# Patient Record
Sex: MALE | Race: WHITE | Employment: OTHER | URBAN - METROPOLITAN AREA
[De-identification: names, ages, dates, MRNs, and addresses within clinical notes are randomized per-mention and may not be internally consistent; named-entity substitution may affect disease eponyms.]

---

## 2017-05-23 ENCOUNTER — OFFICE VISIT (OUTPATIENT)
Dept: CARDIOLOGY | Age: 72
End: 2017-05-23

## 2017-05-23 VITALS
SYSTOLIC BLOOD PRESSURE: 144 MMHG | HEIGHT: 70 IN | OXYGEN SATURATION: 98 % | TEMPERATURE: 97.7 F | DIASTOLIC BLOOD PRESSURE: 85 MMHG | RESPIRATION RATE: 18 BRPM | HEART RATE: 62 BPM | BODY MASS INDEX: 26.34 KG/M2 | WEIGHT: 184 LBS

## 2017-05-23 DIAGNOSIS — Z95.5 HISTORY OF CORONARY ARTERY STENT PLACEMENT: ICD-10-CM

## 2017-05-23 DIAGNOSIS — I73.9 PAD (PERIPHERAL ARTERY DISEASE) (HCC): ICD-10-CM

## 2017-05-23 DIAGNOSIS — Z78.9 STATIN INTOLERANCE: ICD-10-CM

## 2017-05-23 DIAGNOSIS — I25.119 CORONARY ARTERY DISEASE INVOLVING NATIVE CORONARY ARTERY OF NATIVE HEART WITH ANGINA PECTORIS (HCC): Primary | ICD-10-CM

## 2017-05-23 DIAGNOSIS — Z98.62 HISTORY OF ANGIOPLASTY OF PERIPHERAL VESSEL: ICD-10-CM

## 2017-05-23 DIAGNOSIS — Z87.891 SMOKING HISTORY: ICD-10-CM

## 2017-05-23 PROCEDURE — 3017F COLORECTAL CA SCREEN DOC REV: CPT | Performed by: INTERNAL MEDICINE

## 2017-05-23 PROCEDURE — G8420 CALC BMI NORM PARAMETERS: HCPCS | Performed by: INTERNAL MEDICINE

## 2017-05-23 PROCEDURE — 1123F ACP DISCUSS/DSCN MKR DOCD: CPT | Performed by: INTERNAL MEDICINE

## 2017-05-23 PROCEDURE — G8599 NO ASA/ANTIPLAT THER USE RNG: HCPCS | Performed by: INTERNAL MEDICINE

## 2017-05-23 PROCEDURE — 4040F PNEUMOC VAC/ADMIN/RCVD: CPT | Performed by: INTERNAL MEDICINE

## 2017-05-23 PROCEDURE — G8427 DOCREV CUR MEDS BY ELIG CLIN: HCPCS | Performed by: INTERNAL MEDICINE

## 2017-05-23 PROCEDURE — 99214 OFFICE O/P EST MOD 30 MIN: CPT | Performed by: INTERNAL MEDICINE

## 2017-05-23 PROCEDURE — 1036F TOBACCO NON-USER: CPT | Performed by: INTERNAL MEDICINE

## 2017-05-23 PROCEDURE — 93000 ELECTROCARDIOGRAM COMPLETE: CPT | Performed by: INTERNAL MEDICINE

## 2017-05-25 ENCOUNTER — HOSPITAL ENCOUNTER (OUTPATIENT)
Dept: GENERAL RADIOLOGY | Age: 72
Discharge: HOME | End: 2017-05-25
Payer: MEDICARE

## 2017-05-25 DIAGNOSIS — Z87.891 SMOKING HISTORY: ICD-10-CM

## 2017-05-25 PROCEDURE — 71020 XR CHEST STANDARD TWO VW: CPT

## 2017-05-30 DIAGNOSIS — R97.20 ELEVATED PSA: ICD-10-CM

## 2017-05-30 DIAGNOSIS — R97.20 ELEVATED PSA: Primary | ICD-10-CM

## 2017-05-30 LAB — PROSTATE SPECIFIC ANTIGEN: 4.07 NG/ML (ref 0–6.22)

## 2017-06-09 ASSESSMENT — ENCOUNTER SYMPTOMS
SHORTNESS OF BREATH: 0
EYES NEGATIVE: 1
CHEST TIGHTNESS: 0
ALLERGIC/IMMUNOLOGIC NEGATIVE: 1
GASTROINTESTINAL NEGATIVE: 1

## 2017-06-12 RX ORDER — ATENOLOL 50 MG/1
TABLET ORAL
Qty: 90 TABLET | Refills: 3 | Status: SHIPPED | OUTPATIENT
Start: 2017-06-12 | End: 2017-07-21 | Stop reason: SDUPTHER

## 2017-06-15 ENCOUNTER — OFFICE VISIT (OUTPATIENT)
Dept: FAMILY MEDICINE CLINIC | Age: 72
End: 2017-06-15

## 2017-06-15 VITALS
TEMPERATURE: 97.8 F | HEIGHT: 71 IN | BODY MASS INDEX: 25.62 KG/M2 | RESPIRATION RATE: 16 BRPM | DIASTOLIC BLOOD PRESSURE: 82 MMHG | SYSTOLIC BLOOD PRESSURE: 130 MMHG | OXYGEN SATURATION: 97 % | WEIGHT: 183 LBS | HEART RATE: 71 BPM

## 2017-06-15 DIAGNOSIS — Z00.00 MEDICARE ANNUAL WELLNESS VISIT, SUBSEQUENT: Primary | ICD-10-CM

## 2017-06-15 DIAGNOSIS — E78.5 HYPERLIPIDEMIA, UNSPECIFIED HYPERLIPIDEMIA TYPE: Chronic | ICD-10-CM

## 2017-06-15 DIAGNOSIS — I10 ESSENTIAL HYPERTENSION: Chronic | ICD-10-CM

## 2017-06-15 DIAGNOSIS — I73.9 PVD (PERIPHERAL VASCULAR DISEASE) (HCC): ICD-10-CM

## 2017-06-15 DIAGNOSIS — Z95.5 HISTORY OF PLACEMENT OF STENT IN LAD CORONARY ARTERY: ICD-10-CM

## 2017-06-15 DIAGNOSIS — I73.9 PERIPHERAL ARTERIAL DISEASE (HCC): ICD-10-CM

## 2017-06-15 PROCEDURE — G8598 ASA/ANTIPLAT THER USED: HCPCS | Performed by: FAMILY MEDICINE

## 2017-06-15 PROCEDURE — G0439 PPPS, SUBSEQ VISIT: HCPCS | Performed by: FAMILY MEDICINE

## 2017-06-15 ASSESSMENT — LIFESTYLE VARIABLES
AUDIT TOTAL SCORE: 3
HAS A RELATIVE, FRIEND, DOCTOR, OR ANOTHER HEALTH PROFESSIONAL EXPRESSED CONCERN ABOUT YOUR DRINKING OR SUGGESTED YOU CUT DOWN: 0
HOW OFTEN DURING THE LAST YEAR HAVE YOU HAD A FEELING OF GUILT OR REMORSE AFTER DRINKING: 0
HOW OFTEN DURING THE LAST YEAR HAVE YOU NEEDED AN ALCOHOLIC DRINK FIRST THING IN THE MORNING TO GET YOURSELF GOING AFTER A NIGHT OF HEAVY DRINKING: 0
HOW MANY STANDARD DRINKS CONTAINING ALCOHOL DO YOU HAVE ON A TYPICAL DAY: 0
HOW OFTEN DO YOU HAVE SIX OR MORE DRINKS ON ONE OCCASION: 0
HOW OFTEN DURING THE LAST YEAR HAVE YOU FAILED TO DO WHAT WAS NORMALLY EXPECTED FROM YOU BECAUSE OF DRINKING: 0
HAVE YOU OR SOMEONE ELSE BEEN INJURED AS A RESULT OF YOUR DRINKING: 0
HOW OFTEN DO YOU HAVE A DRINK CONTAINING ALCOHOL: 3
HOW OFTEN DURING THE LAST YEAR HAVE YOU BEEN UNABLE TO REMEMBER WHAT HAPPENED THE NIGHT BEFORE BECAUSE YOU HAD BEEN DRINKING: 0
AUDIT-C TOTAL SCORE: 3
HOW OFTEN DURING THE LAST YEAR HAVE YOU FOUND THAT YOU WERE NOT ABLE TO STOP DRINKING ONCE YOU HAD STARTED: 0

## 2017-06-15 ASSESSMENT — ENCOUNTER SYMPTOMS
RESPIRATORY NEGATIVE: 1
SHORTNESS OF BREATH: 0
GASTROINTESTINAL NEGATIVE: 1
ALLERGIC/IMMUNOLOGIC NEGATIVE: 1
EYES NEGATIVE: 1

## 2017-06-15 ASSESSMENT — PATIENT HEALTH QUESTIONNAIRE - PHQ9: SUM OF ALL RESPONSES TO PHQ QUESTIONS 1-9: 0

## 2017-06-15 ASSESSMENT — ANXIETY QUESTIONNAIRES: GAD7 TOTAL SCORE: 0

## 2017-07-21 DIAGNOSIS — I10 ESSENTIAL HYPERTENSION: Primary | Chronic | ICD-10-CM

## 2017-07-21 DIAGNOSIS — E78.5 HYPERLIPIDEMIA, UNSPECIFIED HYPERLIPIDEMIA TYPE: Chronic | ICD-10-CM

## 2017-07-21 DIAGNOSIS — I25.10 CORONARY ARTERY DISEASE INVOLVING NATIVE CORONARY ARTERY OF NATIVE HEART WITHOUT ANGINA PECTORIS: Chronic | ICD-10-CM

## 2017-07-21 RX ORDER — LOVASTATIN 10 MG/1
TABLET ORAL
Qty: 90 TABLET | Refills: 3 | Status: SHIPPED | OUTPATIENT
Start: 2017-07-21 | End: 2018-08-21 | Stop reason: SDUPTHER

## 2017-07-21 RX ORDER — ATENOLOL 50 MG/1
50 TABLET ORAL DAILY
Qty: 90 TABLET | Refills: 3 | Status: SHIPPED | OUTPATIENT
Start: 2017-07-21 | End: 2018-05-31 | Stop reason: ALTCHOICE

## 2017-09-20 ENCOUNTER — OFFICE VISIT (OUTPATIENT)
Dept: FAMILY MEDICINE CLINIC | Age: 72
End: 2017-09-20

## 2017-09-20 VITALS
WEIGHT: 179 LBS | OXYGEN SATURATION: 97 % | TEMPERATURE: 98.1 F | RESPIRATION RATE: 18 BRPM | HEIGHT: 71 IN | DIASTOLIC BLOOD PRESSURE: 86 MMHG | BODY MASS INDEX: 25.06 KG/M2 | HEART RATE: 74 BPM | SYSTOLIC BLOOD PRESSURE: 138 MMHG

## 2017-09-20 DIAGNOSIS — M25.561 ARTHRALGIA OF KNEE, RIGHT: Primary | ICD-10-CM

## 2017-09-20 PROCEDURE — 3017F COLORECTAL CA SCREEN DOC REV: CPT | Performed by: FAMILY MEDICINE

## 2017-09-20 PROCEDURE — G8427 DOCREV CUR MEDS BY ELIG CLIN: HCPCS | Performed by: FAMILY MEDICINE

## 2017-09-20 PROCEDURE — 1123F ACP DISCUSS/DSCN MKR DOCD: CPT | Performed by: FAMILY MEDICINE

## 2017-09-20 PROCEDURE — G8598 ASA/ANTIPLAT THER USED: HCPCS | Performed by: FAMILY MEDICINE

## 2017-09-20 PROCEDURE — 1036F TOBACCO NON-USER: CPT | Performed by: FAMILY MEDICINE

## 2017-09-20 PROCEDURE — G8420 CALC BMI NORM PARAMETERS: HCPCS | Performed by: FAMILY MEDICINE

## 2017-09-20 PROCEDURE — 99213 OFFICE O/P EST LOW 20 MIN: CPT | Performed by: FAMILY MEDICINE

## 2017-09-20 PROCEDURE — 4040F PNEUMOC VAC/ADMIN/RCVD: CPT | Performed by: FAMILY MEDICINE

## 2017-09-20 ASSESSMENT — ENCOUNTER SYMPTOMS
RESPIRATORY NEGATIVE: 1
ALLERGIC/IMMUNOLOGIC NEGATIVE: 1
EYES NEGATIVE: 1
GASTROINTESTINAL NEGATIVE: 1
SHORTNESS OF BREATH: 0

## 2017-10-05 RX ORDER — METOPROLOL SUCCINATE 50 MG/1
50 TABLET, EXTENDED RELEASE ORAL DAILY
Qty: 90 TABLET | Refills: 3 | Status: SHIPPED | OUTPATIENT
Start: 2017-10-05 | End: 2018-06-07 | Stop reason: SDUPTHER

## 2017-11-16 ENCOUNTER — OFFICE VISIT (OUTPATIENT)
Dept: FAMILY MEDICINE CLINIC | Age: 72
End: 2017-11-16

## 2017-11-16 VITALS
SYSTOLIC BLOOD PRESSURE: 138 MMHG | DIASTOLIC BLOOD PRESSURE: 88 MMHG | HEIGHT: 71 IN | RESPIRATION RATE: 18 BRPM | TEMPERATURE: 98.1 F | HEART RATE: 72 BPM | BODY MASS INDEX: 24.08 KG/M2 | OXYGEN SATURATION: 97 % | WEIGHT: 172 LBS

## 2017-11-16 DIAGNOSIS — E78.5 HYPERLIPIDEMIA, UNSPECIFIED HYPERLIPIDEMIA TYPE: Chronic | ICD-10-CM

## 2017-11-16 DIAGNOSIS — Z12.5 PROSTATE CANCER SCREENING: ICD-10-CM

## 2017-11-16 DIAGNOSIS — I10 ESSENTIAL HYPERTENSION: Primary | Chronic | ICD-10-CM

## 2017-11-16 DIAGNOSIS — I10 ESSENTIAL HYPERTENSION: Chronic | ICD-10-CM

## 2017-11-16 DIAGNOSIS — I25.10 CORONARY ARTERY DISEASE INVOLVING NATIVE CORONARY ARTERY OF NATIVE HEART WITHOUT ANGINA PECTORIS: Chronic | ICD-10-CM

## 2017-11-16 LAB
ACANTHOCYTES: 0
ALBUMIN SERPL-MCNC: 4.6 G/DL (ref 3.9–4.9)
ALP BLD-CCNC: 63 U/L (ref 35–104)
ALT SERPL-CCNC: 19 U/L (ref 0–41)
ANION GAP SERPL CALCULATED.3IONS-SCNC: 14 MEQ/L (ref 7–13)
ANISOCYTOSIS: 0
AST SERPL-CCNC: 23 U/L (ref 0–40)
AUER RODS: 0
BANDED NEUTROPHILS RELATIVE PERCENT: 1 %
BASOPHILIC STIPPLING: 0
BASOPHILS ABSOLUTE: 0 K/UL (ref 0–0.2)
BASOPHILS RELATIVE PERCENT: 0.9 %
BILIRUB SERPL-MCNC: 0.4 MG/DL (ref 0–1.2)
BUN BLDV-MCNC: 15 MG/DL (ref 8–23)
BURR CELLS: 0
CABOT RINGS: 0
CALCIUM SERPL-MCNC: 9.1 MG/DL (ref 8.6–10.2)
CHLORIDE BLD-SCNC: 103 MEQ/L (ref 98–107)
CHOLESTEROL, TOTAL: 183 MG/DL (ref 0–199)
CO2: 28 MEQ/L (ref 22–29)
CREAT SERPL-MCNC: 0.97 MG/DL (ref 0.7–1.2)
DOHLE BODIES: 0
EOSINOPHILS ABSOLUTE: 0.2 K/UL (ref 0–0.7)
EOSINOPHILS RELATIVE PERCENT: 3 %
GFR AFRICAN AMERICAN: >60
GFR NON-AFRICAN AMERICAN: >60
GLOBULIN: 2.4 G/DL (ref 2.3–3.5)
GLUCOSE BLD-MCNC: 81 MG/DL (ref 74–109)
HAIRY CELLS: 0
HCT VFR BLD CALC: 43.6 % (ref 42–52)
HDLC SERPL-MCNC: 57 MG/DL (ref 40–59)
HEMOGLOBIN: 14.7 G/DL (ref 14–18)
HOWELL-JOLLY BODIES: 0
HYPERSEGMENTED NEUTROPHILS: 0
HYPOCHROMIA: 0
LDL CHOLESTEROL CALCULATED: 87 MG/DL (ref 0–129)
LYMPHOCYTES ABSOLUTE: 0.7 K/UL (ref 1–4.8)
LYMPHOCYTES RELATIVE PERCENT: 11 %
MACROCYTES: 0
MCH RBC QN AUTO: 29.7 PG (ref 27–31.3)
MCHC RBC AUTO-ENTMCNC: 33.7 % (ref 33–37)
MCV RBC AUTO: 88.2 FL (ref 80–100)
METAMYELOCYTES RELATIVE PERCENT: 1 %
MICROCYTES: 0
MONOCYTES ABSOLUTE: 0.2 K/UL (ref 0.2–0.8)
MONOCYTES RELATIVE PERCENT: 3.9 %
NEUTROPHILS ABSOLUTE: 5.1 K/UL (ref 1.4–6.5)
NEUTROPHILS RELATIVE PERCENT: 81 %
OVALOCYTES: 0
PAPPENHEIMER BODIES: 0
PDW BLD-RTO: 13 % (ref 11.5–14.5)
PELGER HUET CELLS: 0 %
PLATELET # BLD: 202 K/UL (ref 130–400)
PLATELET SLIDE REVIEW: ADEQUATE
POIKILOCYTES: 0
POLYCHROMASIA: 0
POTASSIUM SERPL-SCNC: 4.6 MEQ/L (ref 3.5–5.1)
PROSTATE SPECIFIC ANTIGEN: 4.25 NG/ML (ref 0–6.22)
RBC # BLD: 4.95 M/UL (ref 4.7–6.1)
RBC # BLD: NORMAL 10*6/UL
SCHISTOCYTES: 0
SICKLE CELLS: 0
SMUDGE CELLS: 3.9
SODIUM BLD-SCNC: 145 MEQ/L (ref 132–144)
SPHEROCYTES: 0
STOMATOCYTES: 0
TARGET CELLS: 0
TEAR DROP CELLS: 0
TOTAL PROTEIN: 7 G/DL (ref 6.4–8.1)
TOXIC GRANULATION: 0
TRIGL SERPL-MCNC: 197 MG/DL (ref 0–200)
VACUOLATED NEUTROPHILS: 0
WBC # BLD: 6.2 K/UL (ref 4.8–10.8)

## 2017-11-16 PROCEDURE — 1123F ACP DISCUSS/DSCN MKR DOCD: CPT | Performed by: FAMILY MEDICINE

## 2017-11-16 PROCEDURE — G8598 ASA/ANTIPLAT THER USED: HCPCS | Performed by: FAMILY MEDICINE

## 2017-11-16 PROCEDURE — G8427 DOCREV CUR MEDS BY ELIG CLIN: HCPCS | Performed by: FAMILY MEDICINE

## 2017-11-16 PROCEDURE — G8420 CALC BMI NORM PARAMETERS: HCPCS | Performed by: FAMILY MEDICINE

## 2017-11-16 PROCEDURE — 3017F COLORECTAL CA SCREEN DOC REV: CPT | Performed by: FAMILY MEDICINE

## 2017-11-16 PROCEDURE — 99214 OFFICE O/P EST MOD 30 MIN: CPT | Performed by: FAMILY MEDICINE

## 2017-11-16 PROCEDURE — 4040F PNEUMOC VAC/ADMIN/RCVD: CPT | Performed by: FAMILY MEDICINE

## 2017-11-16 PROCEDURE — G8484 FLU IMMUNIZE NO ADMIN: HCPCS | Performed by: FAMILY MEDICINE

## 2017-11-16 PROCEDURE — 1036F TOBACCO NON-USER: CPT | Performed by: FAMILY MEDICINE

## 2017-11-16 ASSESSMENT — ENCOUNTER SYMPTOMS
ALLERGIC/IMMUNOLOGIC NEGATIVE: 1
SHORTNESS OF BREATH: 0
RESPIRATORY NEGATIVE: 1
EYES NEGATIVE: 1
GASTROINTESTINAL NEGATIVE: 1

## 2017-11-16 NOTE — PROGRESS NOTES
Number of children: N/A    Years of education: N/A     Social History Main Topics    Smoking status: Former Smoker     Packs/day: 1.00     Years: 36.00     Start date: 7/1/1967     Quit date: 7/1/2003    Smokeless tobacco: Never Used    Alcohol use Yes     5 Glasses of wine, 2 Cans of beer per week      Comment: 3- 4 TIMES A WEEK  WINE ONLY     Drug use: No    Sexual activity: Not Asked     Other Topics Concern    None     Social History Narrative    None     Current Outpatient Prescriptions   Medication Sig Dispense Refill    metoprolol succinate (TOPROL XL) 50 MG extended release tablet Take 1 tablet by mouth daily 90 tablet 3    lovastatin (MEVACOR) 10 MG tablet Take 1 tablet by mouth  nightly 90 tablet 3    atenolol (TENORMIN) 50 MG tablet Take 1 tablet by mouth daily 90 tablet 3    aspirin 81 MG tablet Take 81 mg by mouth daily      tadalafil (CIALIS) 20 MG tablet Take 1 tablet by mouth as needed for Erectile Dysfunction. 10 tablet 3     No current facility-administered medications for this visit. Current Outpatient Prescriptions on File Prior to Visit   Medication Sig Dispense Refill    metoprolol succinate (TOPROL XL) 50 MG extended release tablet Take 1 tablet by mouth daily 90 tablet 3    lovastatin (MEVACOR) 10 MG tablet Take 1 tablet by mouth  nightly 90 tablet 3    atenolol (TENORMIN) 50 MG tablet Take 1 tablet by mouth daily 90 tablet 3    aspirin 81 MG tablet Take 81 mg by mouth daily      tadalafil (CIALIS) 20 MG tablet Take 1 tablet by mouth as needed for Erectile Dysfunction. 10 tablet 3     No current facility-administered medications on file prior to visit.       Allergies   Allergen Reactions    Lisinopril Anaphylaxis and Swelling    Crestor [Rosuvastatin]     Pravachol [Pravastatin] Other (See Comments)     Joint / Muscle aches     Health Maintenance   Topic Date Due    AAA screen  1945    DTaP/Tdap/Td vaccine (1 - Tdap) 04/14/1964    Smoker: low dose lung CT screening  04/14/2000    Lipid screen  11/11/2021    Colon cancer screen colonoscopy  10/19/2022    Zostavax vaccine  Completed    Flu vaccine  Completed    Pneumococcal low/med risk  Completed    Hepatitis C screen  Completed       Review of Systems    Review of Systems   Constitutional: Negative for activity change, appetite change, chills, fever and unexpected weight change. HENT: Negative. Eyes: Negative. Respiratory: Negative. Negative for shortness of breath. Cardiovascular: Negative. Negative for chest pain and palpitations. Gastrointestinal: Negative. Endocrine: Negative. Genitourinary: Negative. Musculoskeletal: Negative. Skin: Negative. Allergic/Immunologic: Negative. Neurological: Negative. Hematological: Negative. Psychiatric/Behavioral: Negative. Physical Exam  Vitals:    11/16/17 1302   BP: 138/88   Pulse: 72   Resp: 18   Temp: 98.1 °F (36.7 °C)   TempSrc: Tympanic   SpO2: 97%   Weight: 172 lb (78 kg)   Height: 5' 11\" (1.803 m)       Physical Exam   Constitutional: He appears well-developed and well-nourished. HENT:   Right Ear: External ear normal.   Left Ear: External ear normal.   Eyes: Conjunctivae are normal. Pupils are equal, round, and reactive to light. Neck: Normal range of motion. Neck supple. No thyromegaly present. Cardiovascular: Normal rate, regular rhythm and normal heart sounds. No murmur heard. Pulmonary/Chest: Effort normal and breath sounds normal.   Abdominal: Soft. Bowel sounds are normal.   Musculoskeletal: Normal range of motion. He exhibits no edema or tenderness. Lymphadenopathy:     He has no cervical adenopathy. Assessment  1. Essential hypertension  CBC Auto Differential    Comprehensive Metabolic Panel    Lipid Panel   2. Coronary artery disease involving native coronary artery of native heart without angina pectoris     3. Hyperlipidemia, unspecified hyperlipidemia type     4.  Prostate cancer screening Psa screening     Problem List     Hypertension - Primary (Chronic)    Relevant Medications    tadalafil (CIALIS) 20 MG tablet    aspirin 81 MG tablet    lovastatin (MEVACOR) 10 MG tablet    atenolol (TENORMIN) 50 MG tablet    metoprolol succinate (TOPROL XL) 50 MG extended release tablet    Other Relevant Orders    CBC Auto Differential    Comprehensive Metabolic Panel    Lipid Panel    CAD (coronary artery disease) (Chronic)    Relevant Medications    tadalafil (CIALIS) 20 MG tablet    aspirin 81 MG tablet    lovastatin (MEVACOR) 10 MG tablet    atenolol (TENORMIN) 50 MG tablet    metoprolol succinate (TOPROL XL) 50 MG extended release tablet    Hyperlipidemia (Chronic)    Relevant Medications    tadalafil (CIALIS) 20 MG tablet    aspirin 81 MG tablet    lovastatin (MEVACOR) 10 MG tablet    atenolol (TENORMIN) 50 MG tablet    metoprolol succinate (TOPROL XL) 50 MG extended release tablet          Plan  Orders Placed This Encounter   Procedures    CBC Auto Differential     Standing Status:   Future     Standing Expiration Date:   11/16/2018    Comprehensive Metabolic Panel     Standing Status:   Future     Standing Expiration Date:   11/16/2018    Lipid Panel     Standing Status:   Future     Standing Expiration Date:   11/16/2018     Order Specific Question:   Is Patient Fasting?/# of Hours     Answer:   8    Psa screening     Standing Status:   Future     Standing Expiration Date:   11/16/2018     No orders of the defined types were placed in this encounter. Follow up in 6 months.   Eileen Payton MD

## 2018-05-31 ENCOUNTER — OFFICE VISIT (OUTPATIENT)
Dept: INTERNAL MEDICINE CLINIC | Age: 73
End: 2018-05-31
Payer: MEDICARE

## 2018-05-31 VITALS
HEIGHT: 71 IN | DIASTOLIC BLOOD PRESSURE: 82 MMHG | RESPIRATION RATE: 12 BRPM | OXYGEN SATURATION: 95 % | SYSTOLIC BLOOD PRESSURE: 136 MMHG | TEMPERATURE: 98.1 F | HEART RATE: 72 BPM | BODY MASS INDEX: 24.64 KG/M2 | WEIGHT: 176 LBS

## 2018-05-31 DIAGNOSIS — Z13.6 SCREENING FOR AAA (ABDOMINAL AORTIC ANEURYSM): ICD-10-CM

## 2018-05-31 DIAGNOSIS — D22.9 ATYPICAL MOLE: Primary | ICD-10-CM

## 2018-05-31 DIAGNOSIS — Z23 NEED FOR SHINGLES VACCINE: ICD-10-CM

## 2018-05-31 DIAGNOSIS — I73.9 PERIPHERAL ARTERIAL DISEASE (HCC): ICD-10-CM

## 2018-05-31 DIAGNOSIS — Z87.891 PERSONAL HISTORY OF TOBACCO USE: ICD-10-CM

## 2018-05-31 DIAGNOSIS — I73.9 PVD (PERIPHERAL VASCULAR DISEASE) (HCC): ICD-10-CM

## 2018-05-31 PROCEDURE — 1036F TOBACCO NON-USER: CPT | Performed by: FAMILY MEDICINE

## 2018-05-31 PROCEDURE — 99213 OFFICE O/P EST LOW 20 MIN: CPT | Performed by: FAMILY MEDICINE

## 2018-05-31 PROCEDURE — 3017F COLORECTAL CA SCREEN DOC REV: CPT | Performed by: FAMILY MEDICINE

## 2018-05-31 PROCEDURE — G8598 ASA/ANTIPLAT THER USED: HCPCS | Performed by: FAMILY MEDICINE

## 2018-05-31 PROCEDURE — G0296 VISIT TO DETERM LDCT ELIG: HCPCS | Performed by: FAMILY MEDICINE

## 2018-05-31 PROCEDURE — G8427 DOCREV CUR MEDS BY ELIG CLIN: HCPCS | Performed by: FAMILY MEDICINE

## 2018-05-31 PROCEDURE — G8420 CALC BMI NORM PARAMETERS: HCPCS | Performed by: FAMILY MEDICINE

## 2018-05-31 PROCEDURE — 4040F PNEUMOC VAC/ADMIN/RCVD: CPT | Performed by: FAMILY MEDICINE

## 2018-05-31 PROCEDURE — 1123F ACP DISCUSS/DSCN MKR DOCD: CPT | Performed by: FAMILY MEDICINE

## 2018-05-31 ASSESSMENT — ENCOUNTER SYMPTOMS
RESPIRATORY NEGATIVE: 1
GASTROINTESTINAL NEGATIVE: 1
EYES NEGATIVE: 1
ALLERGIC/IMMUNOLOGIC NEGATIVE: 1
SHORTNESS OF BREATH: 0

## 2018-06-07 RX ORDER — METOPROLOL SUCCINATE 50 MG/1
50 TABLET, EXTENDED RELEASE ORAL DAILY
Qty: 90 TABLET | Refills: 1 | Status: SHIPPED | OUTPATIENT
Start: 2018-06-07 | End: 2018-10-08 | Stop reason: SDUPTHER

## 2018-06-08 ENCOUNTER — TELEPHONE (OUTPATIENT)
Dept: CASE MANAGEMENT | Age: 73
End: 2018-06-08

## 2018-06-15 ENCOUNTER — OFFICE VISIT (OUTPATIENT)
Dept: CARDIOLOGY CLINIC | Age: 73
End: 2018-06-15
Payer: MEDICARE

## 2018-06-15 VITALS
WEIGHT: 175 LBS | HEIGHT: 71 IN | RESPIRATION RATE: 16 BRPM | BODY MASS INDEX: 24.5 KG/M2 | DIASTOLIC BLOOD PRESSURE: 70 MMHG | SYSTOLIC BLOOD PRESSURE: 122 MMHG | OXYGEN SATURATION: 99 % | HEART RATE: 75 BPM

## 2018-06-15 DIAGNOSIS — I73.9 PAD (PERIPHERAL ARTERY DISEASE) (HCC): Primary | ICD-10-CM

## 2018-06-15 DIAGNOSIS — R09.89 BRUIT OF RIGHT CAROTID ARTERY: ICD-10-CM

## 2018-06-15 DIAGNOSIS — Z95.5 HISTORY OF PLACEMENT OF STENT IN LAD CORONARY ARTERY: ICD-10-CM

## 2018-06-15 DIAGNOSIS — I25.10 CORONARY ARTERY DISEASE INVOLVING NATIVE CORONARY ARTERY OF NATIVE HEART WITHOUT ANGINA PECTORIS: Chronic | ICD-10-CM

## 2018-06-15 DIAGNOSIS — I10 ESSENTIAL HYPERTENSION: Chronic | ICD-10-CM

## 2018-06-15 DIAGNOSIS — E78.5 HYPERLIPIDEMIA, UNSPECIFIED HYPERLIPIDEMIA TYPE: Chronic | ICD-10-CM

## 2018-06-15 PROCEDURE — 3017F COLORECTAL CA SCREEN DOC REV: CPT | Performed by: INTERNAL MEDICINE

## 2018-06-15 PROCEDURE — 93000 ELECTROCARDIOGRAM COMPLETE: CPT | Performed by: INTERNAL MEDICINE

## 2018-06-15 PROCEDURE — G8427 DOCREV CUR MEDS BY ELIG CLIN: HCPCS | Performed by: INTERNAL MEDICINE

## 2018-06-15 PROCEDURE — 4040F PNEUMOC VAC/ADMIN/RCVD: CPT | Performed by: INTERNAL MEDICINE

## 2018-06-15 PROCEDURE — 99214 OFFICE O/P EST MOD 30 MIN: CPT | Performed by: INTERNAL MEDICINE

## 2018-06-15 PROCEDURE — G8598 ASA/ANTIPLAT THER USED: HCPCS | Performed by: INTERNAL MEDICINE

## 2018-06-15 PROCEDURE — 1123F ACP DISCUSS/DSCN MKR DOCD: CPT | Performed by: INTERNAL MEDICINE

## 2018-06-15 PROCEDURE — G8420 CALC BMI NORM PARAMETERS: HCPCS | Performed by: INTERNAL MEDICINE

## 2018-06-15 PROCEDURE — 1036F TOBACCO NON-USER: CPT | Performed by: INTERNAL MEDICINE

## 2018-06-15 ASSESSMENT — ENCOUNTER SYMPTOMS
STRIDOR: 0
WHEEZING: 0
SHORTNESS OF BREATH: 0
NAUSEA: 0
GASTROINTESTINAL NEGATIVE: 1
EYES NEGATIVE: 1
RESPIRATORY NEGATIVE: 1
BLOOD IN STOOL: 0
CHEST TIGHTNESS: 0
COUGH: 0

## 2018-06-19 ENCOUNTER — HOSPITAL ENCOUNTER (OUTPATIENT)
Dept: CT IMAGING | Age: 73
Discharge: HOME | End: 2018-06-21
Payer: MEDICARE

## 2018-06-19 ENCOUNTER — HOSPITAL ENCOUNTER (OUTPATIENT)
Dept: ULTRASOUND IMAGING | Age: 73
Discharge: HOME | End: 2018-06-21
Payer: MEDICARE

## 2018-06-19 DIAGNOSIS — Z13.6 SCREENING FOR AAA (ABDOMINAL AORTIC ANEURYSM): ICD-10-CM

## 2018-06-19 DIAGNOSIS — R09.89 BRUIT: ICD-10-CM

## 2018-06-19 DIAGNOSIS — Z87.891 PERSONAL HISTORY OF TOBACCO USE: ICD-10-CM

## 2018-06-19 DIAGNOSIS — R09.89 BRUIT OF RIGHT CAROTID ARTERY: ICD-10-CM

## 2018-06-19 PROCEDURE — 93880 EXTRACRANIAL BILAT STUDY: CPT

## 2018-06-19 PROCEDURE — G0297 LDCT FOR LUNG CA SCREEN: HCPCS

## 2018-06-19 PROCEDURE — 76706 US ABDL AORTA SCREEN AAA: CPT

## 2018-06-21 ENCOUNTER — TELEPHONE (OUTPATIENT)
Dept: CARDIOLOGY CLINIC | Age: 73
End: 2018-06-21

## 2018-06-28 ENCOUNTER — OFFICE VISIT (OUTPATIENT)
Dept: INTERNAL MEDICINE CLINIC | Age: 73
End: 2018-06-28
Payer: MEDICARE

## 2018-06-28 VITALS
WEIGHT: 177 LBS | OXYGEN SATURATION: 98 % | DIASTOLIC BLOOD PRESSURE: 80 MMHG | RESPIRATION RATE: 16 BRPM | SYSTOLIC BLOOD PRESSURE: 130 MMHG | TEMPERATURE: 97.7 F | HEIGHT: 71 IN | BODY MASS INDEX: 24.78 KG/M2 | HEART RATE: 68 BPM

## 2018-06-28 DIAGNOSIS — I25.10 CORONARY ARTERY DISEASE INVOLVING NATIVE CORONARY ARTERY OF NATIVE HEART WITHOUT ANGINA PECTORIS: Chronic | ICD-10-CM

## 2018-06-28 DIAGNOSIS — I73.9 PVD (PERIPHERAL VASCULAR DISEASE) (HCC): Primary | ICD-10-CM

## 2018-06-28 DIAGNOSIS — I10 ESSENTIAL HYPERTENSION: Chronic | ICD-10-CM

## 2018-06-28 PROCEDURE — 4040F PNEUMOC VAC/ADMIN/RCVD: CPT | Performed by: FAMILY MEDICINE

## 2018-06-28 PROCEDURE — G8598 ASA/ANTIPLAT THER USED: HCPCS | Performed by: FAMILY MEDICINE

## 2018-06-28 PROCEDURE — 1123F ACP DISCUSS/DSCN MKR DOCD: CPT | Performed by: FAMILY MEDICINE

## 2018-06-28 PROCEDURE — G8427 DOCREV CUR MEDS BY ELIG CLIN: HCPCS | Performed by: FAMILY MEDICINE

## 2018-06-28 PROCEDURE — G8420 CALC BMI NORM PARAMETERS: HCPCS | Performed by: FAMILY MEDICINE

## 2018-06-28 PROCEDURE — 99214 OFFICE O/P EST MOD 30 MIN: CPT | Performed by: FAMILY MEDICINE

## 2018-06-28 PROCEDURE — 3288F FALL RISK ASSESSMENT DOCD: CPT | Performed by: FAMILY MEDICINE

## 2018-06-28 PROCEDURE — 1036F TOBACCO NON-USER: CPT | Performed by: FAMILY MEDICINE

## 2018-06-28 PROCEDURE — G8510 SCR DEP NEG, NO PLAN REQD: HCPCS | Performed by: FAMILY MEDICINE

## 2018-06-28 PROCEDURE — 3017F COLORECTAL CA SCREEN DOC REV: CPT | Performed by: FAMILY MEDICINE

## 2018-06-28 ASSESSMENT — ENCOUNTER SYMPTOMS
ALLERGIC/IMMUNOLOGIC NEGATIVE: 1
RESPIRATORY NEGATIVE: 1
SHORTNESS OF BREATH: 0
GASTROINTESTINAL NEGATIVE: 1
EYES NEGATIVE: 1

## 2018-06-28 ASSESSMENT — PATIENT HEALTH QUESTIONNAIRE - PHQ9
SUM OF ALL RESPONSES TO PHQ QUESTIONS 1-9: 0
2. FEELING DOWN, DEPRESSED OR HOPELESS: 0
1. LITTLE INTEREST OR PLEASURE IN DOING THINGS: 0
SUM OF ALL RESPONSES TO PHQ9 QUESTIONS 1 & 2: 0

## 2018-07-13 ENCOUNTER — PROCEDURE VISIT (OUTPATIENT)
Dept: INTERNAL MEDICINE CLINIC | Age: 73
End: 2018-07-13
Payer: MEDICARE

## 2018-07-13 VITALS
RESPIRATION RATE: 16 BRPM | DIASTOLIC BLOOD PRESSURE: 82 MMHG | SYSTOLIC BLOOD PRESSURE: 124 MMHG | HEART RATE: 70 BPM | WEIGHT: 177.2 LBS | OXYGEN SATURATION: 97 % | BODY MASS INDEX: 24.81 KG/M2 | TEMPERATURE: 98.4 F | HEIGHT: 71 IN

## 2018-07-13 DIAGNOSIS — R91.1 LUNG NODULE: Primary | ICD-10-CM

## 2018-07-13 DIAGNOSIS — D22.9 ATYPICAL MOLE: ICD-10-CM

## 2018-07-13 PROCEDURE — 11310 SHAVE SKIN LESION 0.5 CM/<: CPT | Performed by: FAMILY MEDICINE

## 2018-07-24 NOTE — PROGRESS NOTES
SUBJECTIVE:   Sree Leon is a 68 y.o. male who presents for lesion removal. We have already discussed this procedure, including option of not performing surgery, technique of surgery  risk benefit and possible complications including but not limited to potential for excessive scarring, recurrence, excessive bleeding and residual lesion at a recent visit       OBJECTIVE:   Patient appears well. /82 (Site: Left Arm, Position: Sitting, Cuff Size: Small Adult)   Pulse 70   Temp 98.4 °F (36.9 °C) (Oral)   Resp 16   Ht 5' 11\" (1.803 m)   Wt 177 lb 3.2 oz (80.4 kg)   SpO2 97%   BMI 24.71 kg/m²     Skin: 5 mm atypical mole     ASSESSMENT:   suspicious lesion pigment even, pigmented uneven, pearly edges, non ulcerated and hyperkeratotic size 5 mm noted on the right cheek    PLAN:   After informed consent was obtained, using Betadine and Alcohol for cleansing and 2% Lidocaine with epinephrine for anesthetic, with sterile technique, shave excision was performed. Antibiotic dressing is applied, and wound care instructions provided. Be alert for any signs of cutaneous infection. The procedure was well tolerated without complications. Follow up: the specimen is labeled and sent to pathology for evaluation.      Ct showed possible lung nodule referred to Dr. Nathan Reeves

## 2018-08-01 ENCOUNTER — OFFICE VISIT (OUTPATIENT)
Dept: PULMONOLOGY | Age: 73
End: 2018-08-01
Payer: MEDICARE

## 2018-08-01 VITALS
DIASTOLIC BLOOD PRESSURE: 80 MMHG | OXYGEN SATURATION: 98 % | SYSTOLIC BLOOD PRESSURE: 138 MMHG | WEIGHT: 175.6 LBS | HEART RATE: 67 BPM | TEMPERATURE: 98 F | HEIGHT: 71 IN | BODY MASS INDEX: 24.58 KG/M2

## 2018-08-01 DIAGNOSIS — Z87.891 HISTORY OF SMOKING: ICD-10-CM

## 2018-08-01 DIAGNOSIS — R91.1 LUNG NODULE: Primary | ICD-10-CM

## 2018-08-01 PROCEDURE — G8420 CALC BMI NORM PARAMETERS: HCPCS | Performed by: INTERNAL MEDICINE

## 2018-08-01 PROCEDURE — 99204 OFFICE O/P NEW MOD 45 MIN: CPT | Performed by: INTERNAL MEDICINE

## 2018-08-01 PROCEDURE — 1101F PT FALLS ASSESS-DOCD LE1/YR: CPT | Performed by: INTERNAL MEDICINE

## 2018-08-01 PROCEDURE — G8427 DOCREV CUR MEDS BY ELIG CLIN: HCPCS | Performed by: INTERNAL MEDICINE

## 2018-08-01 PROCEDURE — G8598 ASA/ANTIPLAT THER USED: HCPCS | Performed by: INTERNAL MEDICINE

## 2018-08-01 PROCEDURE — 4040F PNEUMOC VAC/ADMIN/RCVD: CPT | Performed by: INTERNAL MEDICINE

## 2018-08-01 PROCEDURE — 1036F TOBACCO NON-USER: CPT | Performed by: INTERNAL MEDICINE

## 2018-08-01 PROCEDURE — 1123F ACP DISCUSS/DSCN MKR DOCD: CPT | Performed by: INTERNAL MEDICINE

## 2018-08-01 PROCEDURE — 3017F COLORECTAL CA SCREEN DOC REV: CPT | Performed by: INTERNAL MEDICINE

## 2018-08-01 NOTE — PROGRESS NOTES
well-being.     Reviewed with the patient: current clinical status, medications, activities and diet.       treatment plan and result expectations have been discussed with the patient who expresses understanding and desires to proceed.         Return in about 6 weeks (around 9/12/2018).       Starr Sprague MD

## 2018-08-02 DIAGNOSIS — R91.1 LUNG NODULE: ICD-10-CM

## 2018-08-05 LAB
ASPERGILLUS ANTIBODY ID: ABNORMAL
BLASTOMYCES ANTIBODY ID: ABNORMAL
COCCIDIOIDES ANTIBODY ID: DETECTED
HISTOPLASMA ABS, ID: ABNORMAL

## 2018-08-06 ENCOUNTER — TELEPHONE (OUTPATIENT)
Dept: PULMONOLOGY | Age: 73
End: 2018-08-06

## 2018-08-06 NOTE — TELEPHONE ENCOUNTER
Adriano Dillard,  , called wanting to know the results of his blood work. His phone number is [phone].

## 2018-08-09 ENCOUNTER — HOSPITAL ENCOUNTER (OUTPATIENT)
Dept: PULMONOLOGY | Age: 73
Discharge: HOME | End: 2018-08-09
Payer: MEDICARE

## 2018-08-09 DIAGNOSIS — R91.1 LUNG NODULE: ICD-10-CM

## 2018-08-09 PROCEDURE — 94729 DIFFUSING CAPACITY: CPT | Performed by: INTERNAL MEDICINE

## 2018-08-09 PROCEDURE — 94726 PLETHYSMOGRAPHY LUNG VOLUMES: CPT

## 2018-08-09 PROCEDURE — 94010 BREATHING CAPACITY TEST: CPT

## 2018-08-09 PROCEDURE — 94060 EVALUATION OF WHEEZING: CPT | Performed by: INTERNAL MEDICINE

## 2018-08-09 PROCEDURE — 94729 DIFFUSING CAPACITY: CPT

## 2018-08-09 PROCEDURE — 94726 PLETHYSMOGRAPHY LUNG VOLUMES: CPT | Performed by: INTERNAL MEDICINE

## 2018-08-10 ENCOUNTER — HOSPITAL ENCOUNTER (OUTPATIENT)
Dept: CT IMAGING | Age: 73
Discharge: HOME | End: 2018-08-12
Payer: MEDICARE

## 2018-08-10 DIAGNOSIS — R91.1 LUNG NODULE: ICD-10-CM

## 2018-08-10 PROCEDURE — 78815 PET IMAGE W/CT SKULL-THIGH: CPT

## 2018-08-10 PROCEDURE — 3430000000 HC RX DIAGNOSTIC RADIOPHARMACEUTICAL: Performed by: INTERNAL MEDICINE

## 2018-08-10 PROCEDURE — A9552 F18 FDG: HCPCS | Performed by: INTERNAL MEDICINE

## 2018-08-10 RX ORDER — FLUDEOXYGLUCOSE F 18 200 MCI/ML
15.3 INJECTION, SOLUTION INTRAVENOUS
Status: COMPLETED | OUTPATIENT
Start: 2018-08-10 | End: 2018-08-10

## 2018-08-10 RX ADMIN — FLUDEOXYGLUCOSE F 18 15.3 MILLICURIE: 200 INJECTION, SOLUTION INTRAVENOUS at 06:45

## 2018-08-13 ENCOUNTER — TELEPHONE (OUTPATIENT)
Dept: PULMONOLOGY | Age: 73
End: 2018-08-13

## 2018-08-13 DIAGNOSIS — R79.1 ABNORMAL COAGULATION PROFILE: ICD-10-CM

## 2018-08-13 DIAGNOSIS — Z01.812 PRE-PROCEDURE LAB EXAM: Primary | ICD-10-CM

## 2018-08-13 DIAGNOSIS — Z01.812 PRE-PROCEDURE LAB EXAM: ICD-10-CM

## 2018-08-13 LAB
BASOPHILS ABSOLUTE: 0.1 K/UL (ref 0–0.2)
BASOPHILS RELATIVE PERCENT: 0.9 %
EOSINOPHILS ABSOLUTE: 0.1 K/UL (ref 0–0.7)
EOSINOPHILS RELATIVE PERCENT: 1.2 %
HCT VFR BLD CALC: 45.6 % (ref 42–52)
HEMOGLOBIN: 15.6 G/DL (ref 14–18)
INR BLD: 1
LYMPHOCYTES ABSOLUTE: 1 K/UL (ref 1–4.8)
LYMPHOCYTES RELATIVE PERCENT: 12.9 %
MCH RBC QN AUTO: 29.7 PG (ref 27–31.3)
MCHC RBC AUTO-ENTMCNC: 34.2 % (ref 33–37)
MCV RBC AUTO: 86.9 FL (ref 80–100)
MONOCYTES ABSOLUTE: 0.3 K/UL (ref 0.2–0.8)
MONOCYTES RELATIVE PERCENT: 3.4 %
NEUTROPHILS ABSOLUTE: 6.2 K/UL (ref 1.4–6.5)
NEUTROPHILS RELATIVE PERCENT: 81.6 %
PDW BLD-RTO: 13.5 % (ref 11.5–14.5)
PLATELET # BLD: 196 K/UL (ref 130–400)
PROTHROMBIN TIME: 10.8 SEC (ref 9.6–12.3)
RBC # BLD: 5.25 M/UL (ref 4.7–6.1)
WBC # BLD: 7.6 K/UL (ref 4.8–10.8)

## 2018-08-13 NOTE — TELEPHONE ENCOUNTER
PET/CT scan discussed with the patient recommend he abuse for  mediastinal lymph node surveillance and biopsy if needed, and bronchoscopy with transbronchial biopsies. We will schedule  patient for next Friday, patient agrees to proceed, all questions answered. Will obtain INR and PTT    Susan please schedule patient for endobronchial ultrasound with transbronchial needle aspiration, bronchoscopy with transbronchial biopsy, general anesthesia, with fluoroscopy.

## 2018-08-17 ENCOUNTER — ANESTHESIA (OUTPATIENT)
Dept: OPERATING ROOM | Age: 73
End: 2018-08-17
Payer: MEDICARE

## 2018-08-17 ENCOUNTER — ANESTHESIA EVENT (OUTPATIENT)
Dept: OPERATING ROOM | Age: 73
End: 2018-08-17
Payer: MEDICARE

## 2018-08-17 ENCOUNTER — HOSPITAL ENCOUNTER (OUTPATIENT)
Age: 73
Setting detail: OUTPATIENT SURGERY
Discharge: HOME | End: 2018-08-17
Attending: INTERNAL MEDICINE | Admitting: INTERNAL MEDICINE
Payer: MEDICARE

## 2018-08-17 ENCOUNTER — APPOINTMENT (OUTPATIENT)
Dept: GENERAL RADIOLOGY | Age: 73
End: 2018-08-17
Attending: INTERNAL MEDICINE
Payer: MEDICARE

## 2018-08-17 VITALS
OXYGEN SATURATION: 98 % | HEIGHT: 71 IN | TEMPERATURE: 97.3 F | HEART RATE: 62 BPM | BODY MASS INDEX: 24.22 KG/M2 | DIASTOLIC BLOOD PRESSURE: 86 MMHG | WEIGHT: 173 LBS | RESPIRATION RATE: 18 BRPM | SYSTOLIC BLOOD PRESSURE: 171 MMHG

## 2018-08-17 VITALS — OXYGEN SATURATION: 100 % | DIASTOLIC BLOOD PRESSURE: 68 MMHG | SYSTOLIC BLOOD PRESSURE: 112 MMHG | TEMPERATURE: 59.9 F

## 2018-08-17 PROCEDURE — 88173 CYTOPATH EVAL FNA REPORT: CPT

## 2018-08-17 PROCEDURE — 7100000011 HC PHASE II RECOVERY - ADDTL 15 MIN: Performed by: INTERNAL MEDICINE

## 2018-08-17 PROCEDURE — 3600000013 HC SURGERY LEVEL 3 ADDTL 15MIN: Performed by: INTERNAL MEDICINE

## 2018-08-17 PROCEDURE — 3700000000 HC ANESTHESIA ATTENDED CARE: Performed by: INTERNAL MEDICINE

## 2018-08-17 PROCEDURE — 2709999900 HC NON-CHARGEABLE SUPPLY: Performed by: INTERNAL MEDICINE

## 2018-08-17 PROCEDURE — 2500000003 HC RX 250 WO HCPCS: Performed by: NURSE ANESTHETIST, CERTIFIED REGISTERED

## 2018-08-17 PROCEDURE — 31623 DX BRONCHOSCOPE/BRUSH: CPT | Performed by: INTERNAL MEDICINE

## 2018-08-17 PROCEDURE — 3209999900 FLUORO FOR SURGICAL PROCEDURES

## 2018-08-17 PROCEDURE — 3700000001 HC ADD 15 MINUTES (ANESTHESIA): Performed by: INTERNAL MEDICINE

## 2018-08-17 PROCEDURE — 2500000003 HC RX 250 WO HCPCS: Performed by: INTERNAL MEDICINE

## 2018-08-17 PROCEDURE — 31624 DX BRONCHOSCOPE/LAVAGE: CPT | Performed by: INTERNAL MEDICINE

## 2018-08-17 PROCEDURE — 31652 BRONCH EBUS SAMPLNG 1/2 NODE: CPT | Performed by: INTERNAL MEDICINE

## 2018-08-17 PROCEDURE — 2580000003 HC RX 258

## 2018-08-17 PROCEDURE — 87102 FUNGUS ISOLATION CULTURE: CPT

## 2018-08-17 PROCEDURE — 7100000001 HC PACU RECOVERY - ADDTL 15 MIN: Performed by: INTERNAL MEDICINE

## 2018-08-17 PROCEDURE — 3600000003 HC SURGERY LEVEL 3 BASE: Performed by: INTERNAL MEDICINE

## 2018-08-17 PROCEDURE — 31628 BRONCHOSCOPY/LUNG BX EACH: CPT | Performed by: INTERNAL MEDICINE

## 2018-08-17 PROCEDURE — C1713 ANCHOR/SCREW BN/BN,TIS/BN: HCPCS | Performed by: INTERNAL MEDICINE

## 2018-08-17 PROCEDURE — 88112 CYTOPATH CELL ENHANCE TECH: CPT

## 2018-08-17 PROCEDURE — 7100000010 HC PHASE II RECOVERY - FIRST 15 MIN: Performed by: INTERNAL MEDICINE

## 2018-08-17 PROCEDURE — 7100000000 HC PACU RECOVERY - FIRST 15 MIN: Performed by: INTERNAL MEDICINE

## 2018-08-17 PROCEDURE — 2580000003 HC RX 258: Performed by: INTERNAL MEDICINE

## 2018-08-17 PROCEDURE — 6360000002 HC RX W HCPCS: Performed by: NURSE ANESTHETIST, CERTIFIED REGISTERED

## 2018-08-17 PROCEDURE — 88305 TISSUE EXAM BY PATHOLOGIST: CPT

## 2018-08-17 PROCEDURE — 2580000003 HC RX 258: Performed by: NURSE ANESTHETIST, CERTIFIED REGISTERED

## 2018-08-17 PROCEDURE — 87116 MYCOBACTERIA CULTURE: CPT

## 2018-08-17 RX ORDER — MIDAZOLAM HYDROCHLORIDE 1 MG/ML
INJECTION INTRAMUSCULAR; INTRAVENOUS PRN
Status: DISCONTINUED | OUTPATIENT
Start: 2018-08-17 | End: 2018-08-17 | Stop reason: SDUPTHER

## 2018-08-17 RX ORDER — HYDROCODONE BITARTRATE AND ACETAMINOPHEN 5; 325 MG/1; MG/1
2 TABLET ORAL PRN
Status: DISCONTINUED | OUTPATIENT
Start: 2018-08-17 | End: 2018-08-17 | Stop reason: HOSPADM

## 2018-08-17 RX ORDER — DIPHENHYDRAMINE HYDROCHLORIDE 50 MG/ML
12.5 INJECTION INTRAMUSCULAR; INTRAVENOUS
Status: DISCONTINUED | OUTPATIENT
Start: 2018-08-17 | End: 2018-08-17 | Stop reason: HOSPADM

## 2018-08-17 RX ORDER — ONDANSETRON 2 MG/ML
4 INJECTION INTRAMUSCULAR; INTRAVENOUS
Status: DISCONTINUED | OUTPATIENT
Start: 2018-08-17 | End: 2018-08-17 | Stop reason: HOSPADM

## 2018-08-17 RX ORDER — SODIUM CHLORIDE, SODIUM LACTATE, POTASSIUM CHLORIDE, CALCIUM CHLORIDE 600; 310; 30; 20 MG/100ML; MG/100ML; MG/100ML; MG/100ML
INJECTION, SOLUTION INTRAVENOUS CONTINUOUS PRN
Status: DISCONTINUED | OUTPATIENT
Start: 2018-08-17 | End: 2018-08-17 | Stop reason: SDUPTHER

## 2018-08-17 RX ORDER — SODIUM CHLORIDE, SODIUM LACTATE, POTASSIUM CHLORIDE, CALCIUM CHLORIDE 600; 310; 30; 20 MG/100ML; MG/100ML; MG/100ML; MG/100ML
INJECTION, SOLUTION INTRAVENOUS
Status: COMPLETED
Start: 2018-08-17 | End: 2018-08-17

## 2018-08-17 RX ORDER — MAGNESIUM HYDROXIDE 1200 MG/15ML
LIQUID ORAL CONTINUOUS PRN
Status: DISCONTINUED | OUTPATIENT
Start: 2018-08-17 | End: 2018-08-17 | Stop reason: HOSPADM

## 2018-08-17 RX ORDER — ROCURONIUM BROMIDE 10 MG/ML
INJECTION, SOLUTION INTRAVENOUS PRN
Status: DISCONTINUED | OUTPATIENT
Start: 2018-08-17 | End: 2018-08-17 | Stop reason: SDUPTHER

## 2018-08-17 RX ORDER — HYDROCODONE BITARTRATE AND ACETAMINOPHEN 5; 325 MG/1; MG/1
1 TABLET ORAL PRN
Status: DISCONTINUED | OUTPATIENT
Start: 2018-08-17 | End: 2018-08-17 | Stop reason: HOSPADM

## 2018-08-17 RX ORDER — ONDANSETRON 2 MG/ML
INJECTION INTRAMUSCULAR; INTRAVENOUS PRN
Status: DISCONTINUED | OUTPATIENT
Start: 2018-08-17 | End: 2018-08-17 | Stop reason: SDUPTHER

## 2018-08-17 RX ORDER — MEPERIDINE HYDROCHLORIDE 25 MG/ML
12.5 INJECTION INTRAMUSCULAR; INTRAVENOUS; SUBCUTANEOUS EVERY 5 MIN PRN
Status: DISCONTINUED | OUTPATIENT
Start: 2018-08-17 | End: 2018-08-17 | Stop reason: HOSPADM

## 2018-08-17 RX ORDER — FENTANYL CITRATE 50 UG/ML
INJECTION, SOLUTION INTRAMUSCULAR; INTRAVENOUS PRN
Status: DISCONTINUED | OUTPATIENT
Start: 2018-08-17 | End: 2018-08-17 | Stop reason: SDUPTHER

## 2018-08-17 RX ORDER — GLYCOPYRROLATE 1 MG/5 ML
SYRINGE (ML) INTRAVENOUS PRN
Status: DISCONTINUED | OUTPATIENT
Start: 2018-08-17 | End: 2018-08-17 | Stop reason: SDUPTHER

## 2018-08-17 RX ORDER — FENTANYL CITRATE 50 UG/ML
50 INJECTION, SOLUTION INTRAMUSCULAR; INTRAVENOUS EVERY 10 MIN PRN
Status: DISCONTINUED | OUTPATIENT
Start: 2018-08-17 | End: 2018-08-17 | Stop reason: HOSPADM

## 2018-08-17 RX ORDER — PROPOFOL 10 MG/ML
INJECTION, EMULSION INTRAVENOUS PRN
Status: DISCONTINUED | OUTPATIENT
Start: 2018-08-17 | End: 2018-08-17 | Stop reason: SDUPTHER

## 2018-08-17 RX ORDER — METOCLOPRAMIDE HYDROCHLORIDE 5 MG/ML
10 INJECTION INTRAMUSCULAR; INTRAVENOUS
Status: DISCONTINUED | OUTPATIENT
Start: 2018-08-17 | End: 2018-08-17 | Stop reason: HOSPADM

## 2018-08-17 RX ORDER — LIDOCAINE HYDROCHLORIDE 20 MG/ML
INJECTION, SOLUTION EPIDURAL; INFILTRATION; INTRACAUDAL; PERINEURAL PRN
Status: DISCONTINUED | OUTPATIENT
Start: 2018-08-17 | End: 2018-08-17 | Stop reason: HOSPADM

## 2018-08-17 RX ADMIN — SODIUM CHLORIDE, POTASSIUM CHLORIDE, SODIUM LACTATE AND CALCIUM CHLORIDE: 600; 310; 30; 20 INJECTION, SOLUTION INTRAVENOUS at 07:00

## 2018-08-17 RX ADMIN — PROPOFOL 200 MG: 10 INJECTION, EMULSION INTRAVENOUS at 07:39

## 2018-08-17 RX ADMIN — SODIUM CHLORIDE, POTASSIUM CHLORIDE, SODIUM LACTATE AND CALCIUM CHLORIDE 1000 ML: 600; 310; 30; 20 INJECTION, SOLUTION INTRAVENOUS at 06:37

## 2018-08-17 RX ADMIN — PHENYLEPHRINE HYDROCHLORIDE 100 MCG: 10 INJECTION INTRAVENOUS at 08:32

## 2018-08-17 RX ADMIN — MIDAZOLAM HYDROCHLORIDE 2 MG: 1 INJECTION, SOLUTION INTRAMUSCULAR; INTRAVENOUS at 07:33

## 2018-08-17 RX ADMIN — ONDANSETRON 4 MG: 2 INJECTION INTRAMUSCULAR; INTRAVENOUS at 08:39

## 2018-08-17 RX ADMIN — FENTANYL CITRATE 100 MCG: 50 INJECTION, SOLUTION INTRAMUSCULAR; INTRAVENOUS at 07:39

## 2018-08-17 RX ADMIN — SUGAMMADEX 150 MG: 100 INJECTION, SOLUTION INTRAVENOUS at 08:50

## 2018-08-17 RX ADMIN — Medication 0.2 MG: at 08:17

## 2018-08-17 RX ADMIN — PHENYLEPHRINE HYDROCHLORIDE 100 MCG: 10 INJECTION INTRAVENOUS at 08:12

## 2018-08-17 RX ADMIN — ROCURONIUM BROMIDE 10 MG: 10 INJECTION INTRAVENOUS at 08:21

## 2018-08-17 RX ADMIN — PHENYLEPHRINE HYDROCHLORIDE 100 MCG: 10 INJECTION INTRAVENOUS at 08:06

## 2018-08-17 RX ADMIN — ROCURONIUM BROMIDE 40 MG: 10 INJECTION INTRAVENOUS at 07:39

## 2018-08-17 RX ADMIN — PHENYLEPHRINE HYDROCHLORIDE 100 MCG: 10 INJECTION INTRAVENOUS at 08:17

## 2018-08-17 ASSESSMENT — PULMONARY FUNCTION TESTS
PIF_VALUE: 28
PIF_VALUE: 31
PIF_VALUE: 32
PIF_VALUE: 32
PIF_VALUE: 0
PIF_VALUE: 33
PIF_VALUE: 2
PIF_VALUE: 1
PIF_VALUE: 31
PIF_VALUE: 12
PIF_VALUE: 33
PIF_VALUE: 12
PIF_VALUE: 31
PIF_VALUE: 3
PIF_VALUE: 35
PIF_VALUE: 31
PIF_VALUE: 32
PIF_VALUE: 32
PIF_VALUE: 35
PIF_VALUE: 35
PIF_VALUE: 15
PIF_VALUE: 32
PIF_VALUE: 31
PIF_VALUE: 20
PIF_VALUE: 13
PIF_VALUE: 12
PIF_VALUE: 33
PIF_VALUE: 21
PIF_VALUE: 31
PIF_VALUE: 35
PIF_VALUE: 33
PIF_VALUE: 35
PIF_VALUE: 10
PIF_VALUE: 14
PIF_VALUE: 13
PIF_VALUE: 33
PIF_VALUE: 29
PIF_VALUE: 15
PIF_VALUE: 13
PIF_VALUE: 21
PIF_VALUE: 12
PIF_VALUE: 29
PIF_VALUE: 32
PIF_VALUE: 25
PIF_VALUE: 21
PIF_VALUE: 2
PIF_VALUE: 33
PIF_VALUE: 35
PIF_VALUE: 14
PIF_VALUE: 12
PIF_VALUE: 31
PIF_VALUE: 2
PIF_VALUE: 1
PIF_VALUE: 2
PIF_VALUE: 4
PIF_VALUE: 13
PIF_VALUE: 33
PIF_VALUE: 13
PIF_VALUE: 35
PIF_VALUE: 32
PIF_VALUE: 14
PIF_VALUE: 32
PIF_VALUE: 31
PIF_VALUE: 12
PIF_VALUE: 2
PIF_VALUE: 31
PIF_VALUE: 11
PIF_VALUE: 14
PIF_VALUE: 28
PIF_VALUE: 14
PIF_VALUE: 31
PIF_VALUE: 12
PIF_VALUE: 29
PIF_VALUE: 34
PIF_VALUE: 1
PIF_VALUE: 34
PIF_VALUE: 15
PIF_VALUE: 17
PIF_VALUE: 17
PIF_VALUE: 21
PIF_VALUE: 30
PIF_VALUE: 13

## 2018-08-17 ASSESSMENT — PAIN - FUNCTIONAL ASSESSMENT: PAIN_FUNCTIONAL_ASSESSMENT: 0-10

## 2018-08-17 NOTE — H&P
Pulmonary and Critical Care Medicine     Encounter Date: 2018 7:22 AM    Mr. Triston Dial is a 68 y.o. male  : 1945         HISTORY OF PRESENT ILLNESS:    Patient is 68 y.o. presents with RUL lung nodule and mediastinal lymphadenopathy, for EBUS TBNA and bronch with biopsy. Has no complaint, no CP, cough, SOB , abd pain , n or v.           Past Medical History:        Diagnosis Date    CAD (coronary artery disease)     Eczema     Granuloma faciale     History of extremity bypass graft 2015    Left leg 2003    History of tobacco abuse 2015    Hyperlipidemia     Hypertension     Lipoma     PVD (peripheral vascular disease) ([facility] [address]     Seborrheic keratosis        Past Surgical History:        Procedure Laterality Date    CARDIAC CATHETERIZATION      COLONOSCOPY  10-19-12    CORONARY ANGIOPLASTY WITH STENT PLACEMENT      DIAGNOSTIC CARDIAC CATH LAB PROCEDURE      HAND SURGERY      L    HIP SURGERY         Social History:     reports that he quit smoking about 13 years ago. His smoking use included Cigarettes. He started smoking about 53 years ago. He has a 40.00 pack-year smoking history. He has never used smokeless tobacco. He reports that he drinks alcohol. He reports that he does not use drugs.     Family History:   Family History   Problem Relation Age of Onset    High Blood Pressure Mother     Other Mother         BRAIN TUMOR    Cancer Father         LUNG       Allergies:  Lisinopril; Crestor [rosuvastatin]; and Pravachol [pravastatin]        MEDICATIONS during current hospitalization:    Continuous Infusions:    Scheduled Meds:    PRN Meds:fentanNYL, HYDROcodone 5 mg - acetaminophen **OR** HYDROcodone 5 mg - acetaminophen, diphenhydrAMINE, ondansetron, metoclopramide, meperidine        REVIEW OF SYSTEMS:  ROS: 10 organs review of system is done including general, psychological, ENT, hematological, endocrine, respiratory, cardiovascular, gastrointestinal, musculoskeletal, neurological,  allergy and Immunology is done and is otherwise negative. PHYSICAL EXAM:    Vitals:  BP (!) 175/95   Pulse 66   Temp 97 °F (36.1 °C) (Temporal)   Resp 12   Ht 5' 11\" (1.803 m)   Wt 173 lb (78.5 kg)   SpO2 97%   BMI 24.13 kg/m²     General: alert, cooperative, no distress  Head: normocephalic, atraumatic  Eyes:No gross abnormalities. ENT:  MMM no lesions  Neck:  supple and no masses  Chest : clear to auscultation bilaterally- no wheezes, rales or rhonchi, normal air movement, no respiratory distress  Heart[de-identified] Heart sounds are normal.  Regular rate and rhythm without murmur, gallop or rub. ABD:  symmetric, soft, non-tender  Musculoskeletal : no cyanosis, no clubbing and no edema  Neuro:  Grossly normal  Skin: No rashes or nodules noted. Lymph node:  no cervical nodes  Urology: No Jolly   Psychiatric: appropriate        Data Review  No results for input(s): WBC, HGB, HCT, PLT in the last 72 hours. No results for input(s): NA, K, CL, CO2, BUN, CREATININE, GLUCOSE in the last 72 hours. Invalid input(s): CA    MV Settings: ABGs: No results for input(s): PHART, EXQ7UDF, PO2ART, PYV6DNZ, BEART, Q0FNMQWM, NQK0EZH in the last 72 hours. O2 Device: None (Room air)  No results found for: LACTA         Assessment, plan:   Patient is at risk due to    · RUL nodule with mediastinal lymphadenopathy, EBUS-TBNA, bronch with TBB, brush and washing today   · Discussed with patient risks include but not limited to bleeding, infection, lung collapse , cardiac arrhythmia, need for other procedures or allergy to med, benefits and alternatives discussed with patient.  Patient  agrees to proceed with procedure      Electronically signed by Ochoa Kelly MD, [facility],  on 8/17/2018 at 7:22 AM

## 2018-08-17 NOTE — ANESTHESIA PRE PROCEDURE
Department of Anesthesiology  Preprocedure Note       Name:  Donal Clark   Age:  68 y.o.  :  1945                                          MRN:  [id number]         Date:  2018      Surgeon: Iam Bailey):  Marga Akhtar MD    Procedure: Procedure(s):  EBUS WITH TRANSBRONCHIAL NEEDLE ASPIRATION W/ FLUOROSCOPY    Medications prior to admission:   Prior to Admission medications    Medication Sig Start Date End Date Taking? Authorizing Provider   metoprolol succinate (TOPROL XL) 50 MG extended release tablet Take 1 tablet by mouth daily 18   Brandon Russell MD   lovastatin (MEVACOR) 10 MG tablet Take 1 tablet by mouth  nightly 17   Bob Lamas MD   aspirin 81 MG tablet Take 81 mg by mouth daily    Historical Provider, MD       Current medications:    Current Facility-Administered Medications   Medication Dose Route Frequency Provider Last Rate Last Dose    lactated ringers infusion                Allergies:     Allergies   Allergen Reactions    Lisinopril Anaphylaxis and Swelling    Crestor [Rosuvastatin]     Pravachol [Pravastatin] Other (See Comments)     Joint / Muscle aches       Problem List:    Patient Active Problem List   Diagnosis Code    Hypertension I10    CAD (coronary artery disease) I25.10    Lipoma D17.9    Eczema L30.9    Hyperlipidemia E78.5    Seborrheic keratosis L82.1    PVD (peripheral vascular disease) ([address] I73.9    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    ED (erectile dysfunction) N52.9    Peripheral arterial disease (HCC) I73.9    History of placement of stent in LAD coronary artery Z95.5    History of extremity bypass graft Z95.828    History of tobacco abuse Z87.891    PAD (peripheral artery disease) (HCC) I73.9    Lung nodule R91.1       Past Medical History:        Diagnosis Date    CAD (coronary artery disease)     Eczema     Granuloma faciale     History of extremity bypass graft 2015    Left leg 2003    History of tobacco abuse 2015 GLUCOSE 104 10/14/2011    PROT 7.0 11/16/2017    CALCIUM 9.1 11/16/2017    BILITOT 0.4 11/16/2017    ALKPHOS 63 11/16/2017    AST 23 11/16/2017    ALT 19 11/16/2017       POC Tests: No results for input(s): POCGLU, POCNA, POCK, POCCL, POCBUN, POCHEMO, POCHCT in the last 72 hours. Coags:   Lab Results   Component Value Date    PROTIME 10.8 08/13/2018    INR 1.0 08/13/2018       HCG (If Applicable): No results found for: PREGTESTUR, PREGSERUM, HCG, HCGQUANT     ABGs: No results found for: PHART, PO2ART, AEX7HHE, AAC6GRY, BEART, S5OSZJTA     Type & Screen (If Applicable):  No results found for: LABABO, [address]    Anesthesia Evaluation  Patient summary reviewed and Nursing notes reviewed no history of anesthetic complications:   Airway: Mallampati: I  TM distance: >3 FB   Neck ROM: full  Mouth opening: > = 3 FB Dental: normal exam         Pulmonary:Negative Pulmonary ROS                              Cardiovascular:    (+) hypertension:, CAD:, CABG/stent:, hyperlipidemia      ECG reviewed               Beta Blocker:  Dose within 24 Hrs         Neuro/Psych:   Negative Neuro/Psych ROS              GI/Hepatic/Renal: Neg GI/Hepatic/Renal ROS            Endo/Other: Negative Endo/Other ROS                    Abdominal:           Vascular:   + PVD, aortic or cerebral, . Anesthesia Plan      general     ASA 3       Induction: intravenous. MIPS: Postoperative opioids intended and Prophylactic antiemetics administered. Anesthetic plan and risks discussed with patient. Plan discussed with CRNA.     Attending anesthesiologist reviewed and agrees with Kyara Paulino MD   8/17/2018

## 2018-08-17 NOTE — OP NOTE
PROCEDURE:   Fiberoptic bronchoscopy with   1. Limited visual airway inspection. 2. EBUS-guided TBNA of stations R4 and R10   1. Bronchoalveolar lavage, BAL RUL   2. Brush biopsy RUL   3. Transbronchial biopsy RUL     CONSENT:   The risks and benefits of this procedure were explained to the the patient . All questions were answered and alternative options explained. The patient has been assessed and He is stable to undergo conscious sedation as well as the procedure itself. MEDICATIONS USED:  1.  1. general anesthesia  2. Topical lidocaine 1% without epinephrine, total quantity 6 mL. PREPROCEDURE DIAGNOSIS(ES):   1. Lung nodule with mediastinal lymphadenopathy , + PET    POSTPROCEDURE DIAGNOSIS(ES):   1. Same      DESCRIPTION OF PROCEDURE:   Consent was verified as signed and placed upon the chart. Patient was positively identified and procedure site was marked. Time-out was performed by operating team and patient. Vital sign monitoring was accomplished by noninvasive hemodynamic monitoring, pulse oximetry, and telemetry. Conscious sedation was applied in an incremental fashion as noted above. A curvilinear EBUS Olympus bronchoscope was inserted into ET tube . Topical lidocaine was applied to the   tracheobronchial tree. Limited visual airway inspection was then performed and no abnormality was noted in the trachea or mainstem bronchi. The bronchoscope was then withdrawn up into the trachea and a andrew survey was begun. EBUS-guided TBNA of stations  R4 and R 10   was then performed. There was no significant bleeding post-biopsy. 2- PROCEDURE:  Fiberoptic bronchoscopy       fiberoptic Olympus bronchoscope was inserted via ET tube. visual airway inspection of the right upper lobe, right middle lobe, right lower lobe, left upper lobe, lingula, and left lower lobe was performed. There were no endobronchial lesions, mucopurulent secretions or active bleeding. Anatomy was normal to the segmental level. The bronchoscope was placed into the right upper lobe segment where fluoroscopic guided cytologic brushings X  1  were obtained from the anterior segment. Bronchoalveolar lavage of the  right upper lobe segment was then obtained. Transbronchial biopsies X  6  were obtained from the  right upper lobe segment under fluoroscopic guidance. There was no significant bleeding post-biopsy. Post-procedure fluorosocopy revealed no evidence of pneumothorax.      ESTIMATED BLOOD LOSS:  less than 10 cc    COMPLICATIONS:  None    Electronically signed by Sulema Bowman MD [facility],  on 8/17/2018 at 10:56 AM

## 2018-08-17 NOTE — PROGRESS NOTES
Dr. Holden Hemp at bedside giving oral discharge instructions, along with procedure results. No questions at noted at this time from family or patient.  Electronically signed by Santa Esqueda RN on 8/17/2018 at 10:50 AM

## 2018-08-21 RX ORDER — LOVASTATIN 10 MG/1
TABLET ORAL
Qty: 90 TABLET | Refills: 3 | Status: SHIPPED | OUTPATIENT
Start: 2018-08-21 | End: 2019-05-13 | Stop reason: SDUPTHER

## 2018-08-22 ENCOUNTER — TELEPHONE (OUTPATIENT)
Dept: PULMONOLOGY | Age: 73
End: 2018-08-22

## 2018-08-22 DIAGNOSIS — R91.1 LUNG NODULE: Primary | ICD-10-CM

## 2018-08-22 NOTE — TELEPHONE ENCOUNTER
Contacted Patient with biopsy results, discussed negative lymph nodes biopsy, lung biopsy also negative however cannot rule out false negative result due to sampling error. We will proceed with CT-guided biopsy, patient is agreeable.

## 2018-08-27 ENCOUNTER — TELEPHONE (OUTPATIENT)
Dept: PULMONOLOGY | Age: 73
End: 2018-08-27

## 2018-08-30 ENCOUNTER — INITIAL CONSULT (OUTPATIENT)
Dept: INTERVENTIONAL RADIOLOGY/VASCULAR | Age: 73
End: 2018-08-30
Payer: MEDICARE

## 2018-08-30 VITALS
SYSTOLIC BLOOD PRESSURE: 128 MMHG | RESPIRATION RATE: 14 BRPM | DIASTOLIC BLOOD PRESSURE: 84 MMHG | HEART RATE: 70 BPM | WEIGHT: 176.2 LBS | OXYGEN SATURATION: 100 % | BODY MASS INDEX: 24.57 KG/M2

## 2018-08-30 DIAGNOSIS — R91.1 LUNG NODULE: Primary | ICD-10-CM

## 2018-08-30 PROCEDURE — 1101F PT FALLS ASSESS-DOCD LE1/YR: CPT | Performed by: NURSE PRACTITIONER

## 2018-08-30 PROCEDURE — 99204 OFFICE O/P NEW MOD 45 MIN: CPT | Performed by: NURSE PRACTITIONER

## 2018-08-30 PROCEDURE — 3017F COLORECTAL CA SCREEN DOC REV: CPT | Performed by: NURSE PRACTITIONER

## 2018-08-30 PROCEDURE — G8427 DOCREV CUR MEDS BY ELIG CLIN: HCPCS | Performed by: NURSE PRACTITIONER

## 2018-08-30 PROCEDURE — G8420 CALC BMI NORM PARAMETERS: HCPCS | Performed by: NURSE PRACTITIONER

## 2018-08-30 ASSESSMENT — ENCOUNTER SYMPTOMS
NAUSEA: 0
CONSTIPATION: 0
DIARRHEA: 0
EYE PAIN: 0
VOMITING: 0
COUGH: 0
WHEEZING: 0
SORE THROAT: 0
HEARTBURN: 0
DOUBLE VISION: 0
BACK PAIN: 0
BLURRED VISION: 0
ABDOMINAL PAIN: 0
SHORTNESS OF BREATH: 0
SINUS PAIN: 0

## 2018-08-30 NOTE — PROGRESS NOTES
 None     Social History Narrative    None       Allergies   Allergen Reactions    Lisinopril Anaphylaxis and Swelling    Crestor [Rosuvastatin]     Pravachol [Pravastatin] Other (See Comments)     Joint / Muscle aches       Current Outpatient Prescriptions on File Prior to Visit   Medication Sig Dispense Refill    lovastatin (MEVACOR) 10 MG tablet Take 1 tablet by mouth  nightly 90 tablet 3    metoprolol succinate (TOPROL XL) 50 MG extended release tablet Take 1 tablet by mouth daily 90 tablet 1    aspirin 81 MG tablet Take 81 mg by mouth daily       No current facility-administered medications on file prior to visit. Review of Systems   Constitutional: Negative for chills, fever, malaise/fatigue and weight loss. HENT: Negative for congestion, ear pain, nosebleeds, sinus pain and sore throat. Eyes: Negative for blurred vision, double vision and pain. Respiratory: Negative for cough, shortness of breath and wheezing. Cardiovascular: Negative for chest pain, palpitations, claudication and leg swelling. Gastrointestinal: Negative for abdominal pain, constipation, diarrhea, heartburn, nausea and vomiting. Genitourinary: Negative for dysuria, frequency and urgency. Musculoskeletal: Negative for back pain, joint pain and neck pain. Skin: Negative for itching and rash. Neurological: Negative for dizziness, tingling, focal weakness, seizures, weakness and headaches. Endo/Heme/Allergies: Does not bruise/bleed easily. Psychiatric/Behavioral: Negative for depression. The patient is not nervous/anxious. OBJECTIVE:  /84   Pulse 70   Resp 14   Wt 176 lb 3.2 oz (79.9 kg)   SpO2 100%   BMI 24.57 kg/m²     Physical Exam   Constitutional: He is oriented to person, place, and time. He appears well-developed and well-nourished. No distress. HENT:   Head: Normocephalic and atraumatic.    Right Ear: External ear normal.   Left Ear: External ear normal.   Mouth/Throat:

## 2018-09-05 ENCOUNTER — HOSPITAL ENCOUNTER (OUTPATIENT)
Dept: CT IMAGING | Age: 73
Discharge: HOME | End: 2018-09-07
Payer: MEDICARE

## 2018-09-05 ENCOUNTER — HOSPITAL ENCOUNTER (OUTPATIENT)
Dept: GENERAL RADIOLOGY | Age: 73
Discharge: HOME | End: 2018-09-07
Payer: MEDICARE

## 2018-09-05 VITALS
OXYGEN SATURATION: 96 % | HEART RATE: 72 BPM | SYSTOLIC BLOOD PRESSURE: 127 MMHG | RESPIRATION RATE: 12 BRPM | DIASTOLIC BLOOD PRESSURE: 77 MMHG

## 2018-09-05 DIAGNOSIS — R91.1 LUNG NODULE: ICD-10-CM

## 2018-09-05 PROCEDURE — 88312 SPECIAL STAINS GROUP 1: CPT

## 2018-09-05 PROCEDURE — 99214 OFFICE O/P EST MOD 30 MIN: CPT | Performed by: RADIOLOGY

## 2018-09-05 PROCEDURE — 77012 CT SCAN FOR NEEDLE BIOPSY: CPT | Performed by: RADIOLOGY

## 2018-09-05 PROCEDURE — 32405 CT NEEDLE BIOPSY LUNG PERCUTANEOUS: CPT | Performed by: RADIOLOGY

## 2018-09-05 PROCEDURE — 6360000002 HC RX W HCPCS: Performed by: RADIOLOGY

## 2018-09-05 PROCEDURE — 71046 X-RAY EXAM CHEST 2 VIEWS: CPT

## 2018-09-05 PROCEDURE — 2580000003 HC RX 258: Performed by: RADIOLOGY

## 2018-09-05 PROCEDURE — 77012 CT SCAN FOR NEEDLE BIOPSY: CPT

## 2018-09-05 PROCEDURE — 2500000003 HC RX 250 WO HCPCS: Performed by: RADIOLOGY

## 2018-09-05 PROCEDURE — 71046 X-RAY EXAM CHEST 2 VIEWS: CPT | Performed by: RADIOLOGY

## 2018-09-05 PROCEDURE — 6370000000 HC RX 637 (ALT 250 FOR IP): Performed by: RADIOLOGY

## 2018-09-05 PROCEDURE — 88305 TISSUE EXAM BY PATHOLOGIST: CPT

## 2018-09-05 PROCEDURE — 32405 CT NEEDLE BIOPSY LUNG PERCUTANEOUS: CPT

## 2018-09-05 RX ORDER — BACITRACIN, NEOMYCIN, POLYMYXIN B 400; 3.5; 5 [USP'U]/G; MG/G; [USP'U]/G
OINTMENT TOPICAL ONCE
Status: COMPLETED | OUTPATIENT
Start: 2018-09-05 | End: 2018-09-05

## 2018-09-05 RX ORDER — FENTANYL CITRATE 50 UG/ML
INJECTION, SOLUTION INTRAMUSCULAR; INTRAVENOUS
Status: COMPLETED | OUTPATIENT
Start: 2018-09-05 | End: 2018-09-05

## 2018-09-05 RX ORDER — LIDOCAINE HYDROCHLORIDE 10 MG/ML
INJECTION, SOLUTION INFILTRATION; PERINEURAL
Status: COMPLETED | OUTPATIENT
Start: 2018-09-05 | End: 2018-09-05

## 2018-09-05 RX ORDER — MIDAZOLAM HYDROCHLORIDE 1 MG/ML
INJECTION INTRAMUSCULAR; INTRAVENOUS
Status: COMPLETED | OUTPATIENT
Start: 2018-09-05 | End: 2018-09-05

## 2018-09-05 RX ORDER — 0.9 % SODIUM CHLORIDE 0.9 %
INTRAVENOUS SOLUTION INTRAVENOUS CONTINUOUS PRN
Status: COMPLETED | OUTPATIENT
Start: 2018-09-05 | End: 2018-09-05

## 2018-09-05 RX ADMIN — SODIUM CHLORIDE 250 ML: 9 INJECTION, SOLUTION INTRAVENOUS at 09:40

## 2018-09-05 RX ADMIN — LIDOCAINE HYDROCHLORIDE 5 ML: 10 INJECTION, SOLUTION INFILTRATION; PERINEURAL at 09:48

## 2018-09-05 RX ADMIN — FENTANYL CITRATE 50 MCG: 50 INJECTION, SOLUTION INTRAMUSCULAR; INTRAVENOUS at 09:45

## 2018-09-05 RX ADMIN — MIDAZOLAM HYDROCHLORIDE 0.5 MG: 1 INJECTION, SOLUTION INTRAMUSCULAR; INTRAVENOUS at 09:45

## 2018-09-05 RX ADMIN — BACITRACIN, NEOMYCIN, POLYMYXIN B 1 G: 400; 3.5; 5 OINTMENT TOPICAL at 10:02

## 2018-09-05 ASSESSMENT — ENCOUNTER SYMPTOMS
HEMOPTYSIS: 0
SPUTUM PRODUCTION: 0
BLURRED VISION: 0
DIARRHEA: 0
PHOTOPHOBIA: 0
BACK PAIN: 0
VOMITING: 0
DOUBLE VISION: 0
COUGH: 1
WHEEZING: 0
EYES NEGATIVE: 1
HEARTBURN: 0
NAUSEA: 0
CONSTIPATION: 0
GASTROINTESTINAL NEGATIVE: 1
ABDOMINAL PAIN: 0
SHORTNESS OF BREATH: 0

## 2018-09-05 NOTE — PROGRESS NOTES
Pt to CT HR, walked gait steady. Assisted onto cart and into gown. Fiancee at side. Placed onto monitor. Vitals stable. History, medications and allergies reviewed. IV inserted. Blood patch obtained. Procedure explained and consent signed. Support given to pt.     0277 Dr Natan Carpenter here speaking with pt. Pre sedation assessment complete. 0094 pt walked to BR, assisted to CT table in supine position for biopsy. Placed onto monitor.

## 2018-09-05 NOTE — PROGRESS NOTES
Pt back to CT holding via cart. Pt A&Ox4. Pt with no c/o pain or SOB. Pt Minh weinstein, at bedside. VSS. Right upper chest band aid clean, dry and intact. Surrounding area WNL. Pt tolerating PO fluids well. Awaiting to go to xray at 11am. Will continue to monitor. Electronically signed by Ceasar Nyhan, RN on 9/5/2018 at 10:36 AM  At 1050, pt to radiology via cart for post lung biopsy 2 view chest xray. O2 sats 96% on RA and respirations even and unlabored. Electronically signed by Ceasar Nyhan, RN on 9/5/2018 at 11:06 AM  At 1120, pt back to CT holding. Dr. Franklin Prim notified and read chest xray is negative for pneumothorax and pt ok to be discharged. Pt provided discharge instructions and pt verbalized understanding. Pt IV removed with IV intact and no complications, dressing applied. Pt changed into street clothes independently. Pt discharged via wheelchair with belongings, to js's vehicle. Electronically signed by Ceasar Nyhan, RN on 9/5/2018 at 11:41 AM

## 2018-09-05 NOTE — PRE SEDATION
(see notes).     Mallampati Airway Assessment:  Mallampati Class I - (soft palate, fauces, uvula & anterior/posterior tonsillar pillars are visible)    Prior History of Anesthesia Complications:   none    ASA Classification:  Class 2 - A normal healthy patient with mild systemic disease    Sedation/ Anesthesia Plan:   intravenous sedation    Medications Planned:   midazolam (Versed) intravenously and fentanyl intravenously    Patient is an appropriate candidate for plan of sedation: yes    Electronically signed by Sara Sharp MD on 9/5/2018 at 9:29 AM

## 2018-09-05 NOTE — SEDATION DOCUMENTATION
Patient supine on ct scanner #2. Monitored, vss. Pt scanned, Dr Vinay Smith marked site then prepped with chloraprep, draped with sterile towels and numbed with lidocaine. 5398 CT/fluoro guidance used to position Temno [id number] introducer needle. 0958 Core sample of left lung nodule taken with Temno [id number] biopsy needle. Sample placed into formalin. 3495 Second sample taken. ETCO2 monitor malfunctioning, will have biomed look at monitor after procedure. All VSS.  [id number] Third core sample taken. All placed into formalin. Blood patch administered as introducer needle withdrawn. 1000 Procedure complete. Bandaid applied. Pt tolerated well. Post CT negative for immediate complications.

## 2018-09-06 NOTE — PROGRESS NOTES
Spoke to the patient who had a right lung biopsy done yesterday. He states he has had no complications or problems overnight. He has no questions. He states that he has no oozing,bleeding,drainage from the site. He slept well.

## 2018-09-11 ASSESSMENT — ENCOUNTER SYMPTOMS
SHORTNESS OF BREATH: 0
EYES NEGATIVE: 1
HEARTBURN: 0
BLURRED VISION: 0
PHOTOPHOBIA: 0
COUGH: 1
BACK PAIN: 0
ABDOMINAL PAIN: 0
SPUTUM PRODUCTION: 0
CONSTIPATION: 0
NAUSEA: 0
HEMOPTYSIS: 0
VOMITING: 0
WHEEZING: 1
GASTROINTESTINAL NEGATIVE: 1
DIARRHEA: 0
DOUBLE VISION: 0

## 2018-09-11 NOTE — H&P
Santi Lee, a male of 68 y.o. came to the office 9/5/2018. No chief complaint on file. HPI: Patient is a 22-year-old pleasant gentleman with a recently discovered pulmonary nodule in the anterior right lung pleural-based. This was seen to be hypermetabolic on recent PET scan dating August 10, 2018. There is high suspicion for malignancy, therefore biopsy is needed for diagnosis and treatment planning. Patient understands the procedure, CT guided lung biopsy as well as the risks and benefits and alternatives. He agrees to proceed with the procedure. He has no complaints today denies any chest pain or shortness of breath fevers nor chills. Family History   Problem Relation Age of Onset    High Blood Pressure Mother     Other Mother         BRAIN TUMOR    Cancer Father         LUNG       Past Surgical History:   Procedure Laterality Date    CARDIAC CATHETERIZATION  2003    COLONOSCOPY  10-19-12    CORONARY ANGIOPLASTY WITH STENT PLACEMENT  2003    DIAGNOSTIC CARDIAC CATH LAB PROCEDURE      HAND SURGERY  2003    L    HIP SURGERY      LUNG BIOPSY Right 09/05/2018    CT guided Left Lung Biopsy by Dr Jolene Garcia Right     9/5/2018 per Dr Kenna Hanks 70. EBUS DX/TX INTERVENTION [address] LES N/A 8/17/2018    EBUS WITH TRANSBRONCHIAL NEEDLE ASPIRATION W/ FLUOROSCOPY performed by Christen Zavala MD at [facility]        Past Medical History:   Diagnosis Date    CAD (coronary artery disease)     Eczema     Granuloma faciale     History of extremity bypass graft 11/16/2015    Left leg 11/14/2003    History of tobacco abuse 11/16/2015    Hyperlipidemia     Hypertension     Lipoma     [facility]     Seborrheic keratosis        Social History     Social History    Marital status:       Spouse name: N/A    Number of children: N/A    Years of education: N/A     Occupational History      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
Anaphylaxis and Swelling    Crestor [Rosuvastatin]     Pravachol [Pravastatin] Other (See Comments)     Joint / Muscle aches       Current Outpatient Prescriptions on File Prior to Encounter   Medication Sig Dispense Refill    lovastatin (MEVACOR) 10 MG tablet Take 1 tablet by mouth  nightly 90 tablet 3    metoprolol succinate (TOPROL XL) 50 MG extended release tablet Take 1 tablet by mouth daily 90 tablet 1    aspirin 81 MG tablet Take 81 mg by mouth daily       No current facility-administered medications on file prior to encounter. Review of Systems   Constitutional: Negative. Negative for chills, diaphoresis, fever, malaise/fatigue and weight loss. HENT: Negative. Negative for congestion, ear pain, hearing loss and tinnitus. Eyes: Negative. Negative for blurred vision, double vision and photophobia. Respiratory: Positive for cough. Negative for hemoptysis, sputum production, shortness of breath and wheezing. Cardiovascular: Negative. Negative for chest pain, palpitations, claudication and leg swelling. Gastrointestinal: Negative. Negative for abdominal pain, constipation, diarrhea, heartburn, nausea and vomiting. Genitourinary: Negative. Negative for dysuria, flank pain, frequency, hematuria and urgency. Musculoskeletal: Negative. Negative for back pain, joint pain and neck pain. Skin: Negative. Negative for itching and rash. Neurological: Negative. Negative for dizziness, tingling, tremors, sensory change, weakness and headaches. Endo/Heme/Allergies: Negative. Negative for environmental allergies. Does not bruise/bleed easily. Psychiatric/Behavioral: Negative. Negative for depression, hallucinations, substance abuse and suicidal ideas. The patient is not nervous/anxious. OBJECTIVE:  There were no vitals taken for this visit. Physical Exam   Constitutional: He is oriented to person, place, and time. He appears well-developed and well-nourished.  No

## 2018-09-12 ENCOUNTER — OFFICE VISIT (OUTPATIENT)
Dept: PULMONOLOGY | Age: 73
End: 2018-09-12
Payer: MEDICARE

## 2018-09-12 VITALS
HEIGHT: 71 IN | WEIGHT: 177.8 LBS | OXYGEN SATURATION: 94 % | DIASTOLIC BLOOD PRESSURE: 80 MMHG | BODY MASS INDEX: 24.89 KG/M2 | SYSTOLIC BLOOD PRESSURE: 130 MMHG | TEMPERATURE: 98.7 F | HEART RATE: 74 BPM

## 2018-09-12 DIAGNOSIS — R91.1 LUNG NODULE: Primary | ICD-10-CM

## 2018-09-12 DIAGNOSIS — Z87.891 HISTORY OF SMOKING: ICD-10-CM

## 2018-09-12 PROCEDURE — 4040F PNEUMOC VAC/ADMIN/RCVD: CPT | Performed by: INTERNAL MEDICINE

## 2018-09-12 PROCEDURE — 1036F TOBACCO NON-USER: CPT | Performed by: INTERNAL MEDICINE

## 2018-09-12 PROCEDURE — 3017F COLORECTAL CA SCREEN DOC REV: CPT | Performed by: INTERNAL MEDICINE

## 2018-09-12 PROCEDURE — 1101F PT FALLS ASSESS-DOCD LE1/YR: CPT | Performed by: INTERNAL MEDICINE

## 2018-09-12 PROCEDURE — 1123F ACP DISCUSS/DSCN MKR DOCD: CPT | Performed by: INTERNAL MEDICINE

## 2018-09-12 PROCEDURE — G8420 CALC BMI NORM PARAMETERS: HCPCS | Performed by: INTERNAL MEDICINE

## 2018-09-12 PROCEDURE — 99213 OFFICE O/P EST LOW 20 MIN: CPT | Performed by: INTERNAL MEDICINE

## 2018-09-12 PROCEDURE — G8598 ASA/ANTIPLAT THER USED: HCPCS | Performed by: INTERNAL MEDICINE

## 2018-09-12 PROCEDURE — G8427 DOCREV CUR MEDS BY ELIG CLIN: HCPCS | Performed by: INTERNAL MEDICINE

## 2018-09-15 LAB
FINAL REPORT: NORMAL
PRELIMINARY: NORMAL

## 2018-10-09 RX ORDER — METOPROLOL SUCCINATE 50 MG/1
50 TABLET, EXTENDED RELEASE ORAL DAILY
Qty: 90 TABLET | Refills: 3 | Status: SHIPPED | OUTPATIENT
Start: 2018-10-09 | End: 2019-05-23 | Stop reason: SDUPTHER

## 2018-10-13 LAB
AFB STAIN: NORMAL
FINAL REPORT: NORMAL
PRELIMINARY: NORMAL

## 2018-11-05 ENCOUNTER — HOSPITAL ENCOUNTER (OUTPATIENT)
Dept: CT IMAGING | Age: 73
Discharge: HOME | End: 2018-11-07
Payer: MEDICARE

## 2018-11-05 ENCOUNTER — TELEPHONE (OUTPATIENT)
Dept: PULMONOLOGY | Age: 73
End: 2018-11-05

## 2018-11-05 VITALS — WEIGHT: 175 LBS | BODY MASS INDEX: 24.5 KG/M2 | HEIGHT: 71 IN

## 2018-11-05 DIAGNOSIS — R91.1 LUNG NODULE: ICD-10-CM

## 2018-11-05 PROCEDURE — 71250 CT THORAX DX C-: CPT

## 2018-11-14 ENCOUNTER — OFFICE VISIT (OUTPATIENT)
Dept: PULMONOLOGY | Age: 73
End: 2018-11-14
Payer: MEDICARE

## 2018-11-14 VITALS
TEMPERATURE: 98.2 F | HEART RATE: 75 BPM | SYSTOLIC BLOOD PRESSURE: 162 MMHG | HEIGHT: 71 IN | BODY MASS INDEX: 25.11 KG/M2 | DIASTOLIC BLOOD PRESSURE: 90 MMHG | WEIGHT: 179.4 LBS | OXYGEN SATURATION: 98 %

## 2018-11-14 DIAGNOSIS — R91.1 LUNG NODULE: Primary | ICD-10-CM

## 2018-11-14 DIAGNOSIS — Z87.891 HISTORY OF SMOKING: ICD-10-CM

## 2018-11-14 DIAGNOSIS — J44.9 CHRONIC OBSTRUCTIVE PULMONARY DISEASE, UNSPECIFIED COPD TYPE (HCC): ICD-10-CM

## 2018-11-14 PROCEDURE — 99213 OFFICE O/P EST LOW 20 MIN: CPT | Performed by: INTERNAL MEDICINE

## 2018-11-14 PROCEDURE — G8427 DOCREV CUR MEDS BY ELIG CLIN: HCPCS | Performed by: INTERNAL MEDICINE

## 2018-11-14 PROCEDURE — G8598 ASA/ANTIPLAT THER USED: HCPCS | Performed by: INTERNAL MEDICINE

## 2018-11-14 PROCEDURE — 1036F TOBACCO NON-USER: CPT | Performed by: INTERNAL MEDICINE

## 2018-11-14 PROCEDURE — 1101F PT FALLS ASSESS-DOCD LE1/YR: CPT | Performed by: INTERNAL MEDICINE

## 2018-11-14 PROCEDURE — G8484 FLU IMMUNIZE NO ADMIN: HCPCS | Performed by: INTERNAL MEDICINE

## 2018-11-14 PROCEDURE — 4040F PNEUMOC VAC/ADMIN/RCVD: CPT | Performed by: INTERNAL MEDICINE

## 2018-11-14 PROCEDURE — G8926 SPIRO NO PERF OR DOC: HCPCS | Performed by: INTERNAL MEDICINE

## 2018-11-14 PROCEDURE — 3023F SPIROM DOC REV: CPT | Performed by: INTERNAL MEDICINE

## 2018-11-14 PROCEDURE — G8419 CALC BMI OUT NRM PARAM NOF/U: HCPCS | Performed by: INTERNAL MEDICINE

## 2018-11-14 PROCEDURE — 1123F ACP DISCUSS/DSCN MKR DOCD: CPT | Performed by: INTERNAL MEDICINE

## 2018-11-14 PROCEDURE — 3017F COLORECTAL CA SCREEN DOC REV: CPT | Performed by: INTERNAL MEDICINE

## 2018-11-14 NOTE — PROGRESS NOTES
Subjective:     Zohra Chandler is a 68 y.o. male whocomplains today of:     Chief Complaint   Patient presents with    Follow-up     f/u for CXR results. HPI  Patient presents for  Lung nodule doing ok, no chest pain, breathing is good, no fever and no weight loss, in general doing well, no lower ext swelling     Allergies:  Lisinopril; Crestor [rosuvastatin]; and Pravachol [pravastatin]  Past Medical History:   Diagnosis Date    CAD (coronary artery disease)     Eczema     Granuloma faciale     History of extremity bypass graft 11/16/2015    Left leg 11/14/2003    History of tobacco abuse 11/16/2015    Hyperlipidemia     Hypertension     Lipoma     PVD [facility]peripheral [facility]) [facility] [facility]     Seborrheic keratosis      Past Surgical History:   Procedure Laterality Date    CARDIAC CATHETERIZATION  2003    COLONOSCOPY  10-19-12    CORONARY ANGIOPLASTY WITH STENT PLACEMENT  2003    DIAGNOSTIC CARDIAC CATH LAB PROCEDURE      HAND SURGERY  2003    L    HIP SURGERY      LUNG BIOPSY Right 09/05/2018    CT guided Left Lung Biopsy by Dr Monika Chavarria Right     9/5/2018 per Dr Olesya [address]. EBUS DX/TX INTERVENTION [address] LES N/A 8/17/2018    EBUS WITH TRANSBRONCHIAL NEEDLE ASPIRATION W/ FLUOROSCOPY performed by Yvette Persaud MD at 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

## 2019-05-14 RX ORDER — LOVASTATIN 10 MG/1
TABLET ORAL
Qty: 90 TABLET | Refills: 1 | Status: SHIPPED | OUTPATIENT
Start: 2019-05-14 | End: 2019-07-22 | Stop reason: SDUPTHER

## 2019-05-21 ENCOUNTER — HOSPITAL ENCOUNTER (OUTPATIENT)
Dept: CT IMAGING | Age: 74
Discharge: HOME | End: 2019-05-23
Payer: MEDICARE

## 2019-05-21 DIAGNOSIS — R91.1 LUNG NODULE: ICD-10-CM

## 2019-05-21 PROCEDURE — 71250 CT THORAX DX C-: CPT

## 2019-05-22 ENCOUNTER — OFFICE VISIT (OUTPATIENT)
Dept: FAMILY MEDICINE CLINIC | Age: 74
End: 2019-05-22
Payer: MEDICARE

## 2019-05-22 VITALS
SYSTOLIC BLOOD PRESSURE: 170 MMHG | DIASTOLIC BLOOD PRESSURE: 94 MMHG | TEMPERATURE: 98.2 F | HEIGHT: 71 IN | RESPIRATION RATE: 10 BRPM | WEIGHT: 181.4 LBS | BODY MASS INDEX: 25.4 KG/M2 | OXYGEN SATURATION: 98 % | HEART RATE: 80 BPM

## 2019-05-22 DIAGNOSIS — I73.9 PVD (PERIPHERAL VASCULAR DISEASE) (HCC): ICD-10-CM

## 2019-05-22 DIAGNOSIS — Z12.5 PROSTATE CANCER SCREENING: ICD-10-CM

## 2019-05-22 DIAGNOSIS — I73.9 PERIPHERAL ARTERIAL DISEASE (HCC): ICD-10-CM

## 2019-05-22 DIAGNOSIS — I10 ESSENTIAL HYPERTENSION: Primary | ICD-10-CM

## 2019-05-22 DIAGNOSIS — E78.5 HYPERLIPIDEMIA, UNSPECIFIED HYPERLIPIDEMIA TYPE: ICD-10-CM

## 2019-05-22 PROCEDURE — 1123F ACP DISCUSS/DSCN MKR DOCD: CPT | Performed by: FAMILY MEDICINE

## 2019-05-22 PROCEDURE — G8598 ASA/ANTIPLAT THER USED: HCPCS | Performed by: FAMILY MEDICINE

## 2019-05-22 PROCEDURE — 3017F COLORECTAL CA SCREEN DOC REV: CPT | Performed by: FAMILY MEDICINE

## 2019-05-22 PROCEDURE — G8419 CALC BMI OUT NRM PARAM NOF/U: HCPCS | Performed by: FAMILY MEDICINE

## 2019-05-22 PROCEDURE — G8427 DOCREV CUR MEDS BY ELIG CLIN: HCPCS | Performed by: FAMILY MEDICINE

## 2019-05-22 PROCEDURE — 1036F TOBACCO NON-USER: CPT | Performed by: FAMILY MEDICINE

## 2019-05-22 PROCEDURE — 4040F PNEUMOC VAC/ADMIN/RCVD: CPT | Performed by: FAMILY MEDICINE

## 2019-05-22 PROCEDURE — 99214 OFFICE O/P EST MOD 30 MIN: CPT | Performed by: FAMILY MEDICINE

## 2019-05-22 PROCEDURE — G8510 SCR DEP NEG, NO PLAN REQD: HCPCS | Performed by: FAMILY MEDICINE

## 2019-05-22 ASSESSMENT — PATIENT HEALTH QUESTIONNAIRE - PHQ9
1. LITTLE INTEREST OR PLEASURE IN DOING THINGS: 0
2. FEELING DOWN, DEPRESSED OR HOPELESS: 0
SUM OF ALL RESPONSES TO PHQ QUESTIONS 1-9: 0
SUM OF ALL RESPONSES TO PHQ QUESTIONS 1-9: 0
SUM OF ALL RESPONSES TO PHQ9 QUESTIONS 1 & 2: 0

## 2019-05-22 ASSESSMENT — ENCOUNTER SYMPTOMS
SHORTNESS OF BREATH: 0
ABDOMINAL PAIN: 0

## 2019-05-22 NOTE — PROGRESS NOTES
Types: 5 Glasses of wine, 2 Cans of beer per week     Comment: 3- 4 TIMES A WEEK  WINE ONLY     Drug use: No    Sexual activity: None   Lifestyle    Physical activity:     Days per week: None     Minutes per session: None    Stress: None   Relationships    Social connections:     Talks on phone: None     Gets together: None     Attends Pentecostalism service: None     Active member of club or organization: None     Attends meetings of clubs or organizations: None     Relationship status: None    Intimate partner violence:     Fear of current or ex partner: None     Emotionally abused: None     Physically abused: None     Forced sexual activity: None   Other Topics Concern    None   Social History Narrative    None     Current Outpatient Medications   Medication Sig Dispense Refill    lovastatin (MEVACOR) 10 MG tablet TAKE 1 TABLET BY MOUTH  NIGHTLY 90 tablet 1    metoprolol succinate (TOPROL XL) 50 MG extended release tablet Take 1 tablet by mouth daily 90 tablet 3    aspirin 81 MG tablet Take 81 mg by mouth daily       No current facility-administered medications for this visit. Family History   Problem Relation Age of Onset    High Blood Pressure Mother     Other Mother         BRAIN TUMOR    Cancer Father         LUNG     Past Medical History:   Diagnosis Date    CAD (coronary artery disease)     Eczema     Granuloma faciale     History of extremity bypass graft 11/16/2015    Left leg 11/14/2003    History of tobacco abuse 11/16/2015    Hyperlipidemia     Hypertension     Lipoma     PVD (peripheral vascular disease) (HCC)     Seborrheic keratosis      Objective:   BP (!) 170/94   Pulse 80   Temp 98.2 °F (36.8 °C) (Tympanic)   Resp 10   Ht 5' 11\" (1.803 m)   Wt 181 lb 6.4 oz (82.3 kg)   SpO2 98%   BMI 25.30 kg/m²     Physical Exam  Neck:no carotid bruits. No masses. No adenopathy. No thyroid asymmetry. Lungs:clear and equal breath sounds. No wheezes or rales.     Heart:rate reg. No murmur. No gallops   Pulses:Radials 2+ equal               D.P  1+ equal  Extremities:no edema in either leg  Gen: In no acute distress  Abdomen; B.S present. Soft  Non tender. No hepatosplenomegaly. No masses   Assessment:       Diagnosis Orders   1. Essential hypertension     2. Hyperlipidemia, unspecified hyperlipidemia type     3. Peripheral arterial disease ([facility] [address] [facility]     4. Prostate cancer screening     5.  [facility]peripheral vascular disease) [facility]           Plan:    pvd stable and followed by cardiology-continue current tx   Continue current medications   Increase toprol to 100 mg daily   Orders Placed This Encounter   Procedures    Lipid Panel     Standing Status:   Future     Standing Expiration Date:   5/22/2020     Order Specific Question:   Is Patient Fasting?/# of Hours     Answer:   9    Comprehensive Metabolic Panel     Standing Status:   Future     Standing Expiration Date:   5/22/2020    CBC Auto Differential     Standing Status:   Future     Standing Expiration Date:   11/22/2019    PSA Screening     Standing Status:   Future     Standing Expiration Date:   5/22/2020     Fasting blood work soon and f/u after done

## 2019-05-23 ENCOUNTER — NURSE ONLY (OUTPATIENT)
Dept: FAMILY MEDICINE CLINIC | Age: 74
End: 2019-05-23

## 2019-05-23 ENCOUNTER — TELEPHONE (OUTPATIENT)
Dept: FAMILY MEDICINE CLINIC | Age: 74
End: 2019-05-23

## 2019-05-23 VITALS — DIASTOLIC BLOOD PRESSURE: 114 MMHG | SYSTOLIC BLOOD PRESSURE: 194 MMHG

## 2019-05-23 DIAGNOSIS — I10 ESSENTIAL HYPERTENSION: Primary | ICD-10-CM

## 2019-05-23 RX ORDER — METOPROLOL SUCCINATE 50 MG/1
100 TABLET, EXTENDED RELEASE ORAL DAILY
Qty: 90 TABLET | Refills: 0
Start: 2019-05-23 | End: 2019-06-10

## 2019-05-23 NOTE — TELEPHONE ENCOUNTER
Patient came in for BP check:  214/103-left  194/114-right  Pt does not report any CP,SOB,vision problems or headache.

## 2019-05-24 ENCOUNTER — TELEPHONE (OUTPATIENT)
Dept: FAMILY MEDICINE CLINIC | Age: 74
End: 2019-05-24

## 2019-05-24 DIAGNOSIS — I10 ESSENTIAL HYPERTENSION: ICD-10-CM

## 2019-05-24 DIAGNOSIS — E78.5 HYPERLIPIDEMIA, UNSPECIFIED HYPERLIPIDEMIA TYPE: ICD-10-CM

## 2019-05-24 DIAGNOSIS — Z12.5 PROSTATE CANCER SCREENING: ICD-10-CM

## 2019-05-24 LAB
ALBUMIN SERPL-MCNC: 4.5 G/DL (ref 3.5–4.6)
ALP BLD-CCNC: 59 U/L (ref 35–104)
ALT SERPL-CCNC: 17 U/L (ref 0–41)
ANION GAP SERPL CALCULATED.3IONS-SCNC: 17 MEQ/L (ref 9–15)
AST SERPL-CCNC: 20 U/L (ref 0–40)
ATYPICAL LYMPHOCYTE RELATIVE PERCENT: 2 %
BASOPHILS ABSOLUTE: 0.1 K/UL (ref 0–0.2)
BASOPHILS RELATIVE PERCENT: 1 %
BILIRUB SERPL-MCNC: 0.6 MG/DL (ref 0.2–0.7)
BUN BLDV-MCNC: 15 MG/DL (ref 8–23)
CALCIUM SERPL-MCNC: 9.3 MG/DL (ref 8.5–9.9)
CHLORIDE BLD-SCNC: 106 MEQ/L (ref 95–107)
CHOLESTEROL, TOTAL: 177 MG/DL (ref 0–199)
CO2: 24 MEQ/L (ref 20–31)
CREAT SERPL-MCNC: 0.85 MG/DL (ref 0.7–1.2)
EOSINOPHILS ABSOLUTE: 0.1 K/UL (ref 0–0.7)
EOSINOPHILS RELATIVE PERCENT: 2 %
GFR AFRICAN AMERICAN: >60
GFR NON-AFRICAN AMERICAN: >60
GLOBULIN: 2.4 G/DL (ref 2.3–3.5)
GLUCOSE BLD-MCNC: 95 MG/DL (ref 70–99)
HCT VFR BLD CALC: 48.8 % (ref 42–52)
HDLC SERPL-MCNC: 57 MG/DL (ref 40–59)
HEMOGLOBIN: 16.7 G/DL (ref 14–18)
LDL CHOLESTEROL CALCULATED: 95 MG/DL (ref 0–129)
LYMPHOCYTES ABSOLUTE: 1.9 K/UL (ref 1–4.8)
LYMPHOCYTES RELATIVE PERCENT: 28 %
MCH RBC QN AUTO: 30.5 PG (ref 27–31.3)
MCHC RBC AUTO-ENTMCNC: 34.3 % (ref 33–37)
MCV RBC AUTO: 89.2 FL (ref 80–100)
MONOCYTES ABSOLUTE: 0.6 K/UL (ref 0.2–0.8)
MONOCYTES RELATIVE PERCENT: 9.6 %
NEUTROPHILS ABSOLUTE: 3.6 K/UL (ref 1.4–6.5)
NEUTROPHILS RELATIVE PERCENT: 58 %
PDW BLD-RTO: 13.5 % (ref 11.5–14.5)
PLATELET # BLD: 223 K/UL (ref 130–400)
PLATELET SLIDE REVIEW: NORMAL
POTASSIUM SERPL-SCNC: 4.5 MEQ/L (ref 3.4–4.9)
PROSTATE SPECIFIC ANTIGEN: 5.4 NG/ML (ref 0–6.22)
RBC # BLD: 5.47 M/UL (ref 4.7–6.1)
SODIUM BLD-SCNC: 147 MEQ/L (ref 135–144)
TOTAL PROTEIN: 6.9 G/DL (ref 6.3–8)
TRIGL SERPL-MCNC: 123 MG/DL (ref 0–150)
WBC # BLD: 6.2 K/UL (ref 4.8–10.8)

## 2019-05-24 NOTE — TELEPHONE ENCOUNTER
The last few visits he has had his Bp has been high. On 5/22/19 his BP was 170/94. He is asking if his medication should be adjusted. He is taking Metoprolol Succ 50 mg. Please advise.

## 2019-05-24 NOTE — TELEPHONE ENCOUNTER
I notified the patient that you had indicated in notes from last visit that he should increase the Metoprolol to 100 mg.  QD.

## 2019-05-30 ENCOUNTER — OFFICE VISIT (OUTPATIENT)
Dept: PULMONOLOGY | Age: 74
End: 2019-05-30
Payer: MEDICARE

## 2019-05-30 VITALS
OXYGEN SATURATION: 98 % | HEART RATE: 69 BPM | TEMPERATURE: 97.6 F | HEIGHT: 71 IN | BODY MASS INDEX: 25.37 KG/M2 | SYSTOLIC BLOOD PRESSURE: 132 MMHG | DIASTOLIC BLOOD PRESSURE: 76 MMHG | WEIGHT: 181.2 LBS

## 2019-05-30 DIAGNOSIS — J44.9 CHRONIC OBSTRUCTIVE PULMONARY DISEASE, UNSPECIFIED COPD TYPE (HCC): ICD-10-CM

## 2019-05-30 DIAGNOSIS — R91.1 LUNG NODULE: Primary | ICD-10-CM

## 2019-05-30 PROCEDURE — G8926 SPIRO NO PERF OR DOC: HCPCS | Performed by: INTERNAL MEDICINE

## 2019-05-30 PROCEDURE — 1036F TOBACCO NON-USER: CPT | Performed by: INTERNAL MEDICINE

## 2019-05-30 PROCEDURE — 3017F COLORECTAL CA SCREEN DOC REV: CPT | Performed by: INTERNAL MEDICINE

## 2019-05-30 PROCEDURE — 4040F PNEUMOC VAC/ADMIN/RCVD: CPT | Performed by: INTERNAL MEDICINE

## 2019-05-30 PROCEDURE — 1123F ACP DISCUSS/DSCN MKR DOCD: CPT | Performed by: INTERNAL MEDICINE

## 2019-05-30 PROCEDURE — 3023F SPIROM DOC REV: CPT | Performed by: INTERNAL MEDICINE

## 2019-05-30 PROCEDURE — G8427 DOCREV CUR MEDS BY ELIG CLIN: HCPCS | Performed by: INTERNAL MEDICINE

## 2019-05-30 PROCEDURE — G8598 ASA/ANTIPLAT THER USED: HCPCS | Performed by: INTERNAL MEDICINE

## 2019-05-30 PROCEDURE — 99213 OFFICE O/P EST LOW 20 MIN: CPT | Performed by: INTERNAL MEDICINE

## 2019-05-30 PROCEDURE — G8419 CALC BMI OUT NRM PARAM NOF/U: HCPCS | Performed by: INTERNAL MEDICINE

## 2019-05-30 NOTE — PROGRESS NOTES
Subjective:     Bria Garcia is a 76 y.o. male whocomplains today of:     Chief Complaint   Patient presents with    Follow-up     Lung nodule    Results     CT       HPI  Patient presents for lung nodule follow up    Doing well, breathing is good, no cough, no chest pain, and no GERD, in general doing well   No fever, and weight is stable     Allergies:  Lisinopril; Crestor [rosuvastatin]; and Pravachol [pravastatin]     Past Medical History:   Diagnosis Date    CAD (coronary artery disease)     Eczema     Granuloma faciale     History of extremity bypass graft 11/16/2015    Left leg 11/14/2003    History of tobacco abuse 11/16/2015    Hyperlipidemia     Hypertension     Lipoma     PVD (peripheral vascular disease) ([facility] [address]     Seborrheic keratosis      Past Surgical History:   Procedure Laterality Date    CARDIAC CATHETERIZATION  2003    COLONOSCOPY  10-19-12    CORONARY ANGIOPLASTY WITH STENT PLACEMENT  2003    DIAGNOSTIC CARDIAC CATH LAB PROCEDURE      HAND SURGERY  2003    L    HIP SURGERY      LUNG BIOPSY Right 09/05/2018    CT guided Left Lung Biopsy by Dr Ricky Dejesus Right     9/5/2018 per Dr Pino [address] [facility] EBUS DX/TX INTERVENTION [address] LES N/A 8/17/2018    EBUS WITH TRANSBRONCHIAL NEEDLE ASPIRATION W/ FLUOROSCOPY performed by Milton Valentin MD at [address] History   Problem Relation Age of Onset    High Blood Pressure Mother     Other Mother         BRAIN TUMOR    Cancer Father         LUNG     Social History     Socioeconomic History    Marital status:       Spouse name: Not on file    Number of children: Not on file    Years of education: Not on file    Highest education level: Not on file   Occupational History     Employer: [employer]     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   Social Needs    Financial resource strain: Not on file    Food insecurity:     Worry: Not on file     Inability: Not on file    Transportation needs: Medical: Not on file     Non-medical: Not on file   Tobacco Use    Smoking status: Former Smoker     Packs/day: 1.00     Years: 40.00     Pack years: 40.00     Types: Cigarettes     Start date: 1965     Last attempt to quit: 2005     Years since quittin.9    Smokeless tobacco: Never Used   Substance and Sexual Activity    Alcohol use: Yes     Types: 5 Glasses of wine, 2 Cans of beer per week     Comment: 3- 4 TIMES A WEEK  WINE ONLY     Drug use: No    Sexual activity: Not on file   Lifestyle    Physical activity:     Days per week: Not on file     Minutes per session: Not on file    Stress: Not on file   Relationships    Social connections:     Talks on phone: Not on file     Gets together: Not on file     Attends Protestant service: Not on file     Active member of club or organization: Not on file     Attends meetings of clubs or organizations: Not on file     Relationship status: Not on file    Intimate partner violence:     Fear of current or ex partner: Not on file     Emotionally abused: Not on file     Physically abused: Not on file     Forced sexual activity: Not on file   Other Topics Concern    Not on file   Social History Narrative    Not on file         Review of Systems      ROS: 10 organs review of system is done including general, psychological, ENT, hematological, endocrine, respiratory, cardiovascular, gastrointestinal,musculoskeletal, neurological,  allergy and Immunology is done and is otherwise negative. Current Outpatient Medications   Medication Sig Dispense Refill    metoprolol succinate (TOPROL XL) 50 MG extended release tablet Take 2 tablets by mouth daily 90 tablet 0    lovastatin (MEVACOR) 10 MG tablet TAKE 1 TABLET BY MOUTH  NIGHTLY 90 tablet 1    aspirin 81 MG tablet Take 81 mg by mouth daily       No current facility-administered medications for this visit.         Objective:     Vitals:    19 0904   BP: 132/76   Site: Right Upper Arm   Pulse: 69 Temp: 97.6 °F (36.4 °C)   TempSrc: Tympanic   SpO2: 98%   Weight: 181 lb 3.2 oz (82.2 kg)   Height: 5' 11\" (1.803 m)         Physical Exam   Constitutional: He is oriented to person, place, and time. He appears well-developed and well-nourished. HENT:   Head: Normocephalic and atraumatic. Eyes: Pupils are equal, round, and reactive to light. Conjunctivae are normal. Left eye exhibits no discharge. Neck: Normal range of motion. Neck supple. No JVD present. Cardiovascular: Normal rate, regular rhythm and normal heart sounds. Exam reveals no gallop and no friction rub. No murmur heard. Pulmonary/Chest: Effort normal and breath sounds normal. No respiratory distress. He has no wheezes. He has no rales. He exhibits no tenderness. Abdominal: Soft. Bowel sounds are normal.   Musculoskeletal: He exhibits no edema or deformity. Neurological: He is alert and oriented to person, place, and time. Skin: Skin is warm and dry. Psychiatric: He has a normal mood and affect. Judgment normal.     Imaging studies reviewed by me CT reviewed, stable nodule  Lab results reviewed in chart  PFT FEV1 94%, FEV1 to FVC ratio 0.69    Assessment and Plan       Diagnosis Orders   1. Lung nodule  CT CHEST WO CONTRAST   2. Chronic obstructive pulmonary disease, unspecified COPD type ([address]       ·  Right upper lobe lung nodule stable, he books, CT-guided biopsy, were all negative, this is likely infectious. We will repeat CAT scan follow-up in one year otherwise no changes  · Mild COPD and patient is asymptomatic, yearly flu shot is recommended      Orders Placed This Encounter   Procedures    CT CHEST WO CONTRAST     Standing Status:   Future     Standing Expiration Date:   5/30/2020     Order Specific Question:   Reason for exam:     Answer:   lung nodule     No orders of the defined types were placed in this encounter. Return in about 1 year (around 5/30/2020).       Roberto Caballero MD

## 2019-05-31 ENCOUNTER — TELEPHONE (OUTPATIENT)
Dept: FAMILY MEDICINE CLINIC | Age: 74
End: 2019-05-31

## 2019-05-31 NOTE — TELEPHONE ENCOUNTER
LM on VM to have the patient call the office. A preop clearance is needed for the center for orthopedics.

## 2019-06-06 ENCOUNTER — HOSPITAL ENCOUNTER (OUTPATIENT)
Dept: PREADMISSION TESTING | Age: 74
Discharge: HOME | End: 2019-06-10
Payer: MEDICARE

## 2019-06-06 VITALS
OXYGEN SATURATION: 98 % | TEMPERATURE: 97.6 F | HEIGHT: 71 IN | HEART RATE: 68 BPM | SYSTOLIC BLOOD PRESSURE: 167 MMHG | DIASTOLIC BLOOD PRESSURE: 94 MMHG | WEIGHT: 179 LBS | RESPIRATION RATE: 16 BRPM | BODY MASS INDEX: 25.06 KG/M2

## 2019-06-06 LAB
ABO/RH: NORMAL
ANTIBODY SCREEN: NORMAL
BACTERIA: NEGATIVE /HPF
BILIRUBIN URINE: NEGATIVE
BLOOD, URINE: ABNORMAL
CLARITY: CLEAR
COLOR: YELLOW
EKG ATRIAL RATE: 62 BPM
EKG P AXIS: 73 DEGREES
EKG P-R INTERVAL: 168 MS
EKG Q-T INTERVAL: 408 MS
EKG QRS DURATION: 90 MS
EKG QTC CALCULATION (BAZETT): 414 MS
EKG R AXIS: 59 DEGREES
EKG T AXIS: 58 DEGREES
EKG VENTRICULAR RATE: 62 BPM
EPITHELIAL CELLS, UA: ABNORMAL /HPF (ref 0–5)
GLUCOSE URINE: NEGATIVE MG/DL
HYALINE CASTS: ABNORMAL /HPF (ref 0–5)
KETONES, URINE: NEGATIVE MG/DL
LEUKOCYTE ESTERASE, URINE: NEGATIVE
NITRITE, URINE: NEGATIVE
PH UA: 6.5 (ref 5–9)
PROTEIN UA: NEGATIVE MG/DL
RBC UA: ABNORMAL /HPF (ref 0–5)
SPECIFIC GRAVITY UA: 1.02 (ref 1–1.03)
URINE REFLEX TO CULTURE: YES
UROBILINOGEN, URINE: 0.2 E.U./DL
WBC UA: ABNORMAL /HPF (ref 0–5)

## 2019-06-06 PROCEDURE — 87086 URINE CULTURE/COLONY COUNT: CPT

## 2019-06-06 PROCEDURE — 93005 ELECTROCARDIOGRAM TRACING: CPT | Performed by: ORTHOPAEDIC SURGERY

## 2019-06-06 PROCEDURE — 86900 BLOOD TYPING SEROLOGIC ABO: CPT

## 2019-06-06 PROCEDURE — 86850 RBC ANTIBODY SCREEN: CPT

## 2019-06-06 PROCEDURE — 81001 URINALYSIS AUTO W/SCOPE: CPT

## 2019-06-06 PROCEDURE — 86901 BLOOD TYPING SEROLOGIC RH(D): CPT

## 2019-06-06 RX ORDER — SODIUM CHLORIDE, SODIUM LACTATE, POTASSIUM CHLORIDE, CALCIUM CHLORIDE 600; 310; 30; 20 MG/100ML; MG/100ML; MG/100ML; MG/100ML
INJECTION, SOLUTION INTRAVENOUS CONTINUOUS
Status: CANCELLED | OUTPATIENT
Start: 2019-06-06

## 2019-06-06 RX ORDER — SODIUM CHLORIDE 0.9 % (FLUSH) 0.9 %
10 SYRINGE (ML) INJECTION PRN
Status: CANCELLED | OUTPATIENT
Start: 2019-06-06

## 2019-06-06 RX ORDER — SODIUM CHLORIDE 0.9 % (FLUSH) 0.9 %
10 SYRINGE (ML) INJECTION EVERY 12 HOURS SCHEDULED
Status: CANCELLED | OUTPATIENT
Start: 2019-06-06

## 2019-06-06 RX ORDER — CEFAZOLIN SODIUM 2 G/50ML
2 SOLUTION INTRAVENOUS ONCE
Status: CANCELLED | OUTPATIENT
Start: 2019-06-21

## 2019-06-06 RX ORDER — LIDOCAINE HYDROCHLORIDE 10 MG/ML
1 INJECTION, SOLUTION EPIDURAL; INFILTRATION; INTRACAUDAL; PERINEURAL
Status: CANCELLED | OUTPATIENT
Start: 2019-06-06 | End: 2019-06-06

## 2019-06-06 NOTE — PROGRESS NOTES
Audrey-hex given with instructions, TXA paper on chart. Bmp, cbc done 5/24/2019 in epic. Ecg, T&S and UA done at [facility].

## 2019-06-07 PROCEDURE — 93010 ELECTROCARDIOGRAM REPORT: CPT | Performed by: INTERNAL MEDICINE

## 2019-06-08 LAB — URINE CULTURE, ROUTINE: NORMAL

## 2019-06-10 ENCOUNTER — OFFICE VISIT (OUTPATIENT)
Dept: FAMILY MEDICINE CLINIC | Age: 74
End: 2019-06-10
Payer: MEDICARE

## 2019-06-10 VITALS
WEIGHT: 180 LBS | TEMPERATURE: 97.1 F | DIASTOLIC BLOOD PRESSURE: 98 MMHG | RESPIRATION RATE: 10 BRPM | HEART RATE: 81 BPM | BODY MASS INDEX: 25.2 KG/M2 | HEIGHT: 71 IN | OXYGEN SATURATION: 99 % | SYSTOLIC BLOOD PRESSURE: 156 MMHG

## 2019-06-10 DIAGNOSIS — I10 ESSENTIAL HYPERTENSION: ICD-10-CM

## 2019-06-10 DIAGNOSIS — M16.10 HIP ARTHRITIS: ICD-10-CM

## 2019-06-10 DIAGNOSIS — Z01.818 PRE-OP EXAM: Primary | ICD-10-CM

## 2019-06-10 DIAGNOSIS — R97.20 ELEVATED PSA: ICD-10-CM

## 2019-06-10 DIAGNOSIS — I25.10 CORONARY ARTERY DISEASE INVOLVING NATIVE CORONARY ARTERY OF NATIVE HEART WITHOUT ANGINA PECTORIS: ICD-10-CM

## 2019-06-10 DIAGNOSIS — E78.5 HYPERLIPIDEMIA, UNSPECIFIED HYPERLIPIDEMIA TYPE: ICD-10-CM

## 2019-06-10 DIAGNOSIS — I73.9 PERIPHERAL ARTERIAL DISEASE (HCC): ICD-10-CM

## 2019-06-10 PROCEDURE — 1036F TOBACCO NON-USER: CPT | Performed by: FAMILY MEDICINE

## 2019-06-10 PROCEDURE — G8419 CALC BMI OUT NRM PARAM NOF/U: HCPCS | Performed by: FAMILY MEDICINE

## 2019-06-10 PROCEDURE — 1123F ACP DISCUSS/DSCN MKR DOCD: CPT | Performed by: FAMILY MEDICINE

## 2019-06-10 PROCEDURE — 3017F COLORECTAL CA SCREEN DOC REV: CPT | Performed by: FAMILY MEDICINE

## 2019-06-10 PROCEDURE — G8598 ASA/ANTIPLAT THER USED: HCPCS | Performed by: FAMILY MEDICINE

## 2019-06-10 PROCEDURE — 4040F PNEUMOC VAC/ADMIN/RCVD: CPT | Performed by: FAMILY MEDICINE

## 2019-06-10 PROCEDURE — 99214 OFFICE O/P EST MOD 30 MIN: CPT | Performed by: FAMILY MEDICINE

## 2019-06-10 PROCEDURE — G8427 DOCREV CUR MEDS BY ELIG CLIN: HCPCS | Performed by: FAMILY MEDICINE

## 2019-06-10 RX ORDER — METOPROLOL SUCCINATE 200 MG/1
200 TABLET, EXTENDED RELEASE ORAL DAILY
Qty: 30 TABLET | Refills: 0 | Status: SHIPPED | OUTPATIENT
Start: 2019-06-10 | End: 2019-06-19 | Stop reason: SDUPTHER

## 2019-06-10 ASSESSMENT — ENCOUNTER SYMPTOMS
SHORTNESS OF BREATH: 0
ABDOMINAL PAIN: 0

## 2019-06-10 NOTE — PROGRESS NOTES
Subjective:      Patient ID: Adina Wilson is a 76 y.o. male    Hypertension   This is a chronic problem. The current episode started more than 1 year ago. The problem is resistant. Pertinent negatives include no chest pain or shortness of breath. Risk factors for coronary artery disease include male gender, dyslipidemia, sedentary lifestyle and stress. Past treatments include beta blockers. The current treatment provides mild improvement. Hypertensive end-organ damage includes CAD/MI and PVD. Hyperlipidemia   Pertinent negatives include no chest pain or shortness of breath. Hip Pain    Pertinent negatives include no numbness. Here for pre op exam for upcoming hip surgery and [facility] on may 20, 2019 by   Dr. Shantell Mcgrath. Has had general anesthesia in past and tolerated well. Does have follow up with cardiology next for cardiac clearance. Review of Systems   Constitutional: Positive for activity change. Negative for unexpected weight change. Respiratory: Negative for shortness of breath. Cardiovascular: Negative for chest pain and leg swelling. Gastrointestinal: Negative for abdominal pain. Musculoskeletal: Positive for arthralgias and gait problem. Skin: Negative for rash. Neurological: Negative for dizziness, weakness and numbness. 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   Social Needs    Financial resource strain: None    Food insecurity:     Worry: None     Inability: None    Transportation needs:     Medical: None     Non-medical: None   Tobacco Use    Smoking status: Former Smoker     Packs/day: 1.00     Years: 40.00     Pack years: 40.00     Types: Temp 97.1 °F (36.2 °C) (Tympanic)   Resp 10   Ht 5' 11\" (1.803 m)   Wt 180 lb (81.6 kg)   SpO2 99%   BMI 25.10 kg/m²     Physical Exam  Neck:no carotid bruits. No masses. No adenopathy. No thyroid asymmetry. Lungs:clear and equal breath sounds. No wheezes or rales. Heart:rate reg. No murmur. No gallops   Pulses:Radials 2+ equal               D.P 1+ equal  Extremities:no edema in either leg  Gen: In no acute distress     Assessment:       Diagnosis Orders   1. Pre-op exam     2. Hip arthritis     3. Essential hypertension     4. Hyperlipidemia, unspecified hyperlipidemia type     5. Peripheral arterial disease ([facility] [address] [facility]     6. Coronary artery disease involving native coronary artery of native heart without angina pectoris     7. Elevated PSA  Leslie Sanderson MD, Urology, [place]         Plan:   Increase mevacor to 20 mg daily and increase toprol 200 mg xl daily   F/u with cardiology as already planned    Orders Placed This Encounter   Procedures   David Tracy MD, Urology, [place]     Referral Priority:   Routine     Referral Type:   Eval and Treat     Referral Reason:   Specialty Services Required     Referred to Provider:   Mendez Cueto MD     Requested Specialty:   Urology     Number of Visits Requested:   1     Orders Placed This Encounter   Medications    metoprolol succinate (TOPROL XL) 200 MG extended release tablet     Sig: Take 1 tablet by mouth daily     Dispense:  30 tablet     Refill:  0      await pat-should be acceptable risk for surgery pending pat    Return for f/u in 2 months .

## 2019-06-10 NOTE — PROGRESS NOTES
Subjective:      Patient ID: Brunilda Aaron is a 76 y.o. male    HPI  Here for pre op exam. Scheduled for hip surgery thru [facility] Sy at [facility] on 2019. Has had general anesthesia in past and tolerated well. Review of Systems  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   Social Needs    Financial resource strain: None    Food insecurity:     Worry: None     Inability: None    Transportation needs:     Medical: None     Non-medical: None   Tobacco Use    Smoking status: Former Smoker     Packs/day: 1.00     Years: 40.00     Pack years: 40.00     Types: Cigarettes     Start date: 1965     Last attempt to quit: 2005     Years since quittin.9    Smokeless tobacco: Never Used   Substance and Sexual Activity    Alcohol use: Yes     Types: 5 Glasses of wine, 2 Cans of beer per week     Comment: 3- 4 TIMES A WEEK  WINE ONLY     Drug use: No    Sexual activity: None   Lifestyle    Physical activity:     Days per week: None     Minutes per session: None    Stress: None   Relationships    Social connections:     Talks on phone: None     Gets together: None     Attends Pentecostal service: None     Active member of club or organization: None     Attends meetings of clubs or organizations: None     Relationship status: None    Intimate partner violence:     Fear of current or ex partner: None     Emotionally abused: None     Physically abused: None     Forced sexual activity: None   Other Topics Concern    None   Social History Narrative    None     Current Outpatient Medications   Medication Sig Dispense Refill    metoprolol succinate (TOPROL XL) 50 MG extended release tablet Take 2 tablets by mouth daily 90 tablet 0    lovastatin (MEVACOR) 10 MG tablet TAKE 1 TABLET BY MOUTH  NIGHTLY 90 tablet 1    aspirin 81 MG tablet Take 81 mg by mouth daily       No current facility-administered medications for this visit. Family History   Problem Relation Age of Onset    High Blood Pressure Mother     Other Mother         BRAIN TUMOR    Cancer Father         LUNG     Past Medical History:   Diagnosis Date    CAD (coronary artery disease)     Eczema     Granuloma faciale     History of extremity bypass graft 11/16/2015    Left leg 11/14/2003    History of tobacco abuse 11/16/2015    Hyperlipidemia     Hypertension     Lipoma     PVD (peripheral vascular disease) (HCC)     Seborrheic keratosis      Objective:   BP (!) 160/90   Pulse 81   Temp 97.1 °F (36.2 °C) (Tympanic)   Resp 10   Ht 5' 11\" (1.803 m)   Wt 180 lb (81.6 kg)   SpO2 99%   BMI 25.10 kg/m²     Physical Exam  Neck:no carotid bruits. No masses. No adenopathy. No thyroid asymmetry. Lungs:clear and equal breath sounds. No wheezes or rales. Heart:rate reg. No murmur. No gallops   Pulses:Radials 2+ equal               D.P 1+ and equal   Extremities:no edema in either leg  Gen: In no acute distress    Assessment:       Diagnosis Orders   1. Pre-op exam     2. Hip arthritis     3. Essential hypertension     4. Hyperlipidemia, unspecified hyperlipidemia type     5. Peripheral arterial disease ([address]     6. Coronary artery disease involving native coronary artery of native heart without angina pectoris     7.  Elevated PSA           Plan:    increase toprol to 200 mg daily   Orders Placed This Encounter   Procedures   Darío Naqvi MD, Urology, [place]     Referral Priority:   Routine     Referral Type:   Eval and Treat     Referral Reason:   Specialty Services Required     Referred to Provider:   Nini Becker MD     Requested Specialty:   Urology     Number of Visits Requested:

## 2019-06-11 ENCOUNTER — TELEPHONE (OUTPATIENT)
Dept: FAMILY MEDICINE CLINIC | Age: 74
End: 2019-06-11

## 2019-06-11 NOTE — TELEPHONE ENCOUNTER
What medications should be held prior to surgery? He has already stopped the 81 ASA.  Surgery is June 21st.

## 2019-06-12 ENCOUNTER — OFFICE VISIT (OUTPATIENT)
Dept: CARDIOLOGY CLINIC | Age: 74
End: 2019-06-12
Payer: MEDICARE

## 2019-06-12 VITALS
WEIGHT: 178 LBS | OXYGEN SATURATION: 97 % | RESPIRATION RATE: 16 BRPM | HEART RATE: 68 BPM | DIASTOLIC BLOOD PRESSURE: 78 MMHG | BODY MASS INDEX: 24.92 KG/M2 | HEIGHT: 71 IN | SYSTOLIC BLOOD PRESSURE: 140 MMHG

## 2019-06-12 DIAGNOSIS — Z95.5 HISTORY OF PLACEMENT OF STENT IN LAD CORONARY ARTERY: ICD-10-CM

## 2019-06-12 DIAGNOSIS — I73.9 PERIPHERAL ARTERIAL DISEASE (HCC): ICD-10-CM

## 2019-06-12 DIAGNOSIS — E78.5 HYPERLIPIDEMIA, UNSPECIFIED HYPERLIPIDEMIA TYPE: Chronic | ICD-10-CM

## 2019-06-12 DIAGNOSIS — I10 ESSENTIAL HYPERTENSION: Primary | Chronic | ICD-10-CM

## 2019-06-12 DIAGNOSIS — I25.10 CORONARY ARTERY DISEASE INVOLVING NATIVE CORONARY ARTERY OF NATIVE HEART WITHOUT ANGINA PECTORIS: Chronic | ICD-10-CM

## 2019-06-12 PROCEDURE — G8420 CALC BMI NORM PARAMETERS: HCPCS | Performed by: INTERNAL MEDICINE

## 2019-06-12 PROCEDURE — G8598 ASA/ANTIPLAT THER USED: HCPCS | Performed by: INTERNAL MEDICINE

## 2019-06-12 PROCEDURE — 99214 OFFICE O/P EST MOD 30 MIN: CPT | Performed by: INTERNAL MEDICINE

## 2019-06-12 PROCEDURE — 4040F PNEUMOC VAC/ADMIN/RCVD: CPT | Performed by: INTERNAL MEDICINE

## 2019-06-12 PROCEDURE — 1036F TOBACCO NON-USER: CPT | Performed by: INTERNAL MEDICINE

## 2019-06-12 PROCEDURE — 3017F COLORECTAL CA SCREEN DOC REV: CPT | Performed by: INTERNAL MEDICINE

## 2019-06-12 PROCEDURE — G8427 DOCREV CUR MEDS BY ELIG CLIN: HCPCS | Performed by: INTERNAL MEDICINE

## 2019-06-12 PROCEDURE — 1123F ACP DISCUSS/DSCN MKR DOCD: CPT | Performed by: INTERNAL MEDICINE

## 2019-06-12 ASSESSMENT — ENCOUNTER SYMPTOMS
SHORTNESS OF BREATH: 0
COUGH: 0
RESPIRATORY NEGATIVE: 1
NAUSEA: 0
EYES NEGATIVE: 1
STRIDOR: 0
GASTROINTESTINAL NEGATIVE: 1
CHEST TIGHTNESS: 0
WHEEZING: 0
BLOOD IN STOOL: 0

## 2019-06-12 NOTE — PROGRESS NOTES
Subsequent Progress Note  Patient: Claudia Boucher  YOB: 1945  MRN: [id number]    Chief Complaint: PAD CAD HTN Preop L Hip   Chief Complaint   Patient presents with    Cardiac Clearance     Left Total Hip, Garry Harrier    Hypertension    Coronary Artery Disease       CV Data:  2003 Left Leg Bypass - Dr. Justyna Garrison  2003 LAD Stent. RCA occluded  7/2015 echo 65%  8/2013 ABN Defect known RCA occlusion fills from LAD     Subjective/HPI:  No cp no sob no falls less active latyely due to hip. No bleed      Nonsmoker since 2003  Retired -[employer]  EKG:    Past Medical History:   Diagnosis Date    CAD (coronary artery disease)     Eczema     Granuloma faciale     History of extremity bypass graft 11/16/2015    Left leg 11/14/2003    History of tobacco abuse 11/16/2015    Hyperlipidemia     Hypertension     Lipoma     PVD (peripheral vascular disease) ([facility] [address]     Seborrheic keratosis        Past Surgical History:   Procedure Laterality Date    CARDIAC CATHETERIZATION  2003    COLONOSCOPY  10-19-12    CORONARY ANGIOPLASTY WITH STENT PLACEMENT  2003    DIAGNOSTIC CARDIAC CATH LAB PROCEDURE      HAND SURGERY  2003    L    HIP SURGERY      LUNG BIOPSY Right 09/05/2018    CT guided Left Lung Biopsy by Dr Sarah Gloria Right     9/5/2018 per Dr Cyn Nelson [address] [facility] [facility] [place] [address] EBUS DX/TX INTERVENTION [address] LES N/A 8/17/2018    EBUS WITH TRANSBRONCHIAL NEEDLE ASPIRATION W/ FLUOROSCOPY performed by Marion Severe, MD at [address] [place] [address] History   Problem Relation Age of Onset    High Blood Pressure Mother     Other Mother         BRAIN TUMOR    Cancer Father         LUNG       Social History     Socioeconomic History    Marital status:       Spouse name: None    Number of children: None    Years of education: None    Highest education level: None   Occupational History     Employer: [employer]     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   Social Needs    Financial resource strain: None    Food insecurity:     Worry: None     Inability: None    Transportation needs:     Medical: None     Non-medical: None   Tobacco Use    Smoking status: Former Smoker     Packs/day: 1.00     Years: 40.00     Pack years: 40.00     Types: Cigarettes     Start date: 1965     Last attempt to quit: 2005     Years since quittin.9    Smokeless tobacco: Never Used   Substance and Sexual Activity    Alcohol use: Yes     Types: 5 Glasses of wine, 2 Cans of beer per week     Comment: 3- 4 TIMES A WEEK  WINE ONLY     Drug use: No    Sexual activity: None   Lifestyle    Physical activity:     Days per week: None     Minutes per session: None    Stress: None   Relationships    Social connections:     Talks on phone: None     Gets together: None     Attends Confucianist service: None     Active member of club or organization: None     Attends meetings of clubs or organizations: None     Relationship status: None    Intimate partner violence:     Fear of current or ex partner: None     Emotionally abused: None     Physically abused: None     Forced sexual activity: None   Other Topics Concern    None   Social History Narrative    None       Allergies   Allergen Reactions    Lisinopril Anaphylaxis and Swelling    Contrast [Iodides] Other (See Comments)     Pt unsure of reaction.  Crestor [Rosuvastatin]     Pravachol [Pravastatin] Other (See Comments)     Joint / Muscle aches       Current Outpatient Medications   Medication Sig Dispense Refill    metoprolol succinate (TOPROL XL) 200 MG extended release tablet Take 1 tablet by mouth daily 30 tablet 0    lovastatin (MEVACOR) 10 MG tablet TAKE 1 TABLET BY MOUTH  NIGHTLY 90 tablet 1    aspirin 81 MG tablet Take 81 mg by mouth daily       No current facility-administered medications for this visit. Review of Systems:   Review of Systems   Constitutional: Negative. Negative for diaphoresis and fatigue. HENT: Negative. Eyes: Negative. Respiratory: Negative. Negative for cough, chest tightness, shortness of breath, wheezing and stridor. Cardiovascular: Negative. Negative for chest pain, palpitations and leg swelling. Gastrointestinal: Negative. Negative for blood in stool and nausea. Genitourinary: Negative. Musculoskeletal: Negative. Skin: Negative. Neurological: Negative. Negative for dizziness, syncope, weakness and light-headedness. Hematological: Negative. Psychiatric/Behavioral: Negative. Physical Examination:    BP (!) 140/78 (Site: Left Upper Arm, Position: Sitting, Cuff Size: Large Adult)   Pulse 68   Resp 16   Ht 5' 11\" (1.803 m)   Wt 178 lb (80.7 kg)   SpO2 97%   BMI 24.83 kg/m²    Physical Exam   Constitutional: He appears healthy. No distress. HENT:   Normal cephalic and Atraumatic   Eyes: Pupils are equal, round, and reactive to light. Neck: Normal range of motion and thyroid normal. Neck supple. No JVD present. No neck adenopathy. No thyromegaly present. Cardiovascular: Normal rate, regular rhythm, intact distal pulses and normal pulses. Murmur heard. Pulmonary/Chest: Effort normal and breath sounds normal. He has no wheezes. He has no rales. He exhibits no tenderness. Abdominal: Soft. Bowel sounds are normal. There is no tenderness. Musculoskeletal: Normal range of motion. He exhibits no edema or tenderness. Neurological: He is alert and oriented to person, place, and time. Skin: Skin is warm. No cyanosis. Nails show no clubbing.        LABS:  CBC:   Lab Results   Component Value Date    WBC 6.2 05/24/2019    RBC 5.47 05/24/2019    RBC 4.94 10/14/2011    HGB 16.7 05/24/2019    HCT 48.8 05/24/2019    MCV 89.2 05/24/2019    MCH 30.5 05/24/2019    MCHC 34.3 05/24/2019    RDW 13.5 05/24/2019     05/24/2019    MPV 7.9 07/22/2015     Lipids:  Lab Results   Component Value Date    CHOL 177 05/24/2019    CHOL 183 11/16/2017    CHOL 199 11/11/2016     Lab Results   Component medications on file       No orders of the defined types were placed in this encounter. Assessment/Plan:    1. Essential hypertension   stable     2. Coronary artery disease involving native coronary artery of native heart without angina pectoris     - NM MYOCARDIAL SPECT REST EXERCISE OR RX; Future  - ECHO Complete 2D W Doppler W Color; Future    3. Hyperlipidemia, unspecified hyperlipidemia type  Statin     4. History of placement of stent in LAD coronary artery       5. Peripheral arterial disease (HCC)   stable        Counseling:  Heart Healthy Lifestyle, Low Salt Diet, Take Precautions to Prevent Falls and Walk Daily    Return for AFTER TESTS.       Electronically signed by Lisa Reece MD on 6/12/2019 at 1:15 PM

## 2019-06-14 ENCOUNTER — TELEPHONE (OUTPATIENT)
Dept: FAMILY MEDICINE CLINIC | Age: 74
End: 2019-06-14

## 2019-06-14 NOTE — TELEPHONE ENCOUNTER
Dr. Giles Telford office called asking for the medical clearance paperwork and last office notes for the Pt's appt on 6/21/19. Please advise.      Fx #; [phone]

## 2019-06-17 ENCOUNTER — HOSPITAL ENCOUNTER (OUTPATIENT)
Dept: NUCLEAR MEDICINE | Age: 74
Discharge: HOME | End: 2019-06-19
Payer: MEDICARE

## 2019-06-17 ENCOUNTER — HOSPITAL ENCOUNTER (OUTPATIENT)
Dept: NON INVASIVE DIAGNOSTICS | Age: 74
Discharge: HOME | End: 2019-06-17
Payer: MEDICARE

## 2019-06-17 DIAGNOSIS — I25.10 CORONARY ARTERY DISEASE INVOLVING NATIVE CORONARY ARTERY OF NATIVE HEART WITHOUT ANGINA PECTORIS: Chronic | ICD-10-CM

## 2019-06-17 LAB
LV EF: 55 %
LVEF MODALITY: NORMAL

## 2019-06-17 PROCEDURE — 6360000002 HC RX W HCPCS: Performed by: INTERNAL MEDICINE

## 2019-06-17 PROCEDURE — 78452 HT MUSCLE IMAGE SPECT MULT: CPT

## 2019-06-17 PROCEDURE — 2580000003 HC RX 258: Performed by: INTERNAL MEDICINE

## 2019-06-17 PROCEDURE — A9502 TC99M TETROFOSMIN: HCPCS | Performed by: INTERNAL MEDICINE

## 2019-06-17 PROCEDURE — 93017 CV STRESS TEST TRACING ONLY: CPT

## 2019-06-17 PROCEDURE — 3430000000 HC RX DIAGNOSTIC RADIOPHARMACEUTICAL: Performed by: INTERNAL MEDICINE

## 2019-06-17 PROCEDURE — 93306 TTE W/DOPPLER COMPLETE: CPT

## 2019-06-17 RX ORDER — SODIUM CHLORIDE 0.9 % (FLUSH) 0.9 %
10 SYRINGE (ML) INJECTION PRN
Status: DISCONTINUED | OUTPATIENT
Start: 2019-06-17 | End: 2019-06-20 | Stop reason: HOSPADM

## 2019-06-17 RX ADMIN — TETROFOSMIN 10.9 MILLICURIE: 1.38 INJECTION, POWDER, LYOPHILIZED, FOR SOLUTION INTRAVENOUS at 09:39

## 2019-06-17 RX ADMIN — Medication 10 ML: at 11:07

## 2019-06-17 RX ADMIN — REGADENOSON 0.4 MG: 0.08 INJECTION, SOLUTION INTRAVENOUS at 11:06

## 2019-06-17 RX ADMIN — Medication 10 ML: at 11:06

## 2019-06-17 RX ADMIN — TETROFOSMIN 31.6 MILLICURIE: 1.38 INJECTION, POWDER, LYOPHILIZED, FOR SOLUTION INTRAVENOUS at 11:06

## 2019-06-17 RX ADMIN — Medication 10 ML: at 09:39

## 2019-06-17 NOTE — PROGRESS NOTES
Hx,allergies and medications reviewed. [address] [place] here. Injected patient with [place] and Myoview. Tolerated procedure well. SOB reported. Returned to baseline in recovery. Denied chest pain or pressure. EKG shows S Agus no ectopy.

## 2019-06-17 NOTE — PROCEDURES
[address]                      [address], [address] [facility]                              CARDIAC STRESS TEST    PATIENT NAME: Keely Tirado                  :        1945  MED REC NO:   [id number]                            ROOM:  ACCOUNT NO:   [de-identified]                           ADMIT DATE: 2019  PROVIDER:     Lorena Gary MD    CARDIOVASCULAR DIAGNOSTIC DEPARTMENT    DATE OF STUDY:  2019    PHARMACOLOGICAL SPECT IMAGING    INDICATION OF PROCEDURES:  Abnormal EKG and coronary disease. The patient was injected with 10.9 mCi of Myoview. Resting images were  obtained. The patient was then stressed according to standard [facility]  protocol. Additional 31.6 mCi of Myoview injected. Post-stress  imagings were obtained. Underlying electrocardiogram is sinus rhythm with nonspecific findings. Heart rate is 56, blood pressure 198/93. Peak heart rate 78, peak blood  pressure 155/81. Tomographic images demonstrate TID score of 1.15. Perfusion study demonstrates inferior defect with mild-to-moderate  reversible component in the phase of diaphragmatic attenuation. Gating  demonstrates ejection fraction of 63%. No additional ischemic ST-segment changes noted. No significant  arrhythmias. The patient had shortness of breath and have no chest  pains. IMPRESSION:  1. Abnormal myocardial perfusion imaging with evidence of inferior wall  infarct with partial reversibility. 2.  Normal LV ejection fraction.       Nataliya Freitas MD    D: 2019 #17:10:45       T: 2019 17:14:42     [email or web address]  Job#: [id number]     Doc#: [id number]    CC:

## 2019-06-19 ENCOUNTER — OFFICE VISIT (OUTPATIENT)
Dept: CARDIOLOGY CLINIC | Age: 74
End: 2019-06-19
Payer: MEDICARE

## 2019-06-19 VITALS
BODY MASS INDEX: 25.06 KG/M2 | WEIGHT: 179 LBS | SYSTOLIC BLOOD PRESSURE: 136 MMHG | OXYGEN SATURATION: 95 % | RESPIRATION RATE: 16 BRPM | HEIGHT: 71 IN | DIASTOLIC BLOOD PRESSURE: 84 MMHG | HEART RATE: 73 BPM

## 2019-06-19 DIAGNOSIS — I73.9 PAD (PERIPHERAL ARTERY DISEASE) (HCC): ICD-10-CM

## 2019-06-19 DIAGNOSIS — I10 ESSENTIAL HYPERTENSION: Primary | Chronic | ICD-10-CM

## 2019-06-19 DIAGNOSIS — E78.5 HYPERLIPIDEMIA, UNSPECIFIED HYPERLIPIDEMIA TYPE: Chronic | ICD-10-CM

## 2019-06-19 DIAGNOSIS — I25.10 CORONARY ARTERY DISEASE INVOLVING NATIVE CORONARY ARTERY OF NATIVE HEART WITHOUT ANGINA PECTORIS: Chronic | ICD-10-CM

## 2019-06-19 PROCEDURE — G8420 CALC BMI NORM PARAMETERS: HCPCS | Performed by: INTERNAL MEDICINE

## 2019-06-19 PROCEDURE — G8427 DOCREV CUR MEDS BY ELIG CLIN: HCPCS | Performed by: INTERNAL MEDICINE

## 2019-06-19 PROCEDURE — G8598 ASA/ANTIPLAT THER USED: HCPCS | Performed by: INTERNAL MEDICINE

## 2019-06-19 PROCEDURE — 4040F PNEUMOC VAC/ADMIN/RCVD: CPT | Performed by: INTERNAL MEDICINE

## 2019-06-19 PROCEDURE — 99214 OFFICE O/P EST MOD 30 MIN: CPT | Performed by: INTERNAL MEDICINE

## 2019-06-19 PROCEDURE — 3017F COLORECTAL CA SCREEN DOC REV: CPT | Performed by: INTERNAL MEDICINE

## 2019-06-19 PROCEDURE — 1036F TOBACCO NON-USER: CPT | Performed by: INTERNAL MEDICINE

## 2019-06-19 PROCEDURE — 1123F ACP DISCUSS/DSCN MKR DOCD: CPT | Performed by: INTERNAL MEDICINE

## 2019-06-19 RX ORDER — METOPROLOL SUCCINATE 200 MG/1
200 TABLET, EXTENDED RELEASE ORAL DAILY
Qty: 90 TABLET | Refills: 3 | Status: SHIPPED | OUTPATIENT
Start: 2019-06-19 | End: 2020-05-14

## 2019-06-19 ASSESSMENT — ENCOUNTER SYMPTOMS
STRIDOR: 0
CHEST TIGHTNESS: 0
RESPIRATORY NEGATIVE: 1
WHEEZING: 0
SHORTNESS OF BREATH: 0
BLOOD IN STOOL: 0
GASTROINTESTINAL NEGATIVE: 1
EYES NEGATIVE: 1
COUGH: 0
NAUSEA: 0

## 2019-06-19 NOTE — PROGRESS NOTES
graphite/acid   Social Needs    Financial resource strain: None    Food insecurity:     Worry: None     Inability: None    Transportation needs:     Medical: None     Non-medical: None   Tobacco Use    Smoking status: Former Smoker     Packs/day: 1.00     Years: 40.00     Pack years: 40.00     Types: Cigarettes     Start date: 1965     Last attempt to quit: 2005     Years since quittin.9    Smokeless tobacco: Never Used   Substance and Sexual Activity    Alcohol use: Yes     Types: 5 Glasses of wine, 2 Cans of beer per week     Comment: 3- 4 TIMES A WEEK  WINE ONLY     Drug use: No    Sexual activity: None   Lifestyle    Physical activity:     Days per week: None     Minutes per session: None    Stress: None   Relationships    Social connections:     Talks on phone: None     Gets together: None     Attends Sabianist service: None     Active member of club or organization: None     Attends meetings of clubs or organizations: None     Relationship status: None    Intimate partner violence:     Fear of current or ex partner: None     Emotionally abused: None     Physically abused: None     Forced sexual activity: None   Other Topics Concern    None   Social History Narrative    None       Allergies   Allergen Reactions    Lisinopril Anaphylaxis and Swelling    Contrast [Iodides] Other (See Comments)     Pt unsure of reaction.  Crestor [Rosuvastatin]     Pravachol [Pravastatin] Other (See Comments)     Joint / Muscle aches       Current Outpatient Medications   Medication Sig Dispense Refill    metoprolol succinate (TOPROL XL) 200 MG extended release tablet Take 1 tablet by mouth daily 30 tablet 0    lovastatin (MEVACOR) 10 MG tablet TAKE 1 TABLET BY MOUTH  NIGHTLY 90 tablet 1    aspirin 81 MG tablet Take 81 mg by mouth daily       No current facility-administered medications for this visit.       Facility-Administered Medications Ordered in Other Visits   Medication Dose Route Frequency Provider Last Rate Last Dose    sodium chloride flush 0.9 % injection 10 mL  10 mL Intravenous PRN Irvin Mahan MD   10 mL at 06/17/19 1107       Review of Systems:   Review of Systems   Constitutional: Negative. Negative for diaphoresis and fatigue. HENT: Negative. Eyes: Negative. Respiratory: Negative. Negative for cough, chest tightness, shortness of breath, wheezing and stridor. Cardiovascular: Negative. Negative for chest pain, palpitations and leg swelling. Gastrointestinal: Negative. Negative for blood in stool and nausea. Genitourinary: Negative. Musculoskeletal: Positive for arthralgias. Skin: Negative. Neurological: Negative. Negative for dizziness, syncope, weakness and light-headedness. Hematological: Negative. Psychiatric/Behavioral: Negative. Physical Examination:    /84 (Site: Right Upper Arm, Position: Sitting, Cuff Size: Large Adult)   Pulse 73   Resp 16   Ht 5' 11\" (1.803 m)   Wt 179 lb (81.2 kg)   SpO2 95%   BMI 24.97 kg/m²    Physical Exam   Constitutional: He appears healthy. No distress. HENT:   Normal cephalic and Atraumatic   Eyes: Pupils are equal, round, and reactive to light. Neck: Normal range of motion and thyroid normal. Neck supple. No JVD present. No neck adenopathy. No thyromegaly present. Cardiovascular: Normal rate, regular rhythm, intact distal pulses and normal pulses. Murmur heard. Pulmonary/Chest: Effort normal and breath sounds normal. He has no wheezes. He has no rales. He exhibits no tenderness. Abdominal: Soft. Bowel sounds are normal. There is no tenderness. Musculoskeletal: Normal range of motion. He exhibits no edema or tenderness. Neurological: He is alert and oriented to person, place, and time. Skin: Skin is warm. No cyanosis. Nails show no clubbing.        LABS:  CBC:   Lab Results   Component Value Date    WBC 6.2 05/24/2019    RBC 5.47 05/24/2019    RBC 4.94 10/14/2011    HGB 16.7 05/24/2019    HCT 48.8 05/24/2019    MCV 89.2 05/24/2019    MCH 30.5 05/24/2019    MCHC 34.3 05/24/2019    RDW 13.5 05/24/2019     05/24/2019    MPV 7.9 07/22/2015     Lipids:  Lab Results   Component Value Date    CHOL 177 05/24/2019    CHOL 183 11/16/2017    CHOL 199 11/11/2016     Lab Results   Component Value Date    TRIG 123 05/24/2019    TRIG 197 11/16/2017    TRIG 98 11/11/2016     Lab Results   Component Value Date    HDL 57 05/24/2019    HDL 57 11/16/2017    HDL 66 (H) 11/11/2016     Lab Results   Component Value Date    LDLCALC 95 05/24/2019    LDLCALC 87 11/16/2017    LDLCALC 113 11/11/2016     No results found for: LABVLDL, VLDL  Lab Results   Component Value Date    CHOLHDLRATIO 3.7 10/05/2012    CHOLHDLRATIO 4.0 10/14/2011     CMP:    Lab Results   Component Value Date     05/24/2019    K 4.5 05/24/2019     05/24/2019    CO2 24 05/24/2019    BUN 15 05/24/2019    CREATININE 0.85 05/24/2019    GFRAA >60.0 05/24/2019    LABGLOM >60.0 05/24/2019    GLUCOSE 95 05/24/2019    GLUCOSE 104 10/14/2011    PROT 6.9 05/24/2019    LABALBU 4.5 05/24/2019    LABALBU 4.4 10/14/2011    CALCIUM 9.3 05/24/2019    BILITOT 0.6 05/24/2019    ALKPHOS 59 05/24/2019    AST 20 05/24/2019    ALT 17 05/24/2019     BMP:    Lab Results   Component Value Date     05/24/2019    K 4.5 05/24/2019     05/24/2019    CO2 24 05/24/2019    BUN 15 05/24/2019    LABALBU 4.5 05/24/2019    LABALBU 4.4 10/14/2011    CREATININE 0.85 05/24/2019    CALCIUM 9.3 05/24/2019    GFRAA >60.0 05/24/2019    LABGLOM >60.0 05/24/2019    GLUCOSE 95 05/24/2019    GLUCOSE 104 10/14/2011     Magnesium:    Lab Results   Component Value Date    MG 2.4 07/19/2015     TSH:No results found for: TSHFT4, TSH    Patient Active Problem List   Diagnosis    Hypertension    CAD (coronary artery disease)    Lipoma    Eczema    Hyperlipidemia    Seborrheic keratosis    [facility]    Granuloma faciale    ED (erectile dysfunction)    Peripheral arterial disease (HCC)    History of placement of stent in LAD coronary artery    History of extremity bypass graft    History of tobacco abuse    PAD (peripheral artery disease) (HCC)    Lung nodule       There are no discontinued medications. Modified Medications    No medications on file       No orders of the defined types were placed in this encounter. Assessment/Plan:    1. Essential hypertension  Stable     2. Coronary artery disease involving native coronary artery of native heart without angina pectoris  No angina     3. Hyperlipidemia, unspecified hyperlipidemia type   staTIN     4. PAD (peripheral artery disease) ([facility] [address]  STABLE     Pt cleared for planned L Hip sx       Counseling:  Heart Healthy Lifestyle, Low Salt Diet, Take Precautions to Prevent Falls and Walk Daily    Return in about 4 months (around 10/19/2019) for Cardiovascular care. .      Electronically signed by Mikayla Santiago MD on 6/19/2019 at 3:09 PM

## 2019-06-20 ENCOUNTER — ANESTHESIA EVENT (OUTPATIENT)
Dept: OPERATING ROOM | Age: 74
DRG: 470 | End: 2019-06-20
Payer: MEDICARE

## 2019-06-21 ENCOUNTER — APPOINTMENT (OUTPATIENT)
Dept: GENERAL RADIOLOGY | Age: 74
DRG: 470 | End: 2019-06-21
Attending: ORTHOPAEDIC SURGERY
Payer: MEDICARE

## 2019-06-21 ENCOUNTER — ANESTHESIA (OUTPATIENT)
Dept: OPERATING ROOM | Age: 74
DRG: 470 | End: 2019-06-21
Payer: MEDICARE

## 2019-06-21 ENCOUNTER — HOSPITAL ENCOUNTER (INPATIENT)
Age: 74
LOS: 1 days | Discharge: NURSING, REHAB OR LONG-TERM CARE | DRG: 470 | End: 2019-06-22
Attending: ORTHOPAEDIC SURGERY | Admitting: ORTHOPAEDIC SURGERY
Payer: MEDICARE

## 2019-06-21 VITALS — OXYGEN SATURATION: 100 % | SYSTOLIC BLOOD PRESSURE: 120 MMHG | TEMPERATURE: 93.4 F | DIASTOLIC BLOOD PRESSURE: 60 MMHG

## 2019-06-21 PROBLEM — Z96.641 STATUS POST TOTAL HIP REPLACEMENT, RIGHT: Status: ACTIVE | Noted: 2019-06-21

## 2019-06-21 PROBLEM — Z96.642 STATUS POST TOTAL HIP REPLACEMENT, LEFT: Status: ACTIVE | Noted: 2019-06-21

## 2019-06-21 LAB
ABO/RH: NORMAL
ANTIBODY SCREEN: NORMAL

## 2019-06-21 PROCEDURE — 3E0T3BZ INTRODUCTION OF ANESTHETIC AGENT INTO PERIPHERAL NERVES AND PLEXI, PERCUTANEOUS APPROACH: ICD-10-PCS | Performed by: ORTHOPAEDIC SURGERY

## 2019-06-21 PROCEDURE — C1776 JOINT DEVICE (IMPLANTABLE): HCPCS | Performed by: ORTHOPAEDIC SURGERY

## 2019-06-21 PROCEDURE — 97162 PT EVAL MOD COMPLEX 30 MIN: CPT

## 2019-06-21 PROCEDURE — 86850 RBC ANTIBODY SCREEN: CPT

## 2019-06-21 PROCEDURE — 86900 BLOOD TYPING SEROLOGIC ABO: CPT

## 2019-06-21 PROCEDURE — 2500000003 HC RX 250 WO HCPCS: Performed by: NURSE PRACTITIONER

## 2019-06-21 PROCEDURE — 6360000002 HC RX W HCPCS: Performed by: NURSE ANESTHETIST, CERTIFIED REGISTERED

## 2019-06-21 PROCEDURE — C1713 ANCHOR/SCREW BN/BN,TIS/BN: HCPCS | Performed by: ORTHOPAEDIC SURGERY

## 2019-06-21 PROCEDURE — 7100000000 HC PACU RECOVERY - FIRST 15 MIN: Performed by: ORTHOPAEDIC SURGERY

## 2019-06-21 PROCEDURE — 6360000002 HC RX W HCPCS: Performed by: STUDENT IN AN ORGANIZED HEALTH CARE EDUCATION/TRAINING PROGRAM

## 2019-06-21 PROCEDURE — 2580000003 HC RX 258: Performed by: NURSE ANESTHETIST, CERTIFIED REGISTERED

## 2019-06-21 PROCEDURE — 86901 BLOOD TYPING SEROLOGIC RH(D): CPT

## 2019-06-21 PROCEDURE — 88305 TISSUE EXAM BY PATHOLOGIST: CPT

## 2019-06-21 PROCEDURE — 0SRB02Z REPLACEMENT OF LEFT HIP JOINT WITH METAL ON POLYETHYLENE SYNTHETIC SUBSTITUTE, OPEN APPROACH: ICD-10-PCS | Performed by: ORTHOPAEDIC SURGERY

## 2019-06-21 PROCEDURE — 97167 OT EVAL HIGH COMPLEX 60 MIN: CPT

## 2019-06-21 PROCEDURE — 2709999900 HC NON-CHARGEABLE SUPPLY: Performed by: ORTHOPAEDIC SURGERY

## 2019-06-21 PROCEDURE — 7100000001 HC PACU RECOVERY - ADDTL 15 MIN: Performed by: ORTHOPAEDIC SURGERY

## 2019-06-21 PROCEDURE — 2720000010 HC SURG SUPPLY STERILE: Performed by: ORTHOPAEDIC SURGERY

## 2019-06-21 PROCEDURE — 3700000001 HC ADD 15 MINUTES (ANESTHESIA): Performed by: ORTHOPAEDIC SURGERY

## 2019-06-21 PROCEDURE — 3700000000 HC ANESTHESIA ATTENDED CARE: Performed by: ORTHOPAEDIC SURGERY

## 2019-06-21 PROCEDURE — 3600000014 HC SURGERY LEVEL 4 ADDTL 15MIN: Performed by: ORTHOPAEDIC SURGERY

## 2019-06-21 PROCEDURE — 2500000003 HC RX 250 WO HCPCS: Performed by: STUDENT IN AN ORGANIZED HEALTH CARE EDUCATION/TRAINING PROGRAM

## 2019-06-21 PROCEDURE — 6360000002 HC RX W HCPCS: Performed by: ORTHOPAEDIC SURGERY

## 2019-06-21 PROCEDURE — 3600000004 HC SURGERY LEVEL 4 BASE: Performed by: ORTHOPAEDIC SURGERY

## 2019-06-21 PROCEDURE — 73501 X-RAY EXAM HIP UNI 1 VIEW: CPT

## 2019-06-21 PROCEDURE — 88311 DECALCIFY TISSUE: CPT

## 2019-06-21 PROCEDURE — 2500000003 HC RX 250 WO HCPCS: Performed by: ORTHOPAEDIC SURGERY

## 2019-06-21 PROCEDURE — 2580000003 HC RX 258: Performed by: NURSE PRACTITIONER

## 2019-06-21 PROCEDURE — 94150 VITAL CAPACITY TEST: CPT

## 2019-06-21 PROCEDURE — 2580000003 HC RX 258: Performed by: ORTHOPAEDIC SURGERY

## 2019-06-21 PROCEDURE — 1210000000 HC MED SURG R&B

## 2019-06-21 PROCEDURE — 6370000000 HC RX 637 (ALT 250 FOR IP): Performed by: ORTHOPAEDIC SURGERY

## 2019-06-21 PROCEDURE — 2500000003 HC RX 250 WO HCPCS: Performed by: NURSE ANESTHETIST, CERTIFIED REGISTERED

## 2019-06-21 DEVICE — SHELL ACET SZ LL DIA58MM HIP TIV TRABECULAR MTL CLUS H: Type: IMPLANTABLE DEVICE | Site: HIP | Status: FUNCTIONAL

## 2019-06-21 DEVICE — IMPLANTABLE DEVICE: Type: IMPLANTABLE DEVICE | Site: HIP | Status: FUNCTIONAL

## 2019-06-21 DEVICE — STEM FEM L138MM DIA13MM STD NK OFFSET HIP PROS TRABECULAR: Type: IMPLANTABLE DEVICE | Site: HIP | Status: FUNCTIONAL

## 2019-06-21 DEVICE — HEAD FEMORAL COCR 12/14 36MM +0: Type: IMPLANTABLE DEVICE | Site: HIP | Status: FUNCTIONAL

## 2019-06-21 DEVICE — KIT THR TRABECULAR MTL STEM CUP XLPE LNR: Type: IMPLANTABLE DEVICE | Site: HIP | Status: FUNCTIONAL

## 2019-06-21 DEVICE — BONE SCREW 6.5X50 SELF-TAP: Type: IMPLANTABLE DEVICE | Site: HIP | Status: FUNCTIONAL

## 2019-06-21 RX ORDER — CYCLOBENZAPRINE HCL 10 MG
10 TABLET ORAL 3 TIMES DAILY PRN
Status: DISCONTINUED | OUTPATIENT
Start: 2019-06-21 | End: 2019-06-22 | Stop reason: HOSPADM

## 2019-06-21 RX ORDER — ONDANSETRON 2 MG/ML
4 INJECTION INTRAMUSCULAR; INTRAVENOUS EVERY 6 HOURS PRN
Status: DISCONTINUED | OUTPATIENT
Start: 2019-06-21 | End: 2019-06-22 | Stop reason: HOSPADM

## 2019-06-21 RX ORDER — MAGNESIUM HYDROXIDE 1200 MG/15ML
LIQUID ORAL CONTINUOUS PRN
Status: COMPLETED | OUTPATIENT
Start: 2019-06-21 | End: 2019-06-21

## 2019-06-21 RX ORDER — DEXAMETHASONE SODIUM PHOSPHATE 4 MG/ML
INJECTION, SOLUTION INTRA-ARTICULAR; INTRALESIONAL; INTRAMUSCULAR; INTRAVENOUS; SOFT TISSUE PRN
Status: DISCONTINUED | OUTPATIENT
Start: 2019-06-21 | End: 2019-06-21 | Stop reason: SDUPTHER

## 2019-06-21 RX ORDER — ASPIRIN 81 MG/1
81 TABLET ORAL 2 TIMES DAILY
Status: DISCONTINUED | OUTPATIENT
Start: 2019-06-22 | End: 2019-06-21 | Stop reason: SDUPTHER

## 2019-06-21 RX ORDER — HYDROCODONE BITARTRATE AND ACETAMINOPHEN 5; 325 MG/1; MG/1
2 TABLET ORAL PRN
Status: DISCONTINUED | OUTPATIENT
Start: 2019-06-21 | End: 2019-06-21 | Stop reason: HOSPADM

## 2019-06-21 RX ORDER — ONDANSETRON 2 MG/ML
4 INJECTION INTRAMUSCULAR; INTRAVENOUS
Status: DISCONTINUED | OUTPATIENT
Start: 2019-06-21 | End: 2019-06-21 | Stop reason: HOSPADM

## 2019-06-21 RX ORDER — FENTANYL CITRATE 50 UG/ML
50 INJECTION, SOLUTION INTRAMUSCULAR; INTRAVENOUS EVERY 10 MIN PRN
Status: DISCONTINUED | OUTPATIENT
Start: 2019-06-21 | End: 2019-06-21 | Stop reason: HOSPADM

## 2019-06-21 RX ORDER — PROPOFOL 10 MG/ML
INJECTION, EMULSION INTRAVENOUS CONTINUOUS PRN
Status: DISCONTINUED | OUTPATIENT
Start: 2019-06-21 | End: 2019-06-21 | Stop reason: SDUPTHER

## 2019-06-21 RX ORDER — KETOROLAC TROMETHAMINE 15 MG/ML
15 INJECTION, SOLUTION INTRAMUSCULAR; INTRAVENOUS EVERY 6 HOURS PRN
Status: ACTIVE | OUTPATIENT
Start: 2019-06-21 | End: 2019-06-22

## 2019-06-21 RX ORDER — LIDOCAINE HYDROCHLORIDE 10 MG/ML
1 INJECTION, SOLUTION EPIDURAL; INFILTRATION; INTRACAUDAL; PERINEURAL
Status: COMPLETED | OUTPATIENT
Start: 2019-06-21 | End: 2019-06-21

## 2019-06-21 RX ORDER — OXYCODONE HYDROCHLORIDE AND ACETAMINOPHEN 5; 325 MG/1; MG/1
1 TABLET ORAL EVERY 4 HOURS PRN
Status: DISCONTINUED | OUTPATIENT
Start: 2019-06-21 | End: 2019-06-22 | Stop reason: HOSPADM

## 2019-06-21 RX ORDER — SODIUM CHLORIDE, SODIUM LACTATE, POTASSIUM CHLORIDE, CALCIUM CHLORIDE 600; 310; 30; 20 MG/100ML; MG/100ML; MG/100ML; MG/100ML
INJECTION, SOLUTION INTRAVENOUS CONTINUOUS
Status: DISCONTINUED | OUTPATIENT
Start: 2019-06-21 | End: 2019-06-21

## 2019-06-21 RX ORDER — METOPROLOL SUCCINATE 100 MG/1
200 TABLET, EXTENDED RELEASE ORAL DAILY
Status: DISCONTINUED | OUTPATIENT
Start: 2019-06-21 | End: 2019-06-22 | Stop reason: HOSPADM

## 2019-06-21 RX ORDER — MORPHINE SULFATE 2 MG/ML
2 INJECTION, SOLUTION INTRAMUSCULAR; INTRAVENOUS
Status: DISCONTINUED | OUTPATIENT
Start: 2019-06-21 | End: 2019-06-22 | Stop reason: HOSPADM

## 2019-06-21 RX ORDER — HYDROCODONE BITARTRATE AND ACETAMINOPHEN 5; 325 MG/1; MG/1
1 TABLET ORAL PRN
Status: DISCONTINUED | OUTPATIENT
Start: 2019-06-21 | End: 2019-06-21 | Stop reason: HOSPADM

## 2019-06-21 RX ORDER — SODIUM CHLORIDE, SODIUM LACTATE, POTASSIUM CHLORIDE, CALCIUM CHLORIDE 600; 310; 30; 20 MG/100ML; MG/100ML; MG/100ML; MG/100ML
INJECTION, SOLUTION INTRAVENOUS
Status: DISPENSED
Start: 2019-06-21 | End: 2019-06-22

## 2019-06-21 RX ORDER — ASPIRIN 81 MG/1
81 TABLET, CHEWABLE ORAL 2 TIMES DAILY
Status: DISCONTINUED | OUTPATIENT
Start: 2019-06-22 | End: 2019-06-22 | Stop reason: HOSPADM

## 2019-06-21 RX ORDER — SODIUM CHLORIDE, SODIUM LACTATE, POTASSIUM CHLORIDE, CALCIUM CHLORIDE 600; 310; 30; 20 MG/100ML; MG/100ML; MG/100ML; MG/100ML
INJECTION, SOLUTION INTRAVENOUS CONTINUOUS PRN
Status: DISCONTINUED | OUTPATIENT
Start: 2019-06-21 | End: 2019-06-21 | Stop reason: SDUPTHER

## 2019-06-21 RX ORDER — MIDAZOLAM HYDROCHLORIDE 1 MG/ML
INJECTION INTRAMUSCULAR; INTRAVENOUS PRN
Status: DISCONTINUED | OUTPATIENT
Start: 2019-06-21 | End: 2019-06-21 | Stop reason: SDUPTHER

## 2019-06-21 RX ORDER — MORPHINE SULFATE 4 MG/ML
4 INJECTION, SOLUTION INTRAMUSCULAR; INTRAVENOUS
Status: DISCONTINUED | OUTPATIENT
Start: 2019-06-21 | End: 2019-06-22 | Stop reason: HOSPADM

## 2019-06-21 RX ORDER — DIPHENHYDRAMINE HYDROCHLORIDE 50 MG/ML
12.5 INJECTION INTRAMUSCULAR; INTRAVENOUS
Status: DISCONTINUED | OUTPATIENT
Start: 2019-06-21 | End: 2019-06-21 | Stop reason: HOSPADM

## 2019-06-21 RX ORDER — SENNA AND DOCUSATE SODIUM 50; 8.6 MG/1; MG/1
1 TABLET, FILM COATED ORAL 2 TIMES DAILY
Status: DISCONTINUED | OUTPATIENT
Start: 2019-06-21 | End: 2019-06-22 | Stop reason: HOSPADM

## 2019-06-21 RX ORDER — TRAMADOL HYDROCHLORIDE 50 MG/1
50 TABLET ORAL EVERY 6 HOURS PRN
Status: DISCONTINUED | OUTPATIENT
Start: 2019-06-21 | End: 2019-06-22 | Stop reason: HOSPADM

## 2019-06-21 RX ORDER — SODIUM CHLORIDE 0.9 % (FLUSH) 0.9 %
10 SYRINGE (ML) INJECTION PRN
Status: DISCONTINUED | OUTPATIENT
Start: 2019-06-21 | End: 2019-06-22 | Stop reason: HOSPADM

## 2019-06-21 RX ORDER — ONDANSETRON 2 MG/ML
INJECTION INTRAMUSCULAR; INTRAVENOUS PRN
Status: DISCONTINUED | OUTPATIENT
Start: 2019-06-21 | End: 2019-06-21 | Stop reason: SDUPTHER

## 2019-06-21 RX ORDER — ASPIRIN 81 MG/1
81 TABLET, CHEWABLE ORAL DAILY
Status: DISCONTINUED | OUTPATIENT
Start: 2019-06-21 | End: 2019-06-21

## 2019-06-21 RX ORDER — FAMOTIDINE 20 MG/1
20 TABLET, FILM COATED ORAL 2 TIMES DAILY PRN
Status: DISCONTINUED | OUTPATIENT
Start: 2019-06-21 | End: 2019-06-22 | Stop reason: HOSPADM

## 2019-06-21 RX ORDER — SODIUM CHLORIDE 0.9 % (FLUSH) 0.9 %
10 SYRINGE (ML) INJECTION EVERY 12 HOURS SCHEDULED
Status: DISCONTINUED | OUTPATIENT
Start: 2019-06-21 | End: 2019-06-21 | Stop reason: HOSPADM

## 2019-06-21 RX ORDER — OXYCODONE HYDROCHLORIDE AND ACETAMINOPHEN 5; 325 MG/1; MG/1
2 TABLET ORAL EVERY 4 HOURS PRN
Status: DISCONTINUED | OUTPATIENT
Start: 2019-06-21 | End: 2019-06-22 | Stop reason: HOSPADM

## 2019-06-21 RX ORDER — BUPIVACAINE HYDROCHLORIDE 5 MG/ML
INJECTION, SOLUTION EPIDURAL; INTRACAUDAL PRN
Status: DISCONTINUED | OUTPATIENT
Start: 2019-06-21 | End: 2019-06-21 | Stop reason: SDUPTHER

## 2019-06-21 RX ORDER — LIDOCAINE HYDROCHLORIDE 20 MG/ML
INJECTION, SOLUTION INFILTRATION; PERINEURAL PRN
Status: DISCONTINUED | OUTPATIENT
Start: 2019-06-21 | End: 2019-06-21 | Stop reason: SDUPTHER

## 2019-06-21 RX ORDER — SODIUM CHLORIDE 0.9 % (FLUSH) 0.9 %
10 SYRINGE (ML) INJECTION PRN
Status: DISCONTINUED | OUTPATIENT
Start: 2019-06-21 | End: 2019-06-21 | Stop reason: HOSPADM

## 2019-06-21 RX ORDER — SODIUM CHLORIDE 0.9 % (FLUSH) 0.9 %
10 SYRINGE (ML) INJECTION EVERY 12 HOURS SCHEDULED
Status: DISCONTINUED | OUTPATIENT
Start: 2019-06-21 | End: 2019-06-22 | Stop reason: HOSPADM

## 2019-06-21 RX ORDER — ACETAMINOPHEN 325 MG/1
650 TABLET ORAL EVERY 6 HOURS
Status: DISCONTINUED | OUTPATIENT
Start: 2019-06-21 | End: 2019-06-22 | Stop reason: HOSPADM

## 2019-06-21 RX ORDER — METOCLOPRAMIDE HYDROCHLORIDE 5 MG/ML
10 INJECTION INTRAMUSCULAR; INTRAVENOUS
Status: DISCONTINUED | OUTPATIENT
Start: 2019-06-21 | End: 2019-06-21 | Stop reason: HOSPADM

## 2019-06-21 RX ORDER — SIMVASTATIN 10 MG
5 TABLET ORAL NIGHTLY
Status: DISCONTINUED | OUTPATIENT
Start: 2019-06-21 | End: 2019-06-22 | Stop reason: HOSPADM

## 2019-06-21 RX ORDER — SODIUM CHLORIDE 9 MG/ML
INJECTION, SOLUTION INTRAVENOUS CONTINUOUS
Status: DISCONTINUED | OUTPATIENT
Start: 2019-06-21 | End: 2019-06-22 | Stop reason: HOSPADM

## 2019-06-21 RX ORDER — MEPERIDINE HYDROCHLORIDE 25 MG/ML
12.5 INJECTION INTRAMUSCULAR; INTRAVENOUS; SUBCUTANEOUS EVERY 5 MIN PRN
Status: DISCONTINUED | OUTPATIENT
Start: 2019-06-21 | End: 2019-06-21 | Stop reason: HOSPADM

## 2019-06-21 RX ADMIN — LIDOCAINE HYDROCHLORIDE 50 MG: 20 INJECTION, SOLUTION INFILTRATION; PERINEURAL at 10:30

## 2019-06-21 RX ADMIN — ONDANSETRON 4 MG: 2 INJECTION INTRAMUSCULAR; INTRAVENOUS at 11:27

## 2019-06-21 RX ADMIN — DEXAMETHASONE SODIUM PHOSPHATE 4 MG: 4 INJECTION, SOLUTION INTRA-ARTICULAR; INTRALESIONAL; INTRAMUSCULAR; INTRAVENOUS; SOFT TISSUE at 10:47

## 2019-06-21 RX ADMIN — SODIUM CHLORIDE, POTASSIUM CHLORIDE, SODIUM LACTATE AND CALCIUM CHLORIDE: 600; 310; 30; 20 INJECTION, SOLUTION INTRAVENOUS at 12:18

## 2019-06-21 RX ADMIN — SODIUM CHLORIDE, POTASSIUM CHLORIDE, SODIUM LACTATE AND CALCIUM CHLORIDE: 600; 310; 30; 20 INJECTION, SOLUTION INTRAVENOUS at 10:49

## 2019-06-21 RX ADMIN — CEFAZOLIN SODIUM 2 G: 1 INJECTION, SOLUTION INTRAVENOUS at 18:43

## 2019-06-21 RX ADMIN — LIDOCAINE HYDROCHLORIDE 1 ML: 10 INJECTION, SOLUTION EPIDURAL; INFILTRATION; INTRACAUDAL; PERINEURAL at 08:57

## 2019-06-21 RX ADMIN — SODIUM CHLORIDE, POTASSIUM CHLORIDE, SODIUM LACTATE AND CALCIUM CHLORIDE: 600; 310; 30; 20 INJECTION, SOLUTION INTRAVENOUS at 09:40

## 2019-06-21 RX ADMIN — SODIUM CHLORIDE, POTASSIUM CHLORIDE, SODIUM LACTATE AND CALCIUM CHLORIDE: 600; 310; 30; 20 INJECTION, SOLUTION INTRAVENOUS at 08:57

## 2019-06-21 RX ADMIN — PROPOFOL 25 MCG/KG/MIN: 10 INJECTION, EMULSION INTRAVENOUS at 10:30

## 2019-06-21 RX ADMIN — MIDAZOLAM HYDROCHLORIDE 2 MG: 1 INJECTION, SOLUTION INTRAMUSCULAR; INTRAVENOUS at 10:07

## 2019-06-21 RX ADMIN — DEXTROSE MONOHYDRATE 2 G: 50 INJECTION, SOLUTION INTRAVENOUS at 10:12

## 2019-06-21 RX ADMIN — BUPIVACAINE HYDROCHLORIDE 2 ML: 5 INJECTION, SOLUTION EPIDURAL; INTRACAUDAL; PERINEURAL at 10:16

## 2019-06-21 ASSESSMENT — PULMONARY FUNCTION TESTS
PIF_VALUE: 17
PIF_VALUE: 0
PIF_VALUE: 17
PIF_VALUE: 17
PIF_VALUE: 0
PIF_VALUE: 18
PIF_VALUE: 17
PIF_VALUE: 0
PIF_VALUE: 18
PIF_VALUE: 16
PIF_VALUE: 17
PIF_VALUE: 17
PIF_VALUE: 16
PIF_VALUE: 16
PIF_VALUE: 17
PIF_VALUE: 0
PIF_VALUE: 17
PIF_VALUE: 17
PIF_VALUE: 16
PIF_VALUE: 0
PIF_VALUE: 17
PIF_VALUE: 17
PIF_VALUE: 0
PIF_VALUE: 1
PIF_VALUE: 17
PIF_VALUE: 16
PIF_VALUE: 18
PIF_VALUE: 18
PIF_VALUE: 17
PIF_VALUE: 17
PIF_VALUE: 0
PIF_VALUE: 18
PIF_VALUE: 16
PIF_VALUE: 16
PIF_VALUE: 17
PIF_VALUE: 17
PIF_VALUE: 1
PIF_VALUE: 0
PIF_VALUE: 18
PIF_VALUE: 16
PIF_VALUE: 17
PIF_VALUE: 16
PIF_VALUE: 17
PIF_VALUE: 16
PIF_VALUE: 16
PIF_VALUE: 17
PIF_VALUE: 1
PIF_VALUE: 17
PIF_VALUE: 18
PIF_VALUE: 16
PIF_VALUE: 17
PIF_VALUE: 16
PIF_VALUE: 18
PIF_VALUE: 18
PIF_VALUE: 16
PIF_VALUE: 16
PIF_VALUE: 17
PIF_VALUE: 16
PIF_VALUE: 17
PIF_VALUE: 1
PIF_VALUE: 18
PIF_VALUE: 17
PIF_VALUE: 16
PIF_VALUE: 0
PIF_VALUE: 16
PIF_VALUE: 17
PIF_VALUE: 16
PIF_VALUE: 16
PIF_VALUE: 17
PIF_VALUE: 16
PIF_VALUE: 16
PIF_VALUE: 17
PIF_VALUE: 16
PIF_VALUE: 16
PIF_VALUE: 17
PIF_VALUE: 16
PIF_VALUE: 17
PIF_VALUE: 18
PIF_VALUE: 16
PIF_VALUE: 16
PIF_VALUE: 17
PIF_VALUE: 16
PIF_VALUE: 16
PIF_VALUE: 0
PIF_VALUE: 17

## 2019-06-21 ASSESSMENT — PAIN SCALES - GENERAL: PAINLEVEL_OUTOF10: 0

## 2019-06-21 ASSESSMENT — PAIN - FUNCTIONAL ASSESSMENT: PAIN_FUNCTIONAL_ASSESSMENT: 0-10

## 2019-06-21 NOTE — ANESTHESIA PRE PROCEDURE
Department of Anesthesiology  Preprocedure Note       Name:  Tomi Blandon   Age:  76 y.o.  :  1945                                          MRN:  [id number]         Date:  2019      Surgeon: Adithya Steen):  Chris Mcnulty MD    Procedure: LEFT  HIP TOTAL ARTHROPLASTY, LATERAL DECUB, [place] 56TM CUP, 12TM EXT'D OFFSET STEM, 36 HD+ 3.5 MM, NECK (Left )    Medications prior to admission:   Prior to Admission medications    Medication Sig Start Date End Date Taking? Authorizing Provider   metoprolol succinate (TOPROL XL) 200 MG extended release tablet Take 1 tablet by mouth daily 19  Yes Sal Carvajal MD   lovastatin (MEVACOR) 10 MG tablet TAKE 1 TABLET BY MOUTH  NIGHTLY 19  Yes Sal Carvajal MD   aspirin 81 MG tablet Take 81 mg by mouth daily    Historical Provider, MD       Current medications:    Current Facility-Administered Medications   Medication Dose Route Frequency Provider Last Rate Last Dose    ceFAZolin (ANCEF) 2 g in dextrose 5 % 100 mL IVPB  2 g Intravenous Once Chris Mcnulty MD        lactated ringers infusion   Intravenous Continuous MAURIZIO Fraser  mL/hr at 19 0857      sodium chloride flush 0.9 % injection 10 mL  10 mL Intravenous 2 times per day MAURIZIO Fraser - CNP        sodium chloride flush 0.9 % injection 10 mL  10 mL Intravenous PRN MAURIZIO Fraser CNP        sodium chloride 0.9 % 27 mL with ketorolac (TORADOL) 30 mg, methylPREDNISolone acetate (DEPO-MEDROL) 40 mg, morphine 10 mg, ropivacaine (NAROPIN) 30 mL   Infiltration Once Chris Mcnulty MD           Allergies: Allergies   Allergen Reactions    Lisinopril Anaphylaxis and Swelling    Contrast [Iodides] Other (See Comments)     Pt unsure of reaction.     Crestor [Rosuvastatin]     Pravachol [Pravastatin] Other (See Comments)     Joint / Muscle aches       Problem List:    Patient Active Problem List   Diagnosis Code    Hypertension I10    CAD notes reviewed no history of anesthetic complications:   Airway: Mallampati: II  TM distance: >3 FB   Neck ROM: full  Mouth opening: > = 3 FB Dental: normal exam         Pulmonary:Negative Pulmonary ROS and normal exam  breath sounds clear to auscultation                             Cardiovascular:  Exercise tolerance: good (>4 METS),   (+) hypertension:, CAD:, CABG/stent:, hyperlipidemia      ECG reviewed  Rhythm: regular  Rate: normal           Beta Blocker:  Dose within 24 Hrs      ROS comment: Normal sinus rhythm  Normal ECG  No previous ECGs available    Mitral annular calcification is present.   1-2+ MR   Moderately dilated left atrium.   Normal left ventricle structure and function.   Left ventricular ejection fraction is visually estimated at 55%.   Pseudonormal filling pattern noted. Neuro/Psych:   Negative Neuro/Psych ROS              GI/Hepatic/Renal: Neg GI/Hepatic/Renal ROS            Endo/Other: Negative Endo/Other ROS             Pt had PAT visit. Abdominal:           Vascular:   + PVD, aortic or cerebral, . Anesthesia Plan      spinal     ASA 3     (Bupivicaine 0.5% 10 mg     Discussed risk / benefit of spinal anesthesia versus general anesthesia. Discussed risk of bleeding, bruising, infection and nerve injury.)      MIPS: Prophylactic antiemetics administered. Anesthetic plan and risks discussed with patient. Plan discussed with CRNA.     Attending anesthesiologist reviewed and agrees with Alan Russell DO   6/21/2019

## 2019-06-21 NOTE — CONSULTS
Inpatient consult to Hospitalist  Consult performed by: SHAINA Ortega  Consult ordered by: Keyon Craig MD        Consult Note    Reason for Consult:  Management of OA, HLD, HTN, CAD    Requesting Physician:  Keyon Craig MD    HISTORY OF PRESENT ILLNESS:    The patient is a 76 y.o. male who is s/p left TAWNY per Dr. Johnathan Block      Past Medical History:   Diagnosis Date    CAD (coronary artery disease)     Eczema     Granuloma faciale     History of extremity bypass graft 11/16/2015    Left leg 11/14/2003    History of tobacco abuse 11/16/2015    Hyperlipidemia     Hypertension     Lipoma     PVD (peripheral vascular disease) ([facility] [address]     Seborrheic keratosis        Past Surgical History:   Procedure Laterality Date    CARDIAC CATHETERIZATION  2003    COLONOSCOPY  10-19-12    CORONARY ANGIOPLASTY WITH STENT PLACEMENT  2003    DIAGNOSTIC CARDIAC CATH LAB PROCEDURE      HAND SURGERY  2003    L    HIP SURGERY      JOINT REPLACEMENT      LUNG BIOPSY Right 09/05/2018    CT guided Left Lung Biopsy by Dr Ben Lemos Right     9/5/2018 per Dr Dominick Esposito [address] DX/TX INTERVENTION [address] LES N/A 8/17/2018    EBUS WITH TRANSBRONCHIAL NEEDLE ASPIRATION W/ FLUOROSCOPY performed by Shania Alvarez MD at [facility]       Prior to Admission medications    Medication Sig Start Date End Date Taking?  Authorizing Provider   metoprolol succinate (TOPROL XL) 200 MG extended release tablet Take 1 tablet by mouth daily 6/19/19  Yes Marshall Mena MD   lovastatin (MEVACOR) 10 MG tablet TAKE 1 TABLET BY MOUTH  NIGHTLY 5/14/19  Yes Marshall Mena MD   aspirin 81 MG tablet Take 81 mg by mouth daily    Historical Provider, MD       Scheduled Meds:   sodium chloride flush  10 mL Intravenous 2 times per day    ceFAZolin (ANCEF) IVPB  2 g Intravenous Q8H    acetaminophen  650 mg Oral Q6H    sennosides-docusate sodium  1 tablet Oral BID    [START ON 6/22/2019] aspirin  81 mg Oral BID Continuous Infusions:   sodium chloride       PRN Meds:sodium chloride flush, morphine **OR** morphine, magnesium hydroxide, ondansetron, famotidine, traMADol, oxyCODONE-acetaminophen, oxyCODONE-acetaminophen, ketorolac, cyclobenzaprine    Allergies   Allergen Reactions    Lisinopril Anaphylaxis and Swelling    Contrast [Iodides] Other (See Comments)     Pt unsure of reaction.  Crestor [Rosuvastatin]     Pravachol [Pravastatin] Other (See Comments)     Joint / Muscle aches       Social History     Socioeconomic History    Marital status:       Spouse name: Not on file    Number of children: Not on file    Years of education: Not on file    Highest education level: Not on file   Occupational History     Employer: [employer]     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   Social Needs    Financial resource strain: Not on file    Food insecurity:     Worry: Not on file     Inability: Not on file    Transportation needs:     Medical: Not on file     Non-medical: Not on file   Tobacco Use    Smoking status: Former Smoker     Packs/day: 1.00     Years: 40.00     Pack years: 40.00     Types: Cigarettes     Start date: 1965     Last attempt to quit: 2005     Years since quittin.9    Smokeless tobacco: Never Used   Substance and Sexual Activity    Alcohol use: Yes     Types: 5 Glasses of wine, 2 Cans of beer per week     Comment: 3- 4 TIMES A WEEK  WINE ONLY     Drug use: No    Sexual activity: Not on file   Lifestyle    Physical activity:     Days per week: Not on file     Minutes per session: Not on file    Stress: Not on file   Relationships    Social connections:     Talks on phone: Not on file     Gets together: Not on file     Attends Jewish service: Not on file     Active member of club or organization: Not on file     Attends meetings of clubs or organizations: Not on file     Relationship status: Not on file    Intimate partner violence:     Fear of current or ex partner: Not on file     Emotionally abused: Not on file     Physically abused: Not on file     Forced sexual activity: Not on file   Other Topics Concern    Not on file   Social History Narrative    Not on file       Family History   Problem Relation Age of Onset    High Blood Pressure Mother     Other Mother         BRAIN TUMOR    Cancer Father         LUNG       Review Of Systems:   CONSTITUTIONAL:  negative for  fevers, chills and sweats  HEENT:  negative for  hearing loss, tinnitus and ear drainage  RESPIRATORY:  negative for  dry cough, cough with sputum and dyspnea  CARDIOVASCULAR:  negative for  chest pain, dyspnea, palpitations  GASTROINTESTINAL:  negative for nausea, vomiting and diarrhea  MUSCULOSKELETAL:  negative for  myalgias, arthralgias and pain  NEUROLOGICAL:  negative for headaches, dizziness and seizures  BEHAVIOR/PSYCH:  negative for poor appetite, increased appetite and decreased sleep    Physical Exam:  Vitals:    06/21/19 1235 06/21/19 1240 06/21/19 1245 06/21/19 1247   BP: 136/71 (!) 140/74 (!) 145/69    Pulse: 50 (!) 40 (!) 40 (!) 45   Resp: 12 11 10 12   Temp:    97.4 °F (36.3 °C)   TempSrc:    Temporal   SpO2: 98% 98% 99% 98%   Weight:       Height:           CONSTITUTIONAL:  awake, alert, cooperative, no apparent distress, and appears stated age  NECK:  Supple, symmetrical, trachea midline, no adenopathy, thyroid symmetric, not enlarged and no tenderness, skin normal  BACK:  Symmetric, no curvature, spinous processes are non-tender on palpation, paraspinous muscles are non-tender on palpation, no costal vertebral tenderness  LUNGS:  No increased work of breathing, good air exchange, clear to auscultation bilaterally, no crackles or wheezing  CARDIOVASCULAR:  Normal apical impulse, regular rate and rhythm, normal S1 and S2, no S3 or S4, and no murmur noted  ABDOMEN:  No scars, normal bowel sounds, soft, non-distended, non-tender, no masses palpated, no hepatosplenomegally  MUSCULOSKELETAL:  There is no redness, warmth of the joints  NEUROLOGIC:  Awake, alert, oriented to name, place and time. Cranial nerves II-XII are grossly intact. SKIN:  no bruising or bleeding, no rashes and no jaundice    Labs:  Recent Results (from the past 24 hour(s))   TYPE AND SCREEN    Collection Time: 06/21/19  8:15 AM   Result Value Ref Range    ABO/Rh B NEG     Antibody Screen NEG      Assessment/Plan:  1. Left TAWNY per Dr. Terell Blanchard  2. OA  3. CAD  4. HTN-continue antihypertensive meds  5.  HLD    Electronically signed by SHAINA Venegas on 6/21/19 at 3:23 PM

## 2019-06-21 NOTE — BRIEF OP NOTE
Brief Postoperative Note  ______________________________________________________________    Patient: Natalie Jason  YOB: 1945  MRN: [id number]  Date of Procedure: 6/21/2019    Pre-Op Diagnosis: LEFT DJD HIP    Post-Op Diagnosis: Same       Procedure(s):  LEFT  HIP TOTAL ARTHROPLASTY, LATERAL DECUB, RADHA 56TM CUP, 12TM EXT'D OFFSET STEM, 36 HD+ 3.5 MM, NECK    Anesthesia: Spinal    Surgeon(s):  Raul Boswell MD    Assistant: daisy adams    Estimated Blood Loss (mL): 539     Complications: None    Specimens:   * No specimens in log *    Implants:  * No implants in log *      Drains: * No LDAs found *    Findings: djd hip left    Raul Boswell MD  Date: 6/21/2019  Time: 10:37 AM

## 2019-06-21 NOTE — PROGRESS NOTES
[facility] OCCUPATIONAL THERAPY EVALUATION - ACUTE     Date: 2019  Patient Name: Tomi Blandon        MRN: [id number]  Account: [de-identified]   : 1945  (76 y.o.)  Room: [place] [address]    Chart Review:  Diagnosis:  There were no encounter diagnoses.   Past Medical History:   Diagnosis Date    CAD (coronary artery disease)     Eczema     Granuloma faciale     History of extremity bypass graft 2015    Left leg 2003    History of tobacco abuse 2015    Hyperlipidemia     Hypertension     Lipoma     PVD (peripheral vascular disease) ([address]     Seborrheic keratosis      Past Surgical History:   Procedure Laterality Date    CARDIAC CATHETERIZATION      COLONOSCOPY  10-19-12    CORONARY ANGIOPLASTY WITH STENT PLACEMENT      DIAGNOSTIC CARDIAC CATH LAB PROCEDURE      HAND SURGERY      L    HIP SURGERY      JOINT REPLACEMENT      LUNG BIOPSY Right 2018    CT guided Left Lung Biopsy by Dr Harrison Late Right     2018 per Dr Juliette Millan [address] EBUS DX/TX INTERVENTION [address] LES N/A 2018    EBUS WITH TRANSBRONCHIAL NEEDLE ASPIRATION W/ FLUOROSCOPY performed by Brooke Benavides MD at [facility]       Restrictions  Restrictions/Precautions: Weight Bearing, Fall Risk  Position Activity Restriction  Hip Precautions: Posterior hip precautions     Safety Devices: Safety Devices  Safety Devices in place: Yes  Type of devices: Patient at risk for falls, All fall risk precautions in place    Subjective       Pain Reassessment:   Pain Assessment  Patient Currently in Pain: No       Orientation  Orientation  Overall Orientation Status: Within Functional Limits    Prior Level of Function:  Social/Functional History  Lives With: Spouse  Type of Home: (Condo)  Home Layout: One level  Home Access: Stairs to enter with rails  Entrance Stairs - Number of Steps: 4  Bathroom Shower/Tub: Walk-in shower  Home Equipment: Rolling walker  ADL Assistance: Independent  Homemaking Assistance: Independent  Ambulation Assistance: Independent(no AD)  Transfer Assistance: Independent    OBJECTIVE:     Orientation Status:  Orientation  Overall Orientation Status: Within Functional Limits    Observation:  Observation/Palpation  Observation: alert,cooperative    Cognition Status:  Cognition  Overall Cognitive Status: WFL    Perception Status:  Perception  Overall Perceptual Status: WFL    Sensation Status:  Sensation  Overall Sensation Status: WFL    Vision and Hearing Status:  Vision  Vision: Impaired  Vision Exceptions: Wears glasses at all times  Hearing  Hearing: Within functional limits     ROM:   LUE AROM (degrees)  LUE AROM : WFL  Left Hand PROM (degrees)  Left Hand General PROM: deformity throughout hand secondary to injury   RUE AROM (degrees)  RUE AROM : WFL  Right Hand AROM (degrees)  Right Hand AROM: WFL    Strength:  LUE Strength  Gross LUE Strength: WFL  RUE Strength  Gross RUE Strength: WFL    Coordination, Tone, Quality of Movement:    Tone RUE  RUE Tone: Normotonic  Tone LUE  LUE Tone: Normotonic  Coordination  Movements Are Fluid And Coordinated: Yes    Hand Dominance:  Hand Dominance  Hand Dominance: Right    ADL Status:  ADL  Feeding: Independent  Grooming: Setup  UE Bathing: Contact guard assistance  LE Bathing: Dependent/Total  UE Dressing: Contact guard assistance  LE Dressing: Dependent/Total  Toileting: Minimal assistance  Toilet Transfers  Toilet - Technique: Ambulating  Equipment Used: Grab bars  Toilet Transfer: Contact guard assistance  Toilet Transfers Comments: anticipated by functional transfers performed,  L knee min buckling may need BSC        Functional Mobility:     Transfers  Sit to stand: Contact guard assistance  Stand to sit: Contact guard assistance    Bed Mobility  Bed mobility  Supine to Sit: Contact guard assistance    Seated and Standing Balance:  Balance  Sitting Balance: Modified independent   Standing Balance: Contact guard assistance    Functional Endurance:  Activity Tolerance  Activity Tolerance: Patient Tolerated treatment well    D/C Recommendations:  therapy    Equipment Recommendations:  TBD      OT Follow Up:  OT D/C RECOMMENDATIONS  REQUIRES OT FOLLOW UP: Yes       Assessment/Discharge Disposition:  Assessment: 79y/o M admitted for L THR. Pt was found to have above deficits and will benefit from therapy  Performance deficits / Impairments: Decreased functional mobility , Decreased strength, Decreased endurance, Decreased coordination, Decreased ADL status, Decreased safe awareness, Decreased high-level IADLs, Decreased balance  Prognosis: Good  Discharge Recommendations: Continue to assess pending progress  History: multi comorb    Six Click Score   How much help for putting on and taking off regular lower body clothing?: Total  How much help for Bathing?: A Lot  How much help for Toileting?: A Lot  How much help for putting on and taking off regular upper body clothing?: A Lot  How much help for taking care of personal grooming?: A Little  How much help for eating meals?: None  AM-[facility] Inpatient Daily Activity Raw Score: 14  AM-PAC Inpatient ADL T-Scale Score : 33.39  ADL Inpatient CMS 0-100% Score: 59.67    Plan:  Plan  Times per week: 1-3x  Plan weeks: acute LOS  Current Treatment Recommendations: Strengthening, Patient/Caregiver Education & Training, Endurance Training, Equipment Evaluation, Education, & procurement, Balance Training, Neuromuscular Re-education, Self-Care / ADL    Goals:   Patient will:    - Improve functional endurance to tolerate/complete 30 mins of ADL's  - Be Ind in UB ADLs   - Be Mod i in LB ADLs  - Be Mod I in ADL transfers without LOB  - Be Ind in toileting tasks  - Improve B UE strength and endurance to [facility] in order to participate in self-care activities as projected. - Access appropriate D/C site with as few architectural barriers as possible.     Patient Goal:    D/C home after Rehab   Discussed and agreed upon: Yes Comments:     Therapy Time:   OT Individual Minutes  Time In: 8494  Time Out: 602 [address]  Minutes: 20    Electronically signed by:    DEBBIE Galvez  6/21/2019, 4:16 PM

## 2019-06-21 NOTE — CARE COORDINATION
LSW spoke to Pt and wife, plan is [facility]. Wife stated Pt has been on rehab before after surgery. .Electronically signed by TRISTAN Lindsay on 6/21/2019 at 3:08 PM

## 2019-06-21 NOTE — ANESTHESIA POSTPROCEDURE EVALUATION
Department of Anesthesiology  Postprocedure Note    Patient: Shravan Judd  MRN: [id number]  Birthdate: [birth year]  Date of evaluation: 6/21/2019  Time:  1:45 PM     Procedure Summary     Date:  06/21/19 Room / Location:  [address] OR    Anesthesia Start:  1007 Anesthesia Stop:      Procedure:  LEFT  HIP TOTAL ARTHROPLASTY (Left Hip) Diagnosis:  (LEFT DJD HIP)    Surgeon:  Eileen Rivera MD Responsible Provider:  Yolanda Shabazz DO    Anesthesia Type:  spinal ASA Status:  3          Anesthesia Type: spinal    Gerri Phase I: Gerri Score: 9    Gerri Phase II:      Last vitals: Reviewed and per EMR flowsheets.        Anesthesia Post Evaluation    Patient location during evaluation: PACU  Patient participation: complete - patient participated  Level of consciousness: awake and alert  Pain score: 0  Airway patency: patent  Nausea & Vomiting: no vomiting and no nausea  Complications: no  Cardiovascular status: hemodynamically stable  Respiratory status: acceptable and room air  Hydration status: stable

## 2019-06-21 NOTE — PROGRESS NOTES
buckling  Distance: 5 ft to chair only    Activity Tolerance  Activity Tolerance: Patient Tolerated treatment well    Exercises  Gluteal Sets: x10  Knee Long Arc Quad: x10  Ankle Pumps: x10     ASSESSMENT:   Body structures, Functions, Activity limitations: Decreased functional mobility ; Decreased endurance;Decreased balance; Increased Pain;Decreased strength  Decision Making: Medium Complexity  History: high  Exam: medium  Clinical Presentation: medium    Prognosis: Good  Patient Education: PT POC, hip precautions, compensatory strategies    DISCHARGE RECOMMENDATIONS:  Discharge Recommendations: Continue to assess pending progress, Patient would benefit from continued therapy after discharge    Assessment: Pt demonstrates the above deficits and decline in functional mobility s/p L TAWNY placing them at increased risk for falls. Pt would benefit from physical therapy to address above deficits and allow for safe return home at highest level of function, decrease risk for falls, and improve QOL.     REQUIRES PT FOLLOW UP: Yes      PLAN OF CARE:  Plan  Times per week: 5-7  Times per day: Twice a day  Current Treatment Recommendations: Strengthening, Balance Training, Transfer Training, Endurance Training, Gait Training, Neuromuscular Re-education, Home Exercise Program, Stair training, Pain Management, Safety Education & Training, Equipment Evaluation, Education, & procurement, Positioning, Functional Mobility Training  Safety Devices  Type of devices: Call light within reach, Chair alarm in place, Nurse notified    Goals:  Patient goals : to go home  Long term goals  Long term goal 1: indep with bed mobility  Long term goal 2: indep with transfers following hip precautions  Long term goal 3: pt to ambulate >150 ft supervision FWW  Long term goal 4: pt to navigate 4 steps with supervision 1 HR    AMPAC (6 CLICK) BASIC MOBILITY  AM-PAC Inpatient Mobility Raw Score : 17     Therapy Time:   Individual   Time In 1545   Time Mellissa Nixon , [place], 06/21/19 at 4:17 PM

## 2019-06-21 NOTE — ANESTHESIA PROCEDURE NOTES
Spinal Block    Patient location during procedure: pre-op  Start time: 6/21/2019 10:08 AM  End time: 6/21/2019 10:16 AM  Reason for block: primary anesthetic  Staffing  Anesthesiologist: Nilda Kramer DO  Resident/CRNA: MAURIZIO Perez - CRNA  Performed: anesthesiologist   Preanesthetic Checklist  Completed: patient identified, site marked, surgical consent, pre-op evaluation, timeout performed, IV checked, risks and benefits discussed, monitors and equipment checked, anesthesia consent given, oxygen available and patient being monitored  Spinal Block  Patient position: sitting  Prep: Betadine  Patient monitoring: cardiac monitor, continuous pulse ox and frequent blood pressure checks  Approach: midline  Location: L4/L5  Provider prep: mask and sterile gloves  Local infiltration: lidocaine  Agent: bupivacaine (Isobaric bupivicain 0.5%)  Dose: 2  Dose: 2  Needle  Needle type: Quincke   Needle gauge: 22 G  Needle length: 3.5 in  Assessment  Sensory level: T6  Events: cerebrospinal fluid  Lysis level: CSF aspirated. CSF: clear  Attempts: 1  Hemodynamics: stable  Additional Notes  Free flowing CSF. Negative heme. Negative paresthesia. Paula Kendrick

## 2019-06-21 NOTE — H&P
Interval History and Physical    I have interviewed and examined the patient and reviewed the recent History and Physical.  There have been no changes to the recent H&P documentation. No change in ROS or PE. Pt's allergies reviewed and no change List of meds on original H&P    No significant findings with ROS, family hx, social  Hx, ALL, surgical hx, med list or medical history     The patient understands the planned operation and its associated risks and benefits and agrees to proceed. The surgical consent form has been signed. There were no vitals taken for this visit.      Electronically signed by Mikayla Puentes MD on 6/21/2019 at 8:34 AM

## 2019-06-21 NOTE — H&P
[address]                      [facility], [address] [facility]                              HISTORY AND PHYSICAL    PATIENT NAME: Venkatesh Morillo                  :        1945  MED REC NO:   [id number]                            ROOM:  ACCOUNT NO:   [de-identified]                           ADMIT DATE: 2019  PROVIDER:     Claire Doll PA-C    FAMILY PHYSICIAN:  Leonor Kahn MD.    CHIEF COMPLAINT:  Left hip pain. HISTORY OF PRESENT ILLNESS:  The patient with known left hip  degenerative joint disease. He has been treating this conservatively  for over five years. It is now hampering activities of daily living. X-rays confirmed severe degenerative joint disease. After risks,  benefits, and alternatives were discussed, he elects to proceed with  surgical intervention for left total hip replacement. This will be  performed on 2019 at [facility]. PAST MEDICAL HISTORY:  Significant for osteoarthritis, elevated  cholesterol, hypertension, and coronary artery disease. CURRENT MEDICATIONS:  Metoprolol, lovastatin, aspirin, but that has been  stopped. ALLERGIES:  IODINE CONTRAST DYE and LISINOPRIL. PAST SURGICAL HISTORY:  Previous surgical history significant for  previous right total hip replacement in  as well as coronary  stenting. The patient denies any problems with anesthetic. Just some  dropping in the blood pressure. SOCIAL HISTORY:  The patient is retired. Denies substance abuse. One  to two alcoholic beverages per week. Does not currently smoke but has  remote smoking history. FAMILY HISTORY:  Mother had brain tumor. Father had lung cancer. REVIEW OF SYSTEMS:  Noncontributory. PHYSICAL EXAMINATION:  GENERAL:  This is a 54-year-old  male. VITAL SIGNS:  Height 5 feet 10 inches, weight 180. EYES:  Pupils are equal, round, reactive to light and accommodation.    Extraocular eye

## 2019-06-21 NOTE — DISCHARGE INSTR - COC
Continuity of Care Form    Patient Name: Gwen Akhtar   :  [birth year]  MRN:  [id number]    Admit date:  2019  Discharge date:  19    Code Status Order: Full Code   Advance Directives:   Advance Care Flowsheet Documentation     Date/Time Healthcare Directive Type of Healthcare Directive Copy in [address] Agent's Name Healthcare Agent's Phone Number    [phone]  Yes, patient has an advance directive for healthcare treatment  Living will  No, copy requested from family  [facility] power of   Renny Tinajero  [phone]          Admitting Physician:  Yvonne Jacinto MD  PCP: Susannah Leiva MD    Discharging Nurse: Trista Burt RN   [address] Unit/Room#: N229/N229-01  Discharging Unit Phone Number: [phone]    Emergency Contact:   Extended Emergency Contact Information  Primary Emergency Contact: Pete Molina   [address] Phone: [phone]  Work Phone: [phone]  Mobile Phone: [phone]  Relation: Child  Secondary Emergency Contact: Jermain [address] Phone: [phone]  Work Phone: [phone]  Mobile Phone: [phone]  Relation: Other    Past Surgical History:  Past Surgical History:   Procedure Laterality Date    CARDIAC CATHETERIZATION      COLONOSCOPY  10-19-12    CORONARY ANGIOPLASTY WITH STENT PLACEMENT      DIAGNOSTIC CARDIAC CATH LAB PROCEDURE      HAND SURGERY      L    HIP SURGERY      JOINT REPLACEMENT      LUNG BIOPSY Right 2018    CT guided Left Lung Biopsy by Dr Sherin Gowers Right     2018 per Dr Maria T Rodriguez [address] DX/TX INTERVENTION [address] LES N/A 2018    EBUS WITH TRANSBRONCHIAL NEEDLE ASPIRATION W/ FLUOROSCOPY performed by Karis Baer MD at [facility]       Immunization History:   Immunization History   Administered Date(s) Administered    Influenza Vaccine, unspecified formulation 10/13/2016    Influenza Virus Vaccine 10/08/2012, 10/14/2013, 09/25/2014, 09/16/2018    Influenza Whole 10/24/2015    Influenza, High Dose (Fluzone 65 yrs and older) 09/18/2017, 09/16/2018    Pneumococcal Conjugate 13-valent (Afhiwot03) 10/31/2016    Pneumococcal Polysaccharide (Mjnxkyxcs51) 10/22/2012    Tdap (Boostrix, Adacel) 01/01/2017    Zoster Live (Zostavax) 10/22/2010    Zoster Recombinant (Shingrix) 05/31/2018       Active Problems:  Patient Active Problem List   Diagnosis Code    Hypertension I10    CAD (coronary artery disease) I25.10    Lipoma D17.9    Eczema L30.9    Hyperlipidemia E78.5    Seborrheic keratosis L82.1    PVD (peripheral vascular disease) ([facility]) I73.9    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    ED (erectile dysfunction) N52.9    Peripheral arterial disease ([facility]) I73.9    History of placement of stent in LAD coronary artery Z95.5    History of extremity bypass graft Z95.828    History of tobacco abuse Z87.891    PAD (peripheral artery disease) ([facility]) I73.9    Lung nodule R91.1    Status post total hip replacement, right Z96.641    Status post total hip replacement, left S37.491       Isolation/Infection:   Isolation          No Isolation            Nurse Assessment:  Last Vital Signs: BP (!) 145/69   Pulse (!) 45   Temp 97.4 °F (36.3 °C) (Temporal)   Resp 12   Ht 5' 11\" (1.803 m)   Wt 180 lb (81.6 kg)   SpO2 98%   BMI 25.10 kg/m²     Last documented pain score (0-10 scale): Pain Level: 0  Last Weight:   Wt Readings from Last 1 Encounters:   06/21/19 180 lb (81.6 kg)     Mental Status:  oriented, alert, coherent, logical, thought processes intact and able to concentrate and follow conversation    IV Access:  - None    Nursing Mobility/ADLs:  Walking   Assisted  Transfer  Assisted  Bathing  Assisted  Dressing  Assisted  Toileting  Assisted  Feeding  Assisted  Med Admin  Dependent  Med Delivery   none    Wound Care Documentation and Therapy:        Elimination:  Continence: · Bowel: No  · Bladder: No  Urinary Catheter: None   Colostomy/Ileostomy/Ileal Conduit: No       Date of Last BM: ***    Intake/Output Summary (Last 24 hours) at 6/21/2019 1418  Last data filed at 6/21/2019 1220  Gross per 24 hour   Intake 2980.4 ml   Output 200 ml   Net 2780.4 ml     No intake/output data recorded. Safety Concerns:     None    Impairments/Disabilities:      None    Nutrition Therapy:  Current Nutrition Therapy:   - Oral Diet:  General    Routes of Feeding: Oral  Liquids: No Restrictions  Daily Fluid Restriction: no  Last Modified Barium Swallow with Video (Video Swallowing Test): {Done Not Done [facility]:832460862}    Treatments at the Time of Hospital Discharge:   Respiratory Treatments: ***  Oxygen Therapy:  {Therapy; copd oxygen:93368}  Ventilator:    - No ventilator support    Rehab Therapies: {THERAPEUTIC INTERVENTION:1704582186}  Weight Bearing Status/Restrictions: No weight bearing restirctions  Other Medical Equipment (for information only, NOT a DME order):  {EQUIPMENT:919484922}  Other Treatments:     Patient's personal belongings (please select all that are sent with patient):  None    RN SIGNATURE:  {Esignature:[id number]}    CASE MANAGEMENT/SOCIAL WORK SECTION    Inpatient Status Date: ***    Readmission Risk Assessment Score:  Readmission Risk              Risk of Unplanned Readmission:        4           Discharging to Facility/ Agency   · Name: [facility]  · Address:  · Phone:  · Fax:    Dialysis Facility (if applicable)   · Name:  · Address:  · Dialysis Schedule:  · Phone:  · Fax:    / signature: {Esignature:[id number]}. Electronically signed by TRISTAN Luna on 6/21/2019 at 2:46 PM    PHYSICIAN SECTION    Prognosis: Good    Condition at Discharge: Stable    Rehab Potential (if transferring to Rehab): Good    Recommended Labs or Other Treatments After Discharge:     Physician Certification: I certify the above information and transfer of Leydi Baker Harmych  is necessary for the continuing treatment of the diagnosis listed and that he requires Acute Rehab for less 30 days.      Update Admission H&P: No change in H&P    PHYSICIAN SIGNATURE:  Electronically signed by Jocelin Seals MD on 6/22/19 at 4:29 PM

## 2019-06-22 ENCOUNTER — HOSPITAL ENCOUNTER (INPATIENT)
Age: 74
LOS: 5 days | Discharge: HOME | DRG: 561 | End: 2019-06-27
Attending: INTERNAL MEDICINE | Admitting: INTERNAL MEDICINE
Payer: MEDICARE

## 2019-06-22 VITALS
BODY MASS INDEX: 25.2 KG/M2 | HEIGHT: 71 IN | HEART RATE: 80 BPM | WEIGHT: 180 LBS | SYSTOLIC BLOOD PRESSURE: 120 MMHG | TEMPERATURE: 98.2 F | RESPIRATION RATE: 18 BRPM | OXYGEN SATURATION: 100 % | DIASTOLIC BLOOD PRESSURE: 55 MMHG

## 2019-06-22 LAB
ANION GAP SERPL CALCULATED.3IONS-SCNC: 11 MEQ/L (ref 9–15)
BUN BLDV-MCNC: 18 MG/DL (ref 8–23)
CALCIUM SERPL-MCNC: 8.3 MG/DL (ref 8.5–9.9)
CHLORIDE BLD-SCNC: 106 MEQ/L (ref 95–107)
CO2: 24 MEQ/L (ref 20–31)
CREAT SERPL-MCNC: 0.98 MG/DL (ref 0.7–1.2)
GFR AFRICAN AMERICAN: >60
GFR NON-AFRICAN AMERICAN: >60
GLUCOSE BLD-MCNC: 121 MG/DL (ref 70–99)
HCT VFR BLD CALC: 36.1 % (ref 42–52)
HEMOGLOBIN: 12.5 G/DL (ref 14–18)
MCH RBC QN AUTO: 30.4 PG (ref 27–31.3)
MCHC RBC AUTO-ENTMCNC: 34.7 % (ref 33–37)
MCV RBC AUTO: 87.5 FL (ref 80–100)
PDW BLD-RTO: 13 % (ref 11.5–14.5)
PLATELET # BLD: 168 K/UL (ref 130–400)
POTASSIUM REFLEX MAGNESIUM: 4.3 MEQ/L (ref 3.4–4.9)
RBC # BLD: 4.12 M/UL (ref 4.7–6.1)
SODIUM BLD-SCNC: 141 MEQ/L (ref 135–144)
WBC # BLD: 10.8 K/UL (ref 4.8–10.8)

## 2019-06-22 PROCEDURE — 6370000000 HC RX 637 (ALT 250 FOR IP): Performed by: PHYSICIAN ASSISTANT

## 2019-06-22 PROCEDURE — 97116 GAIT TRAINING THERAPY: CPT

## 2019-06-22 PROCEDURE — 99221 1ST HOSP IP/OBS SF/LOW 40: CPT | Performed by: PHYSICAL MEDICINE & REHABILITATION

## 2019-06-22 PROCEDURE — 6370000000 HC RX 637 (ALT 250 FOR IP): Performed by: ORTHOPAEDIC SURGERY

## 2019-06-22 PROCEDURE — 6360000002 HC RX W HCPCS: Performed by: ORTHOPAEDIC SURGERY

## 2019-06-22 PROCEDURE — 2580000003 HC RX 258: Performed by: ORTHOPAEDIC SURGERY

## 2019-06-22 PROCEDURE — 6370000000 HC RX 637 (ALT 250 FOR IP): Performed by: INTERNAL MEDICINE

## 2019-06-22 PROCEDURE — 97535 SELF CARE MNGMENT TRAINING: CPT

## 2019-06-22 PROCEDURE — 85027 COMPLETE CBC AUTOMATED: CPT

## 2019-06-22 PROCEDURE — 1200000002 HC SEMI PRIVATE SWING BED

## 2019-06-22 PROCEDURE — 80048 BASIC METABOLIC PNL TOTAL CA: CPT

## 2019-06-22 PROCEDURE — 36415 COLL VENOUS BLD VENIPUNCTURE: CPT

## 2019-06-22 RX ORDER — SIMVASTATIN 10 MG
5 TABLET ORAL NIGHTLY
Status: CANCELLED | OUTPATIENT
Start: 2019-06-22

## 2019-06-22 RX ORDER — FAMOTIDINE 20 MG/1
20 TABLET, FILM COATED ORAL 2 TIMES DAILY PRN
Status: DISCONTINUED | OUTPATIENT
Start: 2019-06-22 | End: 2019-06-27 | Stop reason: HOSPADM

## 2019-06-22 RX ORDER — LOVASTATIN 10 MG/1
10 TABLET ORAL NIGHTLY
Status: DISCONTINUED | OUTPATIENT
Start: 2019-06-22 | End: 2019-06-27 | Stop reason: HOSPADM

## 2019-06-22 RX ORDER — CYCLOBENZAPRINE HCL 10 MG
10 TABLET ORAL 3 TIMES DAILY PRN
Status: DISCONTINUED | OUTPATIENT
Start: 2019-06-22 | End: 2019-06-27 | Stop reason: HOSPADM

## 2019-06-22 RX ORDER — TRAMADOL HYDROCHLORIDE 50 MG/1
50 TABLET ORAL EVERY 6 HOURS PRN
Status: DISCONTINUED | OUTPATIENT
Start: 2019-06-22 | End: 2019-06-27 | Stop reason: HOSPADM

## 2019-06-22 RX ORDER — ONDANSETRON 2 MG/ML
4 INJECTION INTRAMUSCULAR; INTRAVENOUS EVERY 6 HOURS PRN
Status: DISCONTINUED | OUTPATIENT
Start: 2019-06-22 | End: 2019-06-27 | Stop reason: HOSPADM

## 2019-06-22 RX ORDER — MORPHINE SULFATE 2 MG/ML
2 INJECTION, SOLUTION INTRAMUSCULAR; INTRAVENOUS
Status: CANCELLED | OUTPATIENT
Start: 2019-06-22

## 2019-06-22 RX ORDER — MORPHINE SULFATE 4 MG/ML
4 INJECTION, SOLUTION INTRAMUSCULAR; INTRAVENOUS
Status: CANCELLED | OUTPATIENT
Start: 2019-06-22

## 2019-06-22 RX ORDER — ACETAMINOPHEN 325 MG/1
650 TABLET ORAL EVERY 6 HOURS
Status: DISCONTINUED | OUTPATIENT
Start: 2019-06-22 | End: 2019-06-23

## 2019-06-22 RX ORDER — ACETAMINOPHEN 325 MG/1
650 TABLET ORAL EVERY 6 HOURS
Status: CANCELLED | OUTPATIENT
Start: 2019-06-22

## 2019-06-22 RX ORDER — OXYCODONE HYDROCHLORIDE AND ACETAMINOPHEN 5; 325 MG/1; MG/1
1 TABLET ORAL EVERY 4 HOURS PRN
Status: DISCONTINUED | OUTPATIENT
Start: 2019-06-22 | End: 2019-06-27 | Stop reason: HOSPADM

## 2019-06-22 RX ORDER — ASPIRIN 81 MG/1
81 TABLET, CHEWABLE ORAL 2 TIMES DAILY
Status: CANCELLED | OUTPATIENT
Start: 2019-06-22

## 2019-06-22 RX ORDER — FAMOTIDINE 20 MG/1
20 TABLET, FILM COATED ORAL 2 TIMES DAILY PRN
Status: CANCELLED | OUTPATIENT
Start: 2019-06-22

## 2019-06-22 RX ORDER — SENNA AND DOCUSATE SODIUM 50; 8.6 MG/1; MG/1
1 TABLET, FILM COATED ORAL 2 TIMES DAILY
Status: DISCONTINUED | OUTPATIENT
Start: 2019-06-22 | End: 2019-06-27 | Stop reason: HOSPADM

## 2019-06-22 RX ORDER — SENNA AND DOCUSATE SODIUM 50; 8.6 MG/1; MG/1
1 TABLET, FILM COATED ORAL 2 TIMES DAILY
Status: CANCELLED | OUTPATIENT
Start: 2019-06-22

## 2019-06-22 RX ORDER — ASPIRIN 81 MG/1
81 TABLET, CHEWABLE ORAL 2 TIMES DAILY
Status: DISCONTINUED | OUTPATIENT
Start: 2019-06-22 | End: 2019-06-27 | Stop reason: HOSPADM

## 2019-06-22 RX ORDER — CYCLOBENZAPRINE HCL 10 MG
10 TABLET ORAL 3 TIMES DAILY PRN
Status: CANCELLED | OUTPATIENT
Start: 2019-06-22

## 2019-06-22 RX ORDER — ONDANSETRON 2 MG/ML
4 INJECTION INTRAMUSCULAR; INTRAVENOUS EVERY 6 HOURS PRN
Status: CANCELLED | OUTPATIENT
Start: 2019-06-22

## 2019-06-22 RX ORDER — SIMVASTATIN 5 MG
5 TABLET ORAL NIGHTLY
Status: DISCONTINUED | OUTPATIENT
Start: 2019-06-22 | End: 2019-06-22

## 2019-06-22 RX ORDER — SODIUM CHLORIDE 0.9 % (FLUSH) 0.9 %
10 SYRINGE (ML) INJECTION EVERY 12 HOURS SCHEDULED
Status: CANCELLED | OUTPATIENT
Start: 2019-06-22

## 2019-06-22 RX ORDER — SODIUM CHLORIDE 0.9 % (FLUSH) 0.9 %
10 SYRINGE (ML) INJECTION PRN
Status: DISCONTINUED | OUTPATIENT
Start: 2019-06-22 | End: 2019-06-27 | Stop reason: HOSPADM

## 2019-06-22 RX ORDER — SODIUM CHLORIDE 0.9 % (FLUSH) 0.9 %
10 SYRINGE (ML) INJECTION EVERY 12 HOURS SCHEDULED
Status: DISCONTINUED | OUTPATIENT
Start: 2019-06-22 | End: 2019-06-22

## 2019-06-22 RX ORDER — OXYCODONE HYDROCHLORIDE AND ACETAMINOPHEN 5; 325 MG/1; MG/1
1 TABLET ORAL EVERY 4 HOURS PRN
Status: CANCELLED | OUTPATIENT
Start: 2019-06-22

## 2019-06-22 RX ORDER — TRAMADOL HYDROCHLORIDE 50 MG/1
50 TABLET ORAL EVERY 6 HOURS PRN
Status: CANCELLED | OUTPATIENT
Start: 2019-06-22

## 2019-06-22 RX ORDER — MORPHINE SULFATE 4 MG/ML
4 INJECTION, SOLUTION INTRAMUSCULAR; INTRAVENOUS
Status: DISCONTINUED | OUTPATIENT
Start: 2019-06-22 | End: 2019-06-22

## 2019-06-22 RX ORDER — SODIUM CHLORIDE 0.9 % (FLUSH) 0.9 %
10 SYRINGE (ML) INJECTION PRN
Status: CANCELLED | OUTPATIENT
Start: 2019-06-22

## 2019-06-22 RX ORDER — METOPROLOL SUCCINATE 100 MG/1
200 TABLET, EXTENDED RELEASE ORAL DAILY
Status: CANCELLED | OUTPATIENT
Start: 2019-06-23

## 2019-06-22 RX ORDER — OXYCODONE HYDROCHLORIDE AND ACETAMINOPHEN 5; 325 MG/1; MG/1
2 TABLET ORAL EVERY 4 HOURS PRN
Status: DISCONTINUED | OUTPATIENT
Start: 2019-06-22 | End: 2019-06-27 | Stop reason: HOSPADM

## 2019-06-22 RX ORDER — METOPROLOL SUCCINATE 50 MG/1
200 TABLET, EXTENDED RELEASE ORAL DAILY
Status: DISCONTINUED | OUTPATIENT
Start: 2019-06-23 | End: 2019-06-27 | Stop reason: HOSPADM

## 2019-06-22 RX ORDER — OXYCODONE HYDROCHLORIDE AND ACETAMINOPHEN 5; 325 MG/1; MG/1
2 TABLET ORAL EVERY 4 HOURS PRN
Status: CANCELLED | OUTPATIENT
Start: 2019-06-22

## 2019-06-22 RX ORDER — MORPHINE SULFATE 2 MG/ML
2 INJECTION, SOLUTION INTRAMUSCULAR; INTRAVENOUS
Status: DISCONTINUED | OUTPATIENT
Start: 2019-06-22 | End: 2019-06-22

## 2019-06-22 RX ADMIN — SENNOSIDES AND DOCUSATE SODIUM 1 TABLET: 8.6; 5 TABLET ORAL at 20:15

## 2019-06-22 RX ADMIN — SENNOSIDES AND DOCUSATE SODIUM 1 TABLET: 8.6; 5 TABLET ORAL at 09:29

## 2019-06-22 RX ADMIN — ACETAMINOPHEN 650 MG: 325 TABLET ORAL at 20:15

## 2019-06-22 RX ADMIN — LOVASTATIN 10 MG: 10 TABLET ORAL at 20:16

## 2019-06-22 RX ADMIN — METOPROLOL SUCCINATE 200 MG: 100 TABLET, EXTENDED RELEASE ORAL at 09:29

## 2019-06-22 RX ADMIN — CEFAZOLIN SODIUM 2 G: 1 INJECTION, SOLUTION INTRAVENOUS at 02:13

## 2019-06-22 RX ADMIN — Medication 10 ML: at 09:31

## 2019-06-22 RX ADMIN — SODIUM CHLORIDE: 9 INJECTION, SOLUTION INTRAVENOUS at 02:18

## 2019-06-22 RX ADMIN — ASPIRIN 81 MG 81 MG: 81 TABLET ORAL at 20:15

## 2019-06-22 ASSESSMENT — ENCOUNTER SYMPTOMS
PHOTOPHOBIA: 0
NAUSEA: 0
EYE PAIN: 0
ABDOMINAL PAIN: 0
SHORTNESS OF BREATH: 1
EYE REDNESS: 0
BACK PAIN: 1
WHEEZING: 0
BLOOD IN STOOL: 0
VOMITING: 0
CONSTIPATION: 1
DIARRHEA: 0
COUGH: 0
SORE THROAT: 0
STRIDOR: 0

## 2019-06-22 ASSESSMENT — PAIN DESCRIPTION - ORIENTATION
ORIENTATION: LEFT

## 2019-06-22 ASSESSMENT — PAIN DESCRIPTION - PROGRESSION
CLINICAL_PROGRESSION: GRADUALLY IMPROVING
CLINICAL_PROGRESSION: GRADUALLY IMPROVING

## 2019-06-22 ASSESSMENT — PAIN DESCRIPTION - PAIN TYPE
TYPE: SURGICAL PAIN

## 2019-06-22 ASSESSMENT — PAIN DESCRIPTION - LOCATION
LOCATION: HIP

## 2019-06-22 ASSESSMENT — PAIN SCALES - GENERAL
PAINLEVEL_OUTOF10: 1
PAINLEVEL_OUTOF10: 2
PAINLEVEL_OUTOF10: 1
PAINLEVEL_OUTOF10: 3
PAINLEVEL_OUTOF10: 1
PAINLEVEL_OUTOF10: 0
PAINLEVEL_OUTOF10: 1

## 2019-06-22 ASSESSMENT — PAIN DESCRIPTION - FREQUENCY: FREQUENCY: INTERMITTENT

## 2019-06-22 ASSESSMENT — PAIN DESCRIPTION - DESCRIPTORS: DESCRIPTORS: SORE

## 2019-06-22 ASSESSMENT — PAIN DESCRIPTION - ONSET: ONSET: ON-GOING

## 2019-06-22 NOTE — FLOWSHEET NOTE
1257- Pt arrived to floor via bed from PACU, oriented to room and call light function-KGW  1345- Admission assessment completed at this time, Pt A&Ox4, denies chest pain/SOB, denies nausea/vomiting, aquacel dressing present to left hip clean, dry, and intact, pt able to move bilateral lower extremities with no difficulty, pt denies any further needs at this time-KGW  1815- Pt notified that we do not carry lovastatin and that we have zocor, pt refusing zocor at this time and is having significant other bring in lovastatin from home-KGW  Electronically signed by Lowell Childress RN on 6/21/2019

## 2019-06-22 NOTE — CARE COORDINATION
Met with pt and girlfriend who lives with pt. Along with Dr. Drea Coelho. Pt. may be too functional for [facility] rehab, Dr. Drea Coelho to review case and let pt know. Discussed alternative with pt if rehab denies, girlfriend reports she cannot take care of him at home, fear of him falling . Offered freedom of choice with options with list of SNF provided. Girlfriend refuses any type of HHC. Dr. Jose Alejandro Youssef informed of above. Electronically signed by Christian Marcano RN on 6/22/2019 at 10:50 AM     Received call from [facility] pt too functional for rehab. Pt.'s choice was [address] . Call to [phone] supervisor after review pt accepted. Once orders rcvd,than  transport set up report can be called pt going to 204. Arely MONAHAN notified and will implement and will notify pt.  Electronically signed by Christian Marcano RN on 6/22/2019 at 2:22 PM

## 2019-06-22 NOTE — CONSULTS
Physical Medicine & Rehabilitation  Consult Note      Admitting Physician: Michael Hidalog MD    Primary Care Provider: Jenna Mcbride MD     Reason for Consult:  Asses rehab needs,promote physical and mental function, and decrease likelihood of re-admit to the hospital after discharge. History of Present Illness:    Julianna Lesches is a 76 y.o. male admitted to [facility] on 6/21/2019. Date:  06/21/19 Room / Location:  [address] [facility] [place]      Anesthesia Start:  1007 Anesthesia Stop:       Procedure:  LEFT  HIP TOTAL ARTHROPLASTY (Left Hip) Diagnosis:  (LEFT DJD HIP)     Surgeon:  Michael Hidalgo MD Responsible Provider:  Vimal Gaitan DO     Anesthesia Type:  spinal ASA Status:  3                   Hip Pain    The incident occurred more than 1 week ago. The pain is present in the right knee, right thigh and right hip. The pain is at a severity of 9/10. The pain is severe. The pain has been fluctuating since onset. Associated symptoms include muscle weakness. Pertinent negatives include no inability to bear weight or loss of motion. Foreign body present: TAWNY. The symptoms are aggravated by movement, palpation and weight bearing. He has tried ice, immobilization, rest and acetaminophen for the symptoms. The treatment provided moderate relief. Their inpatient work up has included:    Imaging:    Imaging and other studies reviewed and discussed with patient and staff    Xr Hip Left   6/21/2019  EXAM: X-ray hip, one view  HISTORY: Postoperative evaluation of left total hip arthroplasty TECHNIQUE: Frontal view of the left hip COMPARISON: None available FINDINGS: Postsurgical changes of left total hip arthroplasty including subcutaneous emphysema. Hip joint alignment appears anatomic on this single frontal view. No periprosthetic abnormality. Postsurgical changes of left total hip arthroplasty.               Labs:     labs reviewed and discussed with patient and staff    Lab Results   Component Value Date    POCGLU 126 07/19/2015    POCGLU 108 07/17/2015     Lab Results   Component Value Date     06/22/2019    K 4.3 06/22/2019     06/22/2019    CO2 24 06/22/2019    BUN 18 06/22/2019    CREATININE 0.98 06/22/2019    CALCIUM 8.3 06/22/2019    LABALBU 4.5 05/24/2019    LABALBU 4.4 10/14/2011    BILITOT 0.6 05/24/2019    ALKPHOS 59 05/24/2019    AST 20 05/24/2019    ALT 17 05/24/2019     Lab Results   Component Value Date    WBC 10.8 06/22/2019    RBC 4.12 06/22/2019    RBC 4.94 10/14/2011    HGB 12.5 06/22/2019    HCT 36.1 06/22/2019    MCV 87.5 06/22/2019    MCH 30.4 06/22/2019    MCHC 34.7 06/22/2019    RDW 13.0 06/22/2019     06/22/2019    MPV 7.9 07/22/2015     Lab Results   Component Value Date    VITD25 22.7 07/22/2015     Lab Results   Component Value Date    COLORU Yellow 06/06/2019    NITRU Negative 06/06/2019    GLUCOSEU Negative 06/06/2019    KETUA Negative 06/06/2019    UROBILINOGEN 0.2 06/06/2019    BILIRUBINUR Negative 06/06/2019     Lab Results   Component Value Date    PROTIME 10.8 08/13/2018     Lab Results   Component Value Date    INR 1.0 08/13/2018         I discussed results with patient. Current Rehabilitation Assessments:    Rehabilitation:  Physical therapy: FIMS:  BedMobility:      Transfers: Sit to Stand: Stand by assistance  Stand to sit: Stand by assistance  Bed to Chair: Contact guard assistance, Ambulation 1  Surface: level tile  Device: Rolling Walker  Assistance: Stand by assistance  Quality of Gait: antalgic gait, step to pattern. Distance: 150' , Stairs  # Steps : 4  Stairs Height: 6\"  Rails: Right ascending  Device: Single pt cane(Left)  Assistance: Stand by assistance  Comment: steady, good technique. FIMS: ,  , Assessment: pt with good mobility safe on stairs and car education given on function and level of care after hospital pt seems to be agreeable to go home with [facility].       Occupational therapy: FIMS:   ,  , Assessment: 79y/o M admitted for L THR. Pt was found to have above deficits and will benefit from therapy      ADL  Equipment Provided: Reacher;Sock aid;Dressing stick (06/22/19 0857)  Feeding: Independent (06/21/19 1602)  Grooming: Setup;Supervision(to complete oral care and hair care while standing at work ) (06/22/19 0857)  UE Bathing: Modified independent (use of detachable shower head  ) (06/22/19 0857)  LE Bathing: Supervision(For safety in standing to bathe poasterior olvin area. Pt declined having therapist wash his feet. Educated pt in use of long handled sponge for bathing feet at home ) (06/22/19 0857)  UE Dressing: Setup(to don t-shirt ) (06/22/19 0857)  LE Dressing: Stand by assistance(to don pants, underwear, and socks. Educated pt in use of AE to complete LB dressing. Pt able to return demonstration with Min difficulty.  Assistance to pull socks all the way up ) (06/22/19 0857)  Toileting: Supervision (06/22/19 0857)    LTG:                 Speech therapy: FIMS:          Past Medical History:          Diagnosis Date    CAD (coronary artery disease)     Eczema     Granuloma faciale     History of extremity bypass graft 11/16/2015    Left leg 11/14/2003    History of tobacco abuse 11/16/2015    Hyperlipidemia     Hypertension     Lipoma     PVD [facility]peripheral vascular disease) ([facility] [address]     Seborrheic keratosis          PastSurgical History:          Procedure Laterality Date    CARDIAC CATHETERIZATION  2003    COLONOSCOPY  10-19-12    CORONARY ANGIOPLASTY WITH STENT PLACEMENT  2003    DIAGNOSTIC CARDIAC CATH LAB PROCEDURE      HAND SURGERY  2003    L    HIP SURGERY      JOINT REPLACEMENT      LUNG BIOPSY Right 09/05/2018    CT guided Left Lung Biopsy by Dr Sant[facility] Right     9/5/2018 per Dr Meka De La Paz [address] DX/TX INTERVENTION [address] LES N/A 8/17/2018    EBUS WITH TRANSBRONCHIAL NEEDLE ASPIRATION W/ FLUOROSCOPY performed by Chris Eastman MD at [facility] Allergies: Allergies   Allergen Reactions    Lisinopril Anaphylaxis and Swelling    Contrast [Iodides] Other (See Comments)     Pt unsure of reaction.  Crestor [Rosuvastatin]     Pravachol [Pravastatin] Other (See Comments)     Joint / Muscle aches        CurrentMedications:     Current Facility-Administered Medications: 0.9 % sodium chloride infusion, , Intravenous, Continuous  sodium chloride flush 0.9 % injection 10 mL, 10 mL, Intravenous, 2 times per day  sodium chloride flush 0.9 % injection 10 mL, 10 mL, Intravenous, PRN  acetaminophen (TYLENOL) tablet 650 mg, 650 mg, Oral, Q6H  morphine (PF) injection 2 mg, 2 mg, Intravenous, Q2H PRN **OR** morphine injection 4 mg, 4 mg, Intravenous, Q2H PRN  sennosides-docusate sodium (SENOKOT-S) 8.6-50 MG tablet 1 tablet, 1 tablet, Oral, BID  magnesium hydroxide (MILK OF MAGNESIA) 400 MG/5ML suspension 30 mL, 30 mL, Oral, Daily PRN  ondansetron (ZOFRAN) injection 4 mg, 4 mg, Intravenous, Q6H PRN  famotidine (PEPCID) tablet 20 mg, 20 mg, Oral, BID PRN  traMADol (ULTRAM) tablet 50 mg, 50 mg, Oral, Q6H PRN  oxyCODONE-acetaminophen (PERCOCET) 5-325 MG per tablet 1 tablet, 1 tablet, Oral, Q4H PRN  oxyCODONE-acetaminophen (PERCOCET) 5-325 MG per tablet 2 tablet, 2 tablet, Oral, Q4H PRN  ketorolac (TORADOL) injection 15 mg, 15 mg, Intravenous, Q6H PRN  cyclobenzaprine (FLEXERIL) tablet 10 mg, 10 mg, Oral, TID PRN  metoprolol succinate (TOPROL XL) extended release tablet 200 mg, 200 mg, Oral, Daily  simvastatin (ZOCOR) tablet 5 mg, 5 mg, Oral, Nightly  aspirin chewable tablet 81 mg, 81 mg, Oral, BID      Social History:    Social History     Socioeconomic History    Marital status:       Spouse name: Not on file    Number of children: Not on file    Years of education: Not on file    Highest education level: Not on file   Occupational History     Employer: [employer]     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 Negative for abdominal pain, blood in stool, diarrhea, nausea and vomiting. Endocrine: Negative for polydipsia. Genitourinary: Negative for dysuria, flank pain, frequency, hematuria and urgency. Musculoskeletal: Positive for back pain and myalgias. Negative for neck pain. Skin: Negative for rash. Allergic/Immunologic: Negative for environmental allergies. Neurological: Positive for weakness. Negative for dizziness, tremors, seizures and headaches. Hematological: Does not bruise/bleed easily. Psychiatric/Behavioral: Negative for hallucinations and suicidal ideas. The patient is not nervous/anxious. Physical Exam:    BP (!) 122/51   Pulse 65   Temp 98.6 °F (37 °C) (Oral)   Resp 18   Ht 5' 11\" (1.803 m)   Wt 180 lb (81.6 kg)   SpO2 100%   BMI 25.10 kg/m²      Physical Exam   Constitutional: He appears well-developed and well-nourished. He appears listless. Non-toxic appearance. He does not have a sickly appearance. He does not appear ill. No distress. HENT:   Head: Normocephalic. Not macrocephalic and not microcephalic. Head is without raccoon's eyes and without Poole's sign. Mouth/Throat: Oropharyngeal exudate present. Eyes: Pupils are equal, round, and reactive to light. Conjunctivae and EOM are normal. Right eye exhibits no discharge. Left eye exhibits no discharge. No scleral icterus. Neck: Normal range of motion. Normal carotid pulses, no hepatojugular reflux and no JVD present. Spinous process tenderness and muscular tenderness present. Carotid bruit is not present. No tracheal deviation present. No thyromegaly present. Cardiovascular: Exam reveals distant heart sounds. Pulmonary/Chest: Effort normal. No stridor. He has decreased breath sounds. Abdominal: Soft. He exhibits no distension. Bowel sounds are decreased. There is no tenderness. There is no rebound and no guarding.    Musculoskeletal:        Right hip: He Basic Metabolic Panel w/ Reflex to MG    Collection Time: 06/22/19  5:56 AM   Result Value Ref Range    Sodium 141 135 - 144 mEq/L    Potassium reflex Magnesium 4.3 3.4 - 4.9 mEq/L    Chloride 106 95 - 107 mEq/L    CO2 24 20 - 31 mEq/L    Anion Gap 11 9 - 15 mEq/L    Glucose 121 (H) 70 - 99 mg/dL    BUN 18 8 - 23 mg/dL    CREATININE 0.98 0.70 - 1.20 mg/dL    GFR Non-African American >60.0 >60    GFR  >60.0 >60    Calcium 8.3 (L) 8.5 - 9.9 mg/dL   CBC    Collection Time: 06/22/19  5:56 AM   Result Value Ref Range    WBC 10.8 4.8 - 10.8 K/uL    RBC 4.12 (L) 4.70 - 6.10 M/uL    Hemoglobin 12.5 (L) 14.0 - 18.0 g/dL    Hematocrit 36.1 (L) 42.0 - 52.0 %    MCV 87.5 80.0 - 100.0 fL    MCH 30.4 27.0 - 31.3 pg    MCHC 34.7 33.0 - 37.0 %    RDW 13.0 11.5 - 14.5 %    Platelets 547 538 - 535 K/uL              Impression:    1. Impaired mobility and ADLs due to right total hip replacement  2. Fatigue and Malaise      Complex Active General Medical Issues thatcomplicate care Assess & Plan:     1. Active Problems:    Status post total hip replacement, right    Status post total hip replacement, left  Resolved Problems:    * No resolved hospital problems. *            Recommendations:    1. Considering all of the factors aboveincluding the patient's current medical status, social status/home envirnment, their functional needs and their ability to participate in a therapy program, I feel that they would best be served at a  [facility]  Rehabilitation[facility] of care. I reviewed the varous local options re these levels of care with the patient and family. 2. Vitamin B12 shot times one  3. Improve hydration and Nutrition by adding Proteinex and push PO fluids        It was my pleasure toevaluate Boubacar Peaks today. Please call [phone] with questions.     Mikey Scott, DO

## 2019-06-22 NOTE — PROGRESS NOTES
Physical Therapy Med Surg Daily Treatment Note  Facility/Department: [facility]  Room: N229/N229-01       NAME: Tegan Nayak  : [birth year] (45 y.o.)  MRN: [id number]  CODE STATUS: Full Code    Date of Service: 2019    Patient Diagnosis(es): Status post total hip replacement, right [Z96.641]  Status post total hip replacement, left [Z96.642]   No chief complaint on file.     Patient Active Problem List    Diagnosis Date Noted    Status post total hip replacement, right 2019    Status post total hip replacement, left 2019    Lung nodule     PAD [facility]peripheral artery disease) ([facility] [address] 06/15/2018    History of placement of stent in LAD coronary artery 2015    History of extremity bypass graft 2015    History of tobacco abuse 2015    Peripheral arterial disease ([facility] [address] 2015    ED (erectile dysfunction) 10/22/2012    Hypertension     CAD (coronary artery disease)     Lipoma     Eczema     Hyperlipidemia     Seborrheic keratosis     PVD (peripheral vascular disease) (HCC)     Granuloma faciale         Past Medical History:   Diagnosis Date    CAD (coronary artery disease)     Eczema     Granuloma faciale     History of extremity bypass graft 2015    Left leg 2003    History of tobacco abuse 2015    Hyperlipidemia     Hypertension     Lipoma     PVD (peripheral vascular disease) (HCC)     Seborrheic keratosis      Past Surgical History:   Procedure Laterality Date    CARDIAC CATHETERIZATION      COLONOSCOPY  10-19-12    CORONARY ANGIOPLASTY WITH STENT PLACEMENT      DIAGNOSTIC CARDIAC CATH LAB PROCEDURE      HAND SURGERY      L    HIP SURGERY      JOINT REPLACEMENT      LUNG BIOPSY Right 2018    CT guided Left Lung Biopsy by Dr Leslie Vogel Right     2018 per Dr Drew Kirby [address] DX/TX INTERVENTION [address] LES N/A 2018    EBUS WITH TRANSBRONCHIAL NEEDLE ASPIRATION W/ FLUOROSCOPY performed by Ricardo Page MD at [facility]          Restrictions:  Restrictions/Precautions: Weight Bearing, Fall Risk  Position Activity Restriction  Hip Precautions: Posterior hip precautions    SUBJECTIVE:  General  Chart Reviewed: Yes  Family / Caregiver Present: No  Subjective  Subjective: I just got a shower. Pre Pain Assessment:  Pre Treatment Pain Screening  Pain at present: 0  Scale Used: Numeric Score  Intervention List: Patient able to continue with treatment  Pain Screening  Patient Currently in Pain: Yes       Post Pain Assessment:   Pain Assessment  Pain Assessment: 0-10  Pain Level: 3  Pain Type: Surgical pain  Pain Location: Hip  Pain Orientation: Left       OBJECTIVE:         Bed mobility  Supine to Sit: Stand by assistance  Sit to Supine: Stand by assistance  Comment: maintains hip precautions well. Transfers  Sit to Stand: Stand by assistance  Stand to sit: Stand by assistance  Bed to Chair: Contact guard assistance  Car Transfer: Stand by assistance  Comment: vc's for kicking LLE out to maintain hip precautions. pt with poor carryover of cues. Ambulation  Ambulation?: Yes  Ambulation 1  Surface: level tile  Device: Rolling Walker  Assistance: Stand by assistance  Quality of Gait: antalgic gait, step to pattern. Distance: 150'   Stairs/Curb  Stairs?: Yes  Stairs  # Steps : 4  Stairs Height: 6\"  Rails: Right ascending  Device: Single pt cane(Left)  Assistance: Stand by assistance  Comment: steady, good technique. Exercises  Gluteal Sets: x10  Knee Long Arc Quad: x10  Ankle Pumps: x10  Comments: pt given written HEP instruction given on technique dosage and frequency of all exercises. ASSESSMENT:  Body structures, Functions, Activity limitations: Decreased functional mobility ; Decreased endurance;Decreased balance; Increased Pain;Decreased strength    Assessment: pt with good mobility safe on stairs and car education given on function and level of care after hospital pt seems to be agreeable to go home with Gigi Jon. Activity Tolerance  Activity Tolerance: Patient Tolerated treatment well       Discharge Recommendations:  Continue to assess pending progress, Patient would benefit from continued therapy after discharge    Goals:  Long term goals  Long term goal 1: indep with bed mobility  Long term goal 2: indep with transfers following hip precautions  Long term goal 3: pt to ambulate >150 ft supervision FWW  Long term goal 4: pt to navigate 4 steps with supervision 1 HR  Patient Goals   Patient goals : to go home    PLAN:   Plan  Times per week: 5-7  Times per day: Twice a day  Current Treatment Recommendations: Strengthening, Balance Training, Transfer Training, Endurance Training, Gait Training, Neuromuscular Re-education, Home Exercise Program, Stair training, Pain Management, Safety Education & Training, Equipment Evaluation, Education, & procurement, Positioning, Functional Mobility Training  Safety Devices  Type of devices:  All fall risk precautions in place, Bed alarm in place, Call light within reach, Left in bed(left with ice pack on left hip, instructed to remove after 15 minutes. )     [facility] (6 CLICK) BASIC MOBILITY  AM-PAC Inpatient Mobility Raw Score : 19      Therapy Time   Individual   Time In 0913   Time Out 0946   Minutes 33      Gait: 23  BM/Trsf: [address], [facility], 06/22/19 at 9:54 AM

## 2019-06-22 NOTE — PROGRESS NOTES
Physical Therapy Med Surg Daily Treatment Note  Facility/Department: [facility]  Room: N229/N229-01       NAME: Ricky Trent  :  (13 y.o.)  MRN: [id number]  CODE STATUS: Full Code    Date of Service: 2019    Patient Diagnosis(es): Status post total hip replacement, right [Z96.641]  Status post total hip replacement, left [Z96.642]   No chief complaint on file.     Patient Active Problem List    Diagnosis Date Noted    Status post total hip replacement, right 2019    Status post total hip replacement, left 2019    Lung nodule     [facility]    History of placement of stent in LAD coronary artery 2015    History of extremity bypass graft 2015    History of tobacco abuse 2015    Peripheral arterial disease ([facility] [address] 2015    ED (erectile dysfunction) 10/22/2012    Hypertension     CAD (coronary artery disease)     Lipoma     Eczema     Hyperlipidemia     Seborrheic keratosis     PVD (peripheral vascular disease) (HCC)     Granuloma faciale         Past Medical History:   Diagnosis Date    CAD (coronary artery disease)     Eczema     Granuloma faciale     History of extremity bypass graft 2015    Left leg 2003    History of tobacco abuse 2015    Hyperlipidemia     Hypertension     Lipoma     PVD (peripheral vascular disease) (HCC)     Seborrheic keratosis      Past Surgical History:   Procedure Laterality Date    CARDIAC CATHETERIZATION      COLONOSCOPY  10-19-12    CORONARY ANGIOPLASTY WITH STENT PLACEMENT      DIAGNOSTIC CARDIAC CATH LAB PROCEDURE      HAND SURGERY      L    HIP SURGERY      JOINT REPLACEMENT      LUNG BIOPSY Right 2018    CT guided Left Lung Biopsy by Dr Ramona Kinney Right     2018 per Dr Yandel Gomez [address] DX/TX INTERVENTION [address] LES N/A 2018    EBUS WITH TRANSBRONCHIAL NEEDLE ASPIRATION W/ FLUOROSCOPY performed by Brooke Benavides MD at [facility]          Restrictions:  Restrictions/Precautions: Weight Bearing, Fall Risk  Position Activity Restriction  Hip Precautions: Posterior hip precautions    SUBJECTIVE:  General  Chart Reviewed: Yes  Family / Caregiver Present: Yes(S.O)  Subjective  Subjective: I'm doing good. Pre Pain Assessment:  Pre Treatment Pain Screening  Pain at present: 1  Scale Used: Numeric Score  Intervention List: Patient able to continue with treatment  Pain Screening  Patient Currently in Pain: Yes       Post Pain Assessment:   Pain Assessment  Pain Assessment: 0-10  Pain Level: 1  Pain Type: Surgical pain  Pain Location: Hip  Pain Orientation: Left       OBJECTIVE:         Bed mobility  Supine to Sit: Stand by assistance  Sit to Supine: Stand by assistance  Comment: maintains hip precautions well. Transfers  Sit to Stand: Stand by assistance  Stand to sit: Stand by assistance  Bed to Chair: Contact guard assistance  Comment: vc's for kicking LLE out to maintain hip precautions. pt with poor carryover of cues. Ambulation  Ambulation?: Yes  Ambulation 1  Surface: level tile  Device: Rolling Walker  Assistance: Stand by assistance  Quality of Gait: antalgic gait, step to pattern. Distance: 300'             Exercises  Comments: reviewed written HEP with pt. ASSESSMENT:  Body structures, Functions, Activity limitations: Decreased functional mobility ; Decreased endurance;Decreased balance; Increased Pain;Decreased strength    Assessment: pt able to increased ambulation distance without issue, still needs cues to maintain hip precautions during transfers, able to recall 2/3 without cues. Educated pt and S.O at length regarding PT POC after being discharged from hospital, pt states he feels good enough to go home.      Activity Tolerance  Activity Tolerance: Patient Tolerated treatment well       Discharge Recommendations:  Continue to assess pending progress, Patient would benefit from continued therapy after discharge    Goals:  Long term goals  Long term goal 1: indep with bed mobility  Long term goal 2: indep with transfers following hip precautions  Long term goal 3: pt to ambulate >150 ft supervision FWW  Long term goal 4: pt to navigate 4 steps with supervision 1 HR  Patient Goals   Patient goals : to go home    PLAN:   Plan  Times per week: 5-7  Times per day: Twice a day  Current Treatment Recommendations: Strengthening, Balance Training, Transfer Training, Endurance Training, Gait Training, Neuromuscular Re-education, Home Exercise Program, Stair training, Pain Management, Safety Education & Training, Equipment Evaluation, Education, & procurement, Positioning, Functional Mobility Training  Safety Devices  Type of devices:  All fall risk precautions in place, Bed alarm in place, Call light within reach, Left in bed(left with ice pack on left hip, instructed to remove after 15 minutes. )     [facility] (6 CLICK) BASIC MOBILITY  AM-PAC Inpatient Mobility Raw Score : 19    Therapy Time   Individual   Time In 1342   Time Out 1400   Minutes 18      Gait: 12  Therex: 4  BM/TrsF: 2        Roz Post PTA, 06/22/19 at 2:06 PM

## 2019-06-22 NOTE — OP NOTE
[address]                      [address], [address] [facility]                                OPERATIVE REPORT    PATIENT NAME: Susie Rodas                  :        1945  MED REC NO:   [id number]                            ROOM:       N229  ACCOUNT NO:   [de-identified]                           ADMIT DATE: 2019  PROVIDER:     Srinivas Gamez MD    DATE OF PROCEDURE:  2019    PREOPERATIVE DIAGNOSIS:  Degenerative joint disease, left hip. POSTOPERATIVE DIAGNOSIS:  Degenerative joint disease, left hip. PROCEDURE:  Left total hip replacement. SURGEON:  Reed Burgos MD.    ASSISTANT:  Che Tinajero PA-C was present throughout the entire case. Given the nature of the disease process and the procedure, a skilled  surgical first assistant was necessary during the case. The assistant  was necessary to hold retractors and manipulate the extremity during the  procedure. A certified scrub tech was at the back table managing the  instruments and supplies for the surgical case. BLOOD LOSS:  Around 250. FLUIDS:  Less than 2 L. ANESTHESIA:  Spinal with regional block. COMPLICATIONS:  None. CLINICAL INDICATIONS:  The patient has pain in the hip that is increased  with activity and weightbearing and walks with an antalgic gait. The  pain is interfering with activities of daily living. The patient has  limited ROM and pain with passive ROM. X-ray demonstrates joint space  narrowing, subchondral sclerosis and osteophyte formation. Symptoms are  not improved with medicine, therapy or supportive device for at least 3  months. The planned procedure, associated risks, complications,  treatment alternatives and postoperative course were discussed. The  patient wishes to proceed. OPERATION AND FINDINGS:  The patient was taken to the operating room and  placed in a supine position whereupon adequate anesthesia was obtained.    Surgical and felt to be  appropriate. The dislocation was performed and trial femoral components were removed. The final femoral head was inserted and completely seated. The femoral  head and liner were applied. The hip was again reduced. Stability was  assessed and found to be satisfactory. The joint capsule was then irrigated with a copious amount of saline  irrigation. The wound was closed using #2 Ethibond sutures through  drill holes in the piriformis fossa for short external rotators and  joint capsule. The IT band was closed using running locked #0 Vicryl  suture, #000 Monocryl was used to close the underlying subcutaneous  tissues and #4-0 Monocryl was used for the skin. A dry, sterile bulky  dressing was applied. The patient tolerated the procedure well and was  taken to the post-anesthesia care unit in satisfactory condition. A postoperative x-ray was reviewed and demonstrated satisfactory  alignment without fracture. The surgical findings and patient condition were discussed with the  patient's family. IMPLANT SIZES:  Porous total hip replacement, we reamed to 56 and placed  a 58 trabecular metal porous cup. We used 50-mm screw. We placed a  liner in the 58 cup with a 15-degree elevated lip for a 36 head. We  broached to 13. We placed a 13 trabecular metal porous stem standard  neck length, and we used a 36 head, 0 neck length.       Stone Summers MD    D: 06/21/2019 11:53:26       T: 06/21/2019 15:54:18     [email or web address]  Job#: [id number]     Doc#: [id number]    CC:

## 2019-06-22 NOTE — PROGRESS NOTES
wnd ck c/d/i  N/v intact  Ok shower  Ok d/c  F/u as sched  Pt has scrpts  Sling    Remove aquacel in 6 days and then leave open

## 2019-06-22 NOTE — PROGRESS NOTES
Hip surgery    Progress Note    Subjective:     Post-Operative Day: 1 Status Post right Hip Arthroplasty  Systemic or Specific Complaints:No Complaints    Objective:     CURRENT VITALS:  BP (!) 122/51   Pulse 65   Temp 98.6 °F (37 °C) (Oral)   Resp 18   Ht 5' 11\" (1.803 m)   Wt 180 lb (81.6 kg)   SpO2 100%   BMI 25.10 kg/m²     General: alert, appears stated age and cooperative   Wound: Wound clean and dry no evidence of infection. Motion: Painless Range of Motion   DVT Exam: No evidence of DVT seen on physical exam.       NVI in lower extremity. Thigh swollen but soft. Moving foot and ankle. Data Review  Recent Labs     06/22/19  0556   WBC 10.8   RBC 4.12*   HGB 12.5*   HCT 36.1*   MCV 87.5   MCH 30.4   MCHC 34.7   RDW 13.0          Assessment:     Status Post right Hip Arthroplasty. Doing well postoperatively.      Plan:      1: Continues current post-op course :  2:  Continue Deep venous thrombosis prophylaxis  3:  Continue physical therapy  4:  Continue Pain Control

## 2019-06-22 NOTE — DISCHARGE SUMMARY
Discharge summary  This patient Bony Chaudhary was admitted to the hospital on 6/21/2019  after undergoing Procedure(s) (LRB):  LEFT  HIP TOTAL ARTHROPLASTY (Left) without complications that morning. During the postoperative period,while in hospital, patient was medically managed by the hospitalist. Please see medial notes and H&P for patients additional diagnoses. Ortho agrees with all medical diagnoses and treatments while patient in hospital.    No significant or unexpected findings noted during hospital stay. ..... Hospital course  Patient tolerated surgical procedure well and there was no complications. Patient progressed adequtly through their recovery during hospital stay including PT and rehabilitation. DVT prophylaxis was implemented POD#1  Patient was then D/C on  to Skilled nursing facility  in stable condition. Patient was instructed on the use of pain medications, the signs and symptoms of infection, and was given our number to call should they have any questions or concerns following discharge.

## 2019-06-22 NOTE — PROGRESS NOTES
VCs for safe transfer technique.  Pt used grab bars     CAROLA Hose donned:  [x]  Yes  []  No       Treatment consisted of:   [x] ADL Training  [] Strengthening   [x] Transfer Training    [] DME Education  [] HEP   [] Manual Therapy   [x] Patient Education  [] Other:      Assessment/Discharge Disposition:  Performance deficits / Impairments: Decreased functional mobility , Decreased strength, Decreased endurance, Decreased coordination, Decreased ADL status, Decreased safe awareness, Decreased high-level IADLs, Decreased balance  Prognosis: Good  Discharge Recommendations: Continue to assess pending progress  History: multi comorb    [employer]  AM-PAC Daily Activity Inpatient   How much help for putting on and taking off regular lower body clothing?: A Little  How much help for Bathing?: A Little  How much help for Toileting?: A Little  How much help for putting on and taking off regular upper body clothing?: None  How much help for taking care of personal grooming?: None  How much help for eating meals?: None  AM-[facility] Inpatient Daily Activity Raw Score: 21  AM-PAC Inpatient ADL T-Scale Score : 44.27  ADL Inpatient CMS 0-100% Score: 32.79  ADL Inpatient CMS G-Code Modifier : CJ    Plan:  [x] Continue OT per POC  [] D/C OT  [] Other:     Goals/Plan of care addressed during this session:   [] Improve balance  [] Improve Strength   [x] Improve [place] with functional transfers   [x]  Improve [place] with ADLs     Minutes:  OT Individual Minutes  Time In: 0809  Time Out: 4248  Minutes: 44      Electronically signed by:    MIR Patel    6/22/2019, 9:56 AM

## 2019-06-23 PROBLEM — Z96.642 HISTORY OF LEFT HIP REPLACEMENT: Status: ACTIVE | Noted: 2019-06-23

## 2019-06-23 LAB
ANION GAP SERPL CALCULATED.3IONS-SCNC: 11 MEQ/L (ref 9–15)
BUN BLDV-MCNC: 19 MG/DL (ref 8–23)
CALCIUM SERPL-MCNC: 8.6 MG/DL (ref 8.5–9.9)
CHLORIDE BLD-SCNC: 105 MEQ/L (ref 95–107)
CO2: 27 MEQ/L (ref 20–31)
CREAT SERPL-MCNC: 0.94 MG/DL (ref 0.7–1.2)
GFR AFRICAN AMERICAN: >60
GFR NON-AFRICAN AMERICAN: >60
GLUCOSE BLD-MCNC: 110 MG/DL (ref 70–99)
HCT VFR BLD CALC: 37.1 % (ref 42–52)
HEMOGLOBIN: 12.5 G/DL (ref 14–18)
MCH RBC QN AUTO: 29.8 PG (ref 27–31.3)
MCHC RBC AUTO-ENTMCNC: 33.6 % (ref 33–37)
MCV RBC AUTO: 88.7 FL (ref 80–100)
PDW BLD-RTO: 13 % (ref 11.5–14.5)
PLATELET # BLD: 163 K/UL (ref 130–400)
POTASSIUM SERPL-SCNC: 4.2 MEQ/L (ref 3.4–4.9)
RBC # BLD: 4.18 M/UL (ref 4.7–6.1)
SODIUM BLD-SCNC: 143 MEQ/L (ref 135–144)
WBC # BLD: 8.2 K/UL (ref 4.8–10.8)

## 2019-06-23 PROCEDURE — 1200000002 HC SEMI PRIVATE SWING BED

## 2019-06-23 PROCEDURE — 97116 GAIT TRAINING THERAPY: CPT

## 2019-06-23 PROCEDURE — 97530 THERAPEUTIC ACTIVITIES: CPT

## 2019-06-23 PROCEDURE — 6370000000 HC RX 637 (ALT 250 FOR IP): Performed by: INTERNAL MEDICINE

## 2019-06-23 PROCEDURE — 97162 PT EVAL MOD COMPLEX 30 MIN: CPT

## 2019-06-23 PROCEDURE — 85027 COMPLETE CBC AUTOMATED: CPT

## 2019-06-23 PROCEDURE — 80048 BASIC METABOLIC PNL TOTAL CA: CPT

## 2019-06-23 PROCEDURE — 97112 NEUROMUSCULAR REEDUCATION: CPT

## 2019-06-23 PROCEDURE — 97110 THERAPEUTIC EXERCISES: CPT

## 2019-06-23 PROCEDURE — 36415 COLL VENOUS BLD VENIPUNCTURE: CPT

## 2019-06-23 RX ORDER — ACETAMINOPHEN 325 MG/1
650 TABLET ORAL EVERY 6 HOURS PRN
Status: DISCONTINUED | OUTPATIENT
Start: 2019-06-23 | End: 2019-06-27 | Stop reason: HOSPADM

## 2019-06-23 RX ORDER — POLYETHYLENE GLYCOL 3350 17 G/17G
17 POWDER, FOR SOLUTION ORAL DAILY
Status: DISCONTINUED | OUTPATIENT
Start: 2019-06-23 | End: 2019-06-27 | Stop reason: HOSPADM

## 2019-06-23 RX ADMIN — ASPIRIN 81 MG 81 MG: 81 TABLET ORAL at 20:23

## 2019-06-23 RX ADMIN — ASPIRIN 81 MG 81 MG: 81 TABLET ORAL at 08:06

## 2019-06-23 RX ADMIN — ACETAMINOPHEN 650 MG: 325 TABLET ORAL at 02:17

## 2019-06-23 RX ADMIN — LOVASTATIN 10 MG: 10 TABLET ORAL at 20:24

## 2019-06-23 RX ADMIN — SENNOSIDES AND DOCUSATE SODIUM 1 TABLET: 8.6; 5 TABLET ORAL at 08:06

## 2019-06-23 RX ADMIN — METOPROLOL SUCCINATE 200 MG: 50 TABLET, EXTENDED RELEASE ORAL at 08:06

## 2019-06-23 RX ADMIN — ACETAMINOPHEN 650 MG: 325 TABLET ORAL at 08:06

## 2019-06-23 RX ADMIN — SENNOSIDES AND DOCUSATE SODIUM 1 TABLET: 8.6; 5 TABLET ORAL at 20:23

## 2019-06-23 ASSESSMENT — ENCOUNTER SYMPTOMS
VOMITING: 0
COUGH: 0
DIARRHEA: 0
SHORTNESS OF BREATH: 0
NAUSEA: 0

## 2019-06-23 ASSESSMENT — PAIN SCALES - GENERAL
PAINLEVEL_OUTOF10: 0
PAINLEVEL_OUTOF10: 2
PAINLEVEL_OUTOF10: 0
PAINLEVEL_OUTOF10: 0
PAINLEVEL_OUTOF10: 1

## 2019-06-23 ASSESSMENT — PAIN DESCRIPTION - PROGRESSION
CLINICAL_PROGRESSION: GRADUALLY IMPROVING

## 2019-06-23 NOTE — PROGRESS NOTES
did a good job)    Orientation     Cognition      Objective      Transfers  Sit to Stand: Stand by assistance  Stand to sit: Stand by assistance  Ambulation  Ambulation?: Yes  More Ambulation?: Yes  Ambulation 1  Surface: level tile  Device: Rolling Walker  Assistance: Stand by assistance  Gait Deviations: (Left hip IR when ambulating)  Distance: 150' with one 180 turn  Comments: Cues to maintain Fitt angle and keep walker close  Ambulation 2  Surface - 2: level tile  Device 2: Rolling Walker  Distance: 10' room        Exercises  Straight Leg Raise: x 10  Hamstring Sets: x 5  Heelslides: x 10  Gluteal Sets: x 10  Hip Flexion: x 10  Hip Extension/Leg Presses: Sit to stand x 5  Knee Long Arc Quad: x 10  Other exercises  Other exercises?: Yes  Other exercises 1: Standing without walker support x 1 min  Other exercises 2: Stagger stance x 1 min each                        G-Code     OutComes Score                                                     AM-PAC Score             Goals  Short term goals  Time Frame for Short term goals: 2-3 days  Short term goal 1: Supervised bed transfers, OOB left  Short term goal 2: SBA amb > 200' with ww  Short term goal 3: Pt able to state from memory 2/3 hip precautions (presently 1/3)  Short term goal 4: Demonstrate supervised transfers oberserving a;; hip precautions and afety precautions  Long term goals  Time Frame for Long term goals : 3-7 days  Long term goal 1: indep with bed mobility, in and OOB on left  Long term goal 2: indep with transfers following hip precautions  Long term goal 3: pt to ambulate  . 200' with Least restrictive AD or none  Long term goal 4: pt to navigate 4 steps with supervision 1 HR R ascending  Patient Goals   Patient goals : Be able to walk without AD or with least restrictive AD before d/c    Plan    Plan  Times per week: 5-7  Times per day: Twice a day  Plan weeks: 1 week  Current Treatment Recommendations: Strengthening, Transfer Training, Endurance Training, Patient/Caregiver Education & Training, Positioning, ROM, Gait Training, Home Exercise Program, Functional Mobility Training, Stair training  Safety Devices  Type of devices: Gait belt, Bed alarm in place, Call light within reach, Left in chair     Therapy Time   Individual Concurrent Group Co-treatment   Time In  345         Time Out  430         Minutes  [id number] Surjit Tariq PTA  License and [address] Number: [id number]

## 2019-06-23 NOTE — H&P
Hospital Medicine History & Physical      PCP: Susannah Leiva MD    Date of Admission: 6/22/2019    Date of Service: 6/23/19      Chief Complaint:  L hip pain      History Of Present Illness:  76 y.o. male who presented to [facility] after acute admission at [facility] for long standing OA, post ORIF L hip. After completing his acute treatment was transferred to [facility] for postacute rehabilitation. Denied fever, chills, CP/dyspnea    Past Medical History:          Diagnosis Date    CAD (coronary artery disease)     Eczema     Granuloma faciale     History of extremity bypass graft 11/16/2015    Left leg 11/14/2003    History of tobacco abuse 11/16/2015    Hyperlipidemia     Hypertension     Lipoma     PVD (peripheral vascular disease) ([address]     Seborrheic keratosis        Past Surgical History:          Procedure Laterality Date    CARDIAC CATHETERIZATION  2003    COLONOSCOPY  10-19-12    CORONARY ANGIOPLASTY WITH STENT PLACEMENT  2003    DIAGNOSTIC CARDIAC CATH LAB PROCEDURE      HAND SURGERY  2003    L    HIP SURGERY      JOINT REPLACEMENT      LUNG BIOPSY Right 09/05/2018    CT guided Left Lung Biopsy by Dr Sherin Gowers Right     9/5/2018 per Dr Maria T Hanks 70. EBUS DX/TX INTERVENTION [address] LES N/A 8/17/2018    EBUS WITH TRANSBRONCHIAL NEEDLE ASPIRATION W/ FLUOROSCOPY performed by Karis Baer MD at [facility]       Medications Prior to Admission:      Prior to Admission medications    Medication Sig Start Date End Date Taking?  Authorizing Provider   metoprolol succinate (TOPROL XL) 200 MG extended release tablet Take 1 tablet by mouth daily 6/19/19  Yes Nestor Hernandez MD   lovastatin (MEVACOR) 10 MG tablet TAKE 1 TABLET BY MOUTH  NIGHTLY 5/14/19  Yes Nestor Hernandez MD   aspirin 81 MG tablet Take 81 mg by mouth daily   Yes Historical Provider, MD       Allergies:  Lisinopril; Contrast [iodides]; Crestor [rosuvastatin]; and Pravachol [pravastatin]    Social History:      The patient currently lives at home    TOBACCO:   reports that he quit smoking about 13 years ago. His smoking use included cigarettes. He started smoking about 54 years ago. He has a 40.00 pack-year smoking history. He has never used smokeless tobacco.  ETOH:   reports that he drinks alcohol. Family History:       Reviewed in detail and negative for DM, CAD, Cancer, CVA. Positive as follows:        Problem Relation Age of Onset    High Blood Pressure Mother     Other Mother         BRAIN TUMOR    Cancer Father         LUNG       REVIEW OF SYSTEMS:   Pertinent positives as noted in the HPI. All other systems reviewed and negative. PHYSICAL EXAM:    /63   Pulse 65   Temp 98.4 °F (36.9 °C) (Oral)   Resp 16   Ht 5' 11\" (1.803 m)   Wt 183 lb 14.4 oz (83.4 kg)   SpO2 98%   BMI 25.65 kg/m²     General appearance:  No apparent distress, appears stated age and cooperative. HEENT:  Normal cephalic, atraumatic without obvious deformity. Pupils equal, round, and reactive to light. Extra ocular muscles intact. Conjunctivae/corneas clear. Neck: Supple, with full range of motion. No jugular venous distention. Trachea midline. Respiratory:  Normal respiratory effort. Clear to auscultation, bilaterally without Rales/Wheezes/Rhonchi. Cardiovascular:  Regular rate and rhythm with normal S1/S2 without murmurs, rubs or gallops. Abdomen: Soft, non-tender, non-distended with normal bowel sounds. Musculoskeletal:  No clubbing, cyanosis or edema bilaterally. Full range of motion without deformity. Skin: Skin color, texture, turgor normal.  No rashes or lesions. Neurologic:  Neurovascularly intact without any focal sensory/motor deficits.  Cranial nerves: II-XII intact, grossly non-focal.  Psychiatric:  Alert and oriented, thought content appropriate, normal insight  Capillary Refill: Brisk,< 3 seconds   Peripheral Pulses: +2 palpable, equal bilaterally       Labs:     Recent Labs

## 2019-06-23 NOTE — PROGRESS NOTES
Tomi Blandon is a 76 y.o. male patient. Pt was seen and evaluated, going to rehab, denies any need at this time  Current Facility-Administered Medications   Medication Dose Route Frequency Provider Last Rate Last Dose    acetaminophen (TYLENOL) tablet 650 mg  650 mg Oral Q6H PRN Frederick Nice MD        polyethylene glycol [facility]) packet 17 g  17 g Oral Daily Frederick Nice MD        magnesium hydroxide (MILK OF MAGNESIA) 400 MG/5ML suspension 30 mL  30 mL Oral Once Frederick Nice MD        famotidine (PEPCID) tablet 20 mg  20 mg Oral BID PRN Heaven Chang MD        magnesium hydroxide (MILK OF MAGNESIA) 400 MG/5ML suspension 30 mL  30 mL Oral Daily PRN Heaven Chang MD        ondansetron [facility]) injection 4 mg  4 mg Intravenous Q6H PRN Heaven Chang MD        sennosides-docusate sodium (SENOKOT-S) 8.6-50 MG tablet 1 tablet  1 tablet Oral BID Heaven Chang MD   1 tablet at 06/23/19 0806    sodium chloride flush 0.9 % injection 10 mL  10 mL Intravenous PRN Heaven Chang MD        aspirin chewable tablet 81 mg  81 mg Oral BID Heaven Chang MD   81 mg at 06/23/19 0806    cyclobenzaprine (FLEXERIL) tablet 10 mg  10 mg Oral TID PRN Heaven Chang MD        metoprolol succinate (TOPROL XL) extended release tablet 200 mg  200 mg Oral Daily Heaven Chang MD   200 mg at 06/23/19 0806    oxyCODONE-acetaminophen (PERCOCET) 5-325 MG per tablet 1 tablet  1 tablet Oral Q4H PRN Heaven Chang MD        oxyCODONE-acetaminophen (PERCOCET) 5-325 MG per tablet 2 tablet  2 tablet Oral Q4H PRN Heaven Chang MD        traMADol [place] [facility]) tablet 50 mg  50 mg Oral Q6H PRN Heaven Chang MD        lovastatin (MEVACOR) tablet 10 mg  10 mg Oral Nightly Frederick Nice MD   10 mg at 06/22/19 2016     Allergies   Allergen Reactions    Lisinopril Anaphylaxis and Swelling    Contrast [Iodides] Other (See Comments)     Pt unsure of reaction.     Crestor [Rosuvastatin]     Pravachol [Pravastatin] Other (See Comments)     Joint / CREATININE 0.94 06/23/2019    GLUCOSE 110 06/23/2019    GLUCOSE 104 10/14/2011    CALCIUM 8.6 06/23/2019        Assessment & Plan  1) encounter for post surgical care  2) CAD no CP, no SOB  3) HTN tevin Johnston MD  6/23/2019

## 2019-06-23 NOTE — PROGRESS NOTES
Physical Therapy    Facility/Department: [facility] MED SURG UNIT  Initial Assessment    NAME: Shravan Judd  :   MRN: [id number]    Date of Service: 2019    Discharge Recommendations:  Continue to assess pending progress, Patient would benefit from continued therapy after discharge   PT Equipment Recommendations  Equipment Needed: No(Pt sates has ww and quad cane at home, advised getting a reacher)    Assessment   Body structures, Functions, Activity limitations: Decreased functional mobility ; Decreased safe awareness  Assessment: Pt requires verbal cues to maintain nuetral or ER rotation of L LE in ambulation, unable to state all hip precautions  Treatment Diagnosis: Decreased safe mobility for s/p THR  Prognosis: Good  Decision Making: Medium Complexity  Patient Education: Pt given written hip precautions, stressed importance of rembering and demonsrting to go home safely  REQUIRES PT FOLLOW UP: Yes  Activity Tolerance  Activity Tolerance: Patient Tolerated treatment well       Patient Diagnosis(es): There were no encounter diagnoses. has a past medical history of CAD (coronary artery disease), Eczema, Granuloma faciale, History of extremity bypass graft, History of tobacco abuse, Hyperlipidemia, Hypertension, Lipoma, PVD (peripheral vascular disease) ([facility] [address] and Seborrheic keratosis. has a past surgical history that includes Hand surgery (); Coronary angioplasty with stent (); Cardiac catheterization (); Colonoscopy (10-19-12); Diagnostic Cardiac Cath Lab Procedure; hip surgery; [facility] dx/tx intervention perph les (N/A, 2018); Lung biopsy (Right, 2018); Lung biopsy (Right); and joint replacement.     Restrictions  Restrictions/Precautions  Restrictions/Precautions: Weight Bearing, Fall Risk  Required Braces or Orthoses?: No  Implants present? : Metal implants  Lower Extremity Weight Bearing Restrictions  Left Lower Extremity Weight Bearing: Weight Bearing As

## 2019-06-24 PROCEDURE — 97535 SELF CARE MNGMENT TRAINING: CPT

## 2019-06-24 PROCEDURE — 97166 OT EVAL MOD COMPLEX 45 MIN: CPT

## 2019-06-24 PROCEDURE — 97116 GAIT TRAINING THERAPY: CPT

## 2019-06-24 PROCEDURE — 97110 THERAPEUTIC EXERCISES: CPT

## 2019-06-24 PROCEDURE — 6370000000 HC RX 637 (ALT 250 FOR IP): Performed by: INTERNAL MEDICINE

## 2019-06-24 PROCEDURE — 97530 THERAPEUTIC ACTIVITIES: CPT

## 2019-06-24 PROCEDURE — 1200000002 HC SEMI PRIVATE SWING BED

## 2019-06-24 RX ADMIN — SENNOSIDES AND DOCUSATE SODIUM 1 TABLET: 8.6; 5 TABLET ORAL at 08:21

## 2019-06-24 RX ADMIN — ASPIRIN 81 MG 81 MG: 81 TABLET ORAL at 20:01

## 2019-06-24 RX ADMIN — METOPROLOL SUCCINATE 200 MG: 50 TABLET, EXTENDED RELEASE ORAL at 08:21

## 2019-06-24 RX ADMIN — POLYETHYLENE GLYCOL 3350 17 G: 17 POWDER, FOR SOLUTION ORAL at 08:21

## 2019-06-24 RX ADMIN — ASPIRIN 81 MG 81 MG: 81 TABLET ORAL at 08:22

## 2019-06-24 RX ADMIN — LOVASTATIN 10 MG: 10 TABLET ORAL at 20:03

## 2019-06-24 ASSESSMENT — PAIN SCALES - GENERAL
PAINLEVEL_OUTOF10: 0

## 2019-06-24 ASSESSMENT — PAIN DESCRIPTION - PROGRESSION
CLINICAL_PROGRESSION: GRADUALLY IMPROVING

## 2019-06-24 NOTE — PROGRESS NOTES
Physical Therapy  Facility/Department: [facility] MED SURG UNIT  Daily Treatment Note  NAME: Andreia Ornelas  :   MRN: [id number]    Date of Service: 2019    Discharge Recommendations:  Continue to assess pending progress, Patient would benefit from continued therapy after discharge   PT Equipment Recommendations  Equipment Needed: No    Assessment   Body structures, Functions, Activity limitations: Decreased functional mobility ; Decreased endurance;Decreased balance; Increased Pain;Decreased strength  Assessment: Pt making good progress towards goals. Pt limited with exercises this session due to not having pain meds. Prognosis: Good  REQUIRES PT FOLLOW UP: Yes  Activity Tolerance  Activity Tolerance: Patient Tolerated treatment well     Patient Diagnosis(es): There were no encounter diagnoses. has a past medical history of CAD (coronary artery disease), Eczema, Granuloma faciale, History of extremity bypass graft, History of tobacco abuse, Hyperlipidemia, Hypertension, Lipoma, PVD (peripheral vascular disease) ([facility] [address] and Seborrheic keratosis. has a past surgical history that includes Hand surgery (); Coronary angioplasty with stent (); Cardiac catheterization (); Colonoscopy (10-19-12); Diagnostic Cardiac Cath Lab Procedure; hip surgery; [facility] ebus dx/tx intervention perph les (N/A, 2018); Lung biopsy (Right, 2018); Lung biopsy (Right); and joint replacement.     Restrictions  Restrictions/Precautions  Restrictions/Precautions: Weight Bearing, Fall Risk  Required Braces or Orthoses?: No  Implants present? : Metal implants  Lower Extremity Weight Bearing Restrictions  Left Lower Extremity Weight Bearing: Weight Bearing As Tolerated  Position Activity Restriction  Hip Precautions: Posterior hip precautions  Subjective   General  Chart Reviewed: Yes  Family / Caregiver Present: Yes(S.O)  Subjective  Subjective: Pt lying in bed upon arrival and agreeable to therapy  Pain Screening  Patient Currently in Pain: Yes  Vital Signs  Level of Consciousness: Alert  Patient Currently in Pain: Yes  Oxygen Therapy  O2 Device: None (Room air)       Objective      Transfers  Sit to Stand: Stand by assistance  Stand to sit: Stand by assistance  Bed to Chair: Contact guard assistance  Car Transfer: Stand by assistance  Comment:   Ambulation  Ambulation?: Yes  Ambulation 1  Surface: level tile  Device: Rolling Walker  Assistance: Stand by assistance  Quality of Gait: Step-through gait  Distance: 150x1, 70x1       Balance  Posture: Good  Sitting - Static: Good  Sitting - Dynamic: Good  Standing - Static: Good  Standing - Dynamic: Good  Exercises  Hip Flexion: 2x10 LLE only to 90 deg  Hip Abduction: 2x10  Knee Long Arc Quad: 2x10  Ankle Pumps: x10                          Goals  Short term goals  Time Frame for Short term goals: 2-3 days  Short term goal 1: Supervised bed transfers, OOB left  Short term goal 2: SBA amb > 200' with ww  Short term goal 3: Pt able to state from memory 2/3 hip precautions (presently 1/3)  Short term goal 4: Demonstrate supervised transfers oberserving a;; hip precautions and afety precautions  Long term goals  Time Frame for Long term goals : 3-7 days  Long term goal 1: indep with bed mobility, in and OOB on left  Long term goal 2: indep with transfers following hip precautions  Long term goal 3: pt to ambulate  . 200' with Least restrictive AD or none  Long term goal 4: pt to navigate 4 steps with supervision 1 HR R ascending  Patient Goals   Patient goals : Be able to walk without AD or with least restrictive AD before d/c    Plan    Plan  Times per week: 5-7  Times per day: Twice a day  Plan weeks: 1 week  Current Treatment Recommendations: Strengthening, Transfer Training, Endurance Training, Patient/Caregiver Education & Training, Positioning, ROM, Gait Training, Home Exercise Program, Functional Mobility Training, Stair training  Safety Devices  Type of devices:  All fall risk precautions in place, Bed alarm in place, Call light within reach     Therapy Time   Individual Concurrent Group Co-treatment   Time In  230         Time Out  [address], \[facility]\""  License and [address] Number: [id number]

## 2019-06-24 NOTE — PROGRESS NOTES
Chart reviewed. Patient's care discussed in daily quality rounds. Patient was admitted to [facility] for skilled therapies s/p left hip replacement. This  met with patient and spouse in hospital room to complete psychosocial assessment and DC planning. Upon visit patient is alert and laying in hospital bed with head of bed elevated, watching TV. Patient appears well groomed dressed in clean, weather appropriate clothing from home, short kept hair and eye glasses. Spouse at bedside. Patient is alert and oriented to person, place, and situation. Patient's thought process is intact. Patient's mood is calm and cooperative. Patient reports residing at home with spouse in [place].  Patient reports to have been independent for ADLS prior to surgery. Patient reports that he has had other hip replaced in the past and reports that he went to out patient therapy at [facility]. Patient desires to go to [facility] for out patient therapy again upon DC from [facility]. Patient is reported to be supportive and able to assist with care needs. No DME or help at home needs identified at this time. SS reviewed and scheduled care conference for this coming Wednesday 6/26.   SS to continue to follow

## 2019-06-24 NOTE — PROGRESS NOTES
Alert  Social/Functional History  Social/Functional History  Lives With: Spouse  Type of Home: House  Home Layout: One level  Home Access: Stairs to enter with rails  Entrance Stairs - Number of Steps: 2  Entrance Stairs - Rails: Right  Bathroom Shower/Tub: Walk-in shower  Bathroom Toilet: Handicap height  Bathroom Equipment: Grab bars in shower, Shower chair, Hand-held shower  Bathroom Accessibility: Walker accessible  Home Equipment: Grab bars  Receives Help From: Family  ADL Assistance: Independent  Homemaking Assistance: Independent  Homemaking Responsibilities: No  Active : Yes  Mode of Transportation: Car  Occupation: Retired  Type of occupation:  steel  Leisure & Hobbies: golfer       Objective   Vision: Within Functional Limits  Vision Exceptions: Wears glasses at all times  Hearing: Within functional limits    Orientation  Overall Orientation Status: Within Normal Limits     Tub Transfers  Tub - Transfer From: Justyna Mar - Transfer Type: To and From  Tub - Transfer To: Standing  Tub - Technique: Ambulating  Tub Transfers: Supervision  Tub Transfers Comments: use of grabbars  Shower Transfers  Shower - Transfer From: Yanet Rolon - Transfer Type: To and From  Shower - Transfer To: Standing  Shower - Technique: Ambulating  Shower Transfers: Supervision  ADL  Feeding: Independent(per pt. report)  Grooming: Supervision  UE Bathing: Supervision  LE Bathing: Supervision  UE Dressing: Supervision  LE Dressing: Minimal assistance(A to thread LLE and don L sock without AE)  Toileting: Supervision     Functional Activity Tolerance  Functional Activity Tolerance:  Tolerates 30+ min exercise without fatigue  Bed mobility  Supine to Sit: Supervision(with bed flattened and bedrails dropped to simulate home)  Scooting: Supervision                 Sensation  Overall Sensation Status: WFL        LUE AROM (degrees)  LUE AROM : WFL  LUE General AROM: WFL  Left Hand AROM (degrees)  Left Hand AROM: WFL  Left Hand General AROM: limited [facility] d/t childhood tendon injury  RUE AROM (degrees)  RUE AROM : WFL  RUE General AROM: WFL  Right Hand AROM (degrees)  Right Hand AROM: WFL  Right Hand General AROM: WFL        Hand Dominance  Hand Dominance: Right             Plan   Plan  Times per week: 3-6 x /wk  Plan weeks: 5-7 days  Specific instructions for Next Treatment: Education of using AE for LBD and review of hip precautions  Current Treatment Recommendations: Strengthening, Balance Training, Functional Mobility Training, Patient/Caregiver Education & Training, Endurance Training, Equipment Evaluation, Education, & procurement  Plan Comment: Skilled OT intervention services are recommended to address strength, balance, and endurance needed to increase I with ADLs, transfers, and functional mobility. G-Code     OutComes Score                                                  AM-PAC Score             Goals  Short term goals  Time Frame for Short term goals: 2-3 days  Short term goal 1: Pt. will complete LBD with SBA. Short term goal 2: Pt. will increase standing tolerance needed for ADL completion  Short term goal 3: Pt. will increase BUE strength needed for functional transfers. Long term goals  Time Frame for Long term goals : 5-7 days  Long term goal 1: Pt. will complete LBD at Mod. I level. Long term goal 2: Pt. will complete LB bathing at Mod. I level.        Therapy Time   Individual Concurrent Group Co-treatment   Time In  08:45         Time Out  09:35         Minutes  Alan Hermosillo OT

## 2019-06-24 NOTE — PROGRESS NOTES
Physical Therapy  Facility/Department: [facility] MED SURG UNIT  Daily Treatment Note  NAME: Janyth Hammans  :   MRN: [id number]    Date of Service: 2019    Discharge Recommendations:  Continue to assess pending progress, Patient would benefit from continued therapy after discharge   PT Equipment Recommendations  Equipment Needed: No    Assessment   Body structures, Functions, Activity limitations: Decreased functional mobility ; Decreased endurance;Decreased balance; Increased Pain;Decreased strength  Assessment:Pt performed stairs with marked time. Pt performed exercises with mild increase in pain. Pt needs vcs for hand placement with transfers for safety. Prognosis: Good  REQUIRES PT FOLLOW UP: Yes  Activity Tolerance  Activity Tolerance: Patient Tolerated treatment well     Patient Diagnosis(es): There were no encounter diagnoses. has a past medical history of CAD (coronary artery disease), Eczema, Granuloma faciale, History of extremity bypass graft, History of tobacco abuse, Hyperlipidemia, Hypertension, Lipoma, PVD (peripheral vascular disease) ([address] and Seborrheic keratosis. has a past surgical history that includes Hand surgery (); Coronary angioplasty with stent (); Cardiac catheterization (); Colonoscopy (10-19-12); Diagnostic Cardiac Cath Lab Procedure; hip surgery; pr [facility] ebus dx/tx intervention perph les (N/A, 2018); Lung biopsy (Right, 2018); Lung biopsy (Right); and joint replacement.     Restrictions  Restrictions/Precautions  Restrictions/Precautions: Weight Bearing, Fall Risk  Required Braces or Orthoses?: No  Implants present? : Metal implants  Lower Extremity Weight Bearing Restrictions  Left Lower Extremity Weight Bearing: Weight Bearing As Tolerated  Position Activity Restriction  Hip Precautions: Posterior hip precautions  Subjective   General  Chart Reviewed: Yes  Family / Caregiver Present: Yes(S.O)  Subjective  Subjective: Pt lying in bed upon arrival and agreeable to therapy  Pain Screening  Patient Currently in Pain: Yes  Vital Signs  Level of Consciousness: Alert  Patient Currently in Pain: Yes  Oxygen Therapy  O2 Device: None (Room air)          Objective      Transfers  Sit to Stand: Stand by assistance  Stand to sit: Stand by assistance  Bed to Chair: Contact guard assistance  Car Transfer: Stand by assistance  Comment: VC for hand placement w transfers  Ambulation  Ambulation?: Yes  Ambulation 1  Surface: level tile  Device: Rolling Walker  Assistance: Stand by assistance  Quality of Gait: Step-through gait  Distance: 100x1, 80x1  Stairs/Curb  Stairs?: Yes  Stairs  # Steps : 4  Stairs Height: 6\"  Rails: Bilateral  Assistance: Stand by assistance  Comment: steady, good technique. Balance  Posture: Good  Sitting - Static: Good  Sitting - Dynamic: Good  Standing - Static: Good  Standing - Dynamic: Good  Exercises  Hip Flexion: 2x10 LLE only to 90 deg  Hip Abduction: 2x10  Knee Long Arc Quad: 2x10  Ankle Pumps: x10                                   Goals  Short term goals  Time Frame for Short term goals: 2-3 days  Short term goal 1: Supervised bed transfers, OOB left  Short term goal 2: SBA amb > 200' with ww  Short term goal 3: Pt able to state from memory 2/3 hip precautions (presently 1/3)  Short term goal 4: Demonstrate supervised transfers oberserving a;; hip precautions and afety precautions  Long term goals  Time Frame for Long term goals : 3-7 days  Long term goal 1: indep with bed mobility, in and OOB on left  Long term goal 2: indep with transfers following hip precautions  Long term goal 3: pt to ambulate  . 200' with Least restrictive AD or none  Long term goal 4: pt to navigate 4 steps with supervision 1 HR R ascending  Patient Goals   Patient goals : Be able to walk without AD or with least restrictive AD before d/c    Plan    Plan  Times per week: 5-7  Times per day: Twice a day  Plan weeks: 1 week  Current Treatment Recommendations: Strengthening, Transfer Training, Endurance Training, Patient/Caregiver Education & Training, Positioning, ROM, Gait Training, Home Exercise Program, Functional Mobility Training, Stair training  Safety Devices  Type of devices:  All fall risk precautions in place, Bed alarm in place, Call light within reach     Therapy Time   Individual Concurrent Group Co-treatment   Time In  1115         Time Out  1200         Minutes  [address] [facility], [facility] and [place] [address] Number: [id number]

## 2019-06-24 NOTE — PROGRESS NOTES
Physical Therapy  Facility/Department: [facility] MED SURG N229/N229-01  Physical Therapy Discharge      NAME: Kathe Dutta    :  (76 y.o.)  MRN: [id number]    Account: [de-identified]  Gender: male      Patient has been discharged from acute care hospital. DC patient from current PT program.      Electronically signed by Briseyda Estevez PT on 19 at 8:11 AM

## 2019-06-24 NOTE — PROGRESS NOTES
Nutrition Assessment    Type and Reason for Visit: Initial(subacute admit s/p L THR)    Nutrition Recommendations:Continue current diet    Nutrition Assessment: Presents stable from nutrition stand point. No risk for compromise identified. Will follow for LOS. Malnutrition Assessment:  · Malnutrition Status: No malnutrition  · Context: Acute illness or injury  · Findings of the 6 clinical characteristics of malnutrition (Minimum of 2 out of 6 clinical characteristics is required to make the diagnosis of moderate or severe Protein Calorie Malnutrition based on AND/ASPEN Guidelines):  1. Energy Intake-Greater than 75% of estimated energy requirement, Greater than or equal to 5 days    2. Weight Loss-No significant weight loss,    3. Fat Loss-No significant subcutaneous fat loss,    4. Muscle Loss-No significant muscle mass loss,    5. Fluid Accumulation-No significant fluid accumulation,      Nutrition Risk Level: Low    Nutrient Needs:  · Estimated Daily Total Kcal: 4600-7246 @ 25-27 kcal/kg  · Estimated Daily Protein (g):  @ 1.2-1.3 gr/kg  · Estimated Daily Total Fluid (ml/day): 2345 ml/d    Nutrition Diagnosis:   · Problem: No nutrition diagnosis at this time    Objective Information:  · Nutrition-Focused Physical Findings: No edema. Last BM 6/20. · Wound Type: Surgical Wound  · Current Nutrition Therapies:  · Oral Diet Orders: General   · Oral Diet intake: %  · Oral Nutrition Supplement (ONS) Orders:    · Anthropometric Measures:  · Ht: 5' 11\" (180.3 cm)   · Current Body Wt: 183 lb 13.8 oz (83.4 kg)  · Admission Body Wt: 183 lb 13.8 oz (83.4 kg)  · Usual Body Wt: 177 lb 0.5 oz (80.3 kg)(6/18 chart)  · Ideal Body Wt: 172 lb (78 kg), % Ideal Body >100%  · BMI Classification: BMI 25.0 - 29.9 Overweight    Nutrition Interventions:   Continue current diet  Continued Inpatient Monitoring    Nutrition Evaluation:   · Evaluation: Goals set   · Goals: Intake > 75% meals.     · Monitoring: Meal Intake, Weight, Pertinent Labs, Monitor Bowel Function      Electronically signed by Kari Chaudhari RD, LD on 6/24/19 at 1:40 PM

## 2019-06-24 NOTE — PROGRESS NOTES
Hospitalist Progress Note      PCP: Mahin Cao MD    Date of Admission: 6/22/2019    Chief Complaint:     Subjective: pt eating breakfast, looks comfortable     Medications:  Reviewed    Infusion Medications   Scheduled Medications    polyethylene glycol  17 g Oral Daily    magnesium hydroxide  30 mL Oral Once    sennosides-docusate sodium  1 tablet Oral BID    aspirin  81 mg Oral BID    metoprolol succinate  200 mg Oral Daily    lovastatin  10 mg Oral Nightly     PRN Meds: acetaminophen, famotidine, magnesium hydroxide, ondansetron, sodium chloride flush, cyclobenzaprine, oxyCODONE-acetaminophen, oxyCODONE-acetaminophen, traMADol      Intake/Output Summary (Last 24 hours) at 6/24/2019 0830  Last data filed at 6/23/2019 1346  Gross per 24 hour   Intake 480 ml   Output --   Net 480 ml       Exam:    /68   Pulse 73   Temp 98 °F (36.7 °C) (Oral)   Resp 14   Ht 5' 11\" (1.803 m)   Wt 183 lb 14.4 oz (83.4 kg)   SpO2 98%   BMI 25.65 kg/m²     General appearance: No apparent distress, appears stated age and cooperative. HEENT: Pupils equal, round, and reactive to light. Conjunctivae/corneas clear. Neck: Supple, with full range of motion. No jugular venous distention. Trachea midline. Respiratory:  Normal respiratory effort. Clear to auscultation, bilaterally without Rales/Wheezes/Rhonchi. Cardiovascular: Regular rate and rhythm with normal S1/S2 without murmurs, rubs or gallops. Abdomen: Soft, non-tender, non-distended with normal bowel sounds. Musculoskeletal: No clubbing, cyanosis or edema bilaterally. Full range of motion without deformity. Skin: L hip postoperative scar   Neurologic:  Neurovascularly intact without any focal sensory/motor deficits.  Cranial nerves: II-XII intact, grossly non-focal.  Psychiatric: Alert and oriented, thought content appropriate, normal insight  Capillary Refill: Brisk,< 3 seconds   Peripheral Pulses: +2 palpable, equal bilaterally       Labs:   Recent Labs     06/22/19  0556 06/23/19  0450   WBC 10.8 8.2   HGB 12.5* 12.5*   HCT 36.1* 37.1*    163     Recent Labs     06/22/19  0556 06/23/19  0450    143   K 4.3 4.2    105   CO2 24 27   BUN 18 19   CREATININE 0.98 0.94   CALCIUM 8.3* 8.6     No results for input(s): AST, ALT, BILIDIR, BILITOT, ALKPHOS in the last 72 hours. No results for input(s): INR in the last 72 hours. No results for input(s): Dinora Maha in the last 72 hours.     Urinalysis:      Lab Results   Component Value Date    NITRU Negative 06/06/2019    WBCUA 0-2 06/06/2019    BACTERIA Negative 06/06/2019    RBCUA 6-10 06/06/2019    BLOODU TRACE 06/06/2019    SPECGRAV 1.017 06/06/2019    GLUCOSEU Negative 06/06/2019       Radiology:  No orders to display           Assessment/Plan:    Active Hospital Problems    Diagnosis Date Noted    History of left hip replacement [Z96.642] 06/23/2019         DVT Prophylaxis: asa  Diet: DIET GENERAL;  Code Status: Full Code    PT/OT Eval Status: done    Dispo - OA, post L hip replacement- pain controlled, PT on case  HTN- home meds restarted, follow up clinically  HLP- statin  Constipation- resolved  Medically stable for skilled status at [facility]           Electronically signed by Brody Mcnamara MD on 6/24/2019 at 8:30 AM

## 2019-06-25 ENCOUNTER — CARE COORDINATION (OUTPATIENT)
Dept: CASE MANAGEMENT | Age: 74
End: 2019-06-25

## 2019-06-25 PROCEDURE — 97535 SELF CARE MNGMENT TRAINING: CPT

## 2019-06-25 PROCEDURE — 97530 THERAPEUTIC ACTIVITIES: CPT

## 2019-06-25 PROCEDURE — 97116 GAIT TRAINING THERAPY: CPT

## 2019-06-25 PROCEDURE — 1200000002 HC SEMI PRIVATE SWING BED

## 2019-06-25 PROCEDURE — 6370000000 HC RX 637 (ALT 250 FOR IP): Performed by: INTERNAL MEDICINE

## 2019-06-25 PROCEDURE — 97110 THERAPEUTIC EXERCISES: CPT

## 2019-06-25 RX ADMIN — ASPIRIN 81 MG 81 MG: 81 TABLET ORAL at 20:11

## 2019-06-25 RX ADMIN — ASPIRIN 81 MG 81 MG: 81 TABLET ORAL at 07:50

## 2019-06-25 RX ADMIN — POLYETHYLENE GLYCOL 3350 17 G: 17 POWDER, FOR SOLUTION ORAL at 07:50

## 2019-06-25 RX ADMIN — METOPROLOL SUCCINATE 200 MG: 50 TABLET, EXTENDED RELEASE ORAL at 07:49

## 2019-06-25 RX ADMIN — SENNOSIDES AND DOCUSATE SODIUM 1 TABLET: 8.6; 5 TABLET ORAL at 07:51

## 2019-06-25 RX ADMIN — LOVASTATIN 10 MG: 10 TABLET ORAL at 20:11

## 2019-06-25 ASSESSMENT — PAIN SCALES - GENERAL
PAINLEVEL_OUTOF10: 0
PAINLEVEL_OUTOF10: 0

## 2019-06-25 ASSESSMENT — PAIN DESCRIPTION - PROGRESSION
CLINICAL_PROGRESSION: GRADUALLY IMPROVING

## 2019-06-25 NOTE — PROGRESS NOTES
Physical Therapy  Facility/Department: [facility] MED SURG UNIT  Daily Treatment Note  NAME: Umu Amanda  :   MRN: [id number]    Date of Service: 2019    Discharge Recommendations:  Continue to assess pending progress, Patient would benefit from continued therapy after discharge   PT Equipment Recommendations  Equipment Needed: No    Assessment   Body structures, Functions, Activity limitations: Decreased functional mobility ; Decreased endurance;Decreased balance; Increased Pain;Decreased strength  Assessment: Pt performed supine, seated and standing exercises with mild increase in pain. Pt ambulated with steady step thru gait. Donned ice post tx. Prognosis: Good  REQUIRES PT FOLLOW UP: Yes  Activity Tolerance  Activity Tolerance: Patient Tolerated treatment well     Patient Diagnosis(es): There were no encounter diagnoses. has a past medical history of CAD (coronary artery disease), Eczema, Granuloma faciale, History of extremity bypass graft, History of tobacco abuse, Hyperlipidemia, Hypertension, Lipoma, PVD (peripheral vascular disease) ([address] and Seborrheic keratosis. has a past surgical history that includes Hand surgery (); Coronary angioplasty with stent (); Cardiac catheterization (); Colonoscopy (10-19-12); Diagnostic Cardiac Cath Lab Procedure; hip surgery; [facility] ebus dx/tx intervention perph les (N/A, 2018); Lung biopsy (Right, 2018); Lung biopsy (Right); joint replacement; and Total hip arthroplasty (Left, 2019).     Restrictions  Restrictions/Precautions  Restrictions/Precautions: Weight Bearing, Fall Risk  Required Braces or Orthoses?: No  Implants present? : Metal implants  Lower Extremity Weight Bearing Restrictions  Left Lower Extremity Weight Bearing: Weight Bearing As Tolerated  Position Activity Restriction  Hip Precautions: Posterior hip precautions  Subjective   General  Chart Reviewed: Yes  Family / Caregiver Present: d/c    Plan    Plan  Times per week: 5-7  Times per day: Twice a day  Plan weeks: 1 week  Current Treatment Recommendations: Strengthening, Transfer Training, Endurance Training, Patient/Caregiver Education & Training, Positioning, ROM, Gait Training, Home Exercise Program, Functional Mobility Training, Stair training  Safety Devices  Type of devices:  All fall risk precautions in place, Call light within reach     Therapy Time   Individual Concurrent Group Co-treatment   Time In  140         Time Out  [address] [place] [address], [facility]  License and [address] Number: [id number]

## 2019-06-25 NOTE — PROGRESS NOTES
Physical Therapy   Subacute Daily Treatment Note  NAME: Bony Chaudhary  :   MRN: [id number]    Date of Service: 2019    Patient Diagnosis(es):   Patient Active Problem List    Diagnosis Date Noted    History of left hip replacement 2019    Status post total hip replacement, right 2019    Status post total hip replacement, left 2019    Lung nodule     [facility]peripheral artery disease) [facility] 06/15/2018    History of placement of stent in LAD coronary artery 2015    History of extremity bypass graft 2015    History of tobacco abuse 2015    Peripheral arterial disease ([facility] 2015    ED (erectile dysfunction) 10/22/2012    Hypertension     CAD (coronary artery disease)     Lipoma     Eczema     Hyperlipidemia     Seborrheic keratosis     PVD (peripheral vascular disease) ([address]     Granuloma faciale        Past Medical History:   Diagnosis Date    CAD (coronary artery disease)     Eczema     Granuloma faciale     History of extremity bypass graft 2015    Left leg 2003    History of tobacco abuse 2015    Hyperlipidemia     Hypertension     Lipoma     PVD (peripheral vascular disease) ([facility]     Seborrheic keratosis      Past Surgical History:   Procedure Laterality Date    CARDIAC CATHETERIZATION      COLONOSCOPY  10-19-12    CORONARY ANGIOPLASTY WITH STENT PLACEMENT      DIAGNOSTIC CARDIAC CATH LAB PROCEDURE      HAND SURGERY      L    HIP SURGERY      JOINT REPLACEMENT      LUNG BIOPSY Right 2018    CT guided Left Lung Biopsy by Dr Yarelis Bird Right     2018 per Dr Meka De La Paz [address] DX/TX INTERVENTION [address] LES N/A 2018    EBUS WITH TRANSBRONCHIAL NEEDLE ASPIRATION W/ FLUOROSCOPY performed by Chris Eastman MD at [place] [address] Left 2019    LEFT  HIP TOTAL ARTHROPLASTY performed by Betina Kirk MD at [facility] Restrictions  Restrictions/Precautions  Restrictions/Precautions: Weight Bearing, Fall Risk  Required Braces or Orthoses?: No  Implants present? : Metal implants  Lower Extremity Weight Bearing Restrictions  Left Lower Extremity Weight Bearing: Weight Bearing As Tolerated  Position Activity Restriction  Hip Precautions: Posterior hip precautions  Subjective              Orientation     Objective      Transfers  Sit to Stand: Modified independent  Stand to sit: Modified independent  Bed to Chair: Modified independent  Comment: all with ww and following hip prec  Ambulation  Ambulation?: Yes  Ambulation 1  Surface: level tile  Device: Rolling Walker  Assistance: Modified Independent(in room only with ww and hip prec)  Quality of Gait: Step-through gait  Distance: 20ft      Assessment         Pt now cleared to safely amb with ww and hip precautions in room. Ok to [employer] with ww and CS of family also as of 6/25/19. Recommend discharge be moved up to 6/27/19 with OP at [facility].  Phone number gave to pt by PT so that he can schedule OP Initial evaluation for Friday or early next week. Discharge Recommendations:  Continue to assess pending progress, Patient would benefit from continued therapy after discharge    Goals  Short term goals  Time Frame for Short term goals: 2-3 days  Short term goal 1: Supervised bed transfers, OOB left  Short term goal 2: SBA amb > 200' with ww  Short term goal 3: Pt able to state from memory 2/3 hip precautions (presently 1/3)  Short term goal 4: Demonstrate supervised transfers oberserving a;; hip precautions and afety precautions  Long term goals  Time Frame for Long term goals : 3-7 days  Long term goal 1: indep with bed mobility, in and OOB on left  Long term goal 2: indep with transfers following hip precautions  Long term goal 3: pt to ambulate  . 200' with Least restrictive AD or none  Long term goal 4: pt to navigate 4 steps with supervision 1 HR R ascending  Patient Goals   Patient goals : Be able to walk without AD or with least restrictive AD before d/c    Plan    Plan  Times per week: 5-7  Times per day: Twice a day  Plan weeks: 1 week  Current Treatment Recommendations: Strengthening, Transfer Training, Endurance Training, Patient/Caregiver Education & Training, Positioning, ROM, Gait Training, Home Exercise Program, Functional Mobility Training, Stair training  Safety Devices  Type of devices:  All fall risk precautions in place, Bed alarm in place, Call light within reach     Therapy Time   Individual Concurrent Group Co-treatment   Time In           Time Out           Minutes  [address] [id number], [id number]

## 2019-06-25 NOTE — PROGRESS NOTES
Hospitalist Progress Note      PCP: Gayle Alexandre MD    Date of Admission: 6/22/2019    Chief Complaint:     Subjective: pt awake, alert, looks comfortable     Medications:  Reviewed    Infusion Medications   Scheduled Medications    polyethylene glycol  17 g Oral Daily    magnesium hydroxide  30 mL Oral Once    sennosides-docusate sodium  1 tablet Oral BID    aspirin  81 mg Oral BID    metoprolol succinate  200 mg Oral Daily    lovastatin  10 mg Oral Nightly     PRN Meds: acetaminophen, famotidine, magnesium hydroxide, ondansetron, sodium chloride flush, cyclobenzaprine, oxyCODONE-acetaminophen, oxyCODONE-acetaminophen, traMADol      Intake/Output Summary (Last 24 hours) at 6/25/2019 0731  Last data filed at 6/24/2019 1654  Gross per 24 hour   Intake 480 ml   Output --   Net 480 ml       Exam:    /68   Pulse 73   Temp 98 °F (36.7 °C) (Oral)   Resp 14   Ht 5' 11\" (1.803 m)   Wt 183 lb 14.4 oz (83.4 kg)   SpO2 98%   BMI 25.65 kg/m²     General appearance: No apparent distress, appears stated age and cooperative. HEENT: Pupils equal, round, and reactive to light. Conjunctivae/corneas clear. Neck: Supple, with full range of motion. No jugular venous distention. Trachea midline. Respiratory:  Normal respiratory effort. Clear to auscultation, bilaterally without Rales/Wheezes/Rhonchi. Cardiovascular: Regular rate and rhythm with normal S1/S2 without murmurs, rubs or gallops. Abdomen: Soft, non-tender, non-distended with normal bowel sounds. Musculoskeletal: No clubbing, cyanosis or edema bilaterally. Full range of motion without deformity. Skin:L hip postoperative dressing intact  Neurologic:  Neurovascularly intact without any focal sensory/motor deficits.  Cranial nerves: II-XII intact, grossly non-focal.  Psychiatric: Alert and oriented, thought content appropriate, normal insight  Capillary Refill: Brisk,< 3 seconds   Peripheral Pulses: +2 palpable, equal bilaterally       Labs: Recent Labs     06/23/19  0450   WBC 8.2   HGB 12.5*   HCT 37.1*        Recent Labs     06/23/19  0450      K 4.2      CO2 27   BUN 19   CREATININE 0.94   CALCIUM 8.6     No results for input(s): AST, ALT, BILIDIR, BILITOT, ALKPHOS in the last 72 hours. No results for input(s): INR in the last 72 hours. No results for input(s): Donna Carina in the last 72 hours.     Urinalysis:      Lab Results   Component Value Date    NITRU Negative 06/06/2019    WBCUA 0-2 06/06/2019    BACTERIA Negative 06/06/2019    RBCUA 6-10 06/06/2019    BLOODU TRACE 06/06/2019    SPECGRAV 1.017 06/06/2019    GLUCOSEU Negative 06/06/2019       Radiology:  No orders to display           Assessment/Plan:    Active Hospital Problems    Diagnosis Date Noted    History of left hip replacement [Z96.642] 06/23/2019         DVT Prophylaxis: asa bid  Diet: DIET GENERAL;  Code Status: Full Code    PT/OT Eval Status: done    Dispo - OA, post L hip replacement- pain controlled, PT on case  HTN- home meds restarted, follow up clinically  HLP- statin  Constipation- resolved  Medically stable for skilled status at [address] [place] [address]             Electronically signed by Lisa Chan MD on 6/25/2019 at 7:31 AM

## 2019-06-25 NOTE — PROGRESS NOTES
Physical Therapy  Facility/Department: [facility] MED SURG UNIT  Daily Treatment Note  NAME: Natalie Jason  :   MRN: [id number]    Date of Service: 2019    Discharge Recommendations:  Continue to assess pending progress, Patient would benefit from continued therapy after discharge   PT Equipment Recommendations  Equipment Needed: No    Assessment   Body structures, Functions, Activity limitations: Decreased functional mobility ; Decreased endurance;Decreased balance; Increased Pain;Decreased strength  Assessment: pt able to increased ambulation distance without issue, still needs cues to maintain hip precautions during transfers, able to recall 2/3 without cues. Educated pt and S.O at length regarding PT POC after being discharged from hospital, pt states he feels good enough to go home. Prognosis: Good  REQUIRES PT FOLLOW UP: Yes  Activity Tolerance  Activity Tolerance: Patient Tolerated treatment well     Patient Diagnosis(es): There were no encounter diagnoses. has a past medical history of CAD (coronary artery disease), Eczema, Granuloma faciale, History of extremity bypass graft, History of tobacco abuse, Hyperlipidemia, Hypertension, Lipoma, PVD (peripheral vascular disease) ([facility] and Seborrheic keratosis. has a past surgical history that includes Hand surgery (); Coronary angioplasty with stent (); Cardiac catheterization (); Colonoscopy (10-19-12); Diagnostic Cardiac Cath Lab Procedure; hip surgery; pr [facility] ebus dx/tx intervention perph les (N/A, 2018); Lung biopsy (Right, 2018); Lung biopsy (Right); joint replacement; and Total hip arthroplasty (Left, 2019).     Restrictions  Restrictions/Precautions  Restrictions/Precautions: Weight Bearing, Fall Risk  Required Braces or Orthoses?: No  Implants present? : Metal implants  Lower Extremity Weight Bearing Restrictions  Left Lower Extremity Weight Bearing: Weight Bearing As Tolerated  Position Activity Restriction  Hip Precautions: Posterior hip precautions  Subjective   General  Chart Reviewed: Yes  Family / Caregiver Present: Yes(S.O)  Subjective  Subjective: Pt lying in bed upon arrival and agreeable to therapy  Pain Screening  Patient Currently in Pain: Yes  Vital Signs  Level of Consciousness: Alert  Patient Currently in Pain: Yes  Oxygen Therapy  O2 Device: None (Room air)       Orientation     Cognition      Objective      Transfers  Sit to Stand: Stand by assistance  Stand to sit: Stand by assistance    Ambulation  Ambulation?: Yes  Ambulation 1  Surface: level tile  Device: Rolling Walker  Assistance: Stand by assistance  Quality of Gait: Step-through gait  Distance: 150x2  Stairs/Curb  Stairs?: Yes  Stairs  # Steps : 4  Stairs Height: 6\"  Std cane  Assistance: Stand by assistance  Comment: marked time      Balance  Posture: Good  Sitting - Static: Good  Sitting - Dynamic: Good  Standing - Static: Good  Standing - Dynamic: Good  Exercises  Hip Flexion: 2x10 LLE only to 90 deg  Hip Abduction: 2x10  Knee Long Arc Quad: 2x10  Ankle Pumps: x10    Std march 2x10  Std hip flex 2x10  Std hip ab/ad 2x10                                   Goals  Short term goals  Time Frame for Short term goals: 2-3 days  Short term goal 1: Supervised bed transfers, OOB left  Short term goal 2: SBA amb > 200' with ww  Short term goal 3: Pt able to state from memory 2/3 hip precautions (presently 1/3)  Short term goal 4: Demonstrate supervised transfers oberserving a;; hip precautions and afety precautions  Long term goals  Time Frame for Long term goals : 3-7 days  Long term goal 1: indep with bed mobility, in and OOB on left  Long term goal 2: indep with transfers following hip precautions  Long term goal 3: pt to ambulate  . 200' with Least restrictive AD or none  Long term goal 4: pt to navigate 4 steps with supervision 1 HR R ascending  Patient Goals   Patient goals : Be able to walk without AD or with least restrictive AD before d/c    Plan    Plan  Times per week: 5-7  Times per day: Twice a day  Plan weeks: 1 week  Current Treatment Recommendations: Strengthening, Transfer Training, Endurance Training, Patient/Caregiver Education & Training, Positioning, ROM, Gait Training, Home Exercise Program, Functional Mobility Training, Stair training  Safety Devices  Type of devices:  All fall risk precautions in place, Bed alarm in place, Call light within reach     Therapy Time   Individual Concurrent Group Co-treatment   Time In  830         Time Out  930         Minutes  [address], PTA  License and [address] Number: [id number]

## 2019-06-25 NOTE — PROGRESS NOTES
Occupational Therapy  Facility/Department: [facility] MED SURG UNIT  Daily Treatment Note  NAME: Bria Garcia  :   MRN: [id number]    Date of Service: 2019    Discharge Recommendations:          Assessment   Performance deficits / Impairments: Decreased balance;Decreased endurance;Decreased ROM; Decreased ADL status; Decreased functional mobility ; Decreased strength  Assessment: Pt. responded well to therapy session. Pt. completed LBD with AE and SBA. Pt. completed functional mobility and various transfers with FWW and SBA. REQUIRES OT FOLLOW UP: Yes         Patient Diagnosis(es): There were no encounter diagnoses. has a past medical history of CAD (coronary artery disease), Eczema, Granuloma faciale, History of extremity bypass graft, History of tobacco abuse, Hyperlipidemia, Hypertension, Lipoma, PVD (peripheral vascular disease) ([facility] [address] and Seborrheic keratosis. has a past surgical history that includes Hand surgery (); Coronary angioplasty with stent (); Cardiac catheterization (); Colonoscopy (10-19-12); Diagnostic Cardiac Cath Lab Procedure; hip surgery; pr brnschsc [facility] ebus dx/tx intervention perph les (N/A, 2018); Lung biopsy (Right, 2018); Lung biopsy (Right); joint replacement; and Total hip arthroplasty (Left, 2019).     Restrictions  Restrictions/Precautions  Restrictions/Precautions: Weight Bearing, Fall Risk  Required Braces or Orthoses?: No  Implants present? : Metal implants  Lower Extremity Weight Bearing Restrictions  Left Lower Extremity Weight Bearing: Weight Bearing As Tolerated  Position Activity Restriction  Hip Precautions: Posterior hip precautions  Subjective   General  Chart Reviewed: Yes  Patient assessed for rehabilitation services?: Yes  Response to previous treatment: Patient with no complaints from previous session  Family / Caregiver Present: No  Referring Practitioner: John  Diagnosis: LTHR  Subjective  Subjective: Pt. sitting in bed and agreeable to therapy session. Orientation     Objective    ADL  Equipment Provided: Reacher;Sock aid;Long-handled shoe horn  LE Dressing: Stand by assistance;Supervision(verbal cues)        Standing Balance  Time: 8 min  Activity: side stepping while reaching outside FOSTER to place/retreive items from overhead and IADLs (folding)  Comment: CGA/SBA with FWW  Functional Mobility  Functional - Mobility Device: Rolling Walker  Activity: Retrieve items(with reacher)  Assist Level: Stand by assistance     Transfers  Stand Step Transfers: Stand by assistance  Sit to stand: Stand by assistance  Stand to sit: Stand by assistance  Transfer Comments: w/ FWW      Pt. Completed transfers to [facility] and walk in shower with FWW to simulate home setup. Transfers completed with SBA. Plan   Plan  Times per week: 3-6 x /wk  Plan weeks: 5-7 days  Specific instructions for Next Treatment: Education of using AE for LBD and review of hip precautions  Current Treatment Recommendations: Strengthening, Balance Training, Functional Mobility Training, Patient/Caregiver Education & Training, Endurance Training, Equipment Evaluation, Education, & procurement  Plan Comment: Continue with OT per POC to address balance, strength, and endurance needed to increase independence with transfers, ADLs, and functional mobility. G-Code     OutComes Score                                                  AM-PAC Score             Goals  Short term goals  Time Frame for Short term goals: 2-3 days  Short term goal 1: Pt. will complete LBD with SBA. Short term goal 2: Pt. will increase standing tolerance needed for ADL completion  Short term goal 3: Pt. will increase BUE strength needed for functional transfers. Long term goals  Time Frame for Long term goals : 5-7 days  Long term goal 1: Pt. will complete LBD at Mod. I level. Long term goal 2: Pt. will complete LB bathing at Mod.  I level.       Therapy Time   Individual Concurrent Group Co-treatment   Time In  09:30         Time Out  10:30         Minutes  [address], OT

## 2019-06-26 LAB
ANION GAP SERPL CALCULATED.3IONS-SCNC: 10 MEQ/L (ref 9–15)
BUN BLDV-MCNC: 16 MG/DL (ref 8–23)
CALCIUM SERPL-MCNC: 8.8 MG/DL (ref 8.5–9.9)
CHLORIDE BLD-SCNC: 102 MEQ/L (ref 95–107)
CO2: 29 MEQ/L (ref 20–31)
CREAT SERPL-MCNC: 0.88 MG/DL (ref 0.7–1.2)
GFR AFRICAN AMERICAN: >60
GFR NON-AFRICAN AMERICAN: >60
GLUCOSE BLD-MCNC: 111 MG/DL (ref 70–99)
HCT VFR BLD CALC: 38.8 % (ref 42–52)
HEMOGLOBIN: 12.9 G/DL (ref 14–18)
MCH RBC QN AUTO: 29.6 PG (ref 27–31.3)
MCHC RBC AUTO-ENTMCNC: 33.1 % (ref 33–37)
MCV RBC AUTO: 89.4 FL (ref 80–100)
PDW BLD-RTO: 12.9 % (ref 11.5–14.5)
PLATELET # BLD: 223 K/UL (ref 130–400)
POTASSIUM SERPL-SCNC: 4.5 MEQ/L (ref 3.4–4.9)
RBC # BLD: 4.34 M/UL (ref 4.7–6.1)
SODIUM BLD-SCNC: 141 MEQ/L (ref 135–144)
WBC # BLD: 6.8 K/UL (ref 4.8–10.8)

## 2019-06-26 PROCEDURE — 85027 COMPLETE CBC AUTOMATED: CPT

## 2019-06-26 PROCEDURE — 97110 THERAPEUTIC EXERCISES: CPT

## 2019-06-26 PROCEDURE — 97530 THERAPEUTIC ACTIVITIES: CPT

## 2019-06-26 PROCEDURE — 6370000000 HC RX 637 (ALT 250 FOR IP): Performed by: INTERNAL MEDICINE

## 2019-06-26 PROCEDURE — 97116 GAIT TRAINING THERAPY: CPT

## 2019-06-26 PROCEDURE — 80048 BASIC METABOLIC PNL TOTAL CA: CPT

## 2019-06-26 PROCEDURE — 1200000002 HC SEMI PRIVATE SWING BED

## 2019-06-26 PROCEDURE — 36415 COLL VENOUS BLD VENIPUNCTURE: CPT

## 2019-06-26 RX ORDER — ASPIRIN 81 MG/1
81 TABLET, CHEWABLE ORAL 2 TIMES DAILY
Qty: 60 TABLET | Refills: 0 | Status: SHIPPED | OUTPATIENT
Start: 2019-06-26 | End: 2022-09-28

## 2019-06-26 RX ADMIN — ASPIRIN 81 MG 81 MG: 81 TABLET ORAL at 21:01

## 2019-06-26 RX ADMIN — SENNOSIDES AND DOCUSATE SODIUM 1 TABLET: 8.6; 5 TABLET ORAL at 21:01

## 2019-06-26 RX ADMIN — POLYETHYLENE GLYCOL 3350 17 G: 17 POWDER, FOR SOLUTION ORAL at 08:09

## 2019-06-26 RX ADMIN — SENNOSIDES AND DOCUSATE SODIUM 1 TABLET: 8.6; 5 TABLET ORAL at 08:09

## 2019-06-26 RX ADMIN — LOVASTATIN 10 MG: 10 TABLET ORAL at 21:04

## 2019-06-26 RX ADMIN — ASPIRIN 81 MG 81 MG: 81 TABLET ORAL at 08:10

## 2019-06-26 RX ADMIN — METOPROLOL SUCCINATE 200 MG: 50 TABLET, EXTENDED RELEASE ORAL at 08:09

## 2019-06-26 ASSESSMENT — PAIN SCALES - GENERAL
PAINLEVEL_OUTOF10: 0
PAINLEVEL_OUTOF10: 0

## 2019-06-26 NOTE — DISCHARGE INSTR - COC
Continuity of Care Form    Patient Name: Hudson Siemens   :    MRN:  [id number]    Admit date:  2019  Discharge date:  19    Code Status Order: Full Code   Advance Directives:   Advance Care Flowsheet Documentation     Date/Time Healthcare Directive Type of Healthcare Directive Copy in [address] Agent's Name Healthcare Agent's Phone Number    19 [address]  Yes, patient has an advance directive for healthcare treatment  Living will  Yes, copy in chart -- -- --          Admitting Physician:  Jarrod Silva MD  PCP: Leonor Kahn MD    Discharging Nurse: Rigo Calderon [facility] Unit/Room#: 0204/0204-01  Discharging Unit Phone Number: [phone]    Emergency Contact:   Extended Emergency Contact Information  Primary Emergency Contact: Pete Molina   [facility] [address]  Phone: [phone]  Mobile Phone: [phone]  Relation: Child  Secondary Emergency Contact: Nathan [address] Phone: [phone]  Work Phone: [phone]  Mobile Phone: [phone]  Relation: Other    Past Surgical History:  Past Surgical History:   Procedure Laterality Date    CARDIAC CATHETERIZATION      COLONOSCOPY  10-19-12    CORONARY ANGIOPLASTY WITH STENT PLACEMENT      DIAGNOSTIC CARDIAC CATH LAB PROCEDURE      HAND SURGERY      L    HIP SURGERY      JOINT REPLACEMENT      LUNG BIOPSY Right 2018    CT guided Left Lung Biopsy by Dr Aimee Medina Right     2018 per Dr Yousif Alcocer [address] DX/TX INTERVENTION [address] LES N/A 2018    EBUS WITH TRANSBRONCHIAL NEEDLE ASPIRATION W/ FLUOROSCOPY performed by Arnol Paul MD at [facility] Left 2019    LEFT  HIP TOTAL ARTHROPLASTY performed by Gertrudis Welsh MD at [facility]       Immunization History:   Immunization History   Administered Date(s) Administered    Influenza Vaccine, unspecified formulation 10/13/2016    Influenza Virus Vaccine 10/08/2012, 10/14/2013, 09/25/2014, 09/16/2018    Influenza Whole 10/24/2015    Influenza, High Dose (Fluzone 65 yrs and older) 09/18/2017, 09/16/2018    Pneumococcal Conjugate 13-valent ([id number]) 10/31/2016    Pneumococcal Polysaccharide ([id number]) 10/22/2012    Tdap (Boostrix, Adacel) 01/01/2017    Zoster Live (Zostavax) 10/22/2010    Zoster Recombinant (Shingrix) 05/31/2018       Active Problems:  Patient Active Problem List   Diagnosis Code    Hypertension I10    CAD (coronary artery disease) I25.10    Lipoma D17.9    Eczema L30.9    Hyperlipidemia E78.5    Seborrheic keratosis L82.1    PVD (peripheral vascular disease) ([facility]) I73.9    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    ED (erectile dysfunction) N52.9    Peripheral arterial disease ([facility]) I73.9    History of placement of stent in LAD coronary artery Z95.5    History of extremity bypass graft Z95.828    History of tobacco abuse Z87.891    PAD (peripheral artery disease) ([facility]) I73.9    Lung nodule R91.1    Status post total hip replacement, right Z96.641    Status post total hip replacement, left I73.287    History of left hip replacement Z96.642       Isolation/Infection:   Isolation          No Isolation            Nurse Assessment:  Last Vital Signs: /69   Pulse 72   Temp 98 °F (36.7 °C) (Oral)   Resp 18   Ht 5' 11\" (1.803 m)   Wt 183 lb 14.4 oz (83.4 kg)   SpO2 98%   BMI 25.65 kg/m²     Last documented pain score (0-10 scale): Pain Level: 0  Last Weight:   Wt Readings from Last 1 Encounters:   06/22/19 183 lb 14.4 oz (83.4 kg)     Mental Status:  oriented, alert, coherent, logical, thought processes intact and able to concentrate and follow conversation    IV Access:  - None    Nursing Mobility/ADLs:  Walking   Independent with FWW  Transfer  Independent  Bathing  Independent  Dressing  Independent  Toileting  Independent  Feeding  Independent  Med Admin  Assisted  Med Delivery whole    Wound Care Documentation and Therapy:        Elimination:  Continence:   · Bowel: Yes  · Bladder: Yes  Urinary Catheter: None   Colostomy/Ileostomy/Ileal Conduit: No       Date of Last BM: 6/26/19    Intake/Output Summary (Last 24 hours) at 6/26/2019 0803  Last data filed at 6/25/2019 1415  Gross per 24 hour   Intake 480 ml   Output --   Net 480 ml     I/O last 3 completed shifts: In: 480 [P.O.:480]  Out: -     Safety Concerns: At Risk for Falls    Impairments/Disabilities:      None    Nutrition Therapy:  Current Nutrition Therapy:   - Oral Diet:  General    Routes of Feeding: Oral  Liquids: No Restrictions  Daily Fluid Restriction: no  Last Modified Barium Swallow with Video (Video Swallowing Test): not done    Treatments at the Time of Hospital Discharge:   Respiratory Treatments: none  Oxygen Therapy:  is not on home oxygen therapy.   Ventilator:    - No ventilator support    Rehab Therapies: Physical Therapy and Occupational Therapy  Weight Bearing Status/Restrictions: No weight bearing restirctions  Other Medical Equipment (for information only, NOT a DME order):  walker  Other Treatments: none    Patient's personal belongings (please select all that are sent with patient):  Anabel    RN SIGNATURE:  Electronically signed by Leah Ronquillo RN on 6/27/19 at 7:58 AM    CASE MANAGEMENT/SOCIAL WORK SECTION    Inpatient Status Date:6/22/2019    Readmission Risk Assessment Score:  Readmission Risk              Risk of Unplanned Readmission:        9           Discharging to Facility/ Agency   · Patient plans to go to [facility] for outpatient therapy  · Address: [facility]  · Phone: [phone]    / signature: Electronically signed by TRISTAN Johnston on 6/26/2019 at 2:21 PM      PHYSICIAN SECTION    Prognosis: Good    Condition at Discharge: Stable    Rehab Potential (if transferring to Rehab): Good    Recommended Labs or Other Treatments After Discharge:     Physician Certification: I certify the above information and transfer of Vale Montemayor  is necessary for the continuing treatment of the diagnosis listed and that he requires 1  Drive for greater 30 days.      Update Admission H&P: No change in H&P    PHYSICIAN SIGNATURE:  Electronically signed by Aidan Matthew MD on 6/26/19 at 8:03 AM

## 2019-06-26 NOTE — DISCHARGE SUMMARY
Discharge Summary    Patient:  Shravan Judd  YOB: 1945    MRN: [id number]   Acct: [de-identified]    Primary Care Physician: Ernie Bhatia MD    Admit date:  6/22/2019    Discharge date:   06/26/19      Discharge Diagnoses:   <principal problem not specified>  Active Problems:    History of left hip replacement  Resolved Problems:    * No resolved hospital problems.  *      Admitted for: [facility] Course: patient was admitted after LTHA, underwent short time postacute rehabilitation and after completing his stay will be DC home with [facility]    Consultants:      Discharge Medications:       Medication List      START taking these medications    aspirin 81 MG chewable tablet  Take 1 tablet by mouth 2 times daily  Replaces:  aspirin 81 MG tablet        CONTINUE taking these medications    lovastatin 10 MG tablet  Commonly known as:  MEVACOR  TAKE 1 TABLET BY MOUTH  NIGHTLY     metoprolol succinate 200 MG extended release tablet  Commonly known as:  TOPROL XL  Take 1 tablet by mouth daily        STOP taking these medications    aspirin 81 MG tablet  Replaced by:  aspirin 81 MG chewable tablet           Where to Get Your Medications      These medications were sent to [address] [place] [address] [address] [facility] [address] [place] [address]    Phone:  [phone]   · aspirin 81 MG chewable tablet         Physical Exam:    Vitals:  Vitals:    06/24/19 1335 06/25/19 0606 06/25/19 0749 06/26/19 0601   BP:  137/73 137/73 137/69   Pulse:  72 72 72   Resp:  18  18   Temp:  98 °F (36.7 °C)  98 °F (36.7 °C)   TempSrc:  Oral  Oral   SpO2:  100%  98%   Weight:       Height: 5' 11\" (1.803 m)        Weight: Weight: 183 lb 14.4 oz (83.4 kg)     24 hour intake/output:    Intake/Output Summary (Last 24 hours) at 6/26/2019 0804  Last data filed at 6/25/2019 1415  Gross per 24 hour   Intake 480 ml   Output --   Net 480 ml       General appearance - alert, well appearing, and in no distress  Chest - clear to auscultation, no wheezes, rales or rhonchi, symmetric air entry  Heart - normal rate, regular rhythm, normal S1, S2, no murmurs, rubs, clicks or gallops  Abdomen - soft, nontender, nondistended, no masses or organomegaly  Obese: No; Protuberant: No   Neurological - alert, oriented, normal speech, no focal findings or movement disorder noted  Extremities - peripheral pulses normal, no pedal edema, no clubbing or cyanosis  Skin - normal coloration and turgor, no rashes, no suspicious skin lesions noted      Radiology reports as per the Radiologist  Radiology: No results found.      Results for orders placed or performed during the hospital encounter of 06/22/19   CBC   Result Value Ref Range    WBC 8.2 4.8 - 10.8 K/uL    RBC 4.18 (L) 4.70 - 6.10 M/uL    Hemoglobin 12.5 (L) 14.0 - 18.0 g/dL    Hematocrit 37.1 (L) 42.0 - 52.0 %    MCV 88.7 80.0 - 100.0 fL    MCH 29.8 27.0 - 31.3 pg    MCHC 33.6 33.0 - 37.0 %    RDW 13.0 11.5 - 14.5 %    Platelets 476 809 - 129 K/uL   Basic Metabolic Panel   Result Value Ref Range    Sodium 143 135 - 144 mEq/L    Potassium 4.2 3.4 - 4.9 mEq/L    Chloride 105 95 - 107 mEq/L    CO2 27 20 - 31 mEq/L    Anion Gap 11 9 - 15 mEq/L    Glucose 110 (H) 70 - 99 mg/dL    BUN 19 8 - 23 mg/dL    CREATININE 0.94 0.70 - 1.20 mg/dL    GFR Non-African American >60.0 >60    GFR  >60.0 >60    Calcium 8.6 8.5 - 9.9 mg/dL   CBC   Result Value Ref Range    WBC 6.8 4.8 - 10.8 K/uL    RBC 4.34 (L) 4.70 - 6.10 M/uL    Hemoglobin 12.9 (L) 14.0 - 18.0 g/dL    Hematocrit 38.8 (L) 42.0 - 52.0 %    MCV 89.4 80.0 - 100.0 fL    MCH 29.6 27.0 - 31.3 pg    MCHC 33.1 33.0 - 37.0 %    RDW 12.9 11.5 - 14.5 %    Platelets 170 022 - 743 K/uL   Basic Metabolic Panel   Result Value Ref Range    Sodium 141 135 - 144 mEq/L    Potassium 4.5 3.4 - 4.9 mEq/L    Chloride 102 95 - 107 mEq/L    CO2 29 20 - 31 mEq/L    Anion Gap 10 9 - 15 mEq/L    Glucose 111 (H) 70 - 99 mg/dL    BUN 16 8 - 23 mg/dL    CREATININE 0.88 0.70 - 1.20 mg/dL    GFR Non-African American >60.0 >60    GFR  >60.0 >60    Calcium 8.8 8.5 - 9.9 mg/dL       Diet:  DIET GENERAL;     Activity:  Activity as tolerated (Patient may move about with assist as indicated or with supervision.)    Follow-up:  in 1 weeks with Mayra Le MD,       Disposition: home    Condition: Stable    Time Spent: 50 minutes    Electronically signed by Tatiana Ferrell MD on 6/26/2019 at 8:04 AM    Discharging Hospitalist

## 2019-06-26 NOTE — PROGRESS NOTES
Occupational Therapy  Facility/Department: [facility] MED SURG UNIT  Daily Treatment Note  NAME: Rozina George  :   MRN: [id number]    Date of Service: 2019    Discharge Recommendations:   home with wife,HHC then to out pt. therapy       Assessment      REQUIRES OT FOLLOW UP: Yes     Pt. Is going home tomorrow  Pt. Is making good gains towards OT goals    Patient Diagnosis(es): There were no encounter diagnoses. has a past medical history of CAD (coronary artery disease), Eczema, Granuloma faciale, History of extremity bypass graft, History of tobacco abuse, Hyperlipidemia, Hypertension, Lipoma, PVD (peripheral vascular disease) ([facility] [address] and Seborrheic keratosis. has a past surgical history that includes Hand surgery (); Coronary angioplasty with stent (); Cardiac catheterization (); Colonoscopy (10-19-12); Diagnostic Cardiac Cath Lab Procedure; hip surgery; pr brnschsc [facility] ebus dx/tx intervention perph les (N/A, 2018); Lung biopsy (Right, 2018); Lung biopsy (Right); joint replacement; and Total hip arthroplasty (Left, 2019). Restrictions  Restrictions/Precautions  Restrictions/Precautions: Weight Bearing, Fall Risk  Required Braces or Orthoses?: No  Implants present? : Metal implants  Lower Extremity Weight Bearing Restrictions  Left Lower Extremity Weight Bearing: Weight Bearing As Tolerated  Position Activity Restriction  Hip Precautions: Posterior hip precautions  Subjective   General  Chart Reviewed: Yes  Patient assessed for rehabilitation services?: Yes  Response to previous treatment: Patient with no complaints from previous session  Family / Caregiver Present: No  Referring Practitioner: John  Diagnosis: LTHR  Subjective  Subjective: Pt. Is agreeable to OT this day      Orientation     Objective                       sit to stand-supervision  Stand to sit-supervision  Pt.  Tolerated standing with ww for 6 minutes while ambulating for kitchen mobility  Gross motor UB activity with BUE in standing position reaching in different planes to increase tolerance and increase GM skills,coordination, and ROM for ADLS-supervision  Pt. Tolerated standing for 6 minutes for UB activity      Fine motor UB activity with BUE in sitting position to increase FM skills for ADLS-supervision/Mod I                                   Type of ROM/Therapeutic Exercise  Type of ROM/Therapeutic Exercise: Cane/Wand(2 lb. T-bar in all planes and directions)  Exercises  Shoulder Flexion: 30  Shoulder Extension: 30  Shoulder ABduction: 30  Shoulder ADduction: 30  Horizontal ABduction: 30  Horizontal ADduction: 30  Elbow Flexion: 30  Elbow Extension: 30  Wrist Flexion: 30  Wrist Extension: 30   To increase and maintain UB strength                     Plan   Plan  Times per week: 3-6 x /wk  Plan weeks: 5-7 days  Specific instructions for Next Treatment: Education of using AE for LBD and review of hip precautions  Current Treatment Recommendations: Strengthening, Balance Training, Functional Mobility Training, Patient/Caregiver Education & Training, Endurance Training, Equipment Evaluation, Education, & procurement  Plan Comment: Continue with OT per POC to address balance, strength, and endurance needed to increase independence with transfers, ADLs, and functional mobility. G-Code     OutComes Score                                                  AM-PAC Score             Goals  Short term goals  Time Frame for Short term goals: 2-3 days  Short term goal 1: Pt. will complete LBD with SBA. Short term goal 2: Pt. will increase standing tolerance needed for ADL completion  Short term goal 3: Pt. will increase BUE strength needed for functional transfers. Long term goals  Time Frame for Long term goals : 5-7 days  Long term goal 1: Pt. will complete LBD at Mod. I level. Long term goal 2: Pt. will complete LB bathing at Mod. I level.        Therapy Time   Individual Concurrent Group Co-treatment   Time In

## 2019-06-26 NOTE — PROGRESS NOTES
Physical Therapy  Facility/Department: [facility] MED SURG UNIT  Daily Treatment Note  NAME: Julianna Lesches  : [birth year]  MRN: [id number]    Date of Service: 2019    Discharge Recommendations:  Continue to assess pending progress, Patient would benefit from continued therapy after discharge   PT Equipment Recommendations  Equipment Needed: No    Assessment   Body structures, Functions, Activity limitations: Decreased functional mobility ; Decreased endurance;Decreased balance; Increased Pain;Decreased strength  Assessment: Pt. performed standing therex this afternoon with YAAN Williamson for now but able to maintain good posure. Seated RB required between ea exercise but denies pain. Provided CP post to L hip to decrease inflammation and assist with muscle recovery. Treatment Diagnosis: Decreased safe mobility for s/p THR  Prognosis: Good  REQUIRES PT FOLLOW UP: Yes  Activity Tolerance  Activity Tolerance: Patient Tolerated treatment well     Patient Diagnosis(es): There were no encounter diagnoses. has a past medical history of CAD (coronary artery disease), Eczema, Granuloma faciale, History of extremity bypass graft, History of tobacco abuse, Hyperlipidemia, Hypertension, Lipoma, PVD (peripheral vascular disease) ([address] and Seborrheic keratosis. has a past surgical history that includes Hand surgery (); Coronary angioplasty with stent (); Cardiac catheterization (); Colonoscopy (10-19-12); Diagnostic Cardiac Cath Lab Procedure; hip surgery; pr sergo [facility] ebus dx/tx intervention [facility] les (N/A, 2018); Lung biopsy (Right, 2018); Lung biopsy (Right); joint replacement; and Total hip arthroplasty (Left, 2019).     Restrictions  Restrictions/Precautions  Restrictions/Precautions: Weight Bearing, Fall Risk  Required Braces or Orthoses?: No  Implants present? : Metal implants  Lower Extremity Weight Bearing Restrictions  Left Lower Extremity Weight Bearing: Weight Bearing As Tolerated  Position Activity Restriction  Hip Precautions: Posterior hip precautions  Subjective   General  Chart Reviewed: Yes  Additional Pertinent Hx: stent, R THR 4 years ago  Family / Caregiver Present: Yes  Subjective  Subjective: Pt. denies pain at rest.       Pre Treatment Pain Screening  Pain at present: 0    Orientation     Cognition      Objective      Transfers  Sit to Stand: Modified independent  Stand to sit: Modified independent  Ambulation  Ambulation?: Yes  Ambulation 1  Surface: level tile  Device: Rolling Walker  Assistance: Supervision  Quality of Gait: steady pace, step through gait VC for forward gaze   Distance: 240'  Stairs/Curb  Stairs?: Yes       Balance  Posture: Good  Sitting - Static: Good  Sitting - Dynamic: Good  Standing - Static: Good  Standing - Dynamic: Good  Exercises    Hamstring Sets: standing x10 hold 3 sec ea. alternating          Hip Flexion: standing x10 hold 3 sec alternating   Hip Extension/Leg Presses: standing x10 hold 3 sec alternating   Hip Abduction: standing x10 hold 3 sec alternating     Ankle Pumps: x10 heel raises standing   Other exercises  Other exercises 2: standing march x10 hold 3 sec alternating   Other exercises 4: minisquat x10 VC and demo required for proper form             Cryotherapy (Minutes\Location): CP Post to L hip to decrease inflammation and assist with muscle recovery.             G-Code     OutComes Score                                                     AM-PAC Score             Goals  Short term goals  Time Frame for Short term goals: 2-3 days  Short term goal 1: Supervised bed transfers, OOB left(GOAL MET)  Short term goal 2: SBA amb > 200' with ww(GOAL MET)  Short term goal 3: Pt able to state from memory 2/3 hip precautions (presently 1/3)(GOAL MET)  Short term goal 4: Demonstrate supervised transfers oberserving a;; hip precautions and afety precautions(GOAL MET)  Long term goals  Time Frame for Long term goals : 3-7 days  Long term goal 1: indep with bed mobility, in and OOB on left(GOAL MET)  Long term goal 2: indep with transfers following hip precautions(GOAL MET)  Long term goal 3: pt to ambulate  . 200' with Least restrictive AD or none(GOAL MET)  Long term goal 4: pt to navigate 4 steps with supervision 1 HR R ascending  Patient Goals   Patient goals : Be able to walk without AD or with least restrictive AD before d/c    Plan    Plan  Times per week: 5-7  Times per day: Twice a day  Plan weeks: 1 week  Current Treatment Recommendations: Strengthening, Transfer Training, Endurance Training, Patient/Caregiver Education & Training, Positioning, ROM, Gait Training, Home Exercise Program, Functional Mobility Training, Stair training  Safety Devices  Type of devices:  All fall risk precautions in place, Call light within reach     Therapy Time   Individual Concurrent Group Co-treatment   Time In  100         Time Out  3 Connie Gtz PTA  License and [address] Number: [id number]

## 2019-06-26 NOTE — PROGRESS NOTES
Tolerated  Position Activity Restriction  Hip Precautions: Posterior hip precautions  Subjective   General  Chart Reviewed: Yes  Additional Pertinent Hx: stent, R THR 4 years ago  Subjective  Subjective: Pt. denies pain at onset of session and is agreeableto PT. Pre Treatment Pain Screening  Pain at present: 0    Orientation     Cognition      Objective      Transfers  Sit to Stand: Modified independent  Stand to sit: Modified independent  Ambulation  Ambulation?: Yes  Ambulation 1  Surface: level tile  Device: Rolling Walker  Assistance: Supervision  Quality of Gait: steady pace, step through gait VC for forward gaze   Distance: 200', 150'  Stairs/Curb  Stairs?: Yes  Stairs  # Steps : 4  Stairs Height: 6\"  Rails: Right ascending  Assistance: Stand by assistance  Comment: steady, good technique. Balance  Posture: Good  Sitting - Static: Good  Sitting - Dynamic: Good  Standing - Static: Good  Standing - Dynamic: Good  Exercises  Straight Leg Raise: x10 with QS   Quad Sets: x20 hold 3 sec   Heelslides: x20  Gluteal Sets: x20 hold 3 sec   Hip Abduction: 2 x10 supine   Knee Short Arc Quad: 2 x10 hold 3 sec   Ankle Pumps: x20            Cryotherapy (Minutes\Location): CP Post to L hip to decrease inflammation and assist with muscle recovery.             G-Code     OutComes Score                                                     AM-PAC Score             Goals  Short term goals  Time Frame for Short term goals: 2-3 days  Short term goal 1: Supervised bed transfers, OOB left(GOAL MET)  Short term goal 2: SBA amb > 200' with ww(GOAL MET)  Short term goal 3: Pt able to state from memory 2/3 hip precautions (presently 1/3)(GOAL MET)  Short term goal 4: Demonstrate supervised transfers oberserving a;; hip precautions and afety precautions(GOAL MET)  Long term goals  Time Frame for Long term goals : 3-7 days  Long term goal 1: indep with bed mobility, in and OOB on left(GOAL MET)  Long term goal 2: indep with transfers following hip precautions(GOAL MET)  Long term goal 3: pt to ambulate  . 200' with Least restrictive AD or none(GOAL MET)  Long term goal 4: pt to navigate 4 steps with supervision 1 HR R ascending  Patient Goals   Patient goals : Be able to walk without AD or with least restrictive AD before d/c    Plan    Plan  Times per week: 5-7  Times per day: Twice a day  Plan weeks: 1 week  Current Treatment Recommendations: Strengthening, Transfer Training, Endurance Training, Patient/Caregiver Education & Training, Positioning, ROM, Gait Training, Home Exercise Program, Functional Mobility Training, Stair training  Safety Devices  Type of devices:  All fall risk precautions in place, Bed alarm in place, Call light within reach     Therapy Time   Individual Concurrent Group Co-treatment   Time In  1000         Time Out  [address]  License and [address] [address] Number: [id number]

## 2019-06-26 NOTE — PROGRESS NOTES
Restrictions  Restrictions/Precautions  Restrictions/Precautions: Weight Bearing, Fall Risk  Required Braces or Orthoses?: No  Implants present? : Metal implants  Lower Extremity Weight Bearing Restrictions  Left Lower Extremity Weight Bearing: Weight Bearing As Tolerated  Position Activity Restriction  Hip Precautions: Posterior hip precautions  Subjective       Objective    Pt and spouse present for Interdisciplinary Care Conference this date. See separate note for full report. Sit to stand modified independent    Hip precautions, reiterated need to follow 90deg hip flex and other post hip prec until cleared by MD for all transfers. amb ww 60 ft with sup                                  Assessment              Discharge Recommendations:  Continue to assess pending progress, Patient would benefit from continued therapy after discharge    Goals  Short term goals  Time Frame for Short term goals: 2-3 days  Short term goal 1: Supervised bed transfers, OOB left(GOAL MET)  Short term goal 2: SBA amb > 200' with ww(GOAL MET)  Short term goal 3: Pt able to state from memory 2/3 hip precautions (presently 1/3)(GOAL MET)  Short term goal 4: Demonstrate supervised transfers oberserving a;; hip precautions and afety precautions(GOAL MET)  Long term goals  Time Frame for Long term goals : 3-7 days  Long term goal 1: indep with bed mobility, in and OOB on left(GOAL MET)  Long term goal 2: indep with transfers following hip precautions(GOAL MET)  Long term goal 3: pt to ambulate  . 200' with Least restrictive AD or none(GOAL MET)  Long term goal 4: pt to navigate 4 steps with supervision 1 HR R ascending  Patient Goals   Patient goals : Be able to walk without AD or with least restrictive AD before d/c    Plan    Plan  Times per week: 5-7  Times per day: Twice a day  Plan weeks: 1 week  Current Treatment Recommendations: Strengthening, Transfer Training, Endurance Training, Patient/Caregiver Education & Training, Positioning, ROM, Gait Training, Home Exercise Program, Functional Mobility Training, Stair training  Safety Devices  Type of devices:  All fall risk precautions in place, Call light within reach     Therapy Time   Individual Concurrent Group Co-treatment   Time In  200         Time Out  220         Minutes  Jasson [id number], [id number]

## 2019-06-27 VITALS
BODY MASS INDEX: 25.75 KG/M2 | SYSTOLIC BLOOD PRESSURE: 143 MMHG | HEART RATE: 65 BPM | WEIGHT: 183.9 LBS | DIASTOLIC BLOOD PRESSURE: 69 MMHG | TEMPERATURE: 98.1 F | OXYGEN SATURATION: 100 % | HEIGHT: 71 IN | RESPIRATION RATE: 18 BRPM

## 2019-06-27 PROCEDURE — 6370000000 HC RX 637 (ALT 250 FOR IP): Performed by: INTERNAL MEDICINE

## 2019-06-27 PROCEDURE — 97116 GAIT TRAINING THERAPY: CPT

## 2019-06-27 PROCEDURE — 97535 SELF CARE MNGMENT TRAINING: CPT

## 2019-06-27 PROCEDURE — 97530 THERAPEUTIC ACTIVITIES: CPT

## 2019-06-27 PROCEDURE — 97110 THERAPEUTIC EXERCISES: CPT

## 2019-06-27 RX ADMIN — SENNOSIDES AND DOCUSATE SODIUM 1 TABLET: 8.6; 5 TABLET ORAL at 08:10

## 2019-06-27 RX ADMIN — ASPIRIN 81 MG 81 MG: 81 TABLET ORAL at 08:10

## 2019-06-27 RX ADMIN — POLYETHYLENE GLYCOL 3350 17 G: 17 POWDER, FOR SOLUTION ORAL at 08:10

## 2019-06-27 RX ADMIN — METOPROLOL SUCCINATE 200 MG: 50 TABLET, EXTENDED RELEASE ORAL at 08:10

## 2019-06-27 ASSESSMENT — PAIN SCALES - GENERAL
PAINLEVEL_OUTOF10: 0

## 2019-06-27 NOTE — PROGRESS NOTES
Occupational Therapy  Facility/Department: [facility] MED SURG UNIT  Daily Treatment Note  NAME: Robinson Reese  :   MRN: [id number]    Date of Service: 2019    Discharge Recommendations:      home with wife  Out pt. rehab    Assessment          OT follow up:yes  Pt. Is making good gains towards ADL goals  Pt. Is high function and safe  Pt. Will go home and continue therapy in out pt. Therapy for hip      Patient Diagnosis(es): There were no encounter diagnoses. has a past medical history of CAD (coronary artery disease), Eczema, Granuloma faciale, History of extremity bypass graft, History of tobacco abuse, Hyperlipidemia, Hypertension, Lipoma, PVD (peripheral vascular disease) ([facility] [address] and Seborrheic keratosis. has a past surgical history that includes Hand surgery (); Coronary angioplasty with stent (); Cardiac catheterization (); Colonoscopy (10-19-12); Diagnostic Cardiac Cath Lab Procedure; [facility], [facility]); Lung biopsy (Right, 2018); Lung biopsy (Right); joint replacement; and Total hip arthroplasty (Left, 2019). Restrictions  Restrictions/Precautions  Restrictions/Precautions: Weight Bearing, Fall Risk  Required Braces or Orthoses?: No  Implants present? : Metal implants  Lower Extremity Weight Bearing Restrictions  Left Lower Extremity Weight Bearing: Weight Bearing As Tolerated  Position Activity Restriction  Hip Precautions: Posterior hip precautions  Subjective   General  Chart Reviewed: Yes  Patient assessed for rehabilitation services?: Yes  Response to previous treatment: Patient with no complaints from previous session  Family / Caregiver Present: No  Referring Practitioner: John  Diagnosis: LTHR  Subjective  Subjective: Pt.  Is agreeable to a shower with OT      Orientation     Objective    ADL  Grooming: Modified independent   UE Bathing: Modified independent   LE Bathing: Supervision  UE Dressing: Modified independent donning shirt  LE Dressing: Supervision(with AE, wife will help pt. at home) donning underwear,pants, and socks Max A for gen hawkins  Additional Comments: (shower transfer with chair-supervision with 1 vc for safety)        Standing Balance  Time: 8 min(with ww)         sit to stand-supervision  Stand to sit-supervision  Trained and educated pt. In safety techniques and good body mechanics                                                              Plan   Plan  Times per week: 3-6 x /wk  Plan weeks: 5-7 days  Specific instructions for Next Treatment: Education of using AE for LBD and review of hip precautions  Current Treatment Recommendations: Strengthening, Balance Training, Functional Mobility Training, Patient/Caregiver Education & Training, Endurance Training, Equipment Evaluation, Education, & procurement  Plan Comment: Continue with OT per POC to address balance, strength, and endurance needed to increase independence with transfers, ADLs, and functional mobility. G-Code     OutComes Score                                                  AM-PAC Score             Goals  Short term goals  Time Frame for Short term goals: 2-3 days  Short term goal 1: Pt. will complete LBD with SBA. Short term goal 2: Pt. will increase standing tolerance needed for ADL completion  Short term goal 3: Pt. will increase BUE strength needed for functional transfers. Long term goals  Time Frame for Long term goals : 5-7 days  Long term goal 1: Pt. will complete LBD at Mod. I level. Long term goal 2: Pt. will complete LB bathing at Mod. I level.        Therapy Time   Individual Concurrent Group Co-treatment   Time In  8:45am         Time Out  9:30am         Minutes  Gerda [address] Number: [id number]

## 2019-06-27 NOTE — PROGRESS NOTES
Hospitalist Progress Note      PCP: Milo Lara MD    Date of Admission: 6/22/2019    Chief Complaint: none today    Subjective: pt awake, alert    Medications:  Reviewed    Infusion Medications   Scheduled Medications    polyethylene glycol  17 g Oral Daily    magnesium hydroxide  30 mL Oral Once    sennosides-docusate sodium  1 tablet Oral BID    aspirin  81 mg Oral BID    metoprolol succinate  200 mg Oral Daily    lovastatin  10 mg Oral Nightly     PRN Meds: acetaminophen, famotidine, magnesium hydroxide, ondansetron, sodium chloride flush, cyclobenzaprine, oxyCODONE-acetaminophen, oxyCODONE-acetaminophen, traMADol      Intake/Output Summary (Last 24 hours) at 6/27/2019 0790  Last data filed at 6/27/2019 0200  Gross per 24 hour   Intake 520 ml   Output --   Net 520 ml       Exam:    BP (!) 143/69   Pulse 65   Temp 98.1 °F (36.7 °C) (Oral)   Resp 18   Ht 5' 11\" (1.803 m)   Wt 183 lb 14.4 oz (83.4 kg)   SpO2 100%   BMI 25.65 kg/m²     General appearance: No apparent distress, appears stated age and cooperative. HEENT: Pupils equal, round, and reactive to light. Conjunctivae/corneas clear. Neck: Supple, with full range of motion. No jugular venous distention. Trachea midline. Respiratory:  Normal respiratory effort. Clear to auscultation, bilaterally without Rales/Wheezes/Rhonchi. Cardiovascular: Regular rate and rhythm with normal S1/S2 without murmurs, rubs or gallops. Abdomen: Soft, non-tender, non-distended with normal bowel sounds. Musculoskeletal: No clubbing, cyanosis or edema bilaterally. Full range of motion without deformity. Skin: Skin color, texture, turgor normal.  No rashes or lesions. Neurologic:  Neurovascularly intact without any focal sensory/motor deficits.  Cranial nerves: II-XII intact, grossly non-focal.  Psychiatric: Alert and oriented, thought content appropriate, normal insight  Capillary Refill: Brisk,< 3 seconds   Peripheral Pulses: +2 palpable, equal bilaterally       Labs:   Recent Labs     06/26/19  0539   WBC 6.8   HGB 12.9*   HCT 38.8*        Recent Labs     06/26/19  0539      K 4.5      CO2 29   BUN 16   CREATININE 0.88   CALCIUM 8.8     No results for input(s): AST, ALT, BILIDIR, BILITOT, ALKPHOS in the last 72 hours. No results for input(s): INR in the last 72 hours. No results for input(s): Josiane Ka in the last 72 hours.     Urinalysis:      Lab Results   Component Value Date    NITRU Negative 06/06/2019    WBCUA 0-2 06/06/2019    BACTERIA Negative 06/06/2019    RBCUA 6-10 06/06/2019    BLOODU TRACE 06/06/2019    SPECGRAV 1.017 06/06/2019    GLUCOSEU Negative 06/06/2019       Radiology:  No orders to display           Assessment/Plan:    Active Hospital Problems    Diagnosis Date Noted    History of left hip replacement [Z96.642] 06/23/2019         DVT Prophylaxis:   Diet: DIET GENERAL;  Code Status: Full Code    PT/OT Eval Status:     Dispo - OA, post L hip replacement- pain controlled, PT on case  HTN- home meds restarted, follow up clinically  HLP- statin  Constipation- resolved  Medically stable for skilled status at [address] [place] [address] home with Gigi Jon today          Electronically signed by Disha Eric MD on 6/27/2019 at 7:25 AM

## 2019-06-27 NOTE — PROGRESS NOTES
Discharge instructions and medications reviewed with pt and fiance. All questions answered. They are packing pt belongings and PT will assist them with car transfer. Will continue to monitor.

## 2019-06-27 NOTE — PROGRESS NOTES
Physical Therapy  Facility/Department: [facility] MED SURG UNIT  Daily Treatment Note  NAME: Ricky Trent  :   MRN: [id number]    Date of Service: 2019    Discharge Recommendations:  Continue to assess pending progress, Patient would benefit from continued therapy after discharge   PT Equipment Recommendations  Equipment Needed: No    Assessment   Body structures, Functions, Activity limitations: Decreased functional mobility ; Decreased endurance;Decreased balance; Increased Pain;Decreased strength  Assessment: Issued and reviewed HEP, demonstrated good tech. Pt performed car transfer and educated pt and spouse on proper safety techniques. Prognosis: Good  REQUIRES PT FOLLOW UP: Yes  Activity Tolerance  Activity Tolerance: Patient Tolerated treatment well     Patient Diagnosis(es): There were no encounter diagnoses. has a past medical history of CAD (coronary artery disease), Eczema, Granuloma faciale, History of extremity bypass graft, History of tobacco abuse, Hyperlipidemia, Hypertension, Lipoma, PVD (peripheral vascular disease) ([address] and Seborrheic keratosis. has a past surgical history that includes Hand surgery (); Coronary angioplasty with stent (); Cardiac catheterization (); Colonoscopy (10-19-12); Diagnostic Cardiac Cath Lab Procedure; hip surgery; [facility] perph les (N/A, 2018); Lung biopsy (Right, 2018); Lung biopsy (Right); joint replacement; and Total hip arthroplasty (Left, 2019).     Restrictions  Restrictions/Precautions  Restrictions/Precautions: Weight Bearing, Fall Risk  Required Braces or Orthoses?: No  Implants present? : Metal implants  Lower Extremity Weight Bearing Restrictions  Left Lower Extremity Weight Bearing: Weight Bearing As Tolerated  Position Activity Restriction  Hip Precautions: Posterior hip precautions  Subjective   General  Chart Reviewed: Yes  Family / Caregiver Present: Yes(S.O)  Subjective  Subjective: Pt lying in bed upon arrival and agreeable to therapy  Pain Screening  Patient Currently in Pain: No  Vital Signs  BP Location: Right upper arm  Level of Consciousness: Alert  Patient Currently in Pain: No  Oxygen Therapy  O2 Device: None (Room air)       Objective      Transfers  Sit to Stand: Independent  Stand to sit: Independent  Car Transfer: Stand by assistance  Comment: VC for hand placement w transfers       Balance  Posture: Good  Sitting - Static: Good  Sitting - Dynamic: Good  Standing - Static: Good  Standing - Dynamic: Good     Exercises  Issued and reviewed HEP    Other exercises 1: std hip flex x10  Other exercises 2: std march x10  Other exercises 3: std hip ab/ad x10  Other exercises 4: hamcurl 10x   Mini squats 10x                                    Goals  Short term goals  Time Frame for Short term goals: 2-3 days  Short term goal 1: Supervised bed transfers, OOB left(GOAL MET)  Short term goal 2: SBA amb > 200' with ww(GOAL MET)  Short term goal 3: Pt able to state from memory 2/3 hip precautions (presently 1/3)(GOAL MET)  Short term goal 4: Demonstrate supervised transfers oberserving a;; hip precautions and afety precautions(GOAL MET)  Long term goals  Time Frame for Long term goals : 3-7 days  Long term goal 1: indep with bed mobility, in and OOB on left(GOAL MET)  Long term goal 2: indep with transfers following hip precautions(GOAL MET)  Long term goal 3: pt to ambulate  . 200' with Least restrictive AD or none(GOAL MET)  Long term goal 4: pt to navigate 4 steps with supervision 1 HR R ascending(GOAL MET)  Patient Goals   Patient goals : Be able to walk without AD or with least restrictive AD before d/c    Plan    Plan  Times per week: 5-7  Times per day: Twice a day  Plan weeks: 1 week  Current Treatment Recommendations: Strengthening, Transfer Training, Endurance Training, Patient/Caregiver Education & Training, Positioning, ROM, Gait Training, Home

## 2019-06-28 ENCOUNTER — HOSPITAL ENCOUNTER (OUTPATIENT)
Dept: PHYSICAL THERAPY | Age: 74
Setting detail: THERAPIES SERIES
Discharge: HOME | End: 2019-06-28
Payer: MEDICARE

## 2019-06-28 ENCOUNTER — CARE COORDINATION (OUTPATIENT)
Dept: CASE MANAGEMENT | Age: 74
End: 2019-06-28

## 2019-06-28 DIAGNOSIS — Z96.642 STATUS POST TOTAL HIP REPLACEMENT, LEFT: Primary | ICD-10-CM

## 2019-06-28 PROCEDURE — 97110 THERAPEUTIC EXERCISES: CPT

## 2019-06-28 PROCEDURE — 97162 PT EVAL MOD COMPLEX 30 MIN: CPT

## 2019-06-28 ASSESSMENT — PAIN - FUNCTIONAL ASSESSMENT: PAIN_FUNCTIONAL_ASSESSMENT: PREVENTS OR INTERFERES WITH MANY ACTIVE NOT PASSIVE ACTIVITIES

## 2019-06-28 ASSESSMENT — PAIN DESCRIPTION - DESCRIPTORS: DESCRIPTORS: ACHING

## 2019-06-28 ASSESSMENT — PAIN DESCRIPTION - PAIN TYPE: TYPE: SURGICAL PAIN

## 2019-06-28 ASSESSMENT — PAIN DESCRIPTION - LOCATION: LOCATION: HIP

## 2019-06-28 ASSESSMENT — PAIN DESCRIPTION - FREQUENCY: FREQUENCY: INTERMITTENT

## 2019-06-28 ASSESSMENT — PAIN DESCRIPTION - ORIENTATION: ORIENTATION: LEFT

## 2019-06-28 ASSESSMENT — PAIN DESCRIPTION - PROGRESSION: CLINICAL_PROGRESSION: GRADUALLY IMPROVING

## 2019-06-28 NOTE — CARE COORDINATION
[facility] Transitions Initial Follow Up Call    Call within 2 business days of discharge: Yes    Patient: Hudson Siemens Patient :    MRN: [id number]  Reason for Admission: -2019 [facility] IP s/p Left TAWNY. -2019 [facility]. Discharge Date: 19 RARS: Readmission Risk Score: 9  CM: 1  PHP Plan: MSSP  PCP: Sachin Norris MD    Last Discharge [facility]       Complaint Diagnosis Description Type Department Provider    19   Admission (Discharged) Yokasta Sher MD           Spoke with: Lori Molina's spouse. Reports doing well in home setting. Ambulating w/ walker. Spouse nervus about uncovering incision site today before shower. Advised on good hand hygiene w/ soap & water before/after all wound care, gentle dressing removal, and incision site concerns to monitor for/report to surgeon, v/u. Advised to lightly cover if clothing irritates incision during therapy or sleeping and keep open to air at home, v/u. Observes bruising and advised may migrate, no swelling. Denies any home, [facility], or DME needs. Tolerates diet/fluid intake. Normal elimination patterns. [facility] Therapy 2019 2:20. TCM 2019 3:15, POst-op Dr Engle Novi 19 8:45. No transportation needs. Med rec & 1111F completed. Pt is rarely using any pain medication. States he does not need to premedicate for therapy. START taking:  aspirin  This replaces a similar medication. See the full medication  list for instructions. STOP taking:  aspirin 81 MG tablet  Replaced by a similar medication. Non-face-to-face services provided:  Obtained and reviewed discharge summary and/or continuity of care documents  Education of patient/family/caregiver/guardian to support self-management-Reviewed s/s post-op infection/complication to report to surgeon. Reviewed wound care and hand hygiene. CTN courtesy call. CTN s/o. Enc to call for any new concerns.     Care Transitions 24 Hour Call    Do you have any ongoing symptoms?:  Yes  Patient-reported symptoms:  Pain (Comment: Minimal post-op pain concerns. Incision site bruising.)  Do you have a copy of your discharge instructions?:  Yes  Do you have all of your prescriptions and are they filled?:  Yes  Have you been contacted by a [address]  No  Have you scheduled your follow up appointment?:  Yes  How are you going to get to your appointment?:  Car - family or friend to transport (Comment: Wife to drive to [facility].)  Were you discharged with any Home Care or Post Acute Services:  Yes  Post Acute Services:   Outpatient/Community Services (Comment: [facility]  Patient DME:  Oralia Coad you have support at home?:  Partner/Spouse/SO  Do you feel like you have everything you need to keep you well at home?:  Yes  Care Transitions Interventions         Follow Up  Future Appointments   Date Time Provider Jeremy Umana   6/28/2019  2:20 PM [facility], [address]   7/5/2019  3:15 PM Jenna Mcbride MD [facility]   8/5/2019  9:00 AM Clint Chavez MD [facility]   10/21/2019  1:15 PM Tahmina Camacho MD [address]

## 2019-06-28 NOTE — PROGRESS NOTES
[address]  PHYSICAL THERAPY EVALUATION    Date: 2019  Patient Name: Hebert Casillas       MRN: [id number]   Account: [de-identified]   : 1945  (71 y.o.)   Gender: male   Referring Practitioner: Derrick Mancilla                  Diagnosis: Localized OA of Hip, L THR   Treatment Diagnosis: decreased L hip arom, decreased L LE strength, decreased functional activity tolerance, impaired functional mobility, decreased balance, and L hip pain   Additional Pertinent Hx: R THR         Past Medical History:  has a past medical history of CAD (coronary artery disease), Eczema, Granuloma faciale, History of extremity bypass graft, History of tobacco abuse, Hyperlipidemia, Hypertension, Lipoma, PVD (peripheral vascular disease) ([address] and Seborrheic keratosis. Past Surgical History:   has a past surgical history that includes Hand surgery (); Coronary angioplasty with stent (); Cardiac catheterization (); Colonoscopy (10-19-12); Diagnostic Cardiac Cath Lab Procedure; hip surgery; pr brnschsc tndsc ebus dx/tx intervention perph les (N/A, 2018); Lung biopsy (Right, 2018); Lung biopsy (Right); joint replacement; and Total hip arthroplasty (Left, 2019). Vital Signs  Patient Currently in Pain: Denies   Pain Screening  Patient Currently in Pain: Denies  Pain Assessment  Pain Assessment: 0-10  Pain Level: (Pain ranges from 0-2/10 )  Pain Type: Surgical pain  Pain Location: Hip  Pain Orientation: Left  Pain Descriptors: Aching  Pain Frequency: Intermittent  Clinical Progression: Gradually improving  Functional Pain Assessment: Prevents or interferes with many active not passive activities  Non-Pharmaceutical Pain Intervention(s): Cold applied; Rest     Lives With: Significant other  Type of Home: House  Home Layout: One level  Home Access: Stairs to enter with rails  Entrance Stairs - Number of Steps: 4  Entrance Stairs - Rails: Right  Home Equipment: Rolling walker;Quad cane  ADL Assistance: Independent  Homemaking Assistance: Independent  Homemaking Responsibilities: Yes  Ambulation Assistance: Independent  Transfer Assistance: Independent  Active : Yes(not cleared to drive currently )  Occupation: Retired  Leisure & Hobbies: golf, hiking, walking  IADL Comments: current functional level 75%      Subjective:  Subjective: Pt presents s/p L THR 6/21/19, went to [facility] rehab until being D/C'd yesterday. Pt hasnt had to take any pain medications since surgery. Has HEP from hospital for seated and supine ther ex. Primarily amb with FWW currently.  Ysabel reports pt was provided hip precautions from rehab however not from MD.   Comments: RTD 7/8/19    Objective:   Bed Mobility  Bridging: Independent    Ambulation 1  Surface: carpet  Device: Rolling Walker  Assistance: Modified Independent  Quality of Gait: narrow FOSTER B toe in L>R, decreased B foot clearnace, HS, walks with FWW away from body   Distance: within dept clincal distances   Stairs  # Steps : 4  Stairs Height: 6\"  Rails: Bilateral  Device: No Device  Assistance: Stand by assistance  Comment: non-reciprocal     Transfers  Sit to Stand: Modified independent  Stand to sit: Modified independent  Bed to Chair: Modified independent  Comment: requires UE assist and icnreased effort     Strength RLE  Strength RLE: Exception  R Hip Flexion: 5/5  R Hip Extension: 3/5  R Hip ABduction: NT(NT d/t inability to layin in L S/L d/t recent surgery)  R Hip Internal Rotation: 5/5  R Hip External Rotation: 4+/5  R Knee Flexion: 5/5  R Knee Extension: 5/5  R Ankle Dorsiflexion: 5/5  Strength LLE  Strength LLE: Exception  L Hip Flexion: 4/5  L Hip Extension: 3/5  L Hip ABduction: 3/5  L Hip Internal Rotation: 4-/5  L Hip External Rotation: 4-/5  L Knee Flexion: 4+/5  L Knee Extension: 4/5  L Ankle Dorsiflexion: 4+/5     AROM RLE (degrees)  RLE General AROM: Hip flex 90, abd 46, ext 5  PROM LLE (degrees)  LLE General PROM: balance, and p/o pain . These impairments currently limit his functional abilities to ambulate, stand, perform transfers, bed mobility, stairs, ADL's, or recreational activities at [facility]. Prognosis: Good  Discharge Recommendations: Continue to assess pending progress  Activity Tolerance: Patient Tolerated treatment well     Decision Making: Medium Complexity  History: high  Exam: High; LEFS 47/80  Clinical Presentation: med       Plan  Frequency/Duration:  Plan  Times per week: 2  Plan weeks: 5  Current Treatment Recommendations: Strengthening, ROM, Gait Training, Stair training, Balance Training, Manual Therapy - Soft Tissue Mobilization, Neuromuscular Re-education, Functional Mobility Training, Transfer Training, Patient/Caregiver Education & Training, Equipment Evaluation, Education, & procurement, Modalities, Positioning, Home Exercise Program, Aquatics, Safety Education & Training    Patient Education  New Education Provided: Pt eductaed on POC, evaluative findings, and provided written handout of HEP    POST-PAIN     Pain Rating (0-10 pain scale): 0/10  Location and pain description same as pre-treatment unless indicated. Action: [x] NA  [] Call Physician  [] Perform HEP  [] Meds as prescribed    Evaluation and patient rights have been reviewed and patient agrees with plan of care. Yes  [x]  No  []   Explain:     Field Fall Risk Assessment  Risk Factor Scale  Score   History of Falls [] Yes  [x] No 25  0    Secondary Diagnosis [] Yes  [x] No 15  0    Ambulatory Aid [] Furniture  [x] Crutches/cane/walker  [] None/bedrest/wheelchair/nurse 30  15  0 15   IV/Heparin Lock [] Yes  [x] No 20  0    Gait/Transferring [] Impaired  [] Weak  [x] Normal/bedrest/immobile 20  10  0 10   Mental Status [] Forgets limitations  [x] Oriented to own ability 15  0       Total: 25     Based on the Assessment score: check the appropriate box.   []  No intervention needed   Low =   Score of 0-24  [x]  Use standard prevention interventions Moderate =  Score of 24-44   [x] Discuss fall prevention strategies   [x] Indicate moderate falls risk on eval  []  Use high risk prevention interventions High = Score of 45 and higher   [] Discuss fall prevention strategies   [] Provide supervision during treatment time    Goals  Long term goals  Long term goal 1: The pt will demo improved L LE strength >/= 4+/5 in order to perform 4-6\" stairs with 1 HR reciprocal indep   Long term goal 2: The pt will demo improved L hip flex, abd, ext AROM >/=5-10* in order to increase ease with ADL's  Long term goal 3: The patient will ambulate unlimited distances all surfaces independently without AD in order to safely ambulate in the community at [address] term goal 4: The pt will have a increase in LEFS score >/=10 points in order to increase functional activity tolerance  Long term goal 5:  The pt will be indep/compliant with HEP in order to self manage symptoms upon D/C    PT Individual Minutes  Time In: 1405  Time Out: 1440  Minutes: 35  Timed Code Treatment Minutes: 8 Minutes  Procedure Minutes: 27  Electronically signed by Christen Hill PT on 6/28/19 at 2:51 PM

## 2019-06-28 NOTE — PROGRESS NOTES
[address]     Ph: [phone]  Fax: [phone]    [x] Certification  [] Recertification [x]  Plan of Care  [] Progress Note [] Discharge      To:  Referring Practitioner: Den Landry       From:  Terell Schafer, PT, DPT  Patient: Boubacar Love     : 1945  Diagnosis: Localized OA of Hip, L THR      Date: 2019  Treatment Diagnosis: decreased L hip arom, decreased L LE strength, decreased functional activity tolerance, impaired functional mobility, decreased balance, and L hip pain     Plan of Care/Certification Expiration Date: 19  Progress Report Period from:  2019  to 2019    Total # of Visits to Date: 1   No Show: 0    Canceled Appointment: 0     OBJECTIVE:   Long Term Goals -    Goals Current/ Discharge status Met   Long term goal 1: The pt will demo improved L LE strength >/= 4+/5 in order to perform 4-6\" stairs with 1 HR reciprocal indep  Strength LLE  Strength LLE: Exception  L Hip Flexion: 4/5  L Hip Extension: 3/5  L Hip ABduction: 3/5  L Hip Internal Rotation: 4-/5  L Hip External Rotation: 4-/5  L Knee Flexion: 4+/5  L Knee Extension: 4/5  L Ankle Dorsiflexion: 4+/5  Stairs  # Steps : 4  Stairs Height: 6\"  Rails: Bilateral  Device: No Device  Assistance: Stand by assistance  Comment: non-reciprocal  [] yes  [x] no   Long term goal 2: The pt will demo improved L hip flex, abd, ext AROM >/=5-10* in order to increase ease with ADL's AROM LLE (degrees)  LLE General AROM: Hip flex 70, abd 35, ext neutral ; IR/ER NT until further clarification of hip precautions  [] yes  [x] no   Long term goal 3: The patient will ambulate unlimited distances all surfaces independently without AD in order to safely ambulate in the community at PLOF Ambulation 1  Surface: carpet  Device: Rolling Walker  Assistance: Modified Independent  Quality of Gait: narrow FOSTER B toe in L>R, decreased B

## 2019-07-02 ENCOUNTER — HOSPITAL ENCOUNTER (OUTPATIENT)
Dept: PHYSICAL THERAPY | Age: 74
Setting detail: THERAPIES SERIES
Discharge: HOME | End: 2019-07-02
Payer: MEDICARE

## 2019-07-02 PROCEDURE — 97110 THERAPEUTIC EXERCISES: CPT

## 2019-07-02 ASSESSMENT — PAIN DESCRIPTION - PROGRESSION: CLINICAL_PROGRESSION: GRADUALLY IMPROVING

## 2019-07-02 ASSESSMENT — PAIN DESCRIPTION - PAIN TYPE: TYPE: SURGICAL PAIN

## 2019-07-02 ASSESSMENT — PAIN DESCRIPTION - ORIENTATION: ORIENTATION: LEFT

## 2019-07-02 ASSESSMENT — PAIN SCALES - GENERAL: PAINLEVEL_OUTOF10: 1

## 2019-07-02 ASSESSMENT — PAIN DESCRIPTION - LOCATION: LOCATION: HIP

## 2019-07-02 ASSESSMENT — PAIN DESCRIPTION - DESCRIPTORS: DESCRIPTORS: SORE

## 2019-07-02 NOTE — PROGRESS NOTES
[address] [address]  Outpatient Physical Therapy    Treatment Note        Date: 2019  Patient: Josselyn Gifford  : [birth year]  ACCT #: [de-identified]  Referring Practitioner: Johnny Austin   Diagnosis: Localized OA of Hip, L THR     Visit Information:  PT Visit Information  PT Insurance Information: Medicare  Total # of Visits to Date: 2  Plan of Care/Certification Expiration Date: 19  No Show: 0  Canceled Appointment: 0  Progress Note Counter: 2/10 (PN due date 19)       Comments: RTD 19  HEP Compliance:  [x] Good [] Fair [] Poor [] Reports not doing due to:    Vital Signs  Patient Currently in Pain: Yes   Pain Screening  Patient Currently in Pain: Yes  Pain Assessment  Pain Level: 1  Pain Type: Surgical pain  Pain Location: Hip  Pain Orientation: Left  Pain Descriptors: Sore  Clinical Progression: Gradually improving    OBJECTIVE:   Exercises  Exercise 1: Nu-step, L-2, 5 min  Exercise 2: SLR 2 x 10   Exercise 3: bridges 3\"x10   Exercise 4: mod alaina stretch 20 sec x 3, left, w/assist  Exercise 5: Hip abd, x 15, RTB, seated w/ LE's ext  Exercise 7: rockerboard x 10, 3-way, lg  Exercise 8: standing abd x 15, circles x 10, b/l  Exercise 11: hip hikes x 10, b/l 2 in step  Exercise 12: LAQ's 5 sec x 10, left    Strength: [x] NT  [] MMT completed:      ROM: [x] NT  [] ROM measurements:   *Indicates exercise, modality, or manual techniques to be initiated when appropriate    Assessment:         Body structures, Functions, Activity limitations: Decreased functional mobility , Decreased ADL status, Decreased ROM, Decreased strength, Decreased balance, Increased Pain  Assessment: multiple exercises added this date, slight compensation on lg rocker bd,   Treatment Diagnosis: decreased L hip arom, decreased L LE strength, decreased functional activity tolerance, impaired functional mobility, decreased balance, and L hip pain   Prognosis: Good      Goals:       Long term goals  Long term goal 1: The pt

## 2019-07-05 ENCOUNTER — OFFICE VISIT (OUTPATIENT)
Dept: FAMILY MEDICINE CLINIC | Age: 74
End: 2019-07-05
Payer: MEDICARE

## 2019-07-05 VITALS
RESPIRATION RATE: 10 BRPM | WEIGHT: 177 LBS | HEIGHT: 71 IN | TEMPERATURE: 97.5 F | SYSTOLIC BLOOD PRESSURE: 124 MMHG | OXYGEN SATURATION: 98 % | DIASTOLIC BLOOD PRESSURE: 84 MMHG | BODY MASS INDEX: 24.78 KG/M2 | HEART RATE: 78 BPM

## 2019-07-05 DIAGNOSIS — I10 ESSENTIAL HYPERTENSION: ICD-10-CM

## 2019-07-05 DIAGNOSIS — Z09 HOSPITAL DISCHARGE FOLLOW-UP: Primary | ICD-10-CM

## 2019-07-05 DIAGNOSIS — D64.9 ANEMIA, UNSPECIFIED TYPE: ICD-10-CM

## 2019-07-05 DIAGNOSIS — Z98.890 STATUS POST HIP SURGERY: ICD-10-CM

## 2019-07-05 PROCEDURE — 1111F DSCHRG MED/CURRENT MED MERGE: CPT | Performed by: FAMILY MEDICINE

## 2019-07-05 PROCEDURE — 99495 TRANSJ CARE MGMT MOD F2F 14D: CPT | Performed by: FAMILY MEDICINE

## 2019-07-05 RX ORDER — OXYCODONE HYDROCHLORIDE AND ACETAMINOPHEN 5; 325 MG/1; MG/1
TABLET ORAL
Refills: 0 | COMMUNITY
Start: 2019-06-20 | End: 2019-08-05 | Stop reason: ALTCHOICE

## 2019-07-05 NOTE — PROGRESS NOTES
Lifestyle    Physical activity:     Days per week: None     Minutes per session: None    Stress: None   Relationships    Social connections:     Talks on phone: None     Gets together: None     Attends Faith service: None     Active member of club or organization: None     Attends meetings of clubs or organizations: None     Relationship status: None    Intimate partner violence:     Fear of current or ex partner: None     Emotionally abused: None     Physically abused: None     Forced sexual activity: None   Other Topics Concern    None   Social History Narrative    None     Current Outpatient Medications   Medication Sig Dispense Refill    aspirin 81 MG chewable tablet Take 1 tablet by mouth 2 times daily 60 tablet 0    metoprolol succinate (TOPROL XL) 200 MG extended release tablet Take 1 tablet by mouth daily 90 tablet 3    lovastatin (MEVACOR) 10 MG tablet TAKE 1 TABLET BY MOUTH  NIGHTLY 90 tablet 1    oxyCODONE-acetaminophen (PERCOCET) 5-325 MG per tablet TAKE 1 TABLET EVERY 6 HOURS  0     No current facility-administered medications for this visit.       Family History   Problem Relation Age of Onset    High Blood Pressure Mother     Other Mother         BRAIN TUMOR    Cancer Father         LUNG     Past Medical History:   Diagnosis Date    CAD (coronary artery disease)     Eczema     Granuloma faciale     History of extremity bypass graft 11/16/2015    Left leg 11/14/2003    History of tobacco abuse 11/16/2015    Hyperlipidemia     Hypertension     Lipoma     [facility]     Seborrheic keratosis      stable     Physical Exam:    Pulmonary/Chest: clear to auscultation bilaterally- no wheezes, rales or rhonchi, normal air movement, no respiratory distress  Cardiovascular: normal rate, regular rhythm, normal S1 and S2, no murmurs, rubs, clicks, or gallops, distal pulses intact, no carotid bruits  Extremities: no cyanosis, clubbing or

## 2019-07-09 ENCOUNTER — HOSPITAL ENCOUNTER (OUTPATIENT)
Dept: PHYSICAL THERAPY | Age: 74
Setting detail: THERAPIES SERIES
Discharge: HOME | End: 2019-07-09
Payer: MEDICARE

## 2019-07-09 PROCEDURE — 97110 THERAPEUTIC EXERCISES: CPT

## 2019-07-09 PROCEDURE — 97116 GAIT TRAINING THERAPY: CPT

## 2019-07-11 ENCOUNTER — HOSPITAL ENCOUNTER (OUTPATIENT)
Dept: PHYSICAL THERAPY | Age: 74
Setting detail: THERAPIES SERIES
Discharge: HOME | End: 2019-07-11
Payer: MEDICARE

## 2019-07-11 PROCEDURE — 97110 THERAPEUTIC EXERCISES: CPT

## 2019-07-11 PROCEDURE — 97116 GAIT TRAINING THERAPY: CPT

## 2019-07-11 NOTE — PROGRESS NOTES
Minutes  Time In: 1120  Time Out: 2222  Minutes: 38  Timed Code Treatment Minutes: 38 Minutes  Procedure Minutes:0    Signature:  Electronically signed by Justo Pugh PTA on 7/11/19 at 11:54 AM

## 2019-07-16 ENCOUNTER — HOSPITAL ENCOUNTER (OUTPATIENT)
Dept: PHYSICAL THERAPY | Age: 74
Setting detail: THERAPIES SERIES
Discharge: HOME | End: 2019-07-16
Payer: MEDICARE

## 2019-07-16 PROCEDURE — 97116 GAIT TRAINING THERAPY: CPT

## 2019-07-16 PROCEDURE — 97110 THERAPEUTIC EXERCISES: CPT

## 2019-07-18 ENCOUNTER — HOSPITAL ENCOUNTER (OUTPATIENT)
Dept: PHYSICAL THERAPY | Age: 74
Setting detail: THERAPIES SERIES
Discharge: HOME | End: 2019-07-18
Payer: MEDICARE

## 2019-07-18 PROCEDURE — 97110 THERAPEUTIC EXERCISES: CPT

## 2019-07-18 NOTE — PROGRESS NOTES
[address] [address]  Outpatient Physical Therapy    Treatment Note        Date: 2019  Patient: Mariela White  :   ACCT #: [de-identified]  Referring Practitioner: Lissette Plaza   Diagnosis: Localized OA of Hip, L THR     Visit Information:  PT Visit Information  PT Insurance Information: Medicare  Total # of Visits to Date: 6  Plan of Care/Certification Expiration Date: 19  No Show: 0  Canceled Appointment: 0  Progress Note Counter: 6/10 (PN due date 19)    Subjective: no pain today, I'm using the cane now, my knee was sore post LV from total gym ex  Comments: RTD 19  HEP Compliance:  [x] Good [] Fair [] Poor [] Reports not doing due to:    Vital Signs  Patient Currently in Pain: Denies   Pain Screening  Patient Currently in Pain: Denies    OBJECTIVE:   Exercises  Exercise 1: Nu Step- L-4, 5 min  Exercise 2: SLR x 20  Exercise 3: p-ball bridges x 15  Exercise 4: mod alaina stretch 20 sec x 3, left  Exercise 5: hip series x 10, left  Exercise 6: step ups F/L 8\" x15  Exercise 9: BOSU lunges, F/L x 15  Exercise 11: hip hikes x 15, b/l   Exercise 14: SLS, 20 sec x 3, b/l, 2 finger hold req. Exercise 15: TM, 3 min, 0.9 MPH, no support  Exercise 16: hip ER/IR x 10, YTB, seated  Exercise 17: mini squats x 10     Strength: [] NT  [x] MMT completed:     Strength LLE  Comment: SLR 4+/5      ROM: [x] NT  [] ROM measurements:     *Indicates exercise, modality, or manual techniques to be initiated when appropriate    Assessment:         Body structures, Functions, Activity limitations: Decreased functional mobility , Decreased ADL status, Decreased ROM, Decreased strength, Decreased balance, Increased Pain  Assessment: good sequencing with st cane, improved technique with hip hikes, added p-ball to bridges and circles to S/L hip abd to improve strength, anf tread mill to improve gait quality, good branden to session, no pain just mm fatigue  Treatment Diagnosis: decreased L hip arom, decreased L

## 2019-07-22 RX ORDER — LOVASTATIN 10 MG/1
10 TABLET ORAL NIGHTLY
Qty: 90 TABLET | Refills: 3 | Status: SHIPPED | OUTPATIENT
Start: 2019-07-22 | End: 2020-05-14

## 2019-07-23 ENCOUNTER — HOSPITAL ENCOUNTER (OUTPATIENT)
Dept: PHYSICAL THERAPY | Age: 74
Setting detail: THERAPIES SERIES
Discharge: HOME | End: 2019-07-23
Payer: MEDICARE

## 2019-07-23 PROCEDURE — 97116 GAIT TRAINING THERAPY: CPT

## 2019-07-23 PROCEDURE — 97110 THERAPEUTIC EXERCISES: CPT

## 2019-07-25 ENCOUNTER — HOSPITAL ENCOUNTER (OUTPATIENT)
Dept: PHYSICAL THERAPY | Age: 74
Setting detail: THERAPIES SERIES
Discharge: HOME | End: 2019-07-25
Payer: MEDICARE

## 2019-07-25 PROCEDURE — 97110 THERAPEUTIC EXERCISES: CPT

## 2019-07-30 ENCOUNTER — HOSPITAL ENCOUNTER (OUTPATIENT)
Dept: PHYSICAL THERAPY | Age: 74
Setting detail: THERAPIES SERIES
Discharge: HOME | End: 2019-07-30
Payer: MEDICARE

## 2019-08-01 ENCOUNTER — HOSPITAL ENCOUNTER (OUTPATIENT)
Dept: PHYSICAL THERAPY | Age: 74
Setting detail: THERAPIES SERIES
Discharge: HOME | End: 2019-08-01
Payer: MEDICARE

## 2019-08-01 PROCEDURE — 97110 THERAPEUTIC EXERCISES: CPT

## 2019-08-01 ASSESSMENT — PAIN SCALES - GENERAL: PAINLEVEL_OUTOF10: 0

## 2019-08-05 ENCOUNTER — OFFICE VISIT (OUTPATIENT)
Dept: UROLOGY | Age: 74
End: 2019-08-05
Payer: MEDICARE

## 2019-08-05 VITALS
WEIGHT: 180 LBS | HEIGHT: 71 IN | DIASTOLIC BLOOD PRESSURE: 86 MMHG | BODY MASS INDEX: 25.2 KG/M2 | SYSTOLIC BLOOD PRESSURE: 134 MMHG | HEART RATE: 63 BPM

## 2019-08-05 DIAGNOSIS — R97.20 ELEVATED PSA: Primary | ICD-10-CM

## 2019-08-05 LAB
BILIRUBIN, POC: ABNORMAL
BLOOD URINE, POC: ABNORMAL
CLARITY, POC: CLEAR
COLOR, POC: YELLOW
GLUCOSE URINE, POC: ABNORMAL
KETONES, POC: ABNORMAL
LEUKOCYTE EST, POC: ABNORMAL
NITRITE, POC: ABNORMAL
PH, POC: 6
PROTEIN, POC: ABNORMAL
SPECIFIC GRAVITY, POC: 1.01
UROBILINOGEN, POC: 0.2

## 2019-08-05 PROCEDURE — 99203 OFFICE O/P NEW LOW 30 MIN: CPT | Performed by: UROLOGY

## 2019-08-05 PROCEDURE — 1123F ACP DISCUSS/DSCN MKR DOCD: CPT | Performed by: UROLOGY

## 2019-08-05 PROCEDURE — G8419 CALC BMI OUT NRM PARAM NOF/U: HCPCS | Performed by: UROLOGY

## 2019-08-05 PROCEDURE — 4040F PNEUMOC VAC/ADMIN/RCVD: CPT | Performed by: UROLOGY

## 2019-08-05 PROCEDURE — G8427 DOCREV CUR MEDS BY ELIG CLIN: HCPCS | Performed by: UROLOGY

## 2019-08-05 PROCEDURE — 3017F COLORECTAL CA SCREEN DOC REV: CPT | Performed by: UROLOGY

## 2019-08-05 PROCEDURE — 1036F TOBACCO NON-USER: CPT | Performed by: UROLOGY

## 2019-08-05 PROCEDURE — 81003 URINALYSIS AUTO W/O SCOPE: CPT | Performed by: UROLOGY

## 2019-08-05 PROCEDURE — G8598 ASA/ANTIPLAT THER USED: HCPCS | Performed by: UROLOGY

## 2019-08-05 NOTE — PROGRESS NOTES
[facility] EVALUATION NOTE                                                 H&P                                                                                                                                                 Reason for Visit  Variable PSAs    History of Present Illness  72-year-old male referred for rising PSA  Current PSA is at 5.4  Patient's PSA last year was around 4.9  3 years ago patient had a PSA of 5.4  Essentially the PSAs have been highly variable between 4.5 and 5.5  Denies family history of prostate cancer  Minimal obstructive voiding symptoms  Former smoker  Recent hip replacement      Urologic Review of Systems/Symptoms  Denies hematuria  Denies dysuria  Denies incontinence  Denies flank pain  Other Urologic: Does not remember if he had a Jolly catheter during hip replacement    Review of Systems  Head and neck: No issues/reviewed  Cardiac: No recent issues/reviewed  Pulmonary: No issues/reviewed  Gastrointestinal: No issues/reviewed  Neurologic: No recent issues/reviewed  Extremities: No issues/reviewed  Lymphatics: No lymphadenopathy no change  Genitourinary: See above  Skin: No issues/reviewed  Hospitalization: On low-dose aspirin  Recent hip replacement no issues after the surgery  All 14 categories of Review of Systems otherwise reviewed no other findings reported.     Past Medical History:   Diagnosis Date    CAD (coronary artery disease)     Eczema     Granuloma faciale     History of extremity bypass graft 11/16/2015    Left leg 11/14/2003    History of tobacco abuse 11/16/2015    Hyperlipidemia     Hypertension     Lipoma     [facility]     Seborrheic keratosis      Past Surgical History:   Procedure Laterality Date    CARDIAC CATHETERIZATION  2003    COLONOSCOPY  10-19-12    CORONARY ANGIOPLASTY WITH STENT PLACEMENT  2003    DIAGNOSTIC CARDIAC CATH LAB PROCEDURE      HAND SURGERY  2003    L    HIP SURGERY      JOINT REPLACEMENT      LUNG BIOPSY Right 2018    CT guided Left Lung Biopsy by Dr Jeri Tsang Right     2018 per Dr [address] 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   Social Needs    Financial resource strain: None    Food insecurity:     Worry: None     Inability: None    Transportation needs:     Medical: None     Non-medical: None   Tobacco Use    Smoking status: Former Smoker     Packs/day: 1.00     Years: 40.00     Pack years: 40.00     Types: Cigarettes     Start date: 1965     Last attempt to quit: 2005     Years since quittin.1    Smokeless tobacco: Never Used   Substance and Sexual Activity    Alcohol use: Yes     Types: 5 Glasses of wine, 2 Cans of beer per week     Comment: 3- 4 TIMES A WEEK  WINE ONLY     Drug use: No    Sexual activity: None   Lifestyle    Physical activity:     Days per week: None     Minutes per session: None    Stress: None   Relationships    Social connections:     Talks on phone: None     Gets together: None     Attends Denominational service: None     Active member of club or organization: None     Attends meetings of clubs or organizations: None     Relationship status: None    Intimate partner violence:     Fear of current or ex partner: None     Emotionally abused: None     Physically abused: None     Forced sexual activity: None   Other Topics Concern    None   Social History Narrative    None     Family History

## 2019-08-06 ENCOUNTER — HOSPITAL ENCOUNTER (OUTPATIENT)
Dept: PHYSICAL THERAPY | Age: 74
Setting detail: THERAPIES SERIES
Discharge: HOME | End: 2019-08-06
Payer: MEDICARE

## 2019-08-06 PROCEDURE — 97110 THERAPEUTIC EXERCISES: CPT

## 2019-08-06 ASSESSMENT — PAIN SCALES - GENERAL: PAINLEVEL_OUTOF10: 0

## 2019-08-06 NOTE — PROGRESS NOTES
[address]     Ph: [phone]  Fax: [phone]    [] Certification  [] Recertification []  Plan of Care  [] Progress Note [x] Discharge      To:  Maricruz Gomez       From:  Ignacia Fernandez, PT, DPT  Patient: Abraham Pederson     : 1945  Diagnosis: Localized OA of Hip, L THR      Date: 2019  Treatment Diagnosis: decreased L hip arom, decreased L LE strength, decreased functional activity tolerance, impaired functional mobility, decreased balance, and L hip pain   Plan of Care/Certification Expiration Date: 19  Progress Report Period from:  2019  to 2019    Total # of Visits to Date: 10   No Show: 0    Canceled Appointment: 1     OBJECTIVE:   Long Term Goals -    Goals Current/ Discharge status Met   Long term goal 1: The pt will demo improved L LE strength >/= 4+/5 in order to perform 4-6\" stairs with 1 HR reciprocal indep  Strength LLE  L Hip Flexion: 4+/5  L Hip Extension: 4+/5  L Hip ABduction: 4+/5  L Hip Internal Rotation: 4+/5  L Hip External Rotation: 4+/5  L Knee Flexion: 5/5  L Knee Extension: 5/5  L Ankle Dorsiflexion: 5/5      Stairs  # Steps : 4  Stairs Height: 6\"  Rails: Right ascending  Device: No Device  Assistance: Independent  Comment: non-reciprocal (pt feels more comfortable with non-reciprocal) [x] yes  [x] no   Long term goal 2: The pt will demo improved L hip flex, abd, ext AROM >/=5-10* in order to increase ease with ADL's AROM LLE (degrees)  LLE General AROM: hip flex 90 deg, abduction 45 deg, extension 10 deg [x] yes  [] no   Long term goal 3: The patient will ambulate unlimited distances all surfaces independently without AD in order to safely ambulate in the community at PLOF Ambulation 1  Surface: carpet  Device: No Device  Assistance: Independent  Quality of Gait: slight forward flexed posture  Distance: unlimited distance in department [x] yes  [] no

## 2019-08-09 ENCOUNTER — TELEPHONE (OUTPATIENT)
Dept: FAMILY MEDICINE CLINIC | Age: 74
End: 2019-08-09

## 2019-08-09 NOTE — TELEPHONE ENCOUNTER
Ysabel states patient medications and can not remember if he took his bp medication (metoprolol) during breakfast or not. Wants to know what she should do?  Should he take another one or wait to tomorrow to take next daily dose

## 2019-09-17 ENCOUNTER — OFFICE VISIT (OUTPATIENT)
Dept: FAMILY MEDICINE CLINIC | Age: 74
End: 2019-09-17
Payer: MEDICARE

## 2019-09-17 VITALS
WEIGHT: 183.2 LBS | RESPIRATION RATE: 16 BRPM | TEMPERATURE: 98.1 F | BODY MASS INDEX: 25.55 KG/M2 | HEART RATE: 75 BPM | SYSTOLIC BLOOD PRESSURE: 122 MMHG | DIASTOLIC BLOOD PRESSURE: 78 MMHG | OXYGEN SATURATION: 98 %

## 2019-09-17 DIAGNOSIS — M79.674 GREAT TOE PAIN, RIGHT: Primary | ICD-10-CM

## 2019-09-17 DIAGNOSIS — M79.674 GREAT TOE PAIN, RIGHT: ICD-10-CM

## 2019-09-17 LAB
BASOPHILS ABSOLUTE: 0.1 K/UL (ref 0–0.2)
BASOPHILS RELATIVE PERCENT: 0.8 %
EOSINOPHILS ABSOLUTE: 0.1 K/UL (ref 0–0.7)
EOSINOPHILS RELATIVE PERCENT: 1.5 %
HCT VFR BLD CALC: 43.4 % (ref 42–52)
HEMOGLOBIN: 14.5 G/DL (ref 14–18)
LYMPHOCYTES ABSOLUTE: 0.8 K/UL (ref 1–4.8)
LYMPHOCYTES RELATIVE PERCENT: 10.8 %
MCH RBC QN AUTO: 29.3 PG (ref 27–31.3)
MCHC RBC AUTO-ENTMCNC: 33.3 % (ref 33–37)
MCV RBC AUTO: 87.9 FL (ref 80–100)
MONOCYTES ABSOLUTE: 0.5 K/UL (ref 0.2–0.8)
MONOCYTES RELATIVE PERCENT: 6.6 %
NEUTROPHILS ABSOLUTE: 5.7 K/UL (ref 1.4–6.5)
NEUTROPHILS RELATIVE PERCENT: 80.3 %
PDW BLD-RTO: 14.2 % (ref 11.5–14.5)
PLATELET # BLD: 200 K/UL (ref 130–400)
RBC # BLD: 4.93 M/UL (ref 4.7–6.1)
SEDIMENTATION RATE, ERYTHROCYTE: 5 MM (ref 0–20)
URIC ACID, SERUM: 7.2 MG/DL (ref 3.4–7)
WBC # BLD: 7.1 K/UL (ref 4.8–10.8)

## 2019-09-17 PROCEDURE — 3017F COLORECTAL CA SCREEN DOC REV: CPT | Performed by: FAMILY MEDICINE

## 2019-09-17 PROCEDURE — 1036F TOBACCO NON-USER: CPT | Performed by: FAMILY MEDICINE

## 2019-09-17 PROCEDURE — G8419 CALC BMI OUT NRM PARAM NOF/U: HCPCS | Performed by: FAMILY MEDICINE

## 2019-09-17 PROCEDURE — G8427 DOCREV CUR MEDS BY ELIG CLIN: HCPCS | Performed by: FAMILY MEDICINE

## 2019-09-17 PROCEDURE — 4040F PNEUMOC VAC/ADMIN/RCVD: CPT | Performed by: FAMILY MEDICINE

## 2019-09-17 PROCEDURE — 99213 OFFICE O/P EST LOW 20 MIN: CPT | Performed by: FAMILY MEDICINE

## 2019-09-17 PROCEDURE — G8598 ASA/ANTIPLAT THER USED: HCPCS | Performed by: FAMILY MEDICINE

## 2019-09-17 PROCEDURE — 1123F ACP DISCUSS/DSCN MKR DOCD: CPT | Performed by: FAMILY MEDICINE

## 2019-09-17 RX ORDER — PREDNISONE 10 MG/1
TABLET ORAL
Qty: 30 TABLET | Refills: 0 | Status: SHIPPED | OUTPATIENT
Start: 2019-09-17 | End: 2019-10-04 | Stop reason: SDUPTHER

## 2019-09-17 ASSESSMENT — ENCOUNTER SYMPTOMS: COLOR CHANGE: 1

## 2019-09-17 NOTE — PROGRESS NOTES
Subjective:      Patient ID: Randi Camacho is a 76 y.o. male    HPI  Here with 4 days of left great toe pain and swelling. No injury. No fever or recent illness. Similar incident few months ago and seen by ortho and placed on steroids which resolved this. Drinking about 4 glasses of wine per week. Review of Systems   Constitutional: Positive for activity change. Negative for chills and fever. Cardiovascular: Positive for leg swelling. Musculoskeletal: Positive for arthralgias. Skin: Positive for color change. Negative for wound. Neurological: Negative for weakness and numbness. 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   Social Needs    Financial resource strain: None    Food insecurity:     Worry: None     Inability: None    Transportation needs:     Medical: None     Non-medical: None   Tobacco Use    Smoking status: Former Smoker     Packs/day: 1.00     Years: 40.00     Pack years: 40.00     Types: Cigarettes     Start date: 1965     Last attempt to quit: 2005     Years since quittin.2    Smokeless tobacco: Never Used   Substance and Sexual Activity    Alcohol use: Yes     Types: 5 Glasses of wine, 2 Cans of beer per week     Comment: 3- 4 TIMES A WEEK  WINE ONLY     Drug use: No    Sexual activity: None   Lifestyle    Physical activity:     Days per week: None     Minutes per session: None    Stress: None   Relationships    Social connections:     Talks on phone: None     Gets together: None     Attends Mandaen service: None     Active member of club or organization: None     Attends meetings of clubs or organizations: None     Relationship status: None    Intimate partner violence:     Fear of current or ex partner: None     Emotionally abused: None     Physically abused: None     Forced sexual activity: None   Other Topics Concern    None   Social History Narrative    None     Current Outpatient Medications   Medication Sig Dispense Refill    Multiple Vitamins-Minerals (MULTIVITAMIN ADULT PO) Take by mouth      lovastatin (MEVACOR) 10 MG tablet Take 1 tablet by mouth nightly 90 tablet 3    aspirin 81 MG chewable tablet Take 1 tablet by mouth 2 times daily 60 tablet 0    metoprolol succinate (TOPROL XL) 200 MG extended release tablet Take 1 tablet by mouth daily 90 tablet 3     No current facility-administered medications for this visit. Family History   Problem Relation Age of Onset    High Blood Pressure Mother     Other Mother         BRAIN TUMOR    Cancer Father         LUNG     Past Medical History:   Diagnosis Date    CAD (coronary artery disease)     Eczema     Granuloma faciale     History of extremity bypass graft 11/16/2015    Left leg 11/14/2003    History of tobacco abuse 11/16/2015    Hyperlipidemia     Hypertension     Lipoma     [facility])     Seborrheic keratosis      Objective:   /78 (Site: Left Upper Arm, Position: Sitting, Cuff Size: Large Adult)   Pulse 75   Temp 98.1 °F (36.7 °C) (Oral)   Resp 16   Wt 183 lb 3.2 oz (83.1 kg)   SpO2 98%   BMI 25.55 kg/m²     Physical Exam  Extremities:      No  Swelling or  tenderness of       Ankle. Tenderness over the bunion on right side with increased warmth and moderate tenderness with slightly decreased rom of toe. No breaks in skin. .  No heel tenderness.   No forefoot tenderness or swelling                                 Dorsi/plantar flexion of ankle  secondary to pain                                                                   D.P 1+ and equal   Lungs:                 Clear and equal breath

## 2019-10-04 ENCOUNTER — OFFICE VISIT (OUTPATIENT)
Dept: FAMILY MEDICINE CLINIC | Age: 74
End: 2019-10-04
Payer: MEDICARE

## 2019-10-04 VITALS
WEIGHT: 183 LBS | DIASTOLIC BLOOD PRESSURE: 90 MMHG | SYSTOLIC BLOOD PRESSURE: 130 MMHG | RESPIRATION RATE: 16 BRPM | HEART RATE: 71 BPM | TEMPERATURE: 98.3 F | HEIGHT: 71 IN | BODY MASS INDEX: 25.62 KG/M2 | OXYGEN SATURATION: 97 %

## 2019-10-04 DIAGNOSIS — E78.5 HYPERLIPIDEMIA, UNSPECIFIED HYPERLIPIDEMIA TYPE: ICD-10-CM

## 2019-10-04 DIAGNOSIS — M10.071 ACUTE IDIOPATHIC GOUT INVOLVING TOE OF RIGHT FOOT: Primary | ICD-10-CM

## 2019-10-04 PROCEDURE — G8484 FLU IMMUNIZE NO ADMIN: HCPCS | Performed by: FAMILY MEDICINE

## 2019-10-04 PROCEDURE — G8598 ASA/ANTIPLAT THER USED: HCPCS | Performed by: FAMILY MEDICINE

## 2019-10-04 PROCEDURE — 4040F PNEUMOC VAC/ADMIN/RCVD: CPT | Performed by: FAMILY MEDICINE

## 2019-10-04 PROCEDURE — 1123F ACP DISCUSS/DSCN MKR DOCD: CPT | Performed by: FAMILY MEDICINE

## 2019-10-04 PROCEDURE — 3017F COLORECTAL CA SCREEN DOC REV: CPT | Performed by: FAMILY MEDICINE

## 2019-10-04 PROCEDURE — G8419 CALC BMI OUT NRM PARAM NOF/U: HCPCS | Performed by: FAMILY MEDICINE

## 2019-10-04 PROCEDURE — 99213 OFFICE O/P EST LOW 20 MIN: CPT | Performed by: FAMILY MEDICINE

## 2019-10-04 PROCEDURE — 1036F TOBACCO NON-USER: CPT | Performed by: FAMILY MEDICINE

## 2019-10-04 PROCEDURE — G8427 DOCREV CUR MEDS BY ELIG CLIN: HCPCS | Performed by: FAMILY MEDICINE

## 2019-10-04 RX ORDER — PREDNISONE 10 MG/1
TABLET ORAL
Qty: 30 TABLET | Refills: 0 | Status: SHIPPED | OUTPATIENT
Start: 2019-10-04 | End: 2019-10-21

## 2019-10-15 DIAGNOSIS — E78.5 HYPERLIPIDEMIA, UNSPECIFIED HYPERLIPIDEMIA TYPE: ICD-10-CM

## 2019-10-15 LAB
CHOLESTEROL, TOTAL: 175 MG/DL (ref 0–199)
HDLC SERPL-MCNC: 71 MG/DL (ref 40–59)
LDL CHOLESTEROL CALCULATED: 76 MG/DL (ref 0–129)
TRIGL SERPL-MCNC: 139 MG/DL (ref 0–150)

## 2019-10-21 ENCOUNTER — OFFICE VISIT (OUTPATIENT)
Dept: CARDIOLOGY CLINIC | Age: 74
End: 2019-10-21
Payer: MEDICARE

## 2019-10-21 VITALS
SYSTOLIC BLOOD PRESSURE: 110 MMHG | HEART RATE: 71 BPM | WEIGHT: 174 LBS | DIASTOLIC BLOOD PRESSURE: 70 MMHG | BODY MASS INDEX: 24.27 KG/M2 | RESPIRATION RATE: 14 BRPM | OXYGEN SATURATION: 97 %

## 2019-10-21 DIAGNOSIS — I73.9 PAD (PERIPHERAL ARTERY DISEASE) (HCC): ICD-10-CM

## 2019-10-21 DIAGNOSIS — E78.5 HYPERLIPIDEMIA, UNSPECIFIED HYPERLIPIDEMIA TYPE: Chronic | ICD-10-CM

## 2019-10-21 DIAGNOSIS — R09.89 BILATERAL CAROTID BRUITS: ICD-10-CM

## 2019-10-21 DIAGNOSIS — I10 ESSENTIAL HYPERTENSION: Primary | Chronic | ICD-10-CM

## 2019-10-21 DIAGNOSIS — I25.10 CORONARY ARTERY DISEASE INVOLVING NATIVE CORONARY ARTERY OF NATIVE HEART WITHOUT ANGINA PECTORIS: Chronic | ICD-10-CM

## 2019-10-21 PROCEDURE — G8598 ASA/ANTIPLAT THER USED: HCPCS | Performed by: INTERNAL MEDICINE

## 2019-10-21 PROCEDURE — G8484 FLU IMMUNIZE NO ADMIN: HCPCS | Performed by: INTERNAL MEDICINE

## 2019-10-21 PROCEDURE — 99214 OFFICE O/P EST MOD 30 MIN: CPT | Performed by: INTERNAL MEDICINE

## 2019-10-21 PROCEDURE — G8420 CALC BMI NORM PARAMETERS: HCPCS | Performed by: INTERNAL MEDICINE

## 2019-10-21 PROCEDURE — 1036F TOBACCO NON-USER: CPT | Performed by: INTERNAL MEDICINE

## 2019-10-21 PROCEDURE — 4040F PNEUMOC VAC/ADMIN/RCVD: CPT | Performed by: INTERNAL MEDICINE

## 2019-10-21 PROCEDURE — 3017F COLORECTAL CA SCREEN DOC REV: CPT | Performed by: INTERNAL MEDICINE

## 2019-10-21 PROCEDURE — G8427 DOCREV CUR MEDS BY ELIG CLIN: HCPCS | Performed by: INTERNAL MEDICINE

## 2019-10-21 PROCEDURE — 1123F ACP DISCUSS/DSCN MKR DOCD: CPT | Performed by: INTERNAL MEDICINE

## 2019-10-21 ASSESSMENT — ENCOUNTER SYMPTOMS
STRIDOR: 0
EYES NEGATIVE: 1
CHEST TIGHTNESS: 0
SHORTNESS OF BREATH: 0
BLOOD IN STOOL: 0
GASTROINTESTINAL NEGATIVE: 1
COUGH: 0
WHEEZING: 0
NAUSEA: 0
RESPIRATORY NEGATIVE: 1

## 2019-10-22 ENCOUNTER — OFFICE VISIT (OUTPATIENT)
Dept: FAMILY MEDICINE CLINIC | Age: 74
End: 2019-10-22
Payer: MEDICARE

## 2019-10-22 VITALS
TEMPERATURE: 98.6 F | OXYGEN SATURATION: 98 % | HEIGHT: 71 IN | DIASTOLIC BLOOD PRESSURE: 80 MMHG | WEIGHT: 174 LBS | RESPIRATION RATE: 16 BRPM | SYSTOLIC BLOOD PRESSURE: 124 MMHG | HEART RATE: 74 BPM | BODY MASS INDEX: 24.36 KG/M2

## 2019-10-22 DIAGNOSIS — M10.071 ACUTE IDIOPATHIC GOUT INVOLVING TOE OF RIGHT FOOT: ICD-10-CM

## 2019-10-22 DIAGNOSIS — I10 ESSENTIAL HYPERTENSION: Primary | ICD-10-CM

## 2019-10-22 DIAGNOSIS — E78.5 HYPERLIPIDEMIA, UNSPECIFIED HYPERLIPIDEMIA TYPE: ICD-10-CM

## 2019-10-22 PROCEDURE — 3017F COLORECTAL CA SCREEN DOC REV: CPT | Performed by: FAMILY MEDICINE

## 2019-10-22 PROCEDURE — 1036F TOBACCO NON-USER: CPT | Performed by: FAMILY MEDICINE

## 2019-10-22 PROCEDURE — G8484 FLU IMMUNIZE NO ADMIN: HCPCS | Performed by: FAMILY MEDICINE

## 2019-10-22 PROCEDURE — G8420 CALC BMI NORM PARAMETERS: HCPCS | Performed by: FAMILY MEDICINE

## 2019-10-22 PROCEDURE — 1123F ACP DISCUSS/DSCN MKR DOCD: CPT | Performed by: FAMILY MEDICINE

## 2019-10-22 PROCEDURE — G8427 DOCREV CUR MEDS BY ELIG CLIN: HCPCS | Performed by: FAMILY MEDICINE

## 2019-10-22 PROCEDURE — 4040F PNEUMOC VAC/ADMIN/RCVD: CPT | Performed by: FAMILY MEDICINE

## 2019-10-22 PROCEDURE — G8598 ASA/ANTIPLAT THER USED: HCPCS | Performed by: FAMILY MEDICINE

## 2019-10-22 PROCEDURE — 99214 OFFICE O/P EST MOD 30 MIN: CPT | Performed by: FAMILY MEDICINE

## 2019-10-22 ASSESSMENT — ENCOUNTER SYMPTOMS
SHORTNESS OF BREATH: 0
ABDOMINAL PAIN: 0

## 2019-10-24 DIAGNOSIS — R97.20 ELEVATED PSA: ICD-10-CM

## 2019-10-24 LAB — PROSTATE SPECIFIC ANTIGEN: 4.37 NG/ML (ref 0–6.22)

## 2019-10-28 ENCOUNTER — HOSPITAL ENCOUNTER (OUTPATIENT)
Dept: ULTRASOUND IMAGING | Age: 74
Discharge: HOME | End: 2019-10-30
Payer: MEDICARE

## 2019-10-28 DIAGNOSIS — I73.9 PAD (PERIPHERAL ARTERY DISEASE) (HCC): ICD-10-CM

## 2019-10-28 DIAGNOSIS — R09.89 BILATERAL CAROTID BRUITS: ICD-10-CM

## 2019-10-28 PROCEDURE — 93880 EXTRACRANIAL BILAT STUDY: CPT | Performed by: INTERNAL MEDICINE

## 2019-10-28 PROCEDURE — 93880 EXTRACRANIAL BILAT STUDY: CPT

## 2019-10-30 ENCOUNTER — TELEPHONE (OUTPATIENT)
Dept: FAMILY MEDICINE CLINIC | Age: 74
End: 2019-10-30

## 2019-10-31 ENCOUNTER — OFFICE VISIT (OUTPATIENT)
Dept: UROLOGY | Age: 74
End: 2019-10-31
Payer: MEDICARE

## 2019-10-31 VITALS
SYSTOLIC BLOOD PRESSURE: 124 MMHG | BODY MASS INDEX: 24.36 KG/M2 | HEART RATE: 80 BPM | WEIGHT: 174 LBS | HEIGHT: 71 IN | DIASTOLIC BLOOD PRESSURE: 82 MMHG

## 2019-10-31 DIAGNOSIS — R33.9 URINARY RETENTION: Primary | ICD-10-CM

## 2019-10-31 LAB — POST VOID RESIDUAL (PVR): 0 ML

## 2019-10-31 PROCEDURE — G8598 ASA/ANTIPLAT THER USED: HCPCS | Performed by: UROLOGY

## 2019-10-31 PROCEDURE — 4040F PNEUMOC VAC/ADMIN/RCVD: CPT | Performed by: UROLOGY

## 2019-10-31 PROCEDURE — G8484 FLU IMMUNIZE NO ADMIN: HCPCS | Performed by: UROLOGY

## 2019-10-31 PROCEDURE — 1036F TOBACCO NON-USER: CPT | Performed by: UROLOGY

## 2019-10-31 PROCEDURE — 3017F COLORECTAL CA SCREEN DOC REV: CPT | Performed by: UROLOGY

## 2019-10-31 PROCEDURE — 99213 OFFICE O/P EST LOW 20 MIN: CPT | Performed by: UROLOGY

## 2019-10-31 PROCEDURE — G8420 CALC BMI NORM PARAMETERS: HCPCS | Performed by: UROLOGY

## 2019-10-31 PROCEDURE — G8427 DOCREV CUR MEDS BY ELIG CLIN: HCPCS | Performed by: UROLOGY

## 2019-10-31 PROCEDURE — 1123F ACP DISCUSS/DSCN MKR DOCD: CPT | Performed by: UROLOGY

## 2019-11-06 ENCOUNTER — TELEPHONE (OUTPATIENT)
Dept: FAMILY MEDICINE CLINIC | Age: 74
End: 2019-11-06

## 2019-11-25 ENCOUNTER — OFFICE VISIT (OUTPATIENT)
Dept: UROLOGY | Age: 74
End: 2019-11-25
Payer: MEDICARE

## 2019-11-25 VITALS
SYSTOLIC BLOOD PRESSURE: 132 MMHG | WEIGHT: 174 LBS | DIASTOLIC BLOOD PRESSURE: 86 MMHG | OXYGEN SATURATION: 97 % | HEART RATE: 70 BPM | BODY MASS INDEX: 24.36 KG/M2 | HEIGHT: 71 IN

## 2019-11-25 DIAGNOSIS — R33.9 URINARY RETENTION: Primary | ICD-10-CM

## 2019-11-25 PROCEDURE — 99213 OFFICE O/P EST LOW 20 MIN: CPT | Performed by: UROLOGY

## 2019-11-25 PROCEDURE — 3017F COLORECTAL CA SCREEN DOC REV: CPT | Performed by: UROLOGY

## 2019-11-25 PROCEDURE — 1123F ACP DISCUSS/DSCN MKR DOCD: CPT | Performed by: UROLOGY

## 2019-11-25 PROCEDURE — 81003 URINALYSIS AUTO W/O SCOPE: CPT | Performed by: UROLOGY

## 2019-11-25 PROCEDURE — 4040F PNEUMOC VAC/ADMIN/RCVD: CPT | Performed by: UROLOGY

## 2019-11-25 PROCEDURE — 1036F TOBACCO NON-USER: CPT | Performed by: UROLOGY

## 2019-11-25 PROCEDURE — G8420 CALC BMI NORM PARAMETERS: HCPCS | Performed by: UROLOGY

## 2019-11-25 PROCEDURE — G8598 ASA/ANTIPLAT THER USED: HCPCS | Performed by: UROLOGY

## 2019-11-25 PROCEDURE — G8427 DOCREV CUR MEDS BY ELIG CLIN: HCPCS | Performed by: UROLOGY

## 2019-11-25 PROCEDURE — G8484 FLU IMMUNIZE NO ADMIN: HCPCS | Performed by: UROLOGY

## 2020-05-13 ENCOUNTER — VIRTUAL VISIT (OUTPATIENT)
Dept: CARDIOLOGY CLINIC | Age: 75
End: 2020-05-13
Payer: MEDICARE

## 2020-05-13 PROBLEM — I25.10 CORONARY ARTERY DISEASE INVOLVING NATIVE CORONARY ARTERY OF NATIVE HEART WITHOUT ANGINA PECTORIS: Status: ACTIVE | Noted: 2020-05-13

## 2020-05-13 PROBLEM — I10 ESSENTIAL HYPERTENSION: Status: ACTIVE | Noted: 2020-05-13

## 2020-05-13 PROCEDURE — G8428 CUR MEDS NOT DOCUMENT: HCPCS | Performed by: INTERNAL MEDICINE

## 2020-05-13 PROCEDURE — 1123F ACP DISCUSS/DSCN MKR DOCD: CPT | Performed by: INTERNAL MEDICINE

## 2020-05-13 PROCEDURE — 4040F PNEUMOC VAC/ADMIN/RCVD: CPT | Performed by: INTERNAL MEDICINE

## 2020-05-13 PROCEDURE — 99214 OFFICE O/P EST MOD 30 MIN: CPT | Performed by: INTERNAL MEDICINE

## 2020-05-13 PROCEDURE — 3017F COLORECTAL CA SCREEN DOC REV: CPT | Performed by: INTERNAL MEDICINE

## 2020-05-13 ASSESSMENT — ENCOUNTER SYMPTOMS
CHEST TIGHTNESS: 0
GASTROINTESTINAL NEGATIVE: 1
STRIDOR: 0
NAUSEA: 0
BLOOD IN STOOL: 0
WHEEZING: 0
EYES NEGATIVE: 1
SHORTNESS OF BREATH: 0
RESPIRATORY NEGATIVE: 1
COUGH: 0

## 2020-05-13 NOTE — PROGRESS NOTES
Subsequent Progress Note  Patient: Jennifer Kelly  YOB: 1945  MRN: [id number]    Chief Complaint: pad cad htn  Chief Complaint   Patient presents with    Coronary Artery Disease       CV Data:  2003 Left Leg Bypass - Dr. Destiney Yuen  2003 LAD Stent. RCA occluded  7/2015 echo 65%  6/2019  ABN Defect known RCA occlusion fills from LAD   6/2018 CUS mild    Subjective/HPI: no cp no sob still walks 2 miles daily no falls no bleed. spect abn but in the territory of known RCA occlusion     10/21/2019: doing well. Did well with L hip sx. No cp no sob. Now having recurrent gout attacks. 5/13/2020 TELEHEALTH EVALUATION -- Audio/Visual (During GDZUP-13 public health emergency)    Recently returned from [id number] [facility]. Doing well.  No cp no sob no bleed no falls    Nonsmoker since 2003  Retired -[employer]       EKG:    Past Medical History:   Diagnosis Date    CAD (coronary artery disease)     Eczema     Granuloma faciale     History of extremity bypass graft 11/16/2015    Left leg 11/14/2003    History of tobacco abuse 11/16/2015    Hyperlipidemia     Hypertension     Lipoma     PVD [facility]peripheral vascular disease) ([facility] [address]     Seborrheic keratosis        Past Surgical History:   Procedure Laterality Date    CARDIAC CATHETERIZATION  2003    COLONOSCOPY  10-19-12    CORONARY ANGIOPLASTY WITH STENT PLACEMENT  2003    DIAGNOSTIC CARDIAC CATH LAB PROCEDURE      HAND SURGERY  2003    L    HIP SURGERY      JOINT REPLACEMENT      LUNG BIOPSY Right 09/05/2018    CT guided Left Lung Biopsy by Dr Naila Mijares Right     9/5/2018 per Dr Madison Wood [address] DX/TX INTERVENTION [address] LES N/A 8/17/2018    EBUS WITH TRANSBRONCHIAL NEEDLE ASPIRATION W/ FLUOROSCOPY performed by Heather Damian MD at [place] [address] Left 6/21/2019    LEFT  HIP TOTAL ARTHROPLASTY performed by Flash Shen MD at [facility] [place]       Family History   Problem Relation Age of Onset    High Blood Pressure Comments)     Joint / Muscle aches       Current Outpatient Medications   Medication Sig Dispense Refill    Multiple Vitamins-Minerals (MULTIVITAMIN ADULT PO) Take by mouth      lovastatin (MEVACOR) 10 MG tablet Take 1 tablet by mouth nightly 90 tablet 3    aspirin 81 MG chewable tablet Take 1 tablet by mouth 2 times daily (Patient taking differently: Take 81 mg by mouth daily ) 60 tablet 0    metoprolol succinate (TOPROL XL) 200 MG extended release tablet Take 1 tablet by mouth daily 90 tablet 3     No current facility-administered medications for this visit. Review of Systems:   Review of Systems   Constitutional: Negative. Negative for diaphoresis and fatigue. HENT: Negative. Eyes: Negative. Respiratory: Negative. Negative for cough, chest tightness, shortness of breath, wheezing and stridor. Cardiovascular: Negative. Negative for chest pain, palpitations and leg swelling. Gastrointestinal: Negative. Negative for blood in stool and nausea. Genitourinary: Negative. Musculoskeletal: Positive for arthralgias. Skin: Negative. Neurological: Negative. Negative for dizziness, syncope, weakness and light-headedness. Hematological: Negative. Psychiatric/Behavioral: Negative. Physical Examination:    There were no vitals taken for this visit.    Physical Exam    LABS:  CBC:   Lab Results   Component Value Date    WBC 7.1 09/17/2019    RBC 4.93 09/17/2019    RBC 4.94 10/14/2011    HGB 14.5 09/17/2019    HCT 43.4 09/17/2019    MCV 87.9 09/17/2019    MCH 29.3 09/17/2019    MCHC 33.3 09/17/2019    RDW 14.2 09/17/2019     09/17/2019    MPV 7.9 07/22/2015     Lipids:  Lab Results   Component Value Date    CHOL 175 10/15/2019    CHOL 177 05/24/2019    CHOL 183 11/16/2017     Lab Results   Component Value Date    TRIG 139 10/15/2019    TRIG 123 05/24/2019    TRIG 197 11/16/2017     Lab Results   Component Value Date    HDL 71 (H) 10/15/2019    HDL 57 05/24/2019    HDL 57

## 2020-05-14 RX ORDER — METOPROLOL SUCCINATE 200 MG/1
200 TABLET, EXTENDED RELEASE ORAL DAILY
Qty: 90 TABLET | Refills: 3 | Status: SHIPPED | OUTPATIENT
Start: 2020-05-14 | End: 2020-06-29 | Stop reason: SDUPTHER

## 2020-05-14 RX ORDER — LOVASTATIN 10 MG/1
10 TABLET ORAL NIGHTLY
Qty: 90 TABLET | Refills: 3 | Status: SHIPPED | OUTPATIENT
Start: 2020-05-14 | End: 2020-05-27 | Stop reason: SDUPTHER

## 2020-05-20 ENCOUNTER — HOSPITAL ENCOUNTER (OUTPATIENT)
Dept: CT IMAGING | Age: 75
Discharge: HOME | End: 2020-05-22
Payer: MEDICARE

## 2020-05-20 DIAGNOSIS — M10.071 ACUTE IDIOPATHIC GOUT INVOLVING TOE OF RIGHT FOOT: ICD-10-CM

## 2020-05-20 DIAGNOSIS — I10 ESSENTIAL HYPERTENSION: ICD-10-CM

## 2020-05-20 DIAGNOSIS — E78.5 HYPERLIPIDEMIA, UNSPECIFIED HYPERLIPIDEMIA TYPE: ICD-10-CM

## 2020-05-20 LAB
ALBUMIN SERPL-MCNC: 4.2 G/DL (ref 3.5–4.6)
ALP BLD-CCNC: 61 U/L (ref 35–104)
ALT SERPL-CCNC: 17 U/L (ref 0–41)
ANION GAP SERPL CALCULATED.3IONS-SCNC: 10 MEQ/L (ref 9–15)
AST SERPL-CCNC: 19 U/L (ref 0–40)
BASOPHILS ABSOLUTE: 0.1 K/UL (ref 0–0.2)
BASOPHILS RELATIVE PERCENT: 0.9 %
BILIRUB SERPL-MCNC: 0.5 MG/DL (ref 0.2–0.7)
BUN BLDV-MCNC: 14 MG/DL (ref 8–23)
CALCIUM SERPL-MCNC: 8.9 MG/DL (ref 8.5–9.9)
CHLORIDE BLD-SCNC: 104 MEQ/L (ref 95–107)
CHOLESTEROL, TOTAL: 156 MG/DL (ref 0–199)
CO2: 27 MEQ/L (ref 20–31)
CREAT SERPL-MCNC: 1.09 MG/DL (ref 0.7–1.2)
EOSINOPHILS ABSOLUTE: 0.1 K/UL (ref 0–0.7)
EOSINOPHILS RELATIVE PERCENT: 2.2 %
GFR AFRICAN AMERICAN: >60
GFR NON-AFRICAN AMERICAN: >60
GLOBULIN: 2.5 G/DL (ref 2.3–3.5)
GLUCOSE BLD-MCNC: 104 MG/DL (ref 70–99)
HCT VFR BLD CALC: 47.1 % (ref 42–52)
HDLC SERPL-MCNC: 57 MG/DL (ref 40–59)
HEMOGLOBIN: 15.5 G/DL (ref 14–18)
LDL CHOLESTEROL CALCULATED: 81 MG/DL (ref 0–129)
LYMPHOCYTES ABSOLUTE: 1.1 K/UL (ref 1–4.8)
LYMPHOCYTES RELATIVE PERCENT: 18.2 %
MCH RBC QN AUTO: 29.2 PG (ref 27–31.3)
MCHC RBC AUTO-ENTMCNC: 32.8 % (ref 33–37)
MCV RBC AUTO: 88.9 FL (ref 80–100)
MONOCYTES ABSOLUTE: 0.4 K/UL (ref 0.2–0.8)
MONOCYTES RELATIVE PERCENT: 6.2 %
NEUTROPHILS ABSOLUTE: 4.6 K/UL (ref 1.4–6.5)
NEUTROPHILS RELATIVE PERCENT: 72.5 %
PDW BLD-RTO: 13.6 % (ref 11.5–14.5)
PLATELET # BLD: 215 K/UL (ref 130–400)
POTASSIUM SERPL-SCNC: 4.5 MEQ/L (ref 3.4–4.9)
RBC # BLD: 5.29 M/UL (ref 4.7–6.1)
SODIUM BLD-SCNC: 141 MEQ/L (ref 135–144)
TOTAL PROTEIN: 6.7 G/DL (ref 6.3–8)
TRIGL SERPL-MCNC: 88 MG/DL (ref 0–150)
URIC ACID, SERUM: 7.4 MG/DL (ref 3.4–7)
WBC # BLD: 6.3 K/UL (ref 4.8–10.8)

## 2020-05-20 PROCEDURE — 71250 CT THORAX DX C-: CPT

## 2020-05-21 ENCOUNTER — VIRTUAL VISIT (OUTPATIENT)
Dept: FAMILY MEDICINE CLINIC | Age: 75
End: 2020-05-21
Payer: MEDICARE

## 2020-05-21 PROCEDURE — G0439 PPPS, SUBSEQ VISIT: HCPCS | Performed by: NURSE PRACTITIONER

## 2020-05-21 PROCEDURE — 4040F PNEUMOC VAC/ADMIN/RCVD: CPT | Performed by: NURSE PRACTITIONER

## 2020-05-21 PROCEDURE — 1123F ACP DISCUSS/DSCN MKR DOCD: CPT | Performed by: NURSE PRACTITIONER

## 2020-05-21 PROCEDURE — 3017F COLORECTAL CA SCREEN DOC REV: CPT | Performed by: NURSE PRACTITIONER

## 2020-05-21 ASSESSMENT — LIFESTYLE VARIABLES
HOW OFTEN DO YOU HAVE SIX OR MORE DRINKS ON ONE OCCASION: 0
HOW OFTEN DURING THE LAST YEAR HAVE YOU HAD A FEELING OF GUILT OR REMORSE AFTER DRINKING: 0
AUDIT TOTAL SCORE: 4
HAS A RELATIVE, FRIEND, DOCTOR, OR ANOTHER HEALTH PROFESSIONAL EXPRESSED CONCERN ABOUT YOUR DRINKING OR SUGGESTED YOU CUT DOWN: 0
HOW MANY STANDARD DRINKS CONTAINING ALCOHOL DO YOU HAVE ON A TYPICAL DAY: 0
HOW OFTEN DURING THE LAST YEAR HAVE YOU FOUND THAT YOU WERE NOT ABLE TO STOP DRINKING ONCE YOU HAD STARTED: 0
HOW OFTEN DURING THE LAST YEAR HAVE YOU FAILED TO DO WHAT WAS NORMALLY EXPECTED FROM YOU BECAUSE OF DRINKING: 0
HOW OFTEN DO YOU HAVE A DRINK CONTAINING ALCOHOL: 4
AUDIT-C TOTAL SCORE: 4
HAVE YOU OR SOMEONE ELSE BEEN INJURED AS A RESULT OF YOUR DRINKING: 0
HOW OFTEN DURING THE LAST YEAR HAVE YOU NEEDED AN ALCOHOLIC DRINK FIRST THING IN THE MORNING TO GET YOURSELF GOING AFTER A NIGHT OF HEAVY DRINKING: 0
HOW OFTEN DURING THE LAST YEAR HAVE YOU BEEN UNABLE TO REMEMBER WHAT HAPPENED THE NIGHT BEFORE BECAUSE YOU HAD BEEN DRINKING: 0

## 2020-05-21 ASSESSMENT — PATIENT HEALTH QUESTIONNAIRE - PHQ9
SUM OF ALL RESPONSES TO PHQ QUESTIONS 1-9: 0
SUM OF ALL RESPONSES TO PHQ QUESTIONS 1-9: 0

## 2020-05-21 NOTE — PROGRESS NOTES
Medicare Annual Wellness Visit  Are Name: Madai Ramirez Date: 2020   MRN: [id number] Sex: Male   Age: 76 y.o. Ethnicity: Non-/Non    : 1945 Race: Lizabeth Montgomery is here for Medicare AWV    Screenings for behavioral, psychosocial and functional/safety risks, and cognitive dysfunction are all negative except as indicated below. These results, as well as other patient data from [facility] [address] [facility], are documented in Flowsheets linked to this Encounter. Allergies   Allergen Reactions    Lisinopril Anaphylaxis and Swelling    Contrast [Iodides] Other (See Comments)     Pt unsure of reaction.  Crestor [Rosuvastatin]     Pravachol [Pravastatin] Other (See Comments)     Joint / Muscle aches         Prior to Visit Medications    Medication Sig Taking?  Authorizing Provider   metoprolol succinate (TOPROL XL) 200 MG extended release tablet TAKE 1 TABLET BY MOUTH  DAILY  Shaniqua Cope MD   lovastatin (MEVACOR) 10 MG tablet TAKE 1 TABLET BY MOUTH  NIGHTLY  Shaniqua Cope MD   Multiple Vitamins-Minerals (MULTIVITAMIN ADULT PO) Take by mouth  Historical Provider, MD   aspirin 81 MG chewable tablet Take 1 tablet by mouth 2 times daily  Patient taking differently: Take 81 mg by mouth daily   Trellis Mortimer, MD         Past Medical History:   Diagnosis Date    CAD (coronary artery disease)     Eczema     Granuloma faciale     History of extremity bypass graft 2015    Left leg 2003    History of tobacco abuse 2015    Hyperlipidemia     Hypertension     Lipoma     PVD [facility]peripheral vascular disease) ([facility] [address]     Seborrheic keratosis        Past Surgical History:   Procedure Laterality Date    CARDIAC CATHETERIZATION      COLONOSCOPY  10-19-12    CORONARY ANGIOPLASTY WITH STENT PLACEMENT      DIAGNOSTIC CARDIAC CATH LAB PROCEDURE      HAND SURGERY      L    HIP SURGERY      JOINT REPLACEMENT      LUNG BIOPSY Right 2018

## 2020-05-26 ENCOUNTER — VIRTUAL VISIT (OUTPATIENT)
Dept: PULMONOLOGY | Age: 75
End: 2020-05-26
Payer: MEDICARE

## 2020-05-26 PROCEDURE — 99213 OFFICE O/P EST LOW 20 MIN: CPT | Performed by: INTERNAL MEDICINE

## 2020-05-26 ASSESSMENT — ENCOUNTER SYMPTOMS
GASTROINTESTINAL NEGATIVE: 1
EYES NEGATIVE: 1

## 2020-05-26 NOTE — PROGRESS NOTES
of Onset    High Blood Pressure Mother     Other Mother         BRAIN TUMOR    Cancer Father         LUNG   ,   Immunization History   Administered Date(s) Administered    Influenza Vaccine, unspecified formulation 10/13/2016    Influenza Virus Vaccine 10/08/2012, 10/14/2013, 09/25/2014, 09/16/2018    Influenza Whole 10/24/2015    Influenza, High Dose (Fluzone 65 yrs and older) 09/18/2017, 09/16/2018    Pneumococcal Conjugate 13-valent ([id number]) 10/31/2016    Pneumococcal Polysaccharide ([id number]) 10/22/2012    Tdap (Boostrix, Adacel) 01/01/2017    Zoster Live (Zostavax) 10/22/2010    Zoster Recombinant (Shingrix) 05/31/2018   ,   Health Maintenance   Topic Date Due    Shingles Vaccine (3 of 3) 07/26/2018    Flu vaccine (Season Ended) 09/01/2020    PSA counseling  10/24/2020    Lipid screen  05/20/2021    Potassium monitoring  05/20/2021    Creatinine monitoring  05/20/2021    Low dose CT lung screening  05/20/2021    Annual Wellness Visit (AWV)  05/22/2021    Colon cancer screen colonoscopy  10/19/2022    DTaP/Tdap/Td vaccine (2 - Td) 01/01/2027    Pneumococcal 65+ years Vaccine  Completed    AAA screen  Completed    Hepatitis C screen  Completed    Hepatitis A vaccine  Aged Out    Hepatitis B vaccine  Aged Out    Hib vaccine  Aged Out    Meningococcal (ACWY) vaccine  Aged Out           PHYSICAL EXAMINATION:  [ INSTRUCTIONS:  \"[x]\" Indicates a positive item  \"[]\" Indicates a negative item  -- DELETE ALL ITEMS NOT EXAMINED]  [] Alert  [] Oriented to person/place/time    [] No apparent distress  [] Toxic appearing    [] Face flushed appearing [] Sclera clear  [] Lips are cyanotic      [] Breathing appears normal  [] Appears tachypneic      [] No rash on visible skin    [] Cranial Nerves II-XII grossly intact    [] Motor grossly intact in visible upper extremities    [] Motor grossly intact in visible lower extremities    [] Normal Mood  [] Anxious appearing    [] Depressed appearing

## 2020-05-27 ENCOUNTER — VIRTUAL VISIT (OUTPATIENT)
Dept: FAMILY MEDICINE CLINIC | Age: 75
End: 2020-05-27
Payer: MEDICARE

## 2020-05-27 PROBLEM — I25.119 ATHEROSCLEROSIS OF NATIVE CORONARY ARTERY WITH ANGINA PECTORIS (HCC): Status: ACTIVE | Noted: 2020-05-13

## 2020-05-27 PROCEDURE — 99443 PR PHYS/QHP TELEPHONE EVALUATION 21-30 MIN: CPT | Performed by: FAMILY MEDICINE

## 2020-05-27 RX ORDER — LOVASTATIN 20 MG/1
20 TABLET ORAL NIGHTLY
Qty: 90 TABLET | Refills: 0 | Status: SHIPPED | OUTPATIENT
Start: 2020-05-27 | End: 2020-06-29 | Stop reason: SDUPTHER

## 2020-05-27 ASSESSMENT — ENCOUNTER SYMPTOMS
DIARRHEA: 0
COUGH: 0
VOMITING: 0

## 2020-05-27 NOTE — PROGRESS NOTES
Sodium (mEq/L)   Date Value   2020 141    BUN (mg/dL)   Date Value   2020 14    Glucose (mg/dL)   Date Value   2020 104 (H)   10/14/2011 104      Potassium (mEq/L)   Date Value   2020 4.5     Potassium reflex Magnesium (mEq/L)   Date Value   2019 4.3    CREATININE (mg/dL)   Date Value   2020 1.09         Hyperlipidemia:  No new myalgias or GI upset on lovastatin (Mevacor). 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   Social Needs    Financial resource strain: None    Food insecurity     Worry: None     Inability: None    Transportation needs     Medical: None     Non-medical: None   Tobacco Use    Smoking status: Former Smoker     Packs/day: 1.00     Years: 40.00     Pack years: 40.00     Types: Cigarettes     Start date: 1965     Last attempt to quit: 2005     Years since quittin.9    Smokeless tobacco: Never Used   Substance and Sexual Activity    Alcohol use: Yes     Types: 5 Glasses of wine, 2 Cans of beer per week     Comment: 3- 4 TIMES A WEEK  WINE ONLY     Drug use: No    Sexual activity: None   Lifestyle    Physical activity     Days per week: None     Minutes per session: None    Stress: None   Relationships    Social connections     Talks on phone: None     Gets together: None     Attends Scientologist service: None     Active member of club or organization: None     Attends meetings of clubs or organizations: None     Relationship status: None    Intimate partner violence     Fear of current or ex partner: None     Emotionally abused: None     Physically abused: None     Forced sexual activity: None   Other Topics Concern    None   Social History Narrative    None     Current Outpatient Medications   Medication Sig Dispense Refill    metoprolol succinate (TOPROL XL) 200 MG extended release tablet TAKE 1 TABLET BY MOUTH  DAILY 90 tablet 3    lovastatin (MEVACOR) 10 MG tablet TAKE 1 TABLET BY MOUTH  NIGHTLY 90 tablet 3    Multiple Vitamins-Minerals (MULTIVITAMIN ADULT PO) Take by mouth      aspirin 81 MG chewable tablet Take 1 tablet by mouth 2 times daily (Patient taking differently: Take 81 mg by mouth daily ) 60 tablet 0     No current facility-administered medications for this visit. Family History   Problem Relation Age of Onset    High Blood Pressure Mother     Other Mother         BRAIN TUMOR    Cancer Father         LUNG     Past Medical History:   Diagnosis Date    CAD (coronary artery disease)     Eczema     Granuloma faciale     History of extremity bypass graft 11/16/2015    Left leg 11/14/2003    History of tobacco abuse 11/16/2015    Hyperlipidemia     Hypertension     Lipoma     PVD (peripheral vascular disease) ([facility] [address]     Seborrheic keratosis      Objective: There were no vitals taken for this visit. Physical Exam  No exam done   Assessment:          Diagnosis Orders   1. Essential hypertension     2. Hyperlipidemia, unspecified hyperlipidemia type     3. Chronic gout without tophus, unspecified cause, unspecified site     4.  Atherosclerosis of native coronary artery with angina pectoris, unspecified whether native or transplanted heart [facility])        Plan:    angina stable and followed by cardiology-continue current tx  Increase mevacor  Fasting blood work in 2 months and f/u after done

## 2020-06-22 ENCOUNTER — OFFICE VISIT (OUTPATIENT)
Dept: UROLOGY | Age: 75
End: 2020-06-22
Payer: MEDICARE

## 2020-06-22 VITALS
BODY MASS INDEX: 24.62 KG/M2 | WEIGHT: 172 LBS | HEART RATE: 59 BPM | OXYGEN SATURATION: 99 % | SYSTOLIC BLOOD PRESSURE: 122 MMHG | HEIGHT: 70 IN | DIASTOLIC BLOOD PRESSURE: 84 MMHG

## 2020-06-22 DIAGNOSIS — R97.20 ELEVATED PSA: ICD-10-CM

## 2020-06-22 LAB
BILIRUBIN, POC: ABNORMAL
BLOOD URINE, POC: ABNORMAL
CLARITY, POC: CLEAR
COLOR, POC: YELLOW
GLUCOSE URINE, POC: ABNORMAL
KETONES, POC: ABNORMAL
LEUKOCYTE EST, POC: ABNORMAL
NITRITE, POC: ABNORMAL
PH, POC: 6
PROSTATE SPECIFIC ANTIGEN: 4.49 NG/ML (ref 0–6.22)
PROTEIN, POC: ABNORMAL
SPECIFIC GRAVITY, POC: 1.02
UROBILINOGEN, POC: 0.2

## 2020-06-22 PROCEDURE — G8427 DOCREV CUR MEDS BY ELIG CLIN: HCPCS | Performed by: UROLOGY

## 2020-06-22 PROCEDURE — 1123F ACP DISCUSS/DSCN MKR DOCD: CPT | Performed by: UROLOGY

## 2020-06-22 PROCEDURE — 3017F COLORECTAL CA SCREEN DOC REV: CPT | Performed by: UROLOGY

## 2020-06-22 PROCEDURE — 4040F PNEUMOC VAC/ADMIN/RCVD: CPT | Performed by: UROLOGY

## 2020-06-22 PROCEDURE — 99214 OFFICE O/P EST MOD 30 MIN: CPT | Performed by: UROLOGY

## 2020-06-22 PROCEDURE — 81003 URINALYSIS AUTO W/O SCOPE: CPT | Performed by: UROLOGY

## 2020-06-22 PROCEDURE — 1036F TOBACCO NON-USER: CPT | Performed by: UROLOGY

## 2020-06-22 PROCEDURE — G8420 CALC BMI NORM PARAMETERS: HCPCS | Performed by: UROLOGY

## 2020-06-22 NOTE — PROGRESS NOTES
[facility] EVALUATION NOTE                                                 H&P                                                                                                                                                 Reason for Visit  Persistent microhematuria    History of Present Illness  History of persistent microhematuria  Today's urinalysis once again shows moderate blood  Patient will be scheduled for CT and office cystoscopy  Repeat PSA today      Urologic Review of Systems/Symptoms  Denies hematuria  Denies dysuria  Denies incontinence  Denies flank pain  Other Urologic: No change in voiding pattern    Review of Systems  Head and neck: No issues/reviewed  Cardiac: No recent issues/reviewed  Pulmonary: No issues/reviewed  Gastrointestinal: No issues/reviewed  Neurologic: No recent issues/reviewed  Extremities: No issues/reviewed  Lymphatics: No lymphadenopathy no change  Genitourinary: See above  Skin: No issues/reviewed  Hospitalization: None recent  Meds reviewed  All 14 categories of Review of Systems otherwise reviewed no other findings reported.     Past Medical History:   Diagnosis Date    CAD (coronary artery disease)     Eczema     Granuloma faciale     History of extremity bypass graft 11/16/2015    Left leg 11/14/2003    History of tobacco abuse 11/16/2015    Hyperlipidemia     Hypertension     Lipoma     PVD (peripheral vascular disease) ([facility])     Seborrheic keratosis      Past Surgical History:   Procedure Laterality Date    CARDIAC CATHETERIZATION  2003    COLONOSCOPY  10-19-12    CORONARY ANGIOPLASTY WITH STENT PLACEMENT  2003    DIAGNOSTIC CARDIAC CATH LAB PROCEDURE      HAND SURGERY  2003    L    HIP SURGERY      JOINT REPLACEMENT      LUNG BIOPSY Right 09/05/2018    CT guided Left Lung Biopsy by Dr Shwetha Freedman Right     9/5/2018 per Dr Sara Brar [address] DX/TX INTERVENTION [address] LES N/A 8/17/2018    EBUS WITH TRANSBRONCHIAL mouth nightly 90 tablet 0    metoprolol succinate (TOPROL XL) 200 MG extended release tablet TAKE 1 TABLET BY MOUTH  DAILY 90 tablet 3    Multiple Vitamins-Minerals (MULTIVITAMIN ADULT PO) Take by mouth      aspirin 81 MG chewable tablet Take 1 tablet by mouth 2 times daily (Patient taking differently: Take 81 mg by mouth daily ) 60 tablet 0     No current facility-administered medications for this visit. Lisinopril; Contrast [iodides]; Crestor [rosuvastatin]; and Pravachol [pravastatin]  All reviewed and verified by Dr Valarie Councilman on today's visit    PSA   Date Value Ref Range Status   10/24/2019 4.37 0.00 - 6.22 ng/mL Final   05/24/2019 5.40 0.00 - 6.22 ng/mL Final     Comment:     When the Total PSA is between 3.00 and 10.00 ng/mL, consider  requesting a Free PSA to aid in diagnosis. 11/16/2017 4.25 0.00 - 6.22 ng/mL Final     Comment:     When the Total PSA is between 3.00 and 10.00 ng/mL, consider  requesting a Free PSA to aid in diagnosis. 05/30/2017 4.07 0.00 - 6.22 ng/mL Final     Comment:     When the Total PSA is between 3.00 and 10.00 ng/mL, consider  requesting a Free PSA to aid in diagnosis. 11/11/2016 5.42 0.00 - 6.22 ng/mL Final     Comment:     When the Total PSA is between 3.00 and 10.00 ng/mL, consider  requesting a Free PSA to aid in diagnosis. Results for POC orders placed in visit on 06/22/20   POCT Urinalysis No Micro (Auto)   Result Value Ref Range    Color, UA yellow     Clarity, UA clear     Glucose, UA POC neg     Bilirubin, UA neg     Ketones, UA neg     Spec Grav, UA 1.020     Blood, UA POC Moderate     pH, UA 6.0     Protein, UA POC neg     Urobilinogen, UA 0.2     Leukocytes, UA neg     Nitrite, UA neg        Physical Exam  Vitals:    06/22/20 1015   BP: 122/84   Pulse: 59   SpO2: 99%   Weight: 172 lb (78 kg)   Height: 5' 10\" (1.778 m)     Constitutional: patient is oriented to person, place, and time. patient appears well-developed.  Not in distress patient

## 2020-06-29 RX ORDER — LOVASTATIN 20 MG/1
20 TABLET ORAL NIGHTLY
Qty: 90 TABLET | Refills: 3 | Status: SHIPPED | OUTPATIENT
Start: 2020-06-29 | End: 2021-04-27

## 2020-06-29 RX ORDER — METOPROLOL SUCCINATE 200 MG/1
200 TABLET, EXTENDED RELEASE ORAL DAILY
Qty: 90 TABLET | Refills: 3 | Status: SHIPPED | OUTPATIENT
Start: 2020-06-29 | End: 2021-04-27

## 2020-07-08 ENCOUNTER — HOSPITAL ENCOUNTER (OUTPATIENT)
Dept: CT IMAGING | Age: 75
Discharge: HOME | End: 2020-07-10
Payer: MEDICARE

## 2020-07-08 PROCEDURE — 74176 CT ABD & PELVIS W/O CONTRAST: CPT

## 2020-07-13 ENCOUNTER — PROCEDURE VISIT (OUTPATIENT)
Dept: UROLOGY | Age: 75
End: 2020-07-13
Payer: MEDICARE

## 2020-07-13 VITALS
BODY MASS INDEX: 24.36 KG/M2 | HEART RATE: 76 BPM | WEIGHT: 174 LBS | SYSTOLIC BLOOD PRESSURE: 116 MMHG | DIASTOLIC BLOOD PRESSURE: 80 MMHG | HEIGHT: 71 IN

## 2020-07-13 LAB
BILIRUBIN, POC: ABNORMAL
BLOOD URINE, POC: ABNORMAL
CLARITY, POC: CLEAR
COLOR, POC: YELLOW
GLUCOSE URINE, POC: ABNORMAL
KETONES, POC: ABNORMAL
LEUKOCYTE EST, POC: ABNORMAL
NITRITE, POC: ABNORMAL
PH, POC: 5
PROTEIN, POC: ABNORMAL
SPECIFIC GRAVITY, POC: 1.02
UROBILINOGEN, POC: 0.2

## 2020-07-13 PROCEDURE — 81003 URINALYSIS AUTO W/O SCOPE: CPT | Performed by: UROLOGY

## 2020-07-13 PROCEDURE — 52000 CYSTOURETHROSCOPY: CPT | Performed by: UROLOGY

## 2020-07-13 RX ORDER — SULFAMETHOXAZOLE AND TRIMETHOPRIM 800; 160 MG/1; MG/1
1 TABLET ORAL ONCE
Qty: 1 TABLET | Refills: 0 | COMMUNITY
Start: 2020-07-13 | End: 2020-07-13

## 2020-07-13 NOTE — PROGRESS NOTES
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

## 2020-07-13 NOTE — PROGRESS NOTES
Chaperone for Intimate Exam    1. Was chaperone offered as part of the rooming process? offered, declined   2. If Chaperone is declined by patient, NA: chaperone was available and exam completed  3.  Chaperone is N/A

## 2020-07-16 ENCOUNTER — VIRTUAL VISIT (OUTPATIENT)
Dept: CARDIOLOGY CLINIC | Age: 75
End: 2020-07-16
Payer: MEDICARE

## 2020-07-16 PROCEDURE — 1123F ACP DISCUSS/DSCN MKR DOCD: CPT | Performed by: INTERNAL MEDICINE

## 2020-07-16 PROCEDURE — 3017F COLORECTAL CA SCREEN DOC REV: CPT | Performed by: INTERNAL MEDICINE

## 2020-07-16 PROCEDURE — G8428 CUR MEDS NOT DOCUMENT: HCPCS | Performed by: INTERNAL MEDICINE

## 2020-07-16 PROCEDURE — 99214 OFFICE O/P EST MOD 30 MIN: CPT | Performed by: INTERNAL MEDICINE

## 2020-07-16 PROCEDURE — 4040F PNEUMOC VAC/ADMIN/RCVD: CPT | Performed by: INTERNAL MEDICINE

## 2020-07-16 ASSESSMENT — ENCOUNTER SYMPTOMS
RESPIRATORY NEGATIVE: 1
CHEST TIGHTNESS: 0
BLOOD IN STOOL: 0
EYES NEGATIVE: 1
GASTROINTESTINAL NEGATIVE: 1
NAUSEA: 0
SHORTNESS OF BREATH: 0
STRIDOR: 0
WHEEZING: 0
COUGH: 0

## 2020-07-16 NOTE — PROGRESS NOTES
FLUOROSCOPY performed by Michelle Irwin MD at [address] Left 6/21/2019    LEFT  HIP TOTAL ARTHROPLASTY performed by Gurpreet Park MD at [facility]       Family History   Problem Relation Age of Onset    High Blood Pressure Mother     Other Mother         BRAIN TUMOR    Cancer Father         LUNG       Social History     Socioeconomic History    Marital status:       Spouse name: Not on file    Number of children: Not on file    Years of education: Not on file    Highest education level: Not on file   Occupational History     Employer: [employer]     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   Social Needs    Financial resource strain: Not on file    Food insecurity     Worry: Not on file     Inability: Not on file   [employer] needs     Medical: Not on file     Non-medical: Not on file   Tobacco Use    Smoking status: Former Smoker     Packs/day: 1.00     Years: 40.00     Pack years: 40.00     Types: Cigarettes     Start date: 7/1/1965     Last attempt to quit: 7/1/2005     Years since quitting: 15.0    Smokeless tobacco: Never Used   Substance and Sexual Activity    Alcohol use: Yes     Types: 5 Glasses of wine, 2 Cans of beer per week     Comment: 3- 4 TIMES A WEEK  WINE ONLY     Drug use: No    Sexual activity: Not on file   Lifestyle    Physical activity     Days per week: Not on file     Minutes per session: Not on file    Stress: Not on file   Relationships    Social connections     Talks on phone: Not on file     Gets together: Not on file     Attends Cheondoism service: Not on file     Active member of club or organization: Not on file     Attends meetings of clubs or organizations: Not on file     Relationship status: Not on file    Intimate partner violence     Fear of current or ex partner: Not on file     Emotionally abused: Not on file     Physically abused: Not on file     Forced sexual activity: Not on file   Other Topics Concern    Not on file   Social History Narrative    Not on file       Allergies   Allergen Reactions    Lisinopril Anaphylaxis and Swelling    Contrast [Iodides] Other (See Comments)     Pt unsure of reaction.  Crestor [Rosuvastatin]     Pravachol [Pravastatin] Other (See Comments)     Joint / Muscle aches       Current Outpatient Medications   Medication Sig Dispense Refill    lovastatin (MEVACOR) 20 MG tablet Take 1 tablet by mouth nightly 90 tablet 3    metoprolol succinate (TOPROL XL) 200 MG extended release tablet Take 1 tablet by mouth daily 90 tablet 3    Multiple Vitamins-Minerals (MULTIVITAMIN ADULT PO) Take by mouth      aspirin 81 MG chewable tablet Take 1 tablet by mouth 2 times daily (Patient taking differently: Take 81 mg by mouth daily ) 60 tablet 0     No current facility-administered medications for this visit. Review of Systems:   Review of Systems   Constitutional: Negative. Negative for diaphoresis and fatigue. HENT: Negative. Eyes: Negative. Respiratory: Negative. Negative for cough, chest tightness, shortness of breath, wheezing and stridor. Cardiovascular: Negative. Negative for chest pain, palpitations and leg swelling. Gastrointestinal: Negative. Negative for blood in stool and nausea. Genitourinary: Negative. Musculoskeletal: Positive for arthralgias. Skin: Negative. Neurological: Negative. Negative for dizziness, syncope, weakness and light-headedness. Hematological: Negative. Psychiatric/Behavioral: Negative. Physical Examination:    There were no vitals taken for this visit.    Physical Exam    LABS:  CBC:   Lab Results   Component Value Date    WBC 6.3 05/20/2020    RBC 5.29 05/20/2020    RBC 4.94 10/14/2011    HGB 15.5 05/20/2020    HCT 47.1 05/20/2020    MCV 88.9 05/20/2020    MCH 29.2 05/20/2020    MCHC 32.8 05/20/2020    RDW 13.6 05/20/2020     05/20/2020    MPV 7.9 07/22/2015     Lipids:  Lab Results   Component Value Date    CHOL 156 05/20/2020 CHOL 175 10/15/2019    CHOL 177 05/24/2019     Lab Results   Component Value Date    TRIG 88 05/20/2020    TRIG 139 10/15/2019    TRIG 123 05/24/2019     Lab Results   Component Value Date    HDL 57 05/20/2020    HDL 71 (H) 10/15/2019    HDL 57 05/24/2019     Lab Results   Component Value Date    LDLCALC 81 05/20/2020    LDLCALC 76 10/15/2019    [facility] 95 05/24/2019     No results found for: LABVLDL, VLDL  Lab Results   Component Value Date    CHOLHDLRATIO 3.7 10/05/2012    CHOLHDLRATIO 4.0 10/14/2011     CMP:    Lab Results   Component Value Date     05/20/2020    K 4.5 05/20/2020    K 4.3 06/22/2019     05/20/2020    CO2 27 05/20/2020    BUN 14 05/20/2020    CREATININE 1.09 05/20/2020    GFRAA >60.0 05/20/2020    LABGLOM >60.0 05/20/2020    GLUCOSE 104 05/20/2020    GLUCOSE 104 10/14/2011    PROT 6.7 05/20/2020    LABALBU 4.2 05/20/2020    LABALBU 4.4 10/14/2011    CALCIUM 8.9 05/20/2020    BILITOT 0.5 05/20/2020    ALKPHOS 61 05/20/2020    AST 19 05/20/2020    ALT 17 05/20/2020     BMP:    Lab Results   Component Value Date     05/20/2020    K 4.5 05/20/2020    K 4.3 06/22/2019     05/20/2020    CO2 27 05/20/2020    BUN 14 05/20/2020    LABALBU 4.2 05/20/2020    LABALBU 4.4 10/14/2011    CREATININE 1.09 05/20/2020    CALCIUM 8.9 05/20/2020    GFRAA >60.0 05/20/2020    LABGLOM >60.0 05/20/2020    GLUCOSE 104 05/20/2020    GLUCOSE 104 10/14/2011     Magnesium:    Lab Results   Component Value Date    MG 2.4 07/19/2015     TSH:No results found for: TSHFT4, TSH    Patient Active Problem List   Diagnosis    Hypertension    CAD (coronary artery disease)    Lipoma    Eczema    Hyperlipidemia    Seborrheic keratosis    PVD (peripheral vascular disease) ([address]    Granuloma faciale    ED (erectile dysfunction)    Peripheral arterial disease [facility] [address]    History of placement of stent in LAD coronary artery    History of extremity bypass graft    History of tobacco abuse    PAD

## 2020-07-22 ENCOUNTER — TELEPHONE (OUTPATIENT)
Dept: UROLOGY | Age: 75
End: 2020-07-22

## 2020-07-22 NOTE — TELEPHONE ENCOUNTER
Pt was given flomax on Sunday  - per Dr. Neena Mendez - and started having gross hematuria - no other added symptoms - this morning. He has an appointment on Friday but wife does not wait that long.  Please advise

## 2020-07-23 ENCOUNTER — TELEPHONE (OUTPATIENT)
Dept: UROLOGY | Age: 75
End: 2020-07-23

## 2020-07-23 NOTE — TELEPHONE ENCOUNTER
----- Message from Janice Kay sent at 7/23/2020  2:42 PM EDT -----  Regarding: advice  Contact: [phone]  Patient called and states that they just spoke with Dr. Hayley Tobin and they have a few more questions. Patients wife is upset and wants a call back today.

## 2020-07-24 ENCOUNTER — OFFICE VISIT (OUTPATIENT)
Dept: UROLOGY | Age: 75
End: 2020-07-24
Payer: MEDICARE

## 2020-07-24 VITALS
WEIGHT: 172 LBS | HEART RATE: 71 BPM | DIASTOLIC BLOOD PRESSURE: 80 MMHG | HEIGHT: 71 IN | SYSTOLIC BLOOD PRESSURE: 122 MMHG | BODY MASS INDEX: 24.08 KG/M2

## 2020-07-24 DIAGNOSIS — R31.0 GROSS HEMATURIA: ICD-10-CM

## 2020-07-24 LAB
BILIRUBIN, POC: ABNORMAL
BLOOD URINE, POC: ABNORMAL
CLARITY, POC: CLEAR
COLOR, POC: YELLOW
GLUCOSE URINE, POC: ABNORMAL
KETONES, POC: ABNORMAL
LEUKOCYTE EST, POC: ABNORMAL
NITRITE, POC: ABNORMAL
PH, POC: 5.5
POST VOID RESIDUAL (PVR): 76 ML
PROTEIN, POC: ABNORMAL
SPECIFIC GRAVITY, POC: 1.01
UROBILINOGEN, POC: 0.2

## 2020-07-24 PROCEDURE — 3017F COLORECTAL CA SCREEN DOC REV: CPT | Performed by: UROLOGY

## 2020-07-24 PROCEDURE — 81003 URINALYSIS AUTO W/O SCOPE: CPT | Performed by: UROLOGY

## 2020-07-24 PROCEDURE — G8427 DOCREV CUR MEDS BY ELIG CLIN: HCPCS | Performed by: UROLOGY

## 2020-07-24 PROCEDURE — 4040F PNEUMOC VAC/ADMIN/RCVD: CPT | Performed by: UROLOGY

## 2020-07-24 PROCEDURE — 99214 OFFICE O/P EST MOD 30 MIN: CPT | Performed by: UROLOGY

## 2020-07-24 PROCEDURE — 1123F ACP DISCUSS/DSCN MKR DOCD: CPT | Performed by: UROLOGY

## 2020-07-24 PROCEDURE — 1036F TOBACCO NON-USER: CPT | Performed by: UROLOGY

## 2020-07-24 PROCEDURE — G8420 CALC BMI NORM PARAMETERS: HCPCS | Performed by: UROLOGY

## 2020-07-24 RX ORDER — TAMSULOSIN HYDROCHLORIDE 0.4 MG/1
CAPSULE ORAL
COMMUNITY
Start: 2020-07-19 | End: 2020-10-22 | Stop reason: ALTCHOICE

## 2020-07-24 NOTE — PROGRESS NOTES
[facility] EVALUATION NOTE                                                 H&P                                                                                                                                                 Reason for Visit  Gross hematuria    History of Present Illness  Episode of gross hematuria  Hematuria work-up done recently showed no upper tract lesions and enlarged prostate with varicosities of the prostate prone to bleeding  History of bleeding prostatic varices  Currently on Flomax  Urine cleared after stopping aspirin  Urine culture will be sent  Patient may discontinue Flomax      Urologic Review of Systems/Symptoms  Denies hematuria  Denies dysuria  Denies incontinence  Denies flank pain  Other Urologic: Mild obstructive voiding symptoms    Review of Systems  Head and neck: No issues/reviewed  Cardiac: No recent issues/reviewed  Pulmonary: No issues/reviewed  Gastrointestinal: No issues/reviewed  Neurologic: No recent issues/reviewed  Extremities: No issues/reviewed  Lymphatics: No lymphadenopathy no change  Genitourinary: See above  Skin: No issues/reviewed  Hospitalization: None recent  Urine is cleared  All 14 categories of Review of Systems otherwise reviewed no other findings reported.     Past Medical History:   Diagnosis Date    CAD (coronary artery disease)     Eczema     Granuloma faciale     History of extremity bypass graft 11/16/2015    Left leg 11/14/2003    History of tobacco abuse 11/16/2015    Hyperlipidemia     Hypertension     Lipoma     PVD [facility]peripheral [facility] disease) ([facility] [facility]     Seborrheic keratosis      Past Surgical History:   Procedure Laterality Date    CARDIAC CATHETERIZATION  2003    COLONOSCOPY  10-19-12    CORONARY ANGIOPLASTY WITH STENT PLACEMENT  2003    DIAGNOSTIC CARDIAC CATH LAB PROCEDURE      HAND SURGERY  2003    L    HIP SURGERY      JOINT REPLACEMENT      LUNG BIOPSY Right 09/05/2018    CT guided Left Lung Biopsy by  [facility] May    LUNG BIOPSY Right     9/5/2018 per Dr Margaux Reese [address] [facility] [address] EBUS DX/TX INTERVENTION [address] LES N/A 8/17/2018    EBUS WITH TRANSBRONCHIAL NEEDLE ASPIRATION W/ FLUOROSCOPY performed by Zohra Hopkins MD at [place] [address] Left 6/21/2019    LEFT  HIP TOTAL ARTHROPLASTY performed by Laurent Wade MD at [address] [place] [address] History     Socioeconomic History    Marital status:       Spouse name: None    Number of children: None    Years of education: None    Highest education level: None   Occupational History     Employer: [employer]     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   Social Needs    Financial resource strain: None    Food insecurity     Worry: None     Inability: None    Transportation needs     Medical: None     Non-medical: None   Tobacco Use    Smoking status: Former Smoker     Packs/day: 1.00     Years: 40.00     Pack years: 40.00     Types: Cigarettes     Start date: 7/1/1965     Last attempt to quit: 7/1/2005     Years since quitting: 15.0    Smokeless tobacco: Never Used   Substance and Sexual Activity    Alcohol use: Yes     Types: 5 Glasses of wine, 2 Cans of beer per week     Comment: 3- 4 TIMES A WEEK  WINE ONLY     Drug use: No    Sexual activity: None   Lifestyle    Physical activity     Days per week: None     Minutes per session: None    Stress: None   Relationships    Social connections     Talks on phone: None     Gets together: None     Attends Congregation service: None     Active member of club or organization: None     Attends meetings of clubs or organizations: None     Relationship status: None    Intimate partner violence     Fear of current or ex partner: None     Emotionally abused: None     Physically abused: None     Forced sexual activity: None   Other Topics Concern    None   Social History Narrative    None     Family History   Problem Relation Age of Onset    High Blood Pressure Mother     Other Mother         BRAIN TUMOR    Cancer Father         LUNG     Current Outpatient Medications   Medication Sig Dispense Refill    tamsulosin (FLOMAX) 0.4 MG capsule TAKE 1 CAPSULE EVERY DAY      lovastatin (MEVACOR) 20 MG tablet Take 1 tablet by mouth nightly (Patient taking differently: Take 40 mg by mouth nightly ) 90 tablet 3    metoprolol succinate (TOPROL XL) 200 MG extended release tablet Take 1 tablet by mouth daily 90 tablet 3    Multiple Vitamins-Minerals (MULTIVITAMIN ADULT PO) Take by mouth      aspirin 81 MG chewable tablet Take 1 tablet by mouth 2 times daily (Patient taking differently: Take 81 mg by mouth daily ) 60 tablet 0     No current facility-administered medications for this visit. Lisinopril; Contrast [iodides]; Crestor [rosuvastatin]; and Pravachol [pravastatin]  All reviewed and verified by Dr Dwight Cortez on today's visit    PSA   Date Value Ref Range Status   06/22/2020 4.49 0.00 - 6.22 ng/mL Final   10/24/2019 4.37 0.00 - 6.22 ng/mL Final   05/24/2019 5.40 0.00 - 6.22 ng/mL Final     Comment:     When the Total PSA is between 3.00 and 10.00 ng/mL, consider  requesting a Free PSA to aid in diagnosis. 11/16/2017 4.25 0.00 - 6.22 ng/mL Final     Comment:     When the Total PSA is between 3.00 and 10.00 ng/mL, consider  requesting a Free PSA to aid in diagnosis. 05/30/2017 4.07 0.00 - 6.22 ng/mL Final     Comment:     When the Total PSA is between 3.00 and 10.00 ng/mL, consider  requesting a Free PSA to aid in diagnosis.          Results for POC orders placed in visit on 07/24/20   POCT Urinalysis No Micro (Auto)   Result Value Ref Range    Color, UA yellow     Clarity, UA clear     Glucose, UA POC neg     Bilirubin, UA neg     Ketones, UA neg     Spec Grav, UA 1.010     Blood, UA POC large     pH, UA 5.5     Protein, UA POC neg     Urobilinogen, UA 0.2     Leukocytes, UA moderate     Nitrite, UA neg    poct post void residual   Result Value Ref Range    post void residual 76 ml    Narrative    A point of care test   Post Void Residual was completed by performing  ultrasound scan of the bladder and  reviewed by Dr Edouard Beatty       Physical Exam  Vitals:    07/24/20 1203   BP: 122/80   Pulse: 71   Weight: 172 lb (78 kg)   Height: 5' 11\" (1.803 m)     Constitutional: patient is oriented to person, place, and time. patient appears well-developed. Not in distress. Ears: Adequate hearing/no hearing loss  Head: Normocephalic. Atraumatic  Neck: Normal range of motion. Cardiovascular: Normal rate, BP reviewed. Normal rhythm  Pulmonary/Chest: Normal respiratory effort Not short of breath  Abdominal: Not distended. No suprapubic discomfort  Urologic Exam  Postvoid residual 76 cc. Urinalysis shows microhematuria and pyuria culture sent. Urologic exam otherwise unchanged  Flow metric shows 9 mL/s with 100 cc voided. Musculoskeletal: Normal range of motion. Ambulatory. Extremities: No edema  Neurological: No deficits   Skin: Skin is warm and dry. No lesions. No rashes   Psychiatric: Flat affect. Assessment/Medical Necessity-Decision Making  Gross hematuria  History of bleeding prostatic varices  Hold aspirin 2 weeks  Resume aspirin after 2 weeks  If bleeding recurs stop aspirin altogether  If bleeding recurs after stopping aspirin for period of time May need finasteride  Understands finasteride and its side effects  If patient cannot tolerate finasteride may need laser TURP  Plan  Follow-up if bleeding recurs  Culture today  Greater than 50% of 30 minutes spent consulting patient face-to-face  Orders Placed This Encounter   Procedures    Culture, Urine     Standing Status:   Future     Standing Expiration Date:   7/24/2021     Order Specific Question:   Specify (ex-cath, midstream, cysto, etc)? Answer:   m    POCT Urinalysis No Micro (Auto)    poct post void residual     No orders of the defined types were placed in this encounter.     Kenzie Connors MD       Please note this report has been partially produced using speech recognition software  And may cause contain errors related to that system including grammar, punctuation and spelling as well as words and phrases that may seem inappropriate. If there are questions or concerns please feel free to contact me to clarify.

## 2020-07-27 ENCOUNTER — TELEPHONE (OUTPATIENT)
Dept: UROLOGY | Age: 75
End: 2020-07-27

## 2020-07-27 LAB
ORGANISM: ABNORMAL
URINE CULTURE, ROUTINE: ABNORMAL

## 2020-07-27 RX ORDER — SULFAMETHOXAZOLE AND TRIMETHOPRIM 800; 160 MG/1; MG/1
1 TABLET ORAL 2 TIMES DAILY
Qty: 14 TABLET | Refills: 0 | Status: SHIPPED | OUTPATIENT
Start: 2020-07-27 | End: 2020-08-03

## 2020-08-10 ENCOUNTER — OFFICE VISIT (OUTPATIENT)
Dept: FAMILY MEDICINE CLINIC | Age: 75
End: 2020-08-10
Payer: MEDICARE

## 2020-08-10 VITALS
BODY MASS INDEX: 24.08 KG/M2 | RESPIRATION RATE: 16 BRPM | HEART RATE: 62 BPM | HEIGHT: 71 IN | SYSTOLIC BLOOD PRESSURE: 120 MMHG | OXYGEN SATURATION: 98 % | WEIGHT: 172 LBS | DIASTOLIC BLOOD PRESSURE: 80 MMHG | TEMPERATURE: 98.3 F

## 2020-08-10 PROCEDURE — 1123F ACP DISCUSS/DSCN MKR DOCD: CPT | Performed by: FAMILY MEDICINE

## 2020-08-10 PROCEDURE — 3017F COLORECTAL CA SCREEN DOC REV: CPT | Performed by: FAMILY MEDICINE

## 2020-08-10 PROCEDURE — 4040F PNEUMOC VAC/ADMIN/RCVD: CPT | Performed by: FAMILY MEDICINE

## 2020-08-10 PROCEDURE — 1036F TOBACCO NON-USER: CPT | Performed by: FAMILY MEDICINE

## 2020-08-10 PROCEDURE — G8427 DOCREV CUR MEDS BY ELIG CLIN: HCPCS | Performed by: FAMILY MEDICINE

## 2020-08-10 PROCEDURE — G8420 CALC BMI NORM PARAMETERS: HCPCS | Performed by: FAMILY MEDICINE

## 2020-08-10 PROCEDURE — 99213 OFFICE O/P EST LOW 20 MIN: CPT | Performed by: FAMILY MEDICINE

## 2020-08-10 ASSESSMENT — ENCOUNTER SYMPTOMS
VOMITING: 0
COUGH: 0
DIARRHEA: 0

## 2020-08-10 NOTE — PROGRESS NOTES
Subjective:      Patient ID: Juan Carl is a 76 y.o. male    HPI  Here with wife. Has noted some memory issues over the last year or so which seems to be getting worse. No headaches. Seems more nervous recently as well. No nausea or emesis. No numbness of arms or legs     Review of Systems   Constitutional: Negative for chills, fever and unexpected weight change. Respiratory: Negative for cough. Gastrointestinal: Negative for diarrhea and vomiting. Neurological: Negative for weakness and headaches. 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   Social Needs    Financial resource strain: None    Food insecurity     Worry: None     Inability: None    Transportation needs     Medical: None     Non-medical: None   Tobacco Use    Smoking status: Former Smoker     Packs/day: 1.00     Years: 40.00     Pack years: 40.00     Types: Cigarettes     Start date: 7/1/1965     Last attempt to quit: 7/1/2005     Years since quitting: 15.1    Smokeless tobacco: Never Used   Substance and Sexual Activity    Alcohol use: Yes     Types: 5 Glasses of wine, 2 Cans of beer per week     Comment: 3- 4 TIMES A WEEK  WINE ONLY     Drug use: No    Sexual activity: None   Lifestyle    Physical activity     Days per week: None     Minutes per session: None    Stress: None   Relationships    Social connections     Talks on phone: None     Gets together: None     Attends Jainism service: None     Active member of club or organization: None     Attends meetings of clubs or organizations: None     Relationship status: None    Intimate partner violence     Fear of current or ex partner: None Emotionally abused: None     Physically abused: None     Forced sexual activity: None   Other Topics Concern    None   Social History Narrative    None     Current Outpatient Medications   Medication Sig Dispense Refill    tamsulosin (FLOMAX) 0.4 MG capsule TAKE 1 CAPSULE EVERY DAY      lovastatin (MEVACOR) 20 MG tablet Take 1 tablet by mouth nightly (Patient taking differently: Take 40 mg by mouth nightly ) 90 tablet 3    metoprolol succinate (TOPROL XL) 200 MG extended release tablet Take 1 tablet by mouth daily 90 tablet 3    Multiple Vitamins-Minerals (MULTIVITAMIN ADULT PO) Take by mouth      aspirin 81 MG chewable tablet Take 1 tablet by mouth 2 times daily (Patient taking differently: Take 81 mg by mouth daily ) 60 tablet 0     No current facility-administered medications for this visit. Family History   Problem Relation Age of Onset    High Blood Pressure Mother     Other Mother         BRAIN TUMOR    Cancer Father         LUNG     Past Medical History:   Diagnosis Date    CAD (coronary artery disease)     Eczema     Granuloma faciale     History of extremity bypass graft 11/16/2015    Left leg 11/14/2003    History of tobacco abuse 11/16/2015    Hyperlipidemia     Hypertension     Lipoma     PVD [facility]peripheral vascular disease) ([facility]     Seborrheic keratosis      Objective:   /80   Pulse 62   Temp 98.3 °F (36.8 °C)   Resp 16   Ht 5' 11\" (1.803 m)   Wt 172 lb (78 kg)   SpO2 98%   BMI 23.99 kg/m²     Physical Exam  Heent: T.M's normal Nares patent. EOM'S intact. Pupillary reaction directly and                Consensually to light normal.  No photophobia. Tongue mid line. No facial               Asymmetry. Neck:   No rigidity. No masses. No thyroid asymmetry. No bruits  Lungs:  Clear equal breath sounds bilat. No wheezes or rales   Heart:   Rate reg. No murmur  Neuro:  C.N 2-12 intact. No focal deficits.   Cerebellar function intact  Assessment: Diagnosis Orders   1.  Memory loss  MRI BRAIN WO CONTRAST         Plan:      Orders Placed This Encounter   Procedures    MRI BRAIN WO CONTRAST     Standing Status:   Future     Standing Expiration Date:   8/10/2021     Fasting blood work soon and f/u after all testing done

## 2020-08-12 DIAGNOSIS — R41.3 MEMORY LOSS: ICD-10-CM

## 2020-08-12 DIAGNOSIS — I10 ESSENTIAL HYPERTENSION: ICD-10-CM

## 2020-08-12 DIAGNOSIS — E78.5 HYPERLIPIDEMIA, UNSPECIFIED HYPERLIPIDEMIA TYPE: ICD-10-CM

## 2020-08-12 LAB
ALBUMIN SERPL-MCNC: 4.2 G/DL (ref 3.5–4.6)
ALP BLD-CCNC: 50 U/L (ref 35–104)
ALT SERPL-CCNC: 17 U/L (ref 0–41)
ANION GAP SERPL CALCULATED.3IONS-SCNC: 11 MEQ/L (ref 9–15)
AST SERPL-CCNC: 19 U/L (ref 0–40)
BASOPHILS ABSOLUTE: 0 K/UL (ref 0–0.2)
BASOPHILS RELATIVE PERCENT: 0.4 %
BILIRUB SERPL-MCNC: 0.7 MG/DL (ref 0.2–0.7)
BILIRUBIN DIRECT: <0.2 MG/DL (ref 0–0.4)
BILIRUBIN, INDIRECT: NORMAL MG/DL (ref 0–0.6)
BUN BLDV-MCNC: 14 MG/DL (ref 8–23)
CALCIUM SERPL-MCNC: 8.9 MG/DL (ref 8.5–9.9)
CHLORIDE BLD-SCNC: 106 MEQ/L (ref 95–107)
CHOLESTEROL, TOTAL: 154 MG/DL (ref 0–199)
CO2: 26 MEQ/L (ref 20–31)
CREAT SERPL-MCNC: 0.95 MG/DL (ref 0.7–1.2)
EOSINOPHILS ABSOLUTE: 0.1 K/UL (ref 0–0.7)
EOSINOPHILS RELATIVE PERCENT: 1.9 %
FOLATE: 17.7 NG/ML (ref 7.3–26.1)
GFR AFRICAN AMERICAN: >60
GFR NON-AFRICAN AMERICAN: >60
GLUCOSE BLD-MCNC: 105 MG/DL (ref 70–99)
HCT VFR BLD CALC: 45.9 % (ref 42–52)
HDLC SERPL-MCNC: 56 MG/DL (ref 40–59)
HEMOGLOBIN: 15.5 G/DL (ref 14–18)
LDL CHOLESTEROL CALCULATED: 76 MG/DL (ref 0–129)
LYMPHOCYTES ABSOLUTE: 1.1 K/UL (ref 1–4.8)
LYMPHOCYTES RELATIVE PERCENT: 21.6 %
MCH RBC QN AUTO: 29.9 PG (ref 27–31.3)
MCHC RBC AUTO-ENTMCNC: 33.9 % (ref 33–37)
MCV RBC AUTO: 88.2 FL (ref 80–100)
MONOCYTES ABSOLUTE: 0.3 K/UL (ref 0.2–0.8)
MONOCYTES RELATIVE PERCENT: 5.3 %
NEUTROPHILS ABSOLUTE: 3.7 K/UL (ref 1.4–6.5)
NEUTROPHILS RELATIVE PERCENT: 70.8 %
PDW BLD-RTO: 13.8 % (ref 11.5–14.5)
PLATELET # BLD: 216 K/UL (ref 130–400)
POTASSIUM SERPL-SCNC: 4.3 MEQ/L (ref 3.4–4.9)
RBC # BLD: 5.2 M/UL (ref 4.7–6.1)
SLIDE REVIEW: NORMAL
SODIUM BLD-SCNC: 143 MEQ/L (ref 135–144)
TOTAL PROTEIN: 6.5 G/DL (ref 6.3–8)
TRIGL SERPL-MCNC: 112 MG/DL (ref 0–150)
TSH SERPL DL<=0.05 MIU/L-ACNC: 1.92 UIU/ML (ref 0.44–3.86)
VITAMIN B-12: 530 PG/ML (ref 232–1245)
WBC # BLD: 5.2 K/UL (ref 4.8–10.8)

## 2020-09-01 ENCOUNTER — HOSPITAL ENCOUNTER (OUTPATIENT)
Dept: MRI IMAGING | Age: 75
Discharge: HOME | End: 2020-09-03
Payer: MEDICARE

## 2020-09-01 PROCEDURE — 70551 MRI BRAIN STEM W/O DYE: CPT

## 2020-09-15 ENCOUNTER — VIRTUAL VISIT (OUTPATIENT)
Dept: FAMILY MEDICINE CLINIC | Age: 75
End: 2020-09-15
Payer: MEDICARE

## 2020-09-15 PROCEDURE — 1123F ACP DISCUSS/DSCN MKR DOCD: CPT | Performed by: FAMILY MEDICINE

## 2020-09-15 PROCEDURE — 3017F COLORECTAL CA SCREEN DOC REV: CPT | Performed by: FAMILY MEDICINE

## 2020-09-15 PROCEDURE — G8510 SCR DEP NEG, NO PLAN REQD: HCPCS | Performed by: FAMILY MEDICINE

## 2020-09-15 PROCEDURE — 4040F PNEUMOC VAC/ADMIN/RCVD: CPT | Performed by: FAMILY MEDICINE

## 2020-09-15 PROCEDURE — G8427 DOCREV CUR MEDS BY ELIG CLIN: HCPCS | Performed by: FAMILY MEDICINE

## 2020-09-15 PROCEDURE — 99214 OFFICE O/P EST MOD 30 MIN: CPT | Performed by: FAMILY MEDICINE

## 2020-09-15 ASSESSMENT — PATIENT HEALTH QUESTIONNAIRE - PHQ9
SUM OF ALL RESPONSES TO PHQ9 QUESTIONS 1 & 2: 0
1. LITTLE INTEREST OR PLEASURE IN DOING THINGS: 0
SUM OF ALL RESPONSES TO PHQ QUESTIONS 1-9: 0
SUM OF ALL RESPONSES TO PHQ QUESTIONS 1-9: 0
2. FEELING DOWN, DEPRESSED OR HOPELESS: 0

## 2020-09-15 ASSESSMENT — ENCOUNTER SYMPTOMS
COUGH: 0
VOMITING: 0
DIARRHEA: 0

## 2020-09-15 NOTE — PROGRESS NOTES
l Subjective:      Patient ID: Zoey Muñoz is a 76 y.o. male    HPI  Here in follow up for memory loss and htn and lipids. Review of Systems   Constitutional: Negative for chills and fever. Respiratory: Negative for cough. Cardiovascular: Negative for chest pain and leg swelling. Gastrointestinal: Negative for diarrhea and vomiting. Skin: Negative for rash. Neurological: Negative for weakness. Treatment Adherence:   Medication compliance:  compliant most of the time  Diet compliance:  compliant most of the time  Weight trend: stable  Current exercise: walks 7 time(s) per week  Barriers: none    Hypertension:  Home blood pressure monitoring: Yes - 120/70. He is adherent to a low sodium diet. Patient denies chest pain. Antihypertensive medication side effects: no medication side effects noted. Use of agents associated with hypertension: none. Sodium (mEq/L)   Date Value   08/12/2020 143    BUN (mg/dL)   Date Value   08/12/2020 14    Glucose (mg/dL)   Date Value   08/12/2020 105 (H)   10/14/2011 104      Potassium (mEq/L)   Date Value   08/12/2020 4.3     Potassium reflex Magnesium (mEq/L)   Date Value   06/22/2019 4.3    CREATININE (mg/dL)   Date Value   08/12/2020 0.95         Hyperlipidemia:  No new myalgias or GI upset on lovastatin (Mevacor). Lab Results   Component Value Date    CHOL 154 08/12/2020    TRIG 112 08/12/2020    HDL 56 08/12/2020    LDLCALC 76 08/12/2020     Lab Results   Component Value Date    ALT 17 08/12/2020    AST 19 08/12/2020        Reviewed allergy, medical, social, surgical, family and med list changes and updated   Files--reviewed blood work and mri-neg      Social History     Socioeconomic History    Marital status:       Spouse name: Not on file    Number of children: Not on file    Years of education: Not on file    Highest education level: Not on file   Occupational History     Employer: [employer] 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   Social Needs    Financial resource strain: Not on file    Food insecurity     Worry: Not on file     Inability: Not on file    Transportation needs     Medical: Not on file     Non-medical: Not on file   Tobacco Use    Smoking status: Former Smoker     Packs/day: 1.00     Years: 40.00     Pack years: 40.00     Types: Cigarettes     Start date: 7/1/1965     Last attempt to quit: 7/1/2005     Years since quitting: 15.2    Smokeless tobacco: Never Used   Substance and Sexual Activity    Alcohol use: Yes     Types: 5 Glasses of wine, 2 Cans of beer per week     Comment: 3- 4 TIMES A WEEK  WINE ONLY     Drug use: No    Sexual activity: Not on file   Lifestyle    Physical activity     Days per week: Not on file     Minutes per session: Not on file    Stress: Not on file   Relationships    Social connections     Talks on phone: Not on file     Gets together: Not on file     Attends [employer] service: Not on file     Active member of club or organization: Not on file     Attends meetings of clubs or organizations: Not on file     Relationship status: Not on file    Intimate partner violence     Fear of current or ex partner: Not on file     Emotionally abused: Not on file     Physically abused: Not on file     Forced sexual activity: Not on file   Other Topics Concern    Not on file   Social History Narrative    Not on file     Current Outpatient Medications   Medication Sig Dispense Refill    tamsulosin (FLOMAX) 0.4 MG capsule TAKE 1 CAPSULE EVERY DAY      lovastatin (MEVACOR) 20 MG tablet Take 1 tablet by mouth nightly (Patient taking differently: Take 40 mg by mouth nightly ) 90 tablet 3    metoprolol succinate (TOPROL XL) 200 MG extended release tablet Take 1 tablet by mouth daily 90 tablet 3    Multiple Vitamins-Minerals (MULTIVITAMIN ADULT PO) Take by mouth      aspirin 81 MG chewable tablet Take 1 tablet by mouth 2 times daily (Patient taking differently: Take 81 mg by mouth daily ) 60 tablet 0     No current facility-administered medications for this visit. Family History   Problem Relation Age of Onset    High Blood Pressure Mother     Other Mother         BRAIN TUMOR    Cancer Father         LUNG     Past Medical History:   Diagnosis Date    CAD (coronary artery disease)     Eczema     Granuloma faciale     History of extremity bypass graft 11/16/2015    Left leg 11/14/2003    History of tobacco abuse 11/16/2015    Hyperlipidemia     Hypertension     Lipoma     PVD (peripheral vascular disease) ([facility] [address]     Seborrheic keratosis        TELEHEALTH EVALUATION -- Audio/Visual (During VVDBF-30 public health emergency)    -   Leigh Whiting is a 76 y.o. male being evaluated by a Virtual Visit (video visit) encounter to address concerns as mentioned above. A caregiver was present when appropriate. Due to this being a TeleHealth encounter (During AICVQ-22 public health emergency), evaluation of the following organ systems was limited: Vitals/Constitutional/EENT/Resp/CV/GI//MS/Neuro/Skin/Heme-Lymph-Imm. Pursuant to the emergency declaration under the [address] [place] [address] and the [employer] Act, this Virtual Visit was conducted with patient's (and/or legal guardian's) consent, to reduce the patient's risk of exposure to COVID-19 and provide necessary medical care. The patient (and/or legal guardian) has also been advised to contact this office for worsening conditions or problems, and seek emergency medical treatment and/or call 911 if deemed necessary. Patient accepts tele health video visit via [employer]. me and associated charges  Visit about 22 min   Services were provided through a video synchronous discussion virtually to substitute for in-person clinic visit. Type of encounter was _x_ Doxy __ MyChart ___Facetime    Patient was located at their home. Provider was located at their _x__ home or        ____ office. --Ilia Grant MD on 9/15/2020 at 10:17 AM    An electronic signature was used to authenticate this note. Objective: There were no vitals taken for this visit. Physical Exam  Gen; No acute distress  Neuro; No focal deficits. No facial asymmetries;    Mental  Status; Alert. Awake. Affect appropriate. Extremities; No edema of either leg. Normal skin color   Assessment:       Diagnosis Orders   1. Essential hypertension     2. Hyperlipidemia, unspecified hyperlipidemia type     3.  Memory loss  Angie Soto MD, Neurology, [place]         Plan:      Continue current meds   Orders Placed This Encounter   Procedures   Jairo Harmon MD, Neurology, [place]     Referral Priority:   Routine     Referral Type:   Eval and Treat     Referral Reason:   Specialty Services Required     Referred to Provider:   Molly Torres MD     Requested Specialty:   Neurology     Number of Visits Requested:   1   f/u in 6 months

## 2020-10-22 ENCOUNTER — OFFICE VISIT (OUTPATIENT)
Dept: CARDIOLOGY CLINIC | Age: 75
End: 2020-10-22
Payer: MEDICARE

## 2020-10-22 VITALS
DIASTOLIC BLOOD PRESSURE: 76 MMHG | RESPIRATION RATE: 16 BRPM | WEIGHT: 174 LBS | HEART RATE: 68 BPM | SYSTOLIC BLOOD PRESSURE: 136 MMHG | BODY MASS INDEX: 24.27 KG/M2 | OXYGEN SATURATION: 98 %

## 2020-10-22 PROCEDURE — G8484 FLU IMMUNIZE NO ADMIN: HCPCS | Performed by: INTERNAL MEDICINE

## 2020-10-22 PROCEDURE — 1036F TOBACCO NON-USER: CPT | Performed by: INTERNAL MEDICINE

## 2020-10-22 PROCEDURE — G8420 CALC BMI NORM PARAMETERS: HCPCS | Performed by: INTERNAL MEDICINE

## 2020-10-22 PROCEDURE — 99214 OFFICE O/P EST MOD 30 MIN: CPT | Performed by: INTERNAL MEDICINE

## 2020-10-22 PROCEDURE — 4040F PNEUMOC VAC/ADMIN/RCVD: CPT | Performed by: INTERNAL MEDICINE

## 2020-10-22 PROCEDURE — G8427 DOCREV CUR MEDS BY ELIG CLIN: HCPCS | Performed by: INTERNAL MEDICINE

## 2020-10-22 PROCEDURE — 93000 ELECTROCARDIOGRAM COMPLETE: CPT | Performed by: INTERNAL MEDICINE

## 2020-10-22 PROCEDURE — 3017F COLORECTAL CA SCREEN DOC REV: CPT | Performed by: INTERNAL MEDICINE

## 2020-10-22 PROCEDURE — 1123F ACP DISCUSS/DSCN MKR DOCD: CPT | Performed by: INTERNAL MEDICINE

## 2020-10-22 ASSESSMENT — ENCOUNTER SYMPTOMS
CHEST TIGHTNESS: 0
BLOOD IN STOOL: 0
GASTROINTESTINAL NEGATIVE: 1
COUGH: 0
NAUSEA: 0
STRIDOR: 0
EYES NEGATIVE: 1
RESPIRATORY NEGATIVE: 1
WHEEZING: 0
SHORTNESS OF BREATH: 0

## 2020-10-22 NOTE — PROGRESS NOTES
Subsequent Progress Note  Patient: Terry Siddiqui  YOB: 1945  MRN: [id number]    Chief Complaint: pad cad htn  Chief Complaint   Patient presents with    3 Month Follow-Up    Coronary Artery Disease    Hypertension       CV Data:  2003 Left Leg Bypass - Dr. Evan Bailey  2003 LAD Stent. RCA occluded  7/2015 echo 65%  6/2019  ABN Defect known RCA occlusion fills from LAD   6/2018 CUS mild  10/2019 CUS - mild. 6/2019 EF 55% 1-2+ MR. Subjective/HPI: no cp no sob still walks 2 miles daily no falls no bleed. spect abn but in the territory of known RCA occlusion     10/21/2019: doing well. Did well with L hip sx. No cp no sob. Now having recurrent gout attacks. 5/13/2020 TELEHEALTH EVALUATION -- Audio/Visual (During GAAXT-11 public health emergency)    Recently returned from [id number] Doing well. No cp no sob no bleed no falls    7/16/2020 TELEHEALTH EVALUATION -- Audio/Visual (During ODWRG-02 public health emergency)    Doing well. They really do not go anywhere due to COVID. He still exercises daily. No pc no osb no falls nobleed. Takes meds    10/22/2020 no cp no sob no falls no bleed takes meds. No leg pain.      Nonsmoker since 2003  Retired -[employer]       EKG:    Past Medical History:   Diagnosis Date    CAD (coronary artery disease)     Eczema     Granuloma faciale     History of extremity bypass graft 11/16/2015    Left leg 11/14/2003    History of tobacco abuse 11/16/2015    Hyperlipidemia     Hypertension     Lipoma     PVD (peripheral vascular disease) ([facility] [address]     Seborrheic keratosis        Past Surgical History:   Procedure Laterality Date    CARDIAC CATHETERIZATION  2003    COLONOSCOPY  10-19-12    CORONARY ANGIOPLASTY WITH STENT PLACEMENT  2003    DIAGNOSTIC CARDIAC CATH LAB PROCEDURE      HAND SURGERY  2003    L    HIP SURGERY      JOINT REPLACEMENT      LUNG BIOPSY Right 09/05/2018    CT guided Left Lung Biopsy by Dr Tomas Kelsey Right     9/5/2018 per Dr Denny Lights [address]. EBUS DX/TX INTERVENTION [address] LES N/A 8/17/2018    EBUS WITH TRANSBRONCHIAL NEEDLE ASPIRATION W/ FLUOROSCOPY performed by Jewels George [facility] at [facility] Left 6/21/2019    LEFT  HIP TOTAL ARTHROPLASTY performed by Reyna Tinajero MD at [facility]       Family History   Problem Relation Age of Onset    High Blood Pressure Mother     Other Mother         BRAIN TUMOR    Cancer Father         LUNG       Social History     Socioeconomic History    Marital status:       Spouse name: None    Number of children: None    Years of education: None    Highest education level: None   Occupational History     Employer: [employer]     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   Social Needs    Financial resource strain: None    Food insecurity     Worry: None     Inability: None    Transportation needs     Medical: None     Non-medical: None   Tobacco Use    Smoking status: Former Smoker     Packs/day: 1.00     Years: 40.00     Pack years: 40.00     Types: Cigarettes     Start date: 7/1/1965     Last attempt to quit: 7/1/2005     Years since quitting: 15.3    Smokeless tobacco: Never Used   Substance and Sexual Activity    Alcohol use: Yes     Types: 5 Glasses of wine, 2 Cans of beer per week     Comment: 3- 4 TIMES A WEEK  WINE ONLY     Drug use: No    Sexual activity: None   Lifestyle    Physical activity     Days per week: None     Minutes per session: None    Stress: None   Relationships    Social connections     Talks on phone: None     Gets together: None     Attends Jew service: None     Active member of club or organization: None     Attends meetings of clubs or organizations: None     Relationship status: None    Intimate partner violence     Fear of current or ex partner: None     Emotionally abused: None     Physically abused: None     Forced sexual activity: None   Other Topics Concern    None   Social History Narrative    None Allergies   Allergen Reactions    Lisinopril Anaphylaxis and Swelling    Contrast [Iodides] Other (See Comments)     Pt unsure of reaction.  Crestor [Rosuvastatin]     Pravachol [Pravastatin] Other (See Comments)     Joint / Muscle aches       Current Outpatient Medications   Medication Sig Dispense Refill    lovastatin (MEVACOR) 20 MG tablet Take 1 tablet by mouth nightly 90 tablet 3    metoprolol succinate (TOPROL XL) 200 MG extended release tablet Take 1 tablet by mouth daily 90 tablet 3    Multiple Vitamins-Minerals (MULTIVITAMIN ADULT PO) Take by mouth      aspirin 81 MG chewable tablet Take 1 tablet by mouth 2 times daily (Patient taking differently: Take 81 mg by mouth daily ) 60 tablet 0     No current facility-administered medications for this visit. Review of Systems:   Review of Systems   Constitutional: Negative. Negative for diaphoresis and fatigue. HENT: Negative. Eyes: Negative. Respiratory: Negative. Negative for cough, chest tightness, shortness of breath, wheezing and stridor. Cardiovascular: Negative. Negative for chest pain, palpitations and leg swelling. Gastrointestinal: Negative. Negative for blood in stool and nausea. Genitourinary: Negative. Musculoskeletal: Positive for arthralgias. Skin: Negative. Neurological: Negative. Negative for dizziness, syncope, weakness and light-headedness. Hematological: Negative. Psychiatric/Behavioral: Negative. Physical Examination:    /76 (Site: Right Upper Arm, Position: Sitting, Cuff Size: Medium Adult)   Pulse 68   Resp 16   Wt 174 lb (78.9 kg)   SpO2 98%   BMI 24.27 kg/m²    Physical Exam   Constitutional: He appears healthy. No distress. HENT:   Normal cephalic and Atraumatic   Eyes: Pupils are equal, round, and reactive to light. Neck: Normal range of motion and thyroid normal. Neck supple. No JVD present. No neck adenopathy. No thyromegaly present.    Cardiovascular: Normal rate, regular rhythm, intact distal pulses and normal pulses. Murmur heard. Pulses:       Carotid pulses are on the right side with bruit and on the left side with bruit. Pulmonary/Chest: Effort normal and breath sounds normal. He has no wheezes. He has no rales. He exhibits no tenderness. Abdominal: Soft. Bowel sounds are normal. There is no abdominal tenderness. Musculoskeletal: Normal range of motion. General: No tenderness or edema. Neurological: He is alert and oriented to person, place, and time. Skin: Skin is warm. No cyanosis. Nails show no clubbing.        LABS:  CBC:   Lab Results   Component Value Date    WBC 5.2 08/12/2020    RBC 5.20 08/12/2020    RBC 4.94 10/14/2011    HGB 15.5 08/12/2020    HCT 45.9 08/12/2020    MCV 88.2 08/12/2020    MCH 29.9 08/12/2020    MCHC 33.9 08/12/2020    RDW 13.8 08/12/2020     08/12/2020    MPV 7.9 07/22/2015     Lipids:  Lab Results   Component Value Date    CHOL 154 08/12/2020    CHOL 156 05/20/2020    CHOL 175 10/15/2019     Lab Results   Component Value Date    TRIG 112 08/12/2020    TRIG 88 05/20/2020    TRIG 139 10/15/2019     Lab Results   Component Value Date    HDL 56 08/12/2020    HDL 57 05/20/2020    HDL 71 (H) 10/15/2019     Lab Results   Component Value Date    LDLCALC 76 08/12/2020    LDLCALC 81 05/20/2020    LDLCALC 76 10/15/2019     No results found for: LABVLDL, VLDL  Lab Results   Component Value Date    CHOLHDLRATIO 3.7 10/05/2012    CHOLHDLRATIO 4.0 10/14/2011     CMP:    Lab Results   Component Value Date     08/12/2020    K 4.3 08/12/2020    K 4.3 06/22/2019     08/12/2020    CO2 26 08/12/2020    BUN 14 08/12/2020    CREATININE 0.95 08/12/2020    GFRAA >60.0 08/12/2020    LABGLOM >60.0 08/12/2020    GLUCOSE 105 08/12/2020    GLUCOSE 104 10/14/2011    PROT 6.5 08/12/2020    LABALBU 4.2 08/12/2020    LABALBU 4.4 10/14/2011    CALCIUM 8.9 08/12/2020    BILITOT 0.7 08/12/2020    ALKPHOS 50 08/12/2020    AST 19 08/12/2020    ALT 17 08/12/2020     BMP:    Lab Results   Component Value Date     08/12/2020    K 4.3 08/12/2020    K 4.3 06/22/2019     08/12/2020    CO2 26 08/12/2020    BUN 14 08/12/2020    LABALBU 4.2 08/12/2020    LABALBU 4.4 10/14/2011    CREATININE 0.95 08/12/2020    CALCIUM 8.9 08/12/2020    GFRAA >60.0 08/12/2020    LABGLOM >60.0 08/12/2020    GLUCOSE 105 08/12/2020    GLUCOSE 104 10/14/2011     Magnesium:    Lab Results   Component Value Date    MG 2.4 07/19/2015     TSH:  Lab Results   Component Value Date    TSH 1.920 08/12/2020       Patient Active Problem List   Diagnosis    Hypertension    CAD (coronary artery disease)    Lipoma    Eczema    Hyperlipidemia    Seborrheic keratosis    [facility]VD [facility]per[facility]) ([facility] [address]    Granuloma faciale    ED (erectile dysfunction)    Peripheral [facility] disease ([facility]    History of placement of stent in LAD coronary artery    History of extremity bypass graft    History of tobacco abuse    PAD [facility]peripheral [facility] disease) ([facility])    Lung nodule    Status post total hip replacement, right    Status post total hip replacement, left    History of left hip replacement    Bilateral carotid bruits    Essential hypertension    Coronary artery disease involving native coronary artery of native heart without angina pectoris       Medications Discontinued During This Encounter   Medication Reason    tamsulosin (FLOMAX) 0.4 MG capsule DISCONTINUED BY ANOTHER CLINICIAN       Modified Medications    No medications on file       No orders of the defined types were placed in this encounter. Assessment/Plan:    1. Essential hypertension  Stable     2. Coronary artery disease involving native coronary artery of native heart without angina pectoris  No angina     3. Hyperlipidemia, unspecified hyperlipidemia type   statin - was recently doubled. Will need f/u Labs in future - stable    4.  PAD (peripheral artery disease) ([facility])  STABLE - no claudication    5. Miller bruits- CUS -- f/u CUS    Continue meds. Continue heart healthy lifestyle        Counseling:  Heart Healthy Lifestyle, Low Salt Diet, Take Precautions to Prevent Falls and Walk Daily    Return in about 3 months (around 1/22/2021).       Electronically signed by Irvin Uriarte MD on 10/22/2020 at 1:35 PM

## 2020-11-03 PROBLEM — I25.10 CAD (CORONARY ARTERY DISEASE): Status: RESOLVED | Noted: 2020-11-03 | Resolved: 2020-11-03

## 2020-11-03 PROBLEM — I73.9 PVD (PERIPHERAL VASCULAR DISEASE) (HCC): Status: RESOLVED | Noted: 2020-11-03 | Resolved: 2020-11-03

## 2020-11-03 PROBLEM — I10 HYPERTENSION: Status: RESOLVED | Noted: 2020-11-03 | Resolved: 2020-11-03

## 2020-11-05 ENCOUNTER — HOSPITAL ENCOUNTER (OUTPATIENT)
Dept: ULTRASOUND IMAGING | Age: 75
Discharge: HOME | End: 2020-11-07
Payer: MEDICARE

## 2020-11-05 PROCEDURE — 93880 EXTRACRANIAL BILAT STUDY: CPT | Performed by: INTERNAL MEDICINE

## 2020-11-05 PROCEDURE — 93880 EXTRACRANIAL BILAT STUDY: CPT

## 2020-12-03 PROBLEM — I10 HYPERTENSIVE DISORDER: Status: ACTIVE | Noted: 2020-12-03

## 2020-12-03 PROBLEM — Z47.1 AFTERCARE FOLLOWING JOINT REPLACEMENT SURGERY: Status: ACTIVE | Noted: 2019-06-23

## 2020-12-03 PROBLEM — Z01.818 ENCOUNTER FOR OTHER PREPROCEDURAL EXAMINATION: Status: ACTIVE | Noted: 2019-06-20

## 2020-12-03 PROBLEM — S93.601A UNSPECIFIED SPRAIN OF RIGHT FOOT, INITIAL ENCOUNTER: Status: ACTIVE | Noted: 2019-07-29

## 2020-12-03 PROBLEM — M16.12 UNILATERAL PRIMARY OSTEOARTHRITIS, LEFT HIP: Status: ACTIVE | Noted: 2019-08-08

## 2020-12-03 PROBLEM — Z96.649 HISTORY OF TOTAL HIP ARTHROPLASTY: Status: ACTIVE | Noted: 2019-06-21

## 2020-12-03 PROBLEM — M19.071 PRIMARY OSTEOARTHRITIS, RIGHT ANKLE AND FOOT: Status: ACTIVE | Noted: 2019-07-29

## 2020-12-03 PROBLEM — Z98.890 HISTORY OF REPAIR OF HIP JOINT: Status: ACTIVE | Noted: 2019-06-23

## 2020-12-03 PROBLEM — Z96.649 S/P HIP REPLACEMENT: Status: ACTIVE | Noted: 2020-12-03

## 2020-12-07 ENCOUNTER — OFFICE VISIT (OUTPATIENT)
Dept: NEUROLOGY | Age: 75
End: 2020-12-07
Payer: MEDICARE

## 2020-12-07 VITALS
HEART RATE: 62 BPM | WEIGHT: 177.3 LBS | SYSTOLIC BLOOD PRESSURE: 156 MMHG | BODY MASS INDEX: 24.73 KG/M2 | DIASTOLIC BLOOD PRESSURE: 82 MMHG

## 2020-12-07 PROBLEM — I63.9 SMALL VESSEL CEREBROVASCULAR ACCIDENT (CVA) (HCC): Status: ACTIVE | Noted: 2020-12-07

## 2020-12-07 PROBLEM — R41.3 MEMORY LOSS, SHORT TERM: Status: ACTIVE | Noted: 2020-12-07

## 2020-12-07 PROBLEM — G31.84 MCI (MILD COGNITIVE IMPAIRMENT): Status: ACTIVE | Noted: 2020-12-07

## 2020-12-07 PROBLEM — G56.22 ULNAR NEUROPATHY AT WRIST, LEFT: Status: ACTIVE | Noted: 2020-12-07

## 2020-12-07 PROCEDURE — 99204 OFFICE O/P NEW MOD 45 MIN: CPT | Performed by: PSYCHIATRY & NEUROLOGY

## 2020-12-07 PROCEDURE — G8484 FLU IMMUNIZE NO ADMIN: HCPCS | Performed by: PSYCHIATRY & NEUROLOGY

## 2020-12-07 PROCEDURE — G8420 CALC BMI NORM PARAMETERS: HCPCS | Performed by: PSYCHIATRY & NEUROLOGY

## 2020-12-07 PROCEDURE — 4040F PNEUMOC VAC/ADMIN/RCVD: CPT | Performed by: PSYCHIATRY & NEUROLOGY

## 2020-12-07 PROCEDURE — 1123F ACP DISCUSS/DSCN MKR DOCD: CPT | Performed by: PSYCHIATRY & NEUROLOGY

## 2020-12-07 PROCEDURE — 3017F COLORECTAL CA SCREEN DOC REV: CPT | Performed by: PSYCHIATRY & NEUROLOGY

## 2020-12-07 PROCEDURE — 1036F TOBACCO NON-USER: CPT | Performed by: PSYCHIATRY & NEUROLOGY

## 2020-12-07 PROCEDURE — G8427 DOCREV CUR MEDS BY ELIG CLIN: HCPCS | Performed by: PSYCHIATRY & NEUROLOGY

## 2020-12-07 RX ORDER — DONEPEZIL HYDROCHLORIDE 5 MG/1
5 TABLET, FILM COATED ORAL NIGHTLY
Qty: 30 TABLET | Refills: 3 | Status: SHIPPED | OUTPATIENT
Start: 2020-12-07 | End: 2021-04-05 | Stop reason: CLARIF

## 2020-12-07 ASSESSMENT — ENCOUNTER SYMPTOMS
BACK PAIN: 0
CHOKING: 0
TROUBLE SWALLOWING: 0
SHORTNESS OF BREATH: 0
COLOR CHANGE: 0
NAUSEA: 0
PHOTOPHOBIA: 0
VOMITING: 0

## 2020-12-07 NOTE — PROGRESS NOTES
Subjective:      Patient ID: Dragan Velázquez is a 76 y.o. male who presents today for:  Chief Complaint   Patient presents with    New Patient     Patient is being seen today due to memory loss. Paitents wife states that it is not horrible memory loss he just forgets things, and gets confused once in a while through out the week. Onset of memory loss and confusion has been going on for about a year now. Patient wants to know if their is anything that can be done to help. Wife also states that it is his present memory that there is issues with not past memory. HPI 49-year-old right-handed gentleman who is here for evaluation of memory issues. Patient has noticed symptoms for about 1 year he is aware of the symptoms. He has short-term memory issues he has good days and bad days. He retired as a supervisor many years ago. He denies any head injury trauma he has no known history of sleep apnea. He does appear to be anxious according to his wife. He still able to drive very well is independent in his activities of daily living is still able to pay bills is not making any mistakes there either. He does not have any major risk factors for cerebrovascular disease he has one cardiac stent. Does have peripheral vascular disease he sleeps well. There is not been any history of alcoholism. Patient does not feel depressed. According to his wife he worries a lot. Patient has a ulnar nerve injury many years ago which has not changed she has a claw hand on the left. He denies any neck pain.     Past Medical History:   Diagnosis Date    CAD (coronary artery disease)     Eczema     Granuloma faciale     History of extremity bypass graft 11/16/2015    Left leg 11/14/2003    History of tobacco abuse 11/16/2015    Hyperlipidemia     Hypertension     Lipoma     [facility]     Seborrheic keratosis     Small vessel cerebrovascular accident (CVA) ([facility] [address] 12/7/2020     Past Surgical History:   Procedure Laterality Date    CARDIAC CATHETERIZATION  2003    COLONOSCOPY  10-19-12    CORONARY ANGIOPLASTY WITH STENT PLACEMENT  2003    DIAGNOSTIC CARDIAC CATH LAB PROCEDURE      HAND SURGERY  2003    L    HIP SURGERY      JOINT REPLACEMENT      LUNG BIOPSY Right 09/05/2018    CT guided Left Lung Biopsy by Dr Laurent Ling Right     9/5/2018 per Dr Nehemiah Hanks 70. EBUS DX/TX INTERVENTION [address] LES N/A 8/17/2018    EBUS WITH TRANSBRONCHIAL NEEDLE ASPIRATION W/ FLUOROSCOPY performed by Jona Alexander MD at [place] [address] Left 6/21/2019    LEFT  HIP TOTAL ARTHROPLASTY performed by Montana Mesa MD at [address] [place] [address] History     Socioeconomic History    Marital status:       Spouse name: Not on file    Number of children: Not on file    Years of education: Not on file    Highest education level: Not on file   Occupational History     Employer: [employer]     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   Social Needs    Financial resource strain: Not on file    Food insecurity     Worry: Not on file     Inability: Not on file    Transportation needs     Medical: Not on file     Non-medical: Not on file   Tobacco Use    Smoking status: Former Smoker     Packs/day: 1.00     Years: 40.00     Pack years: 40.00     Types: Cigarettes     Start date: 7/1/1965     Last attempt to quit: 7/1/2005     Years since quitting: 15.4    Smokeless tobacco: Never Used   Substance and Sexual Activity    Alcohol use: Yes     Types: 5 Glasses of wine, 2 Cans of beer per week     Comment: 3- 4 TIMES A WEEK  WINE ONLY     Drug use: No    Sexual activity: Not on file   Lifestyle    Physical activity     Days per week: Not on file     Minutes per session: Not on file    Stress: Not on file   Relationships    Social connections     Talks on phone: Not on file     Gets together: Not on file     Attends Amish service: Not on file     Active member of Allergic/Immunologic: Negative for food allergies. Neurological: Negative for dizziness, tremors, seizures, syncope, facial asymmetry, speech difficulty, weakness, light-headedness, numbness and headaches. Psychiatric/Behavioral: Negative for behavioral problems, confusion, hallucinations and sleep disturbance. Objective:   BP (!) 156/82 (Site: Left Upper Arm, Position: Sitting, Cuff Size: Medium Adult)   Pulse 62   Wt 177 lb 4.8 oz (80.4 kg)   BMI 24.73 kg/m²     Physical Exam  Vitals signs reviewed. Eyes:      Pupils: Pupils are equal, round, and reactive to light. Neck:      Musculoskeletal: Normal range of motion. Cardiovascular:      Rate and Rhythm: Normal rate and regular rhythm. Heart sounds: No murmur. Pulmonary:      Effort: Pulmonary effort is normal.      Breath sounds: Normal breath sounds. Abdominal:      General: Bowel sounds are normal.   Musculoskeletal: Normal range of motion. Skin:     General: Skin is warm. Neurological:      Mental Status: He is alert and oriented to person, place, and time. Cranial Nerves: No cranial nerve deficit. Sensory: No sensory deficit. Motor: No abnormal muscle tone. Coordination: Coordination normal.      Deep Tendon Reflexes: Reflexes are normal and symmetric. Babinski sign absent on the right side. Babinski sign absent on the left side. Comments: Mini-Mental examination score of 25. Left arm examination reveals a claw hand with ulnar nerve dysfunction which is distal with significant wasting of the ulnar supplied muscles in his hand.    Psychiatric:         Mood and Affect: Mood normal.     Mini-Mental examination score of 25    Us Carotid Artery Bilateral    Result Date: 11/5/2020  EXAMINATION:  CAROTID DUPLEX ULTRASONOGRAPHY CLINICAL HISTORY:  BILATERAL CAROTID BRUITS COMPARISONS:  October 28, 2019 TECHNIQUE:  B-mode, color flow and spectral Doppler FINDINGS:  ARTERIAL BLOOD FLOW VELOCITY RIGHT PS Prox CCA    101 cm/s             Mid CCA     114 cm/s              Dist CCA    122 cm/s              Prox ICA    80 cm/s               Mid ICA     82 cm/s            Dist ICA    77 cm/s             Prox ECA    147 cm/s             Prox VERT   23 cm/s              ICA/CCA     0.72                        LEFT PS Prox CCA    126 cm/s Mid CCA     121 cm/s Dist CCA    122 cm/s Prox ICA    123 cm/s Mid ICA     75 cm/s Dist ICA    98 cm/s Prox ECA    115 cm/s Prox VERT   84 cm/s ICA/CCA     1.02     MILD ATHEROSCLEROSIS DIFFUSELY. NO FLOW OBSTRUCTION. THERE IS SOME CALCIFICATION. DIFFUSE INTIMAL THICKENING NOTED. BY VELOCITIES, BILATERAL ICA LESS THAN 50% STENOSIS. BILATERAL ANTEGRADE VERTEBRAL FLOW.        Lab Results   Component Value Date    WBC 5.2 08/12/2020    RBC 5.20 08/12/2020    RBC 4.94 10/14/2011    HGB 15.5 08/12/2020    HCT 45.9 08/12/2020    MCV 88.2 08/12/2020    MCH 29.9 08/12/2020    MCHC 33.9 08/12/2020    RDW 13.8 08/12/2020     08/12/2020    MPV 7.9 07/22/2015     Lab Results   Component Value Date     08/12/2020    K 4.3 08/12/2020    K 4.3 06/22/2019     08/12/2020    CO2 26 08/12/2020    BUN 14 08/12/2020    CREATININE 0.95 08/12/2020    GFRAA >60.0 08/12/2020    LABGLOM >60.0 08/12/2020    GLUCOSE 105 08/12/2020    GLUCOSE 104 10/14/2011    PROT 6.5 08/12/2020    LABALBU 4.2 08/12/2020    LABALBU 4.4 10/14/2011    CALCIUM 8.9 08/12/2020    BILITOT 0.7 08/12/2020    ALKPHOS 50 08/12/2020    AST 19 08/12/2020    ALT 17 08/12/2020     Lab Results   Component Value Date    PROTIME 10.8 08/13/2018    INR 1.0 08/13/2018     Lab Results   Component Value Date    TSH 1.920 08/12/2020    KPHEBFKF20 530 08/12/2020    FOLATE 17.7 08/12/2020     Lab Results   Component Value Date    TRIG 112 08/12/2020    HDL 56 08/12/2020    LDLCALC 76 08/12/2020     No results found for: [address], [address] [place], [facility] [facility], [facility], PHENCYCLIDINESCREENURINE, PPXUR, ETOH  No results found for: LITHIUM, DILFRTOT, VALPROATE    Assessment:       Diagnosis Orders   1. MCI (mild cognitive impairment)     2. Ulnar neuropathy at wrist, left     3. Memory loss, short term     4. Small vessel cerebrovascular accident (CVA) ([address]     Mild cognitive impairment with age-related changes. An underlying small vessel ischemic changes notable in his MRI which I have  reviewed. This may contribute to his memory issues he has good days and bad days and this may turn out to be a vascular etiology than a degenerative etiology. In this situation we recommended that patient be more active. Patient has some degree of anxiety which may be contributing to his memory issues as well. He is Mini-Mental examination score is 25 and borderline. He may benefit from very low-dose Aricept for now. His clinical examination also notable for a left ulnar neuropathy which is clonic with a claw hand. Is no session of his C8-T1 radiculopathy. Patient is recommended to become more active mentally to prevent further progression into a true dementia. Do not recommend any treatment for anxiety for now unless it becomes an issue and more bothersome. Patient is still very highly functional and we will follow him up in a few months to see if there are any major changes. There is no one particular treatment for mild cognitive impairment which we are awaiting and has not been released. Plan:      No orders of the defined types were placed in this encounter. Orders Placed This Encounter   Medications    donepezil (ARICEPT) 5 MG tablet     Sig: Take 1 tablet by mouth nightly     Dispense:  30 tablet     Refill:  3       No follow-ups on file.       Gini Reid MD

## 2021-01-02 PROBLEM — Z01.818 ENCOUNTER FOR OTHER PREPROCEDURAL EXAMINATION: Status: RESOLVED | Noted: 2019-06-20 | Resolved: 2021-01-02

## 2021-01-21 ENCOUNTER — OFFICE VISIT (OUTPATIENT)
Dept: CARDIOLOGY CLINIC | Age: 76
End: 2021-01-21
Payer: MEDICARE

## 2021-01-21 VITALS
WEIGHT: 177 LBS | HEIGHT: 71 IN | BODY MASS INDEX: 24.78 KG/M2 | SYSTOLIC BLOOD PRESSURE: 132 MMHG | DIASTOLIC BLOOD PRESSURE: 78 MMHG | OXYGEN SATURATION: 96 % | HEART RATE: 58 BPM | RESPIRATION RATE: 16 BRPM

## 2021-01-21 DIAGNOSIS — I25.10 CORONARY ARTERY DISEASE INVOLVING NATIVE CORONARY ARTERY OF NATIVE HEART WITHOUT ANGINA PECTORIS: Primary | ICD-10-CM

## 2021-01-21 DIAGNOSIS — I10 ESSENTIAL HYPERTENSION: ICD-10-CM

## 2021-01-21 DIAGNOSIS — E78.5 HYPERLIPIDEMIA, UNSPECIFIED HYPERLIPIDEMIA TYPE: Chronic | ICD-10-CM

## 2021-01-21 DIAGNOSIS — I73.9 PAD (PERIPHERAL ARTERY DISEASE) (HCC): ICD-10-CM

## 2021-01-21 PROCEDURE — 1036F TOBACCO NON-USER: CPT | Performed by: INTERNAL MEDICINE

## 2021-01-21 PROCEDURE — G8420 CALC BMI NORM PARAMETERS: HCPCS | Performed by: INTERNAL MEDICINE

## 2021-01-21 PROCEDURE — G8427 DOCREV CUR MEDS BY ELIG CLIN: HCPCS | Performed by: INTERNAL MEDICINE

## 2021-01-21 PROCEDURE — G8484 FLU IMMUNIZE NO ADMIN: HCPCS | Performed by: INTERNAL MEDICINE

## 2021-01-21 PROCEDURE — 1123F ACP DISCUSS/DSCN MKR DOCD: CPT | Performed by: INTERNAL MEDICINE

## 2021-01-21 PROCEDURE — 4040F PNEUMOC VAC/ADMIN/RCVD: CPT | Performed by: INTERNAL MEDICINE

## 2021-01-21 PROCEDURE — 99214 OFFICE O/P EST MOD 30 MIN: CPT | Performed by: INTERNAL MEDICINE

## 2021-01-21 PROCEDURE — 3017F COLORECTAL CA SCREEN DOC REV: CPT | Performed by: INTERNAL MEDICINE

## 2021-01-21 ASSESSMENT — ENCOUNTER SYMPTOMS
WHEEZING: 0
EYES NEGATIVE: 1
SHORTNESS OF BREATH: 0
STRIDOR: 0
CHEST TIGHTNESS: 0
BLOOD IN STOOL: 0
GASTROINTESTINAL NEGATIVE: 1
RESPIRATORY NEGATIVE: 1
COUGH: 0
NAUSEA: 0

## 2021-01-21 NOTE — PROGRESS NOTES
Subsequent Progress Note  Patient: Joe Pablo  YOB: 1945  MRN: [id number]    Chief Complaint: pad cad htn  Chief Complaint   Patient presents with    3 Month Follow-Up    Hypertension    Coronary Artery Disease       CV Data:  2003 Left Leg Bypass - Dr. Chinedu Diaz  2003 LAD Stent. RCA occluded  7/2015 echo 65%  6/2019  SPECT ABN Defect known RCA occlusion fills from LAD   6/2018 CUS mild  10/2019 CUS - mild. 6/2019 EF 55% 1-2+ MR.   11/2020 CUS mild       Subjective/HPI: no cp no sob still walks 2 miles daily no falls no bleed. spect abn but in the territory of known RCA occlusion     10/21/2019: doing well. Did well with L hip sx. No cp no sob. Now having recurrent gout attacks. 5/13/2020 TELEHEALTH EVALUATION -- Audio/Visual (During ETP-49 public health emergency)    Recently returned from [id number] Doing well. No cp no sob no bleed no falls    7/16/2020 TELEHEALTH EVALUATION -- Audio/Visual ([facility]    Doing well. They really do not go anywhere due to COVID. He still exercises daily. No pc no osb no falls nobleed. Takes meds    10/22/2020 no cp no sob no falls no bleed takes meds. No leg pain.      1/21/21 no cp no sob no falls no bleed takes meds     Nonsmoker since 2003  Retired -[employer]       EKG:    Past Medical History:   Diagnosis Date    CAD (coronary artery disease)     Eczema     Granuloma faciale     History of extremity bypass graft 11/16/2015    Left leg 11/14/2003    History of tobacco abuse 11/16/2015    Hyperlipidemia     Hypertension     Lipoma     [facility]per[facility]) ([facility])     Seborrheic keratosis     Small vessel cerebrovascular accident (CVA) ([facility] [address] 12/7/2020       Past Surgical History:   Procedure Laterality Date    CARDIAC CATHETERIZATION  2003    COLONOSCOPY  10-19-12    CORONARY ANGIOPLASTY WITH STENT PLACEMENT  2003    DIAGNOSTIC CARDIAC CATH LAB PROCEDURE      HAND SURGERY  2003    L    HIP SURGERY      JOINT REPLACEMENT      LUNG BIOPSY Right 09/05/2018    CT guided Left Lung Biopsy by Dr Lew Sheikh Right     9/5/2018 per Dr Isac Hanks 70. EBUS DX/TX INTERVENTION [address] LES N/A 8/17/2018    EBUS WITH TRANSBRONCHIAL NEEDLE ASPIRATION W/ FLUOROSCOPY performed by Morena Harry MD at [facility] Left 6/21/2019    LEFT  HIP TOTAL ARTHROPLASTY performed by Guilherme Duque MD at [facility]       Family History   Problem Relation Age of Onset    High Blood Pressure Mother     Other Mother         BRAIN TUMOR    Cancer Father         LUNG       Social History     Socioeconomic History    Marital status:       Spouse name: None    Number of children: None    Years of education: None    Highest education level: None   Occupational History     Employer: [employer]     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   Social Needs    Financial resource strain: None    Food insecurity     Worry: None     Inability: None    Transportation needs     Medical: None     Non-medical: None   Tobacco Use    Smoking status: Former Smoker     Packs/day: 1.00     Years: 40.00     Pack years: 40.00     Types: Cigarettes     Start date: 7/1/1965     Quit date: 7/1/2005     Years since quitting: 15.5    Smokeless tobacco: Never Used   Substance and Sexual Activity    Alcohol use: Yes     Types: 5 Glasses of wine, 2 Cans of beer per week     Comment: 3- 4 TIMES A WEEK  WINE ONLY     Drug use: No    Sexual activity: None   Lifestyle    Physical activity     Days per week: None     Minutes per session: None    Stress: None   Relationships    Social connections     Talks on phone: None     Gets together: None     Attends Restoration service: None     Active member of club or organization: None     Attends meetings of clubs or organizations: None     Relationship status: None    Intimate partner violence     Fear of current or ex partner: None     Emotionally abused: None HENT:   Normal cephalic and Atraumatic   Eyes: Pupils are equal, round, and reactive to light. Neck: Normal range of motion and thyroid normal. Neck supple. No JVD present. No neck adenopathy. No thyromegaly present. Cardiovascular: Normal rate, regular rhythm, intact distal pulses and normal pulses. Murmur heard. Pulses:       Carotid pulses are on the right side with bruit and on the left side with bruit. Pulmonary/Chest: Effort normal and breath sounds normal. He has no wheezes. He has no rales. He exhibits no tenderness. Abdominal: Soft. Bowel sounds are normal. There is no abdominal tenderness. Musculoskeletal: Normal range of motion. General: No tenderness or edema. Neurological: He is alert and oriented to person, place, and time. Skin: Skin is warm. No cyanosis. Nails show no clubbing.        LABS:  CBC:   Lab Results   Component Value Date    WBC 5.2 08/12/2020    RBC 5.20 08/12/2020    RBC 4.94 10/14/2011    HGB 15.5 08/12/2020    HCT 45.9 08/12/2020    MCV 88.2 08/12/2020    MCH 29.9 08/12/2020    MCHC 33.9 08/12/2020    RDW 13.8 08/12/2020     08/12/2020    MPV 7.9 07/22/2015     Lipids:  Lab Results   Component Value Date    CHOL 154 08/12/2020    CHOL 156 05/20/2020    CHOL 175 10/15/2019     Lab Results   Component Value Date    TRIG 112 08/12/2020    TRIG 88 05/20/2020    TRIG 139 10/15/2019     Lab Results   Component Value Date    HDL 56 08/12/2020    HDL 57 05/20/2020    HDL 71 (H) 10/15/2019     Lab Results   Component Value Date    LDLCALC 76 08/12/2020    LDLCALC 81 05/20/2020    LDLCALC 76 10/15/2019     No results found for: LABVLDL, VLDL  Lab Results   Component Value Date    CHOLHDLRATIO 3.7 10/05/2012    CHOLHDLRATIO 4.0 10/14/2011     CMP:    Lab Results   Component Value Date     08/12/2020    K 4.3 08/12/2020    K 4.3 06/22/2019     08/12/2020    CO2 26 08/12/2020    BUN 14 08/12/2020    CREATININE 0.95 08/12/2020    GFRAA >60.0 08/12/2020 LABGLOM >60.0 08/12/2020    GLUCOSE 105 08/12/2020    GLUCOSE 104 10/14/2011    PROT 6.5 08/12/2020    LABALBU 4.2 08/12/2020    LABALBU 4.4 10/14/2011    CALCIUM 8.9 08/12/2020    BILITOT 0.7 08/12/2020    ALKPHOS 50 08/12/2020    AST 19 08/12/2020    ALT 17 08/12/2020     BMP:    Lab Results   Component Value Date     08/12/2020    K 4.3 08/12/2020    K 4.3 06/22/2019     08/12/2020    CO2 26 08/12/2020    BUN 14 08/12/2020    LABALBU 4.2 08/12/2020    LABALBU 4.4 10/14/2011    CREATININE 0.95 08/12/2020    CALCIUM 8.9 08/12/2020    GFRAA >60.0 08/12/2020    LABGLOM >60.0 08/12/2020    GLUCOSE 105 08/12/2020    GLUCOSE 104 10/14/2011     Magnesium:    Lab Results   Component Value Date    MG 2.4 07/19/2015     TSH:  Lab Results   Component Value Date    TSH 1.920 08/12/2020       Patient Active Problem List   Diagnosis    Lipoma    Eczema    Hyperlipidemia    Senile hyperkeratosis    Granuloma faciale    Impotence    History of placement of stent in LAD coronary artery    History of arterial bypass of lower extremity    History of tobacco abuse    PAD (peripheral artery disease) (HCC)    Lung mass    Status post total hip replacement, right    Status post total hip replacement, left    Aftercare following joint replacement surgery    Carotid bruit    Essential hypertension    Coronary artery disease involving native coronary artery of native heart without angina pectoris    Unilateral primary osteoarthritis, left hip    Primary osteoarthritis, right ankle and foot    Unspecified sprain of right foot, initial encounter    Coronary arteriosclerosis    Hypertensive disorder    Peripheral vascular disease ([address]    History of repair of hip joint    History of total hip arthroplasty    S/P hip replacement    MCI (mild cognitive impairment)    Ulnar neuropathy at wrist, left    Memory loss, short term    Small vessel cerebrovascular accident (CVA) ([address]       There are no discontinued medications. Modified Medications    No medications on file       No orders of the defined types were placed in this encounter. Assessment/Plan:    1. Essential hypertension  Stable     2. Coronary artery disease involving native coronary artery of native heart without angina pectoris  No angina     3. Hyperlipidemia, unspecified hyperlipidemia type   statin - was recently doubled. Will need f/u Labs in future - stable    4. PAD (peripheral artery disease) ([facility])  STABLE - no claudication    5. Miller bruits- CUS -- f/u CUS - stable     Continue meds. Continue heart healthy lifestyle        Counseling:  Heart Healthy Lifestyle, Low Salt Diet, Take Precautions to Prevent Falls and Walk Daily    Return in about 4 months (around 5/21/2021).       Electronically signed by Lajean Dakin, MD on 1/21/2021 at 12:18 PM

## 2021-01-27 ENCOUNTER — TELEPHONE (OUTPATIENT)
Dept: FAMILY MEDICINE CLINIC | Age: 76
End: 2021-01-27

## 2021-04-05 ENCOUNTER — OFFICE VISIT (OUTPATIENT)
Dept: NEUROLOGY | Age: 76
End: 2021-04-05
Payer: MEDICARE

## 2021-04-05 VITALS
SYSTOLIC BLOOD PRESSURE: 156 MMHG | HEART RATE: 63 BPM | BODY MASS INDEX: 24.13 KG/M2 | DIASTOLIC BLOOD PRESSURE: 79 MMHG | WEIGHT: 173 LBS

## 2021-04-05 DIAGNOSIS — F09 MILD COGNITIVE DISORDER: Primary | ICD-10-CM

## 2021-04-05 DIAGNOSIS — I63.9 SMALL VESSEL CEREBROVASCULAR ACCIDENT (CVA) (HCC): ICD-10-CM

## 2021-04-05 DIAGNOSIS — G56.22 ULNAR NEUROPATHY AT WRIST, LEFT: ICD-10-CM

## 2021-04-05 PROCEDURE — 1123F ACP DISCUSS/DSCN MKR DOCD: CPT | Performed by: PSYCHIATRY & NEUROLOGY

## 2021-04-05 PROCEDURE — 4040F PNEUMOC VAC/ADMIN/RCVD: CPT | Performed by: PSYCHIATRY & NEUROLOGY

## 2021-04-05 PROCEDURE — 1036F TOBACCO NON-USER: CPT | Performed by: PSYCHIATRY & NEUROLOGY

## 2021-04-05 PROCEDURE — 99214 OFFICE O/P EST MOD 30 MIN: CPT | Performed by: PSYCHIATRY & NEUROLOGY

## 2021-04-05 PROCEDURE — G8420 CALC BMI NORM PARAMETERS: HCPCS | Performed by: PSYCHIATRY & NEUROLOGY

## 2021-04-05 PROCEDURE — 3017F COLORECTAL CA SCREEN DOC REV: CPT | Performed by: PSYCHIATRY & NEUROLOGY

## 2021-04-05 PROCEDURE — G8427 DOCREV CUR MEDS BY ELIG CLIN: HCPCS | Performed by: PSYCHIATRY & NEUROLOGY

## 2021-04-05 RX ORDER — DONEPEZIL HYDROCHLORIDE 10 MG/1
10 TABLET, FILM COATED ORAL DAILY
Qty: 30 TABLET | Refills: 3 | Status: SHIPPED | OUTPATIENT
Start: 2021-04-05 | End: 2021-08-09

## 2021-04-05 ASSESSMENT — ENCOUNTER SYMPTOMS
TROUBLE SWALLOWING: 0
VOMITING: 0
BACK PAIN: 0
SHORTNESS OF BREATH: 0
CHOKING: 0
NAUSEA: 0
PHOTOPHOBIA: 0
COLOR CHANGE: 0

## 2021-04-05 NOTE — PROGRESS NOTES
Subjective:      Patient ID: Ar Mason is a 76 y.o. male who presents today for:  Chief Complaint   Patient presents with    Follow-up     Pt states that he has been doing okay. He says everything is still the same, No concerns at this time. HPI 76 right-handed gentleman with a history of vascular disease and small vessel ischemic changes. Patient seen here for memory issues. Short-term memory she has good days and bad days. Also has anxiety. His memory examination showed a score of 25 which is borderline. Patient has ulnar neuropathy with a claw hand and is not a C8-T1 radiculopathy. Is an old finding which has not worsened and he has not developed any other abnormalities in other nerves. Last seen we recommend to be more active.     Past Medical History:   Diagnosis Date    CAD (coronary artery disease)     Eczema     Granuloma faciale     History of extremity bypass graft 11/16/2015    Left leg 11/14/2003    History of tobacco abuse 11/16/2015    Hyperlipidemia     Hypertension     Lipoma     [facility]     Seborrheic keratosis     Small vessel cerebrovascular accident (CVA) ([facility] [address] 12/7/2020     Past Surgical History:   Procedure Laterality Date    CARDIAC CATHETERIZATION  2003    COLONOSCOPY  10-19-12    CORONARY ANGIOPLASTY WITH STENT PLACEMENT  2003    DIAGNOSTIC CARDIAC CATH LAB PROCEDURE      HAND SURGERY  2003    L    HIP SURGERY      JOINT REPLACEMENT      LUNG BIOPSY Right 09/05/2018    CT guided Left Lung Biopsy by Dr Jen Silver Right     9/5/2018 per Dr Hakeem Hanks 70. EBUS DX/TX INTERVENTION [address] LES N/A 8/17/2018    EBUS WITH TRANSBRONCHIAL NEEDLE ASPIRATION W/ FLUOROSCOPY performed by Omaira Dhaliwal MD at [place] [id number] Left 6/21/2019    LEFT  HIP TOTAL ARTHROPLASTY performed by Robbie Perdomo MD at [address] [place] [address] History     Socioeconomic History    Marital status:      Spouse name: Not on file    Number of children: Not on file    Years of education: Not on file    Highest education level: Not on file   Occupational History     Employer: [employer]     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   Social Needs    Financial resource strain: Not on file    Food insecurity     Worry: Not on file     Inability: Not on file   [employer] needs     Medical: Not on file     Non-medical: Not on file   Tobacco Use    Smoking status: Former Smoker     Packs/day: 1.00     Years: 40.00     Pack years: 40.00     Types: Cigarettes     Start date: 7/1/1965     Quit date: 7/1/2005     Years since quitting: 15.7    Smokeless tobacco: Never Used   Substance and Sexual Activity    Alcohol use: Yes     Types: 5 Glasses of wine, 2 Cans of beer per week     Comment: 3- 4 TIMES A WEEK  WINE ONLY     Drug use: No    Sexual activity: Not on file   Lifestyle    Physical activity     Days per week: Not on file     Minutes per session: Not on file    Stress: Not on file   Relationships    Social connections     Talks on phone: Not on file     Gets together: Not on file     Attends Faith service: Not on file     Active member of club or organization: Not on file     Attends meetings of clubs or organizations: Not on file     Relationship status: Not on file    Intimate partner violence     Fear of current or ex partner: Not on file     Emotionally abused: Not on file     Physically abused: Not on file     Forced sexual activity: Not on file   Other Topics Concern    Not on file   Social History Narrative    Not on file     Family History   Problem Relation Age of Onset    High Blood Pressure Mother     Other Mother         BRAIN TUMOR    Cancer Father         LUNG     Allergies   Allergen Reactions    Lisinopril Anaphylaxis and Swelling    Contrast [Iodides] Other (See Comments)     Pt unsure of reaction.     Crestor [Rosuvastatin]     Pravachol [Pravastatin] Other (See Comments)     Joint / Muscle aches       Current Outpatient Medications   Medication Sig Dispense Refill    donepezil (ARICEPT) 5 MG tablet Take 1 tablet by mouth nightly 30 tablet 3    lovastatin (MEVACOR) 20 MG tablet Take 1 tablet by mouth nightly 90 tablet 3    metoprolol succinate (TOPROL XL) 200 MG extended release tablet Take 1 tablet by mouth daily 90 tablet 3    Multiple Vitamins-Minerals (MULTIVITAMIN ADULT PO) Take by mouth      aspirin 81 MG chewable tablet Take 1 tablet by mouth 2 times daily (Patient taking differently: Take 81 mg by mouth daily One time daily) 60 tablet 0     No current facility-administered medications for this visit. Review of Systems   Constitutional: Negative for fever. HENT: Negative for ear pain, tinnitus and trouble swallowing. Eyes: Negative for photophobia and visual disturbance. Respiratory: Negative for choking and shortness of breath. Cardiovascular: Negative for chest pain and palpitations. Gastrointestinal: Negative for nausea and vomiting. Musculoskeletal: Negative for back pain, gait problem, joint swelling, myalgias, neck pain and neck stiffness. Skin: Negative for color change. Allergic/Immunologic: Negative for food allergies. Neurological: Negative for dizziness, tremors, seizures, syncope, facial asymmetry, speech difficulty, weakness, light-headedness, numbness and headaches. Psychiatric/Behavioral: Negative for behavioral problems, confusion, hallucinations and sleep disturbance. Objective:   BP (!) 156/79 (Site: Left Upper Arm, Position: Sitting, Cuff Size: Large Adult)   Pulse 63   Wt 173 lb (78.5 kg)   BMI 24.13 kg/m²     Physical Exam  Vitals signs reviewed. Eyes:      Pupils: Pupils are equal, round, and reactive to light. Neck:      Musculoskeletal: Normal range of motion. Cardiovascular:      Rate and Rhythm: Normal rate and regular rhythm. Heart sounds: No murmur.    Pulmonary:      Effort: Pulmonary effort is normal.      Breath sounds: Normal breath sounds. Abdominal:      General: Bowel sounds are normal.   Musculoskeletal: Normal range of motion. Skin:     General: Skin is warm. Neurological:      Mental Status: He is alert and oriented to person, place, and time. Cranial Nerves: No cranial nerve deficit. Sensory: No sensory deficit. Motor: No abnormal muscle tone. Coordination: Coordination normal.      Deep Tendon Reflexes: Reflexes are normal and symmetric. Babinski sign absent on the right side. Babinski sign absent on the left side. Comments: Left arm examination shows a claw hand with the ulnar neuropathy. Psychiatric:         Mood and Affect: Mood normal.         Us Carotid Artery Bilateral    Result Date: 11/5/2020  EXAMINATION:  CAROTID DUPLEX ULTRASONOGRAPHY CLINICAL HISTORY:  BILATERAL CAROTID BRUITS COMPARISONS:  October 28, 2019 TECHNIQUE:  B-mode, color flow and spectral Doppler FINDINGS:  ARTERIAL BLOOD FLOW VELOCITY RIGHT PS                                               Prox CCA    101 cm/s             Mid CCA     114 cm/s              Dist CCA    122 cm/s              Prox ICA    80 cm/s               Mid ICA     82 cm/s            Dist ICA    77 cm/s             Prox ECA    147 cm/s             Prox VERT   23 cm/s              ICA/CCA     0.72                        LEFT PS Prox CCA    126 cm/s Mid CCA     121 cm/s Dist CCA    122 cm/s Prox ICA    123 cm/s Mid ICA     75 cm/s Dist ICA    98 cm/s Prox ECA    115 cm/s Prox VERT   84 cm/s ICA/CCA     1.02     MILD ATHEROSCLEROSIS DIFFUSELY. NO FLOW OBSTRUCTION. THERE IS SOME CALCIFICATION. DIFFUSE INTIMAL THICKENING NOTED. BY VELOCITIES, BILATERAL ICA LESS THAN 50% STENOSIS. BILATERAL ANTEGRADE VERTEBRAL FLOW.        Lab Results   Component Value Date    WBC 5.2 08/12/2020    RBC 5.20 08/12/2020    RBC 4.94 10/14/2011    HGB 15.5 08/12/2020    HCT 45.9 08/12/2020    MCV 88.2 08/12/2020    MCH 29.9 08/12/2020 MCHC 33.9 08/12/2020    RDW 13.8 08/12/2020     08/12/2020    MPV 7.9 07/22/2015     Lab Results   Component Value Date     08/12/2020    K 4.3 08/12/2020    K 4.3 06/22/2019     08/12/2020    CO2 26 08/12/2020    BUN 14 08/12/2020    CREATININE 0.95 08/12/2020    GFRAA >60.0 08/12/2020    LABGLOM >60.0 08/12/2020    GLUCOSE 105 08/12/2020    GLUCOSE 104 10/14/2011    PROT 6.5 08/12/2020    LABALBU 4.2 08/12/2020    LABALBU 4.4 10/14/2011    CALCIUM 8.9 08/12/2020    BILITOT 0.7 08/12/2020    ALKPHOS 50 08/12/2020    AST 19 08/12/2020    ALT 17 08/12/2020     Lab Results   Component Value Date    PROTIME 10.8 08/13/2018    INR 1.0 08/13/2018     Lab Results   Component Value Date    TSH 1.920 08/12/2020    ARACZAXF80 530 08/12/2020    FOLATE 17.7 08/12/2020     Lab Results   Component Value Date    TRIG 112 08/12/2020    HDL 56 08/12/2020    LDLCALC 76 08/12/2020     No results found for: Buddy María, LABBENZ, CANNAB, COCAINESCRN, LABMETH, OPIATESCREENURINE, PHENCYCLIDINESCREENURINE, PPXUR, ETOH  No results found for: LITHIUM, DILFRTOT, VALPROATE    Assessment:       Diagnosis Orders   1. Mild cognitive disorder     2. Ulnar neuropathy at wrist, left     3. Small vessel cerebrovascular accident (CVA) ([facility] [address]     Mild cognitive impairment with a Mini-Mental examination score of 25. Patient actually is on Aricept 5 mg. Still independent in his activities of daily living he has good days and bad days. He does have some rings of anxiety which may be contributing to his symptoms. Next well patient also has ulnar neuropathy in the left hand which has not changed no other nerve involvement is noted again an old finding. I recommended that we increase the Aricept to 10 mg but take it in the morning as this may cause insomnia otherwise. Patient is likely to have vascular memory changes from the small vessel ischemic changes notable. He has some risk factors for some vascular disease.   We will continue keep observation and may consider adding Namenda if he worsens. Plan:      No orders of the defined types were placed in this encounter. No orders of the defined types were placed in this encounter. No follow-ups on file.       Lesley Guadalupe MD

## 2021-05-24 ENCOUNTER — HOSPITAL ENCOUNTER (OUTPATIENT)
Dept: CT IMAGING | Age: 76
Discharge: HOME | End: 2021-05-26
Payer: MEDICARE

## 2021-05-24 DIAGNOSIS — R91.1 LUNG NODULE: ICD-10-CM

## 2021-05-24 PROCEDURE — 71250 CT THORAX DX C-: CPT

## 2021-05-25 ENCOUNTER — OFFICE VISIT (OUTPATIENT)
Dept: CARDIOLOGY CLINIC | Age: 76
End: 2021-05-25
Payer: MEDICARE

## 2021-05-25 VITALS
HEART RATE: 59 BPM | HEIGHT: 71 IN | BODY MASS INDEX: 23.8 KG/M2 | OXYGEN SATURATION: 98 % | RESPIRATION RATE: 16 BRPM | SYSTOLIC BLOOD PRESSURE: 150 MMHG | WEIGHT: 170 LBS | DIASTOLIC BLOOD PRESSURE: 93 MMHG

## 2021-05-25 DIAGNOSIS — I25.10 CORONARY ARTERY DISEASE INVOLVING NATIVE CORONARY ARTERY OF NATIVE HEART WITHOUT ANGINA PECTORIS: Primary | ICD-10-CM

## 2021-05-25 DIAGNOSIS — I10 ESSENTIAL HYPERTENSION: ICD-10-CM

## 2021-05-25 DIAGNOSIS — I73.9 PAD (PERIPHERAL ARTERY DISEASE) (HCC): ICD-10-CM

## 2021-05-25 DIAGNOSIS — I73.9 PERIPHERAL VASCULAR DISEASE (HCC): ICD-10-CM

## 2021-05-25 DIAGNOSIS — E78.5 HYPERLIPIDEMIA, UNSPECIFIED HYPERLIPIDEMIA TYPE: ICD-10-CM

## 2021-05-25 PROCEDURE — G8420 CALC BMI NORM PARAMETERS: HCPCS | Performed by: INTERNAL MEDICINE

## 2021-05-25 PROCEDURE — 4040F PNEUMOC VAC/ADMIN/RCVD: CPT | Performed by: INTERNAL MEDICINE

## 2021-05-25 PROCEDURE — G8427 DOCREV CUR MEDS BY ELIG CLIN: HCPCS | Performed by: INTERNAL MEDICINE

## 2021-05-25 PROCEDURE — 99214 OFFICE O/P EST MOD 30 MIN: CPT | Performed by: INTERNAL MEDICINE

## 2021-05-25 PROCEDURE — 1123F ACP DISCUSS/DSCN MKR DOCD: CPT | Performed by: INTERNAL MEDICINE

## 2021-05-25 PROCEDURE — 1036F TOBACCO NON-USER: CPT | Performed by: INTERNAL MEDICINE

## 2021-05-25 RX ORDER — ISOSORBIDE MONONITRATE 60 MG/1
60 TABLET, EXTENDED RELEASE ORAL DAILY
Qty: 90 TABLET | Refills: 3 | Status: SHIPPED | OUTPATIENT
Start: 2021-05-25 | End: 2022-03-14 | Stop reason: SDUPTHER

## 2021-05-25 ASSESSMENT — ENCOUNTER SYMPTOMS
SHORTNESS OF BREATH: 0
CHEST TIGHTNESS: 0
STRIDOR: 0
NAUSEA: 0
GASTROINTESTINAL NEGATIVE: 1
WHEEZING: 0
EYES NEGATIVE: 1
COUGH: 0
RESPIRATORY NEGATIVE: 1
BLOOD IN STOOL: 0

## 2021-05-25 NOTE — PROGRESS NOTES
Subsequent Progress Note  Patient: Bela Fowler  YOB: 1945  MRN: [id number]    Chief Complaint: pad cad htn  Chief Complaint   Patient presents with    Follow-up     4 month    Coronary Artery Disease    Hypertension    Claudication     PAD       CV Data:  2003 Left Leg Bypass - Dr. aKy Priest  2003 LAD Stent. RCA occluded  7/2015 echo 65%  6/2019  SPECT ABN Defect known RCA occlusion fills from LAD   6/2018 CUS mild  10/2019 CUS - mild. 6/2019 EF 55% 1-2+ MR.   11/2020 CUS mild       Subjective/HPI: no cp no sob still walks 2 miles daily no falls no bleed. spect abn but in the territory of known RCA occlusion     10/21/2019: doing well. Did well with L hip sx. No cp no sob. Now having recurrent gout attacks. 5/13/2020 TELEHEALTH EVALUATION -- Audio/Visual (During [facility]-09 public health emergency)    Recently returned from [id number] Doing well. No cp no sob no bleed no falls    7/16/2020 TELEHEALTH EVALUATION -- Audio/Visual (During [facility]- public health emergency)    Doing well. They really do not go anywhere due to COVID. He still exercises daily. No pc no osb no falls nobleed. Takes meds    10/22/2020 no cp no sob no falls no bleed takes meds. No leg pain. 1/21/21 no cp no sob no falls no bleed takes meds     5/25/21 active no angina. Takes meds. BP elevated. Has not cheked at home lately.      Nonsmoker since 2003  Retired -[employer]       EKG:    Past Medical History:   Diagnosis Date    CAD (coronary artery disease)     Eczema     Granuloma faciale     History of extremity bypass graft 11/16/2015    Left leg 11/14/2003    History of tobacco abuse 11/16/2015    Hyperlipidemia     Hypertension     Lipoma     [facility]) ([facility])     Seborrheic keratosis     Small vessel cerebrovascular accident (CVA) ([facility] [address] 12/7/2020       Past Surgical History:   Procedure Laterality Date    CARDIAC CATHETERIZATION  2003    COLONOSCOPY  10-19-12    CORONARY ANGIOPLASTY WITH Session:    Stress:     Feeling of Stress :    Social Connections:     Frequency of Communication with Friends and Family:     Frequency of Social Gatherings with Friends and Family:     Attends Alevism Services:     Active Member of Clubs or Organizations:     Attends Club or Organization Meetings:     Marital Status:    Intimate Partner Violence:     Fear of Current or Ex-Partner:     Emotionally Abused:     Physically Abused:     Sexually Abused: Allergies   Allergen Reactions    Lisinopril Anaphylaxis and Swelling    Contrast [Iodides] Other (See Comments)     Pt unsure of reaction.  Crestor [Rosuvastatin]     Pravachol [Pravastatin] Other (See Comments)     Joint / Muscle aches       Current Outpatient Medications   Medication Sig Dispense Refill    isosorbide mononitrate (IMDUR) 60 MG extended release tablet Take 1 tablet by mouth daily 90 tablet 3    lovastatin (MEVACOR) 20 MG tablet TAKE 1 TABLET BY MOUTH AT  NIGHT 90 tablet 3    metoprolol succinate (TOPROL XL) 200 MG extended release tablet TAKE 1 TABLET BY MOUTH  DAILY 90 tablet 3    donepezil (ARICEPT) 10 MG tablet Take 1 tablet by mouth daily In am 30 tablet 3    Multiple Vitamins-Minerals (MULTIVITAMIN ADULT PO) Take by mouth      aspirin 81 MG chewable tablet Take 1 tablet by mouth 2 times daily (Patient taking differently: Take 81 mg by mouth daily One time daily) 60 tablet 0     No current facility-administered medications for this visit. Review of Systems:   Review of Systems   Constitutional: Negative. Negative for diaphoresis and fatigue. HENT: Negative. Eyes: Negative. Respiratory: Negative. Negative for cough, chest tightness, shortness of breath, wheezing and stridor. Cardiovascular: Negative. Negative for chest pain, palpitations and leg swelling. Gastrointestinal: Negative. Negative for blood in stool and nausea. Genitourinary: Negative. Musculoskeletal: Positive for arthralgias. Skin: Negative. Neurological: Negative. Negative for dizziness, syncope, weakness and light-headedness. Hematological: Negative. Psychiatric/Behavioral: Negative. Physical Examination:    BP (!) 150/93 (Site: Left Upper Arm, Position: Sitting, Cuff Size: Medium Adult)   Pulse 59   Resp 16   Ht 5' 11\" (1.803 m)   Wt 170 lb (77.1 kg)   SpO2 98%   BMI 23.71 kg/m²    Physical Exam   Constitutional: He appears healthy. No distress. HENT:   Normal cephalic and Atraumatic   Eyes: Pupils are equal, round, and reactive to light. Neck: Thyroid normal. No JVD present. No neck adenopathy. No thyromegaly present. Cardiovascular: Normal rate, regular rhythm, intact distal pulses and normal pulses. Murmur heard. Pulses:       Carotid pulses are on the right side with bruit and on the left side with bruit. Pulmonary/Chest: Effort normal and breath sounds normal. He has no wheezes. He has no rales. He exhibits no tenderness. Abdominal: Soft. Bowel sounds are normal. There is no abdominal tenderness. Musculoskeletal:         General: No tenderness or edema. Normal range of motion. Cervical back: Normal range of motion and neck supple. Neurological: He is alert and oriented to person, place, and time. Skin: Skin is warm. No cyanosis. Nails show no clubbing.        LABS:  CBC:   Lab Results   Component Value Date    WBC 5.2 08/12/2020    RBC 5.20 08/12/2020    RBC 4.94 10/14/2011    HGB 15.5 08/12/2020    HCT 45.9 08/12/2020    MCV 88.2 08/12/2020    MCH 29.9 08/12/2020    MCHC 33.9 08/12/2020    RDW 13.8 08/12/2020     08/12/2020    MPV 7.9 07/22/2015     Lipids:  Lab Results   Component Value Date    CHOL 154 08/12/2020    CHOL 156 05/20/2020    CHOL 175 10/15/2019     Lab Results   Component Value Date    TRIG 112 08/12/2020    TRIG 88 05/20/2020    TRIG 139 10/15/2019     Lab Results   Component Value Date    HDL 56 08/12/2020    HDL 57 05/20/2020    HDL 71 (H) 10/15/2019 Lab Results   Component Value Date    LDLCALC 76 08/12/2020    LDLCALC 81 05/20/2020    LDLCALC 76 10/15/2019     No results found for: LABVLDL, VLDL  Lab Results   Component Value Date    CHOLHDLRATIO 3.7 10/05/2012    CHOLHDLRATIO 4.0 10/14/2011     CMP:    Lab Results   Component Value Date     08/12/2020    K 4.3 08/12/2020    K 4.3 06/22/2019     08/12/2020    CO2 26 08/12/2020    BUN 14 08/12/2020    CREATININE 0.95 08/12/2020    GFRAA >60.0 08/12/2020    LABGLOM >60.0 08/12/2020    GLUCOSE 105 08/12/2020    GLUCOSE 104 10/14/2011    PROT 6.5 08/12/2020    LABALBU 4.2 08/12/2020    LABALBU 4.4 10/14/2011    CALCIUM 8.9 08/12/2020    BILITOT 0.7 08/12/2020    ALKPHOS 50 08/12/2020    AST 19 08/12/2020    ALT 17 08/12/2020     BMP:    Lab Results   Component Value Date     08/12/2020    K 4.3 08/12/2020    K 4.3 06/22/2019     08/12/2020    CO2 26 08/12/2020    BUN 14 08/12/2020    LABALBU 4.2 08/12/2020    LABALBU 4.4 10/14/2011    CREATININE 0.95 08/12/2020    CALCIUM 8.9 08/12/2020    GFRAA >60.0 08/12/2020    LABGLOM >60.0 08/12/2020    GLUCOSE 105 08/12/2020    GLUCOSE 104 10/14/2011     Magnesium:    Lab Results   Component Value Date    MG 2.4 07/19/2015     TSH:  Lab Results   Component Value Date    TSH 1.920 08/12/2020       Patient Active Problem List   Diagnosis    Lipoma    Eczema    Hyperlipidemia    Senile hyperkeratosis    Granuloma faciale    Impotence    History of placement of stent in LAD coronary artery    History of arterial bypass of lower extremity    History of tobacco abuse    [facility]) ([facility] [address]    Lung mass    Status post total hip replacement, right    Status post total hip replacement, left    Aftercare following joint replacement surgery    Carotid bruit    Essential hypertension    Coronary artery disease involving native coronary artery of native heart without angina pectoris    Unilateral primary osteoarthritis, left hip    Primary osteoarthritis, right ankle and foot    Unspecified sprain of right foot, initial encounter    Coronary arteriosclerosis    Hypertensive disorder    Peripheral vascular disease ([facility] [address]    History of repair of hip joint    History of total hip arthroplasty    S/P hip replacement    MCI (mild cognitive impairment)    Ulnar neuropathy at wrist, left    Memory loss, short term    Small vessel cerebrovascular accident (CVA) ([facility] [address]    Mild cognitive disorder       There are no discontinued medications. Modified Medications    No medications on file       Orders Placed This Encounter   Medications    isosorbide mononitrate (IMDUR) 60 MG extended release tablet     Sig: Take 1 tablet by mouth daily     Dispense:  90 tablet     Refill:  3       Assessment/Plan:    1. Essential hypertension  Stable     2. Coronary artery disease involving native coronary artery of native heart without angina pectoris  No angina     3. Hyperlipidemia, unspecified hyperlipidemia type   statin - was recently doubled. Will need f/u Labs in future - stable    4. [facility]AD [facility]peripheral artery disease) ([facility])  STABLE - no claudication    5. Miller bruits- CUS -- f/u CUS - stable     Continue meds. Continue heart healthy lifestyle        Counseling:  Heart Healthy Lifestyle, Low Salt Diet, Take Precautions to Prevent Falls and Walk Daily    Return in about 4 months (around 9/25/2021).       Electronically signed by Peri Aponte MD on 5/25/2021 at 12:40 PM

## 2021-06-09 ENCOUNTER — OFFICE VISIT (OUTPATIENT)
Dept: PULMONOLOGY | Age: 76
End: 2021-06-09
Payer: MEDICARE

## 2021-06-09 VITALS
HEART RATE: 67 BPM | WEIGHT: 172.8 LBS | RESPIRATION RATE: 16 BRPM | HEIGHT: 71 IN | BODY MASS INDEX: 24.19 KG/M2 | DIASTOLIC BLOOD PRESSURE: 66 MMHG | OXYGEN SATURATION: 98 % | SYSTOLIC BLOOD PRESSURE: 128 MMHG | TEMPERATURE: 96.9 F

## 2021-06-09 DIAGNOSIS — I51.89 DIASTOLIC DYSFUNCTION: ICD-10-CM

## 2021-06-09 DIAGNOSIS — R91.8 LUNG NODULES: Primary | ICD-10-CM

## 2021-06-09 DIAGNOSIS — J44.9 CHRONIC OBSTRUCTIVE PULMONARY DISEASE, UNSPECIFIED COPD TYPE (HCC): ICD-10-CM

## 2021-06-09 PROCEDURE — 1123F ACP DISCUSS/DSCN MKR DOCD: CPT | Performed by: INTERNAL MEDICINE

## 2021-06-09 PROCEDURE — 3023F SPIROM DOC REV: CPT | Performed by: INTERNAL MEDICINE

## 2021-06-09 PROCEDURE — G8427 DOCREV CUR MEDS BY ELIG CLIN: HCPCS | Performed by: INTERNAL MEDICINE

## 2021-06-09 PROCEDURE — 4040F PNEUMOC VAC/ADMIN/RCVD: CPT | Performed by: INTERNAL MEDICINE

## 2021-06-09 PROCEDURE — 99213 OFFICE O/P EST LOW 20 MIN: CPT | Performed by: INTERNAL MEDICINE

## 2021-06-09 PROCEDURE — G8926 SPIRO NO PERF OR DOC: HCPCS | Performed by: INTERNAL MEDICINE

## 2021-06-09 PROCEDURE — G8420 CALC BMI NORM PARAMETERS: HCPCS | Performed by: INTERNAL MEDICINE

## 2021-06-09 PROCEDURE — 1036F TOBACCO NON-USER: CPT | Performed by: INTERNAL MEDICINE

## 2021-06-09 NOTE — PROGRESS NOTES
Subjective:     Darryl Haney is a 68 y.o. male who complains today of:     Chief Complaint   Patient presents with    Follow-up     1 YR F/U for COPD and Lung Nodule    Results     CT Scan of Chest       HPI  Patient presents for lung nodules and COPD follow-up      He is doing good, no shortness of breath, he is active, he walks almost 1 mile every day, no coughing, no heartburn, no nasal congestion postnasal drip, no lower extremity edema, no fevers, sleeps well, no chills, and weight is stable.             Allergies:  Lisinopril, Contrast [iodides], Crestor [rosuvastatin], and Pravachol [pravastatin]  Past Medical History:   Diagnosis Date    CAD (coronary artery disease)     Eczema     Granuloma faciale     History of extremity bypass graft 11/16/2015    Left leg 11/14/2003    History of tobacco abuse 11/16/2015    Hyperlipidemia     Hypertension     Lipoma     [facility])     Seborrheic keratosis     Small vessel cerebrovascular accident (CVA) ([address] 12/7/2020     Past Surgical History:   Procedure Laterality Date    CARDIAC CATHETERIZATION  2003    COLONOSCOPY  10-19-12    CORONARY ANGIOPLASTY WITH STENT PLACEMENT  2003    DIAGNOSTIC CARDIAC CATH LAB PROCEDURE      HAND SURGERY  2003    L    HIP SURGERY      JOINT REPLACEMENT      LUNG BIOPSY Right 09/05/2018    CT guided Left Lung Biopsy by Dr Alyx Alvarenga Right     9/5/2018 per Dr Alivia Hanks 70. EBUS DX/TX INTERVENTION [address] LES N/A 8/17/2018    EBUS WITH TRANSBRONCHIAL NEEDLE ASPIRATION W/ FLUOROSCOPY performed by Ale Orozco [facility] at [facility] Left 6/21/2019    LEFT  HIP TOTAL ARTHROPLASTY performed by Bianca Aj MD at [place]     Family History   Problem Relation Age of Onset    High Blood Pressure Mother     Other Mother         BRAIN TUMOR    Cancer Father         LUNG     Social History     Socioeconomic History    Marital status:      Spouse name: Not on file    Number of children: Not on file    Years of education: Not on file    Highest education level: Not on file   Occupational History     Employer: [employer]     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   Tobacco Use    Smoking status: Former Smoker     Packs/day: 1.00     Years: 40.00     Pack years: 40.00     Types: Cigarettes     Start date: 7/1/1965     Quit date: 7/1/2005     Years since quitting: 15.9    Smokeless tobacco: Never Used   Vaping Use    Vaping Use: Never used   Substance and Sexual Activity    Alcohol use: Yes     Types: 5 Glasses of wine, 2 Cans of beer per week     Comment: 3- 4 TIMES A WEEK  WINE ONLY     Drug use: No    Sexual activity: Not on file   Other Topics Concern    Not on file   Social History Narrative    Not on file     Social Determinants of Health     Financial Resource Strain:     Difficulty of Paying Living Expenses:    Food Insecurity:     Worried About Running Out of Food in the Last Year:     [address] in the Last Year:    Transportation Needs:     Lack of Transportation (Medical):      Lack of Transportation (Non-Medical):    Physical Activity:     Days of Exercise per Week:     Minutes of Exercise per Session:    Stress:     Feeling of Stress :    Social Connections:     Frequency of Communication with Friends and Family:     Frequency of Social Gatherings with Friends and Family:     Attends Quaker Services:     Active Member of Clubs or Organizations:     Attends Club or Organization Meetings:     Marital Status:    Intimate Partner Violence:     Fear of Current or Ex-Partner:     Emotionally Abused:     Physically Abused:     Sexually Abused:          Review of Systems      ROS: 10 organs review of system is done including general, psychological, ENT, hematological, endocrine, respiratory, cardiovascular, gastrointestinal,musculoskeletal, neurological,  allergy and Immunology is done and is otherwise negative. Current Outpatient Medications   Medication Sig Dispense Refill    isosorbide mononitrate (IMDUR) 60 MG extended release tablet Take 1 tablet by mouth daily 90 tablet 3    lovastatin (MEVACOR) 20 MG tablet TAKE 1 TABLET BY MOUTH AT  NIGHT 90 tablet 3    metoprolol succinate (TOPROL XL) 200 MG extended release tablet TAKE 1 TABLET BY MOUTH  DAILY 90 tablet 3    donepezil (ARICEPT) 10 MG tablet Take 1 tablet by mouth daily In am 30 tablet 3    Multiple Vitamins-Minerals (MULTIVITAMIN ADULT PO) Take by mouth      aspirin 81 MG chewable tablet Take 1 tablet by mouth 2 times daily (Patient taking differently: Take 81 mg by mouth daily One time daily) 60 tablet 0     No current facility-administered medications for this visit. Objective:     Vitals:    06/09/21 1019   BP: 128/66   Site: Right Upper Arm   Position: Sitting   Cuff Size: Medium Adult   Pulse: 67   Resp: 16   Temp: 96.9 °F (36.1 °C)   TempSrc: Tympanic   SpO2: 98%   Weight: 172 lb 12.8 oz (78.4 kg)   Height: 5' 11\" (1.803 m)         Physical Exam  Constitutional:       Appearance: He is well-developed. HENT:      Head: Normocephalic and atraumatic. Eyes:      General:         Left eye: No discharge. Conjunctiva/sclera: Conjunctivae normal.      Pupils: Pupils are equal, round, and reactive to light. Neck:      Vascular: No JVD. Cardiovascular:      Rate and Rhythm: Normal rate and regular rhythm. Heart sounds: Normal heart sounds. No murmur heard. No friction rub. No gallop. Pulmonary:      Effort: Pulmonary effort is normal. No respiratory distress. Breath sounds: Normal breath sounds. No wheezing or rales. Chest:      Chest wall: No tenderness. Abdominal:      General: Bowel sounds are normal.      Palpations: Abdomen is soft. Musculoskeletal:         General: No tenderness or deformity. Cervical back: Normal range of motion and neck supple. Right lower leg: No edema.       Left lower leg: No edema. Skin:     General: Skin is warm and dry. Neurological:      Mental Status: He is alert and oriented to person, place, and time. Psychiatric:         Judgment: Judgment normal.         Imaging studies reviewed by me CT chest reviewed with the patient, stable lung nodules  Lab results reviewed in chart  PFT 2018 FEV1 94%, FEV1/FVC 0.69  ECHO: 2019 EF 64% with diastolic dysfunction    Assessment and Plan       Diagnosis Orders   1. Lung nodules  CT CHEST WO CONTRAST   2. Chronic obstructive pulmonary disease, unspecified COPD type ([facility]     3. Diastolic dysfunction       · Lung nodule, negative CT-guided biopsy, PET scan low uptake, will continue monitoring, repeat CT chest in 1 year  · COPD, mild and asymptomatic monitor clinically  · Diastolic dysfunction, maintain euvolemic and continue cardioprotective medications      Orders Placed This Encounter   Procedures    CT CHEST WO CONTRAST     Standing Status:   Future     Standing Expiration Date:   6/9/2022     No orders of the defined types were placed in this encounter. Discussed with patient the importance of exercise and weight control and  overall health and well-being. Reviewed with the patient: current clinical status, medications, activities and diet. Side effects, adverse effects of the medication prescribed today, as well as treatment plan and result expectations have been discussed with the patient who expresses understanding and desires to proceed. Return in about 1 year (around 6/9/2022).       Benjamín Hernandez MD

## 2021-06-15 ENCOUNTER — OFFICE VISIT (OUTPATIENT)
Dept: FAMILY MEDICINE CLINIC | Age: 76
End: 2021-06-15
Payer: MEDICARE

## 2021-06-15 VITALS
SYSTOLIC BLOOD PRESSURE: 138 MMHG | BODY MASS INDEX: 24.08 KG/M2 | RESPIRATION RATE: 14 BRPM | HEART RATE: 58 BPM | TEMPERATURE: 98 F | HEIGHT: 71 IN | WEIGHT: 172 LBS | OXYGEN SATURATION: 97 % | DIASTOLIC BLOOD PRESSURE: 80 MMHG

## 2021-06-15 DIAGNOSIS — E78.5 HYPERLIPIDEMIA, UNSPECIFIED HYPERLIPIDEMIA TYPE: ICD-10-CM

## 2021-06-15 DIAGNOSIS — I25.119 ATHEROSCLEROSIS OF NATIVE CORONARY ARTERY WITH ANGINA PECTORIS, UNSPECIFIED WHETHER NATIVE OR TRANSPLANTED HEART (HCC): ICD-10-CM

## 2021-06-15 DIAGNOSIS — I10 ESSENTIAL HYPERTENSION: Primary | ICD-10-CM

## 2021-06-15 PROCEDURE — G8420 CALC BMI NORM PARAMETERS: HCPCS | Performed by: FAMILY MEDICINE

## 2021-06-15 PROCEDURE — 4040F PNEUMOC VAC/ADMIN/RCVD: CPT | Performed by: FAMILY MEDICINE

## 2021-06-15 PROCEDURE — 1036F TOBACCO NON-USER: CPT | Performed by: FAMILY MEDICINE

## 2021-06-15 PROCEDURE — 99214 OFFICE O/P EST MOD 30 MIN: CPT | Performed by: FAMILY MEDICINE

## 2021-06-15 PROCEDURE — 1123F ACP DISCUSS/DSCN MKR DOCD: CPT | Performed by: FAMILY MEDICINE

## 2021-06-15 PROCEDURE — G8427 DOCREV CUR MEDS BY ELIG CLIN: HCPCS | Performed by: FAMILY MEDICINE

## 2021-06-15 SDOH — ECONOMIC STABILITY: FOOD INSECURITY: WITHIN THE PAST 12 MONTHS, THE FOOD YOU BOUGHT JUST DIDN'T LAST AND YOU DIDN'T HAVE MONEY TO GET MORE.: NEVER TRUE

## 2021-06-15 SDOH — ECONOMIC STABILITY: FOOD INSECURITY: WITHIN THE PAST 12 MONTHS, YOU WORRIED THAT YOUR FOOD WOULD RUN OUT BEFORE YOU GOT MONEY TO BUY MORE.: NEVER TRUE

## 2021-06-15 ASSESSMENT — ENCOUNTER SYMPTOMS
COUGH: 0
DIARRHEA: 0
VOMITING: 0

## 2021-06-15 ASSESSMENT — PATIENT HEALTH QUESTIONNAIRE - PHQ9
SUM OF ALL RESPONSES TO PHQ QUESTIONS 1-9: 0
2. FEELING DOWN, DEPRESSED OR HOPELESS: 0
1. LITTLE INTEREST OR PLEASURE IN DOING THINGS: 0
SUM OF ALL RESPONSES TO PHQ QUESTIONS 1-9: 0
SUM OF ALL RESPONSES TO PHQ9 QUESTIONS 1 & 2: 0
SUM OF ALL RESPONSES TO PHQ QUESTIONS 1-9: 0

## 2021-06-15 ASSESSMENT — SOCIAL DETERMINANTS OF HEALTH (SDOH): HOW HARD IS IT FOR YOU TO PAY FOR THE VERY BASICS LIKE FOOD, HOUSING, MEDICAL CARE, AND HEATING?: NOT HARD AT ALL

## 2021-06-15 NOTE — PROGRESS NOTES
Subjective:      Patient ID: Bela Fowler is a 68 y.o. male    HPI  Here in follow up for htn and lipids    Review of Systems   Constitutional: Negative for chills and fever. Respiratory: Negative for cough. Cardiovascular: Negative for chest pain. Gastrointestinal: Negative for diarrhea and vomiting. Neurological: Negative for weakness. Treatment Adherence:   Medication compliance:  compliant most of the time  Diet compliance:  compliant most of the time  Weight trend: stable  Current exercise: walks 7 time(s) per week  Barriers: none    Hypertension:  Home blood pressure monitoring: Yes - . He is adherent to a low sodium diet. Patient denies chest pain. Antihypertensive medication side effects: no medication side effects noted. Use of agents associated with hypertension: none. Sodium (mEq/L)   Date Value   08/12/2020 143    BUN (mg/dL)   Date Value   08/12/2020 14    Glucose (mg/dL)   Date Value   08/12/2020 105 (H)   10/14/2011 104      Potassium (mEq/L)   Date Value   08/12/2020 4.3     Potassium reflex Magnesium (mEq/L)   Date Value   06/22/2019 4.3    CREATININE (mg/dL)   Date Value   08/12/2020 0.95         Hyperlipidemia:  No new myalgias or GI upset on lovastatin (Mevacor). 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 00     Types: Cigarettes     Start date: 7/1/1965     Quit date: 7/1/2005     Years since quitting: 15.9    Smokeless tobacco: Never Used   Vaping Use    Vaping Use: Never used   Substance and Sexual Activity    Alcohol use: Yes     Types: 5 Glasses of wine, 2 Cans of beer per week     Comment: 3- 4 TIMES A WEEK  WINE ONLY     Drug use: No    Sexual activity: None   Other Topics Concern    None   Social History Narrative    None     Social Determinants of Health     Financial Resource Strain: Low Risk     Difficulty of Paying Living Expenses: Not hard at all   Food Insecurity: No Food Insecurity    Worried About Running Out of Food in the Last Year: Never true    Susanna of Food in the Last Year: Never true   Transportation Needs:     Lack of Transportation (Medical):      Lack of Transportation (Non-Medical):    Physical Activity:     Days of Exercise per Week:     Minutes of Exercise per Session:    Stress:     Feeling of Stress :    Social Connections:     Frequency of Communication with Friends and Family:     Frequency of Social Gatherings with Friends and Family:     Attends Islam Services:     Active Member of Clubs or Organizations:     Attends Club or Organization Meetings:     Marital Status:    Intimate Partner Violence:     Fear of Current or Ex-Partner:     Emotionally Abused:     Physically Abused:     Sexually Abused:      Current Outpatient Medications   Medication Sig Dispense Refill    isosorbide mononitrate (IMDUR) 60 MG extended release tablet Take 1 tablet by mouth daily 90 tablet 3    lovastatin (MEVACOR) 20 MG tablet TAKE 1 TABLET BY MOUTH AT  NIGHT 90 tablet 3    metoprolol succinate (TOPROL XL) 200 MG extended release tablet TAKE 1 TABLET BY MOUTH  DAILY 90 tablet 3    donepezil (ARICEPT) 10 MG tablet Take 1 tablet by mouth daily In am 30 tablet 3    Multiple Vitamins-Minerals (MULTIVITAMIN ADULT PO) Take by mouth      aspirin 81 MG chewable tablet Take 1 tablet by mouth 2 times daily (Patient taking differently: Take 81 mg by mouth daily One time daily) 60 tablet 0     No current facility-administered medications for this visit. Family History   Problem Relation Age of Onset    High Blood Pressure Mother     Other Mother         BRAIN TUMOR    Cancer Father         LUNG     Past Medical History:   Diagnosis Date    CAD (coronary artery disease)     Eczema     Granuloma faciale     History of extremity bypass graft 11/16/2015    Left leg 11/14/2003    History of tobacco abuse 11/16/2015    Hyperlipidemia     Hypertension     Lipoma     [facility]     Seborrheic keratosis     [facility] 12/7/2020     Objective:   /80   Pulse 58   Temp 98 °F (36.7 °C)   Resp 14   Ht 5' 11\" (1.803 m)   Wt 172 lb (78 kg)   SpO2 97%   BMI 23.99 kg/m²     Physical Exam  Neck:no carotid bruits. No masses. No adenopathy. No thyroid asymmetry. Lungs:clear and equal breath sounds. No wheezes or rales. Heart:rate reg. No murmur. No gallops   Pulses:Radials 2+ equal               Poster tib 1+ equal  Extremities:no edema in either leg  Gen: In no acute distress  Abdomen; B.S present. Soft  Non tender. No hepatosplenomegaly. No masses   Assessment:          Diagnosis Orders   1. Essential hypertension     2. Hyperlipidemia, unspecified hyperlipidemia type     3.  Atherosclerosis of native coronary artery with angina pectoris, unspecified whether native or transplanted heart ([facility]        Plan:     Cad stable and followed by cardiology -continue current tx   Orders Placed This Encounter   Procedures    Lipid Panel     Standing Status:   Future     Standing Expiration Date:   6/15/2022     Order Specific Question:   Is Patient Fasting?/# of Hours     Answer:   9    Comprehensive Metabolic Panel     Standing Status:   Future     Standing Expiration Date:   6/15/2022    CBC Auto Differential     Standing Status:   Future     Standing Expiration Date:   6/15/2022     Continue current medications  F/u after fasting blood work in fall   Fasting blood work

## 2021-08-09 RX ORDER — DONEPEZIL HYDROCHLORIDE 10 MG/1
10 TABLET, FILM COATED ORAL DAILY
Qty: 30 TABLET | Refills: 3 | Status: SHIPPED | OUTPATIENT
Start: 2021-08-09 | End: 2021-08-09 | Stop reason: SDUPTHER

## 2021-08-10 RX ORDER — DONEPEZIL HYDROCHLORIDE 10 MG/1
10 TABLET, FILM COATED ORAL DAILY
Qty: 30 TABLET | Refills: 6 | Status: SHIPPED | OUTPATIENT
Start: 2021-08-10 | End: 2021-12-20 | Stop reason: SDUPTHER

## 2021-09-07 ENCOUNTER — OFFICE VISIT (OUTPATIENT)
Dept: FAMILY MEDICINE CLINIC | Age: 76
End: 2021-09-07
Payer: MEDICARE

## 2021-09-07 VITALS
BODY MASS INDEX: 23.8 KG/M2 | TEMPERATURE: 98.2 F | WEIGHT: 170 LBS | DIASTOLIC BLOOD PRESSURE: 70 MMHG | HEART RATE: 64 BPM | SYSTOLIC BLOOD PRESSURE: 130 MMHG | RESPIRATION RATE: 16 BRPM | HEIGHT: 71 IN | OXYGEN SATURATION: 96 %

## 2021-09-07 VITALS
RESPIRATION RATE: 14 BRPM | SYSTOLIC BLOOD PRESSURE: 130 MMHG | DIASTOLIC BLOOD PRESSURE: 70 MMHG | OXYGEN SATURATION: 96 % | WEIGHT: 170.4 LBS | TEMPERATURE: 98 F | BODY MASS INDEX: 23.85 KG/M2 | HEIGHT: 71 IN | HEART RATE: 64 BPM

## 2021-09-07 DIAGNOSIS — I10 ESSENTIAL HYPERTENSION: Primary | ICD-10-CM

## 2021-09-07 DIAGNOSIS — E78.5 HYPERLIPIDEMIA, UNSPECIFIED HYPERLIPIDEMIA TYPE: ICD-10-CM

## 2021-09-07 DIAGNOSIS — Z00.00 ROUTINE GENERAL MEDICAL EXAMINATION AT A HEALTH CARE FACILITY: Primary | ICD-10-CM

## 2021-09-07 PROCEDURE — 1123F ACP DISCUSS/DSCN MKR DOCD: CPT | Performed by: FAMILY MEDICINE

## 2021-09-07 PROCEDURE — G0439 PPPS, SUBSEQ VISIT: HCPCS | Performed by: FAMILY MEDICINE

## 2021-09-07 PROCEDURE — G8420 CALC BMI NORM PARAMETERS: HCPCS | Performed by: FAMILY MEDICINE

## 2021-09-07 PROCEDURE — 99213 OFFICE O/P EST LOW 20 MIN: CPT | Performed by: FAMILY MEDICINE

## 2021-09-07 PROCEDURE — G8427 DOCREV CUR MEDS BY ELIG CLIN: HCPCS | Performed by: FAMILY MEDICINE

## 2021-09-07 PROCEDURE — 1036F TOBACCO NON-USER: CPT | Performed by: FAMILY MEDICINE

## 2021-09-07 PROCEDURE — 4040F PNEUMOC VAC/ADMIN/RCVD: CPT | Performed by: FAMILY MEDICINE

## 2021-09-07 RX ORDER — ATORVASTATIN CALCIUM 10 MG/1
10 TABLET, FILM COATED ORAL DAILY
Qty: 90 TABLET | Refills: 0 | Status: SHIPPED | OUTPATIENT
Start: 2021-09-07 | End: 2021-11-08

## 2021-09-07 ASSESSMENT — PATIENT HEALTH QUESTIONNAIRE - PHQ9
1. LITTLE INTEREST OR PLEASURE IN DOING THINGS: 0
SUM OF ALL RESPONSES TO PHQ QUESTIONS 1-9: 0
SUM OF ALL RESPONSES TO PHQ9 QUESTIONS 1 & 2: 0
2. FEELING DOWN, DEPRESSED OR HOPELESS: 0

## 2021-09-07 ASSESSMENT — ENCOUNTER SYMPTOMS
DIARRHEA: 0
VOMITING: 0
COUGH: 0

## 2021-09-07 NOTE — PROGRESS NOTES
Subjective:      Patient ID: Keke Cheek is a 68 y.o. male. HPI  Here in follow up for htn and lipids and blood work. Review of Systems   Constitutional: Negative for chills and fever. Respiratory: Negative for cough. Gastrointestinal: Negative for diarrhea and vomiting. Neurological: Negative for weakness. Treatment Adherence:   Medication compliance:  compliant most of the time  Diet compliance:  compliant most of the time  Weight trend: stable  Current exercise: walks 7 time(s) per week  Barriers: none    Hypertension:  Home blood pressure monitoring: Yes - . He is adherent to a low sodium diet. Patient denies chest pain. Antihypertensive medication side effects: no medication side effects noted. Use of agents associated with hypertension: none. Sodium (mEq/L)   Date Value   09/01/2021 141    BUN (mg/dL)   Date Value   09/01/2021 20    Glucose (mg/dL)   Date Value   09/01/2021 102 (H)   10/14/2011 104      Potassium (mEq/L)   Date Value   09/01/2021 4.4     Potassium reflex Magnesium (mEq/L)   Date Value   06/22/2019 4.3    CREATININE (mg/dL)   Date Value   09/01/2021 1.10         Hyperlipidemia:  No new myalgias or GI upset on lovastatin (Mevacor). 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   Tobacco Use    Smoking status: Former Smoker     Packs/day: 1.00     Years: 40.00 Pack years: 40.00     Types: Cigarettes     Start date: 1965     Quit date: 2005     Years since quittin.1    Smokeless tobacco: Never Used   Vaping Use    Vaping Use: Never used   Substance and Sexual Activity    Alcohol use: Yes     Types: 5 Glasses of wine, 2 Cans of beer per week     Comment: 3- 4 TIMES A WEEK  WINE ONLY     Drug use: No    Sexual activity: None   Other Topics Concern    None   Social History Narrative    None     Social Determinants of Health     Financial Resource Strain: Low Risk     Difficulty of Paying Living Expenses: Not hard at all   Food Insecurity: No Food Insecurity    Worried About Running Out of Food in the Last Year: Never true    Susanna of Food in the Last Year: Never true   Transportation Needs:     Lack of Transportation (Medical):      Lack of Transportation (Non-Medical):    Physical Activity:     Days of Exercise per Week:     Minutes of Exercise per Session:    Stress:     Feeling of Stress :    Social Connections:     Frequency of Communication with Friends and Family:     Frequency of Social Gatherings with Friends and Family:     Attends Scientology Services:     Active Member of Clubs or Organizations:     Attends Club or Organization Meetings:     Marital Status:    Intimate Partner Violence:     Fear of Current or Ex-Partner:     Emotionally Abused:     Physically Abused:     Sexually Abused:      Current Outpatient Medications   Medication Sig Dispense Refill    donepezil (ARICEPT) 10 MG tablet Take 1 tablet by mouth daily In am 30 tablet 6    isosorbide mononitrate (IMDUR) 60 MG extended release tablet Take 1 tablet by mouth daily 90 tablet 3    metoprolol succinate (TOPROL XL) 200 MG extended release tablet TAKE 1 TABLET BY MOUTH  DAILY 90 tablet 3    Multiple Vitamins-Minerals (MULTIVITAMIN ADULT PO) Take by mouth      aspirin 81 MG chewable tablet Take 1 tablet by mouth 2 times daily (Patient taking differently: Take 81 mg by mouth daily One time daily) 60 tablet 0     No current facility-administered medications for this visit. 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 No masses. No adenopathy. No thyroid asymmetry. Lungs:clear and equal breath sounds. No wheezes or rales. Heart:rate reg. No murmur. No gallops   Pulses:Radials 2+ equal               Poster tib 1+ equal  Extremities:no edema in either leg  Gen: In no acute distress  Abdomen; B.S present. Soft  Non tender. No hepatosplenomegaly. No masses   Assessment / Plan:          Diagnosis Orders   1. Essential hypertension     2.  Hyperlipidemia, unspecified hyperlipidemia type         Stop mevacor and change to lipitor   Orders Placed This Encounter   Medications    atorvastatin (LIPITOR) 10 MG tablet     Sig: Take 1 tablet by mouth daily     Dispense:  90 tablet     Refill:  0     Orders Placed This Encounter   Procedures    Lipid Panel     Standing Status:   Future     Standing Expiration Date:   9/7/2022     Order Specific Question:   Is Patient Fasting?/# of Hours     Answer:   5    Hepatic Function Panel     Standing Status:   Future     Standing Expiration Date:   9/7/2022   fasting blood work in 8 weeks and f/u after done

## 2021-09-07 NOTE — PROGRESS NOTES
Medicare Annual Wellness Visit  Name: William Thomas Date: 2021   MRN: [id number] Sex: Male   Age: 68 y.o. Ethnicity: Non- / Non    : 1945 Race: White (non-)      Nito Smith is here for Medicare AWV (Here for amedicare AWV)    Screenings for behavioral, psychosocial and functional/safety risks, and cognitive dysfunction are all negative except as indicated below. These results, as well as other patient data from the [address] form, are documented in Flowsheets linked to this Encounter. Allergies   Allergen Reactions    Lisinopril Anaphylaxis and Swelling    Contrast [Iodides] Other (See Comments)     Pt unsure of reaction.  Crestor [Rosuvastatin]     Pravachol [Pravastatin] Other (See Comments)     Joint / Muscle aches       Prior to Visit Medications    Medication Sig Taking?  Authorizing Provider   atorvastatin (LIPITOR) 10 MG tablet Take 1 tablet by mouth daily  Alee Jones MD   donepezil (ARICEPT) 10 MG tablet Take 1 tablet by mouth daily In am  Maria Elena White MD   isosorbide mononitrate (IMDUR) 60 MG extended release tablet Take 1 tablet by mouth daily  Yas Joy MD   metoprolol succinate (TOPROL XL) 200 MG extended release tablet TAKE 1 TABLET BY MOUTH  DAILY  Yas Joy MD   Multiple Vitamins-Minerals (MULTIVITAMIN ADULT PO) Take by mouth  Historical Provider, MD   aspirin 81 MG chewable tablet Take 1 tablet by mouth 2 times daily  Patient taking differently: Take 81 mg by mouth daily One time daily  Paris Naranjo MD       Past Medical History:   Diagnosis Date    CAD (coronary artery disease)     Eczema     Granuloma faciale     History of extremity bypass graft 2015    Left leg 2003    History of tobacco abuse 2015    Hyperlipidemia     Hypertension     Lipoma     [facility]VD [facility]per[facility] vascular [facility] [address]     Seborrheic keratosis     Small vessel cerebrovascular accident (CVA) ([facility] [address] 2020 Past Surgical History:   Procedure Laterality Date    CARDIAC CATHETERIZATION  2003    COLONOSCOPY  10-19-12    CORONARY ANGIOPLASTY WITH STENT PLACEMENT  2003    DIAGNOSTIC CARDIAC CATH LAB PROCEDURE      HAND SURGERY  2003    L    HIP SURGERY      JOINT REPLACEMENT      LUNG BIOPSY Right 09/05/2018    CT guided Left Lung Biopsy by Dr Russella Lennox Right     9/5/2018 per Dr Chelle Robert [address] EBUS DX/TX INTERVENTION [address] LES N/A 8/17/2018    EBUS WITH TRANSBRONCHIAL NEEDLE ASPIRATION W/ FLUOROSCOPY performed by Gaston Arroyo MD at [place] [address] Left 6/21/2019    LEFT  HIP TOTAL ARTHROPLASTY performed by Reymundo Kelly MD at [facility] [place]       Family History   Problem Relation Age of Onset    High Blood Pressure Mother     Other Mother         BRAIN TUMOR    Cancer Father         LUNG       CareTeam (Including outside providers/suppliers regularly involved in providing care):   Patient Care Team:  Katiana Moscoso MD as PCP - General (Family Medicine)  Katiana Moscoso MD as PCP - [facility]  Lenora Rievra RN as Nurse Anny Alexandre MD as Consulting Physician (Pulmonology)  Lexy Art MD as Cardiologist (Cardiology)    Wt Readings from Last 3 Encounters:   09/07/21 170 lb (77.1 kg)   09/07/21 170 lb 6.4 oz (77.3 kg)   06/15/21 172 lb (78 kg)     Vitals:    09/07/21 1029   BP: 130/70   Site: Right Upper Arm   Position: Sitting   Cuff Size: Large Adult   Pulse: 64   Resp: 16   Temp: 98.2 °F (36.8 °C)   SpO2: 96%   Weight: 170 lb (77.1 kg)   Height: 5' 11\" (1.803 m)     Body mass index is 23.71 kg/m². Based upon direct observation of the patient, evaluation of cognition reveals global memory impairment noted. Patient's complete Health Risk Assessment and screening values have been reviewed and are found in Flowsheets.  The following problems were reviewed today and where indicated follow up appointments were made and/or referrals ordered. Positive Risk Factor Screenings with Interventions:          General Health and ACP:  General  In general, how would you say your health is?: Very Good  In the past 7 days, have you experienced any of the following?  New or Increased Pain, New or Increased Fatigue, Loneliness, Social Isolation, Stress or Anger?: None of These  Do you get the social and emotional support that you need?: Yes  Do you have a Living Will?: Yes  Advance Directives     Power of  Living Will ACP-Advance Directive ACP-Power of     Not on File Not on File Not on File Not on File      General Health Risk Interventions:  · Stress: relaxation techniques discussed     Hearing/Vision:  No exam data present  Hearing/Vision  Do you or your family notice any trouble with your hearing that hasn't been managed with hearing aids?: (!) Yes (beginning of hearing loss)  Do you have difficulty driving, watching TV, or doing any of your daily activities because of your eyesight?: No  Have you had an eye exam within the past year?: Yes  Hearing/Vision Interventions:  · Hearing concerns:  patient declines any further evaluation/treatment for hearing issues      Personalized Preventive Plan   Current Health Maintenance Status  Immunization History   Administered Date(s) Administered    COVID-19, Moderna, PF, 100mcg/0.5mL 03/03/2021, 03/31/2021    Influenza Vaccine, unspecified formulation 10/13/2016    Influenza Virus Vaccine 09/28/2011, 10/08/2012, 10/14/2013, 09/25/2014, 10/13/2016, 09/16/2018    Influenza Whole 10/14/2013, 09/25/2014, 10/24/2015    Influenza, High Dose (Fluzone 65 yrs and older) 10/24/2015, 09/18/2017, 09/16/2018, 10/30/2019    Influenza, High-dose, Quadv, 65 yrs +, IM (Fluzone) 08/30/2020    Pneumococcal Conjugate 13-valent ([id number]) 10/31/2016    Pneumococcal Polysaccharide ([id number]) 10/22/2012    Tdap (Boostrix, Adacel) 01/01/2017    Zoster Live (Zostavax) 10/22/2010    Zoster Recombinant (Shingrix) 05/31/2018        Health Maintenance   Topic Date Due    Shingles Vaccine (3 of 3) 07/26/2018    Annual Wellness Visit (AWV)  05/22/2021    PSA counseling  06/22/2021    Flu vaccine (1) 09/01/2021    Lipid screen  09/01/2022    Potassium monitoring  09/01/2022    Creatinine monitoring  09/01/2022    DTaP/Tdap/Td vaccine (2 - Td or Tdap) 01/01/2027    Pneumococcal 65+ years Vaccine  Completed    COVID-19 Vaccine  Completed    Hepatitis C screen  Completed    Hepatitis A vaccine  Aged Out    Hepatitis B vaccine  Aged Out    Hib vaccine  Aged Out    Meningococcal (ACWY) vaccine  Aged Out   This encounter was performed under myAlee MDs, direct supervision, 9/7/2021. Recommendations for Preventive Services Due: see orders and patient instructions/AVS.  . Recommended screening schedule for the next 5-10 years is provided to the patient in written form: see Patient Instructions/AVS.    JUDY, [address], LPN, 5/9/4303, performed the documented evaluation under the direct supervision of the attending physician.

## 2021-09-07 NOTE — PATIENT INSTRUCTIONS
Personalized Preventive Plan for Marcelina Monroe - 9/7/2021  Medicare offers a range of preventive health benefits. Some of the tests and screenings are paid in full while other may be subject to a deductible, co-insurance, and/or copay. Some of these benefits include a comprehensive review of your medical history including lifestyle, illnesses that may run in your family, and various assessments and screenings as appropriate. After reviewing your medical record and screening and assessments performed today your provider may have ordered immunizations, labs, imaging, and/or referrals for you. A list of these orders (if applicable) as well as your Preventive Care list are included within your After Visit Summary for your review. Other Preventive Recommendations:    · A preventive eye exam performed by an eye specialist is recommended every 1-2 years to screen for glaucoma; cataracts, macular degeneration, and other eye disorders. · A preventive dental visit is recommended every 6 months. · Try to get at least 150 minutes of exercise per week or 10,000 steps per day on a pedometer . · Order or download the FREE \"Exercise & Physical Activity: Your Everyday Guide\" from [employer]. Call 1-486.784.3845 or search [employer] online. · You need 1267-8064 mg of calcium and 7124-8110 IU of vitamin D per day. It is possible to meet your calcium requirement with diet alone, but a vitamin D supplement is usually necessary to meet this goal.  · When exposed to the sun, use a sunscreen that protects against both UVA and UVB radiation with an SPF of 30 or greater. Reapply every 2 to 3 hours or after sweating, drying off with a towel, or swimming. · Always wear a seat belt when traveling in a car. Always wear a helmet when riding a bicycle or motorcycle. Heart-Healthy Diet   Sodium, Fat, and Cholesterol Controlled Diet       What Is a Heart Healthy Diet?    A heart-healthy diet is one that limits sodium , certain types of fat , and cholesterol . This type of diet is recommended for:   People with any form of cardiovascular disease (eg, coronary heart disease , peripheral vascular disease , previous heart attack , previous stroke )   People with risk factors for cardiovascular disease, such as high blood pressure , high cholesterol , or diabetes   Anyone who wants to lower their risk of developing cardiovascular disease   Sodium    Sodium is a mineral found in many foods. In general, most people consume much more sodium than they need. Diets high in sodium can increase blood pressure and lead to edema (water retention). On a heart-healthy diet, you should consume no more than 2,300 mg (milligrams) of sodium per dayabout the amount in one teaspoon of table salt. The foods highest in sodium include table salt (about 50% sodium), processed foods, convenience foods, and preserved foods. Cholesterol    Cholesterol is a fat-like, waxy substance in your blood. Our bodies make some cholesterol. It is also found in animal products, with the highest amounts in fatty meat, egg yolks, whole milk, cheese, shellfish, and organ meats. On a heart-healthy diet, you should limit your cholesterol intake to less than 200 mg per day. It is normal and important to have some cholesterol in your bloodstream. But too much cholesterol can cause plaque to build up within your arteries, which can eventually lead to a heart attack or stroke. The two types of cholesterol that are most commonly referred to are:   Low-density lipoprotein (LDL) cholesterol  Also known as bad cholesterol, this is the cholesterol that tends to build up along your arteries. Bad cholesterol levels are increased by eating fats that are saturated or hydrogenated. Optimal level of this cholesterol is less than 100. Over 130 starts to get risky for heart disease.    High-density lipoprotein (HDL) cholesterol  Also known as good cholesterol, this type of cholesterol actually carries cholesterol away from your arteries and may, therefore, help lower your risk of having a heart attack. You want this level to be high (ideally greater than 60). It is a risk to have a level less than 40. You can raise this good cholesterol by eating olive oil, canola oil, avocados, or nuts. Exercise raises this level, too. Fat    Fat is calorie dense and packs a lot of calories into a small amount of food. Even though fats should be limited due to their high calorie content, not all fats are bad. In fact, some fats are quite healthful. Fat can be broken down into four main types. The good-for-you fats are:   Monounsaturated fat  found in oils such as olive and canola, avocados, and nuts and natural nut butters; can decrease cholesterol levels, while keeping levels of HDL cholesterol high   Polyunsaturated fat  found in oils such as safflower, sunflower, soybean, corn, and sesame; can decrease total cholesterol and LDL cholesterol   Omega-3 fatty acids  particularly those found in fatty fish (such as salmon, trout, tuna, mackerel, herring, and sardines); can decrease risk of arrhythmias, decrease triglyceride levels, and slightly lower blood pressure   The fats that you want to limit are:   Saturated fat  found in animal products, many fast foods, and a few vegetables; increases total blood cholesterol, including LDL levels   Animal fats that are saturated include: butter, lard, whole-milk dairy products, meat fat, and poultry skin   Vegetable fats that are saturated include: hydrogenated shortening, palm oil, coconut oil, cocoa butter   Hydrogenated or trans fat  found in margarine and vegetable shortening, most shelf stable snack foods, and fried foods; increases LDL and decreases HDL     It is generally recommended that you limit your total fat for the day to less than 30% of your total calories.  If you follow an 1800-calorie heart healthy diet, for example, this would mean 60 grams of fat or less per day. Saturated fat and trans fat in your diet raises your blood cholesterol the most, much more than dietary cholesterol does. For this reason, on a heart-healthy diet, less than 7% of your calories should come from saturated fat and ideally 0% from trans fat. On an 1800-calorie diet, this translates into less than 14 grams of saturated fat per day, leaving 46 grams of fat to come from mono- and polyunsaturated fats.    Food Choices on a Heart Healthy Diet   Food Category   Foods Recommended   Foods to Avoid   Grains   Breads and rolls without salted tops Most dry and cooked cereals Unsalted crackers and breadsticks Low-sodium or homemade breadcrumbs or stuffing All rice and pastas   Breads, rolls, and crackers with salted tops High-fat baked goods (eg, muffins, donuts, pastries) Quick breads, self-rising flour, and biscuit mixes Regular bread crumbs Instant hot cereals Commercially prepared rice, pasta, or stuffing mixes   Vegetables   Most fresh, frozen, and low-sodium canned vegetables Low-sodium and salt-free vegetable juices Canned vegetables if unsalted or rinsed   Regular canned vegetables and juices, including sauerkraut and pickled vegetables Frozen vegetables with sauces Commercially prepared potato and vegetable mixes   Fruits   Most fresh, frozen, and canned fruits All fruit juices   Fruits processed with salt or sodium   Milk   Nonfat or low-fat (1%) milk Nonfat or low-fat yogurt Cottage cheese, low-fat ricotta, cheeses labeled as low-fat and low-sodium   Whole milk Reduced-fat (2%) milk Malted and chocolate milk Full fat yogurt Most cheeses (unless low-fat and low salt) Buttermilk (no more than 1 cup per week)   Meats and Beans   Lean cuts of fresh or frozen beef, veal, lamb, or pork (look for the word loin) Fresh or frozen poultry without the skin Fresh or frozen fish and some shellfish Egg whites and egg substitutes (Limit whole eggs to three per week) Tofu Nuts or seeds (unsalted, dry-roasted), low-sodium peanut butter Dried peas, beans, and lentils   Any smoked, cured, salted, or canned meat, fish, or poultry (including polk, chipped beef, cold cuts, hot dogs, sausages, sardines, and anchovies) Poultry skins Breaded and/or fried fish or meats Canned peas, beans, and lentils Salted nuts   Fats and Oils   Olive oil and canola oil Low-sodium, low-fat salad dressings and mayonnaise   Butter, margarine, coconut and palm oils, polk fat   Snacks, Sweets, and Condiments   Low-sodium or unsalted versions of broths, soups, soy sauce, and condiments Pepper, herbs, and spices; vinegar, lemon, or lime juice Low-fat frozen desserts (yogurt, sherbet, fruit bars) Sugar, cocoa powder, honey, syrup, jam, and preserves Low-fat, trans-fat free cookies, cakes, and pies Jalen and animal crackers, fig bars, anjel snaps   High-fat desserts Broth, soups, gravies, and sauces, made from instant mixes or other high-sodium ingredients Salted snack foods Canned olives Meat tenderizers, seasoning salt, and most flavored vinegars   Beverages   Low-sodium carbonated beverages Tea and coffee in moderation Soy milk   Commercially softened water   Suggestions   Make whole grains, fruits, and vegetables the base of your diet. Choose heart-healthy fats such as canola, olive, and flaxseed oil, and foods high in heart-healthy fats, such as nuts, seeds, soybeans, tofu, and fish. Eat fish at least twice per week; the fish highest in omega-3 fatty acids and lowest in mercury include salmon, herring, mackerel, sardines, and canned chunk light tuna. If you eat fish less than twice per week or have high triglycerides, talk to your doctor about taking fish oil supplements. Read food labels.    For products low in fat and cholesterol, look for fat free, low-fat, cholesterol free, saturated fat free, and trans fat freeAlso scan the Nutrition Facts Label, which lists saturated fat, trans fat, and cholesterol amounts. For products low in sodium, look for sodium free, very low sodium, low sodium, no added salt, and unsalted   Skip the salt when cooking or at the table; if food needs more flavor, get creative and try out different herbs and spices. Garlic and onion also add substantial flavor to foods. Trim any visible fat off meat and poultry before cooking, and drain the fat off after [place]. Use cooking methods that require little or no added fat, such as grilling, boiling, baking, poaching, broiling, roasting, steaming, stir-frying, and sauting. Avoid fast food and convenience food. They tend to be high in saturated and trans fat and have a lot of added salt. Talk to a registered dietitian for individualized diet advice. Last Reviewed: March 2011 Alexandur Auguste MS, MPH, RD   Updated: 3/29/2011   ·     Keep Your Memory Laura Paris       Many factors can affect your ability to remembera hectic lifestyle, aging, stress, chronic disease, and certain medicines. But, there are steps you can take to sharpen your mind and help preserve your memory. Challenge Your Brain   Regularly challenging your mind may help keeps it in top shape. Good mental exercises include:   Crossword puzzlesUse a dictionary if you need it; you will learn more that way. Brainteasers Try some! Crafts, such as wood working and sewing   Hobbies, such as gardening and building model airplanes   SocializingVisit old friends or join groups to meet new ones. Reading   Learning a new language   Taking a class, whether it be art history or janneth chi   TravelingExperience the food, history, and culture of your destination   Learning to use a computer   Going to museums, the theater, or thought-provoking movies   Changing things in your daily life, such as reversing your pattern in the grocery store or brushing your teeth using your nondominant hand   Use Memory Aids   There is no need to remember every detail on your own.  These memory aids can help:   Calendars and day planners   Electronic organizers to store all sorts of helpful informationThese devices can \"beep\" to remind you of appointments. A book of days to record birthdays, anniversaries, and other occasions that occur on the same date every year   Detailed \"to-do\" lists and strategically placed sticky notes   Quick \"study\" sessionsBefore a gathering, review who will be there so their names will be fresh in your mind. Establish routinesFor example, keep your keys, wallet, and umbrella in the same place all the time or take medicine with your 8:00 AM glass of juice   Live a Healthy Life   Many actions that will keep your body strong will do the same for your mind. For example:   Talk to Your Doctor About Herbs and Supplements    Malnutrition and vitamin deficiencies can impair your mental function. For example, vitamin B12 deficiency can cause a range of symptoms, including confusion. But, what if your nutritional needs are being met? Can herbs and supplements still offer a benefit? Researchers have investigated a range of natural remedies, such as ginkgo , ginseng , and the supplement phosphatidylserine (PS). So far, though, the evidence is inconsistent as to whether these products can improve memory or thinking. If you are interested in taking herbs and supplements, talk to your doctor first because they may interact with other medicines that you are taking. Exercise Regularly    Among the many benefits of regular exercise are increased blood flow to the brain and decreased risk of certain diseases that can interfere with memory function. One study found that even moderate exercise has a beneficial effect. Examples of \"moderate\" exercise include:   Playing 18 holes of golf once a week, without a cart   Playing tennis twice a week   Walking one mile per day   Manage Stress    It can be tough to remember what is important when your mind is cluttered.  Make time for relaxation. Choose activities that calm you down, and make it routine. Manage Chronic Conditions    Side effects of high blood pressure , diabetes, and heart disease can interfere with mental function. Many of the lifestyle steps discussed here can help manage these conditions. Strive to eat a healthy diet, exercise regularly, get stress under control, and follow your doctor's advice for your condition. Minimize Medications    Talk to your doctor about the medicines that you take. Some may be unnecessary. Also, healthy lifestyle habits may lower the need for certain drugs. Last Reviewed: April 2010 Tito Rodriguez MD   Updated: 4/13/2010   ·     [address]       As we get older, changes in balance, gait, strength, vision, hearing, and cognition make even the most youthful senior more prone to accidents. Falls are one of the leading health risks for older people. This increased risk of falling is related to:   Aging process (eg, decreased muscle strength, slowed reflexes)   Higher incidence of chronic health problems (eg, arthritis, diabetes) that may limit mobility, agility or sensory awareness   Side effects of medicine (eg, dizziness, blurred vision)especially medicines like prescription pain medicines and drugs used to treat mental health conditions   Depending on the brittleness of your bones, the consequences of a fall can be serious and long lasting. Home Life   Research by the Association of Aging [facility]) shows that some home accidents among older adults can be prevented by making simple lifestyle changes and basic modifications and repairs to the home environment. Here are some lifestyle changes that experts recommend:   Have your hearing and vision checked regularly. Be sure to wear prescription glasses that are right for you. Speak to your doctor or pharmacist about the possible side effects of your medicines. A number of medicines can cause dizziness.    If you have problems with sleep, talk to your doctor. Limit your intake of alcohol. If necessary, use a cane or walker to help maintain your balance. Wear supportive, rubber-soled shoes, even at home. If you live in a region that gets wintry weather, you may want to put special cleats on your shoes to prevent you from slipping on the snow and ice. Exercise regularly to help maintain muscle tone, agility, and balance. Always hold the banister when going up or down stairs. Also, use  bars when getting in or out of the bath or shower, or using the toilet. To avoid dizziness, get up slowly from a lying down position. Sit up first, dangling your legs for a minute or two before rising to a standing position. Overall Home Safety Check   According to the [employer]'s \"Older Consumer Home Safety Checklist,\" it is important to check for potential hazards in each room. And remember, proper lighting is an essential factor in home safety. If you cannot see clearly, you are more likely to fall. Important questions to ask yourself include:   Are lamp, electric, extension, and telephone cords placed out of the flow of traffic and maintained in good condition? Have frayed cords been replaced? Are all small rugs and runners slip resistant? If not, you can secure them to the floor with a special double-sided carpet tape. Are smoke detectors properly locatedone on every floor of your home and one outside of every sleeping area? Are they in good working order? Are batteries replaced at least once a year? Do you have a well-maintained carbon monoxide detector outside every sleeping are in your home? Does your furniture layout leave plenty of space to maneuver between and around chairs, tables, beds, and sofas? Are hallways, stairs and passages between rooms well lit? Can you reach a lamp without getting out of bed? Are floor surfaces well maintained?  Shag rugs, high-pile carpeting, tile floors, and polished wood floors can be particularly slippery. Stairs should always have handrails and be carpeted or fitted with a non-skid tread. Is your telephone easily reachable. Is the cord safely tucked away? Room by Room   According to the Association of Aging, bathrooms and alexys are the two most potentially hazardous rooms in your home. In the Kitchen    Be sure your stove is in proper working order and always make sure burners and the oven are off before you go out or go to sleep. Keep pots on the back burners, turn handles away from the front of the stove, and keep stove clean and free of grease build-up. Kitchen ventilation systems and range exhausts should be working properly. Keep flammable objects such as towels and pot holders away from the cooking area except when in use. Make sure kitchen curtains are tied back. Move cords and appliances away from the sink and hot surfaces. If extension cords are needed, install wiring guides so they do not hang over the sink, range, or working areas. Look for coffee pots, kettles and toaster ovens with automatic shut-offs. Keep a mop handy in the kitchen so you can wipe up spills instantly. You should also have a small fire extinguisher. Arrange your kitchen with frequently used items on lower shelves to avoid the need to stand on a stepstool to reach them. Make sure countertops are well-lit to avoid injuries while cutting and preparing food. In the Bathroom    Use a non-slip mat or decals in the tub and shower, since wet, soapy tile or porcelain surfaces are extremely slippery. Make sure bathroom rugs are non-skid or tape them firmly to the floor. Bathtubs should have at least one, preferably two, grab bars, firmly attached to structural supports in the wall. (Do not use built-in soap holders or glass shower doors as grab bars.)    Tub seats fitted with non-slip material on the legs allow you to wash sitting down.  For people with limited mobility, bathtub transfer benches allow you to slide safely into the tub. Raised toilet seats and toilet safety rails are helpful for those with knee or hip problems. In the [facility]    Make sure you use a nightlight and that the area around your bed is clear of potential obstacles. Be careful with electric blankets and never go to sleep with a heating pad, which can cause serious burns even if on a low setting. Use fire-resistant mattress covers and pillows, and NEVER smoke in bed. Keep a phone next to the bed that is programmed to dial 911 at the push of a button. If you have a chronic condition, you may want to sign on with an automatic call-in service. Typically the system includes a small pendant that connects directly to an emergency medical voice-response system. You should also make arrangements to stay in contact with someonefriend, neighbor, family memberon a regular schedule. Fire Prevention   According to the [employer] (Smoke Alarms for Every) [address] [place] [address], senior citizens are one of the two highest risk groups for death and serious injuries due to residential fires. When cooking, wear short-sleeved items, never a bulky long-sleeved robe. The [facility]'s Safety Checklist for Older Consumers emphasizes the importance of checking basements, garages, workshops and storage areas for fire hazards, such as volatile liquids, piles of old rags or clothing and overloaded circuits. Never smoke in bed or when lying down on a couch or recliner chair. Small portable electric or kerosene heaters are responsible for many home fires and should be used cautiously if at all. If you do use one, be sure to keep them away from flammable materials. In case of fire, make sure you have a pre-established emergency exit plan. Have a professional check your fireplace and other fuel-burning appliances yearly.     Helping Hands   Baby boomers entering the sun years will continue to see the development of new products to help older adults live safely and independently in spite of age-related changes. Making Life More Livable  , by Claudia Scott, lists over 1,000 products for \"living well in the mature years,\" such as bathing and mobility aids, household security devices, ergonomically designed knives and peelers, and faucet valves and knobs for temperature control. Medical supply stores and organizations are good sources of information about products that improve your quality of life and insure your safety.      Last Reviewed: November 2009 Law Shen MD   Updated: 3/7/2011     ·

## 2021-10-04 ENCOUNTER — OFFICE VISIT (OUTPATIENT)
Dept: CARDIOLOGY CLINIC | Age: 76
End: 2021-10-04
Payer: MEDICARE

## 2021-10-04 VITALS
HEART RATE: 64 BPM | OXYGEN SATURATION: 98 % | HEIGHT: 71 IN | WEIGHT: 170 LBS | BODY MASS INDEX: 23.8 KG/M2 | SYSTOLIC BLOOD PRESSURE: 122 MMHG | DIASTOLIC BLOOD PRESSURE: 72 MMHG | RESPIRATION RATE: 16 BRPM

## 2021-10-04 DIAGNOSIS — I10 ESSENTIAL HYPERTENSION: ICD-10-CM

## 2021-10-04 DIAGNOSIS — R09.89 BILATERAL CAROTID BRUITS: ICD-10-CM

## 2021-10-04 DIAGNOSIS — E78.5 HYPERLIPIDEMIA, UNSPECIFIED HYPERLIPIDEMIA TYPE: ICD-10-CM

## 2021-10-04 DIAGNOSIS — I73.9 PAD (PERIPHERAL ARTERY DISEASE) (HCC): ICD-10-CM

## 2021-10-04 DIAGNOSIS — I73.9 PVD (PERIPHERAL VASCULAR DISEASE) (HCC): ICD-10-CM

## 2021-10-04 DIAGNOSIS — I25.10 CORONARY ARTERY DISEASE INVOLVING NATIVE CORONARY ARTERY OF NATIVE HEART WITHOUT ANGINA PECTORIS: Primary | ICD-10-CM

## 2021-10-04 DIAGNOSIS — I73.9 PERIPHERAL VASCULAR DISEASE (HCC): ICD-10-CM

## 2021-10-04 PROCEDURE — 1036F TOBACCO NON-USER: CPT | Performed by: INTERNAL MEDICINE

## 2021-10-04 PROCEDURE — G8484 FLU IMMUNIZE NO ADMIN: HCPCS | Performed by: INTERNAL MEDICINE

## 2021-10-04 PROCEDURE — 4040F PNEUMOC VAC/ADMIN/RCVD: CPT | Performed by: INTERNAL MEDICINE

## 2021-10-04 PROCEDURE — G8427 DOCREV CUR MEDS BY ELIG CLIN: HCPCS | Performed by: INTERNAL MEDICINE

## 2021-10-04 PROCEDURE — 93000 ELECTROCARDIOGRAM COMPLETE: CPT | Performed by: INTERNAL MEDICINE

## 2021-10-04 PROCEDURE — G8420 CALC BMI NORM PARAMETERS: HCPCS | Performed by: INTERNAL MEDICINE

## 2021-10-04 PROCEDURE — 1123F ACP DISCUSS/DSCN MKR DOCD: CPT | Performed by: INTERNAL MEDICINE

## 2021-10-04 PROCEDURE — 99214 OFFICE O/P EST MOD 30 MIN: CPT | Performed by: INTERNAL MEDICINE

## 2021-10-04 ASSESSMENT — ENCOUNTER SYMPTOMS
COUGH: 0
WHEEZING: 0
STRIDOR: 0
GASTROINTESTINAL NEGATIVE: 1
CHEST TIGHTNESS: 0
RESPIRATORY NEGATIVE: 1
BLOOD IN STOOL: 0
SHORTNESS OF BREATH: 0
NAUSEA: 0
EYES NEGATIVE: 1

## 2021-10-04 NOTE — PROGRESS NOTES
Subsequent Progress Note  Patient: Johnathan Olmedo  YOB: 1945  MRN: [id number]    Chief Complaint: pad cad htn  Chief Complaint   Patient presents with    Follow-up     4 month    Coronary Artery Disease    Hypertension    Claudication       CV Data:  2003 Left Leg Bypass - Dr. Tonja Cabot  2003 LAD Stent. RCA occluded  7/2015 echo 65%  6/2019  SPECT ABN Defect known RCA occlusion fills from LAD   6/2018 CUS mild  10/2019 CUS - mild. 6/2019 EF 55% 1-2+ MR.   11/2020 CUS mild       Subjective/HPI: no cp no sob still walks 2 miles daily no falls no bleed. spect abn but in the territory of known RCA occlusion     10/21/2019: doing well. Did well with L hip sx. No cp no sob. Now having recurrent gout attacks. 5/13/2020 TELEHEALTH EVALUATION -- Audio/Visual (During MJISI-89 public health emergency)    Recently returned from [address] Doing well. No cp no sob no bleed no falls    7/16/2020 TELEHEALTH EVALUATION -- Audio/Visual (During IUCAA-34 public health emergency)    Doing well. They really do not go anywhere due to COVID. He still exercises daily. No pc no osb no falls nobleed. Takes meds    10/22/2020 no cp no sob no falls no bleed takes meds. No leg pain. 1/21/21 no cp no sob no falls no bleed takes meds     5/25/21 active no angina. Takes meds. BP elevated. Has not cheked at home lately.      10/4/21 no cp no son active at home no falls no bleed    Nonsmoker since 2003  [facility]       EKG:     Past Medical History:   Diagnosis Date    CAD (coronary artery disease)     Eczema     Granuloma faciale     History of extremity bypass graft 11/16/2015    Left leg 11/14/2003    History of tobacco abuse 11/16/2015    Hyperlipidemia     Hypertension     Lipoma     [facility]     Seborrheic keratosis     Small vessel cerebrovascular accident (CVA) ([facility] [address] 12/7/2020       Past Surgical History:   Procedure Laterality Date    CARDIAC CATHETERIZATION  2003    COLONOSCOPY  10-19-    CORONARY ANGIOPLASTY WITH STENT PLACEMENT      DIAGNOSTIC CARDIAC CATH LAB PROCEDURE      HAND SURGERY  2003    L    HIP SURGERY      JOINT REPLACEMENT      LUNG BIOPSY Right 2018    CT guided Left Lung Biopsy by Dr Daphnie Alamo Right     2018 per Dr Karine Hanks 70. EBUS DX/TX INTERVENTION [address] LES N/A 2018    EBUS WITH TRANSBRONCHIAL NEEDLE ASPIRATION W/ FLUOROSCOPY performed by Megha Cohen MD at [place] [address] Left 2019    LEFT  HIP TOTAL ARTHROPLASTY performed by Elizabeth Jenkins MD at [place] [facility]       Family History   Problem Relation Age of Onset    High Blood Pressure Mother     Other Mother         BRAIN TUMOR    Cancer Father         LUNG       Social History     Socioeconomic History    Marital status:      Spouse name: None    Number of children: None    Years of education: None    Highest education level: None   Occupational History     Employer: [employer]     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   Tobacco Use    Smoking status: Former Smoker     Packs/day: 1.00     Years: 40.00     Pack years: 40.00     Types: Cigarettes     Start date: 1965     Quit date: 2005     Years since quittin.2    Smokeless tobacco: Never Used   Vaping Use    Vaping Use: Never used   Substance and Sexual Activity    Alcohol use: Yes     Types: 5 Glasses of wine, 2 Cans of beer per week     Comment: 3- 4 TIMES A WEEK  WINE ONLY     Drug use: No    Sexual activity: None   Other Topics Concern    None   Social History Narrative    None     Social Determinants of Health     Financial Resource Strain: Low Risk     Difficulty of Paying Living Expenses: Not hard at all   Food Insecurity: No Food Insecurity    Worried About Running Out of Food in the Last Year: Never true    Susanna of Food in the Last Year: Never true   Transportation Needs:     Lack of Transportation (Medical):      Lack of Transportation (Non-Medical):    Physical Activity:     Days of Exercise per Week:     Minutes of Exercise per Session:    Stress:     Feeling of Stress :    Social Connections:     Frequency of Communication with Friends and Family:     Frequency of Social Gatherings with Friends and Family:     Attends Spiritism Services:     Active Member of Clubs or Organizations:     Attends Club or Organization Meetings:     Marital Status:    Intimate Partner Violence:     Fear of Current or Ex-Partner:     Emotionally Abused:     Physically Abused:     Sexually Abused: Allergies   Allergen Reactions    Lisinopril Anaphylaxis and Swelling    Contrast [Iodides] Other (See Comments)     Pt unsure of reaction.  Crestor [Rosuvastatin]     Pravachol [Pravastatin] Other (See Comments)     Joint / Muscle aches       Current Outpatient Medications   Medication Sig Dispense Refill    atorvastatin (LIPITOR) 10 MG tablet Take 1 tablet by mouth daily 90 tablet 0    donepezil (ARICEPT) 10 MG tablet Take 1 tablet by mouth daily In am 30 tablet 6    isosorbide mononitrate (IMDUR) 60 MG extended release tablet Take 1 tablet by mouth daily 90 tablet 3    metoprolol succinate (TOPROL XL) 200 MG extended release tablet TAKE 1 TABLET BY MOUTH  DAILY 90 tablet 3    Multiple Vitamins-Minerals (MULTIVITAMIN ADULT PO) Take by mouth      aspirin 81 MG chewable tablet Take 1 tablet by mouth 2 times daily (Patient taking differently: Take 81 mg by mouth daily One time daily) 60 tablet 0     No current facility-administered medications for this visit. Review of Systems:   Review of Systems   Constitutional: Negative. Negative for diaphoresis and fatigue. HENT: Negative. Eyes: Negative. Respiratory: Negative. Negative for cough, chest tightness, shortness of breath, wheezing and stridor. Cardiovascular: Negative. Negative for chest pain, palpitations and leg swelling. Gastrointestinal: Negative. Negative for blood in stool and nausea. Genitourinary: Negative. Musculoskeletal: Positive for arthralgias. Skin: Negative. Neurological: Negative. Negative for dizziness, syncope, weakness and light-headedness. Hematological: Negative. Psychiatric/Behavioral: Negative. Physical Examination:    /72 (Site: Left Upper Arm, Position: Sitting, Cuff Size: Medium Adult)   Pulse 64   Resp 16   Ht 5' 11\" (1.803 m)   Wt 170 lb (77.1 kg)   SpO2 98%   BMI 23.71 kg/m²    Physical Exam   Constitutional: He appears healthy. No distress. HENT:   Normal cephalic and Atraumatic   Eyes: Pupils are equal, round, and reactive to light. Neck: Thyroid normal. No JVD present. No neck adenopathy. No thyromegaly present. Cardiovascular: Normal rate, regular rhythm, intact distal pulses and normal pulses. Murmur heard. Pulses:       Carotid pulses are on the right side with bruit and on the left side with bruit. Pulmonary/Chest: Effort normal and breath sounds normal. He has no wheezes. He has no rales. He exhibits no tenderness. Abdominal: Soft. Bowel sounds are normal. There is no abdominal tenderness. Musculoskeletal:         General: No tenderness or edema. Normal range of motion. Cervical back: Normal range of motion and neck supple. Neurological: He is alert and oriented to person, place, and time. Skin: Skin is warm. No cyanosis. Nails show no clubbing.        LABS:  CBC:   Lab Results   Component Value Date    WBC 5.2 09/01/2021    RBC 5.43 09/01/2021    RBC 4.94 10/14/2011    HGB 16.5 09/01/2021    HCT 48.5 09/01/2021    MCV 89.3 09/01/2021    MCH 30.4 09/01/2021    MCHC 34.0 09/01/2021    RDW 12.9 09/01/2021     09/01/2021    MPV 7.9 07/22/2015     Lipids:  Lab Results   Component Value Date    CHOL 176 09/01/2021    CHOL 154 08/12/2020    CHOL 156 05/20/2020     Lab Results   Component Value Date    TRIG 87 09/01/2021    TRIG 112 08/12/2020    TRIG 88 05/20/2020 Lab Results   Component Value Date    HDL 54 09/01/2021    HDL 56 08/12/2020    HDL 57 05/20/2020     Lab Results   Component Value Date    LDLCALC 105 09/01/2021    LDLCALC 76 08/12/2020    LDLCALC 81 05/20/2020     No results found for: LABVLDL, VLDL  Lab Results   Component Value Date    CHOLHDLRATIO 3.7 10/05/2012    CHOLHDLRATIO 4.0 10/14/2011     CMP:    Lab Results   Component Value Date     09/01/2021    K 4.4 09/01/2021    K 4.3 06/22/2019     09/01/2021    CO2 30 09/01/2021    BUN 20 09/01/2021    CREATININE 1.10 09/01/2021    GFRAA >60.0 09/01/2021    LABGLOM >60.0 09/01/2021    GLUCOSE 102 09/01/2021    GLUCOSE 104 10/14/2011    PROT 6.5 09/01/2021    LABALBU 4.4 09/01/2021    LABALBU 4.4 10/14/2011    CALCIUM 8.9 09/01/2021    BILITOT 0.7 09/01/2021    ALKPHOS 62 09/01/2021    AST 21 09/01/2021    ALT 16 09/01/2021     BMP:    Lab Results   Component Value Date     09/01/2021    K 4.4 09/01/2021    K 4.3 06/22/2019     09/01/2021    CO2 30 09/01/2021    BUN 20 09/01/2021    LABALBU 4.4 09/01/2021    LABALBU 4.4 10/14/2011    CREATININE 1.10 09/01/2021    CALCIUM 8.9 09/01/2021    GFRAA >60.0 09/01/2021    LABGLOM >60.0 09/01/2021    GLUCOSE 102 09/01/2021    GLUCOSE 104 10/14/2011     Magnesium:    Lab Results   Component Value Date    MG 2.4 07/19/2015     TSH:  Lab Results   Component Value Date    TSH 1.920 08/12/2020       Patient Active Problem List   Diagnosis    Lipoma    Eczema    Hyperlipidemia    Senile hyperkeratosis    Granuloma faciale    Impotence    History of placement of stent in LAD coronary artery    History of arterial bypass of lower extremity    History of tobacco abuse    [facility]AD [facility]peripheral artery disease) ([facility] [address]    Lung mass    Status post total hip replacement, right    Status post total hip replacement, left    Aftercare following joint replacement surgery    Carotid bruit    Essential hypertension    Atherosclerosis of native coronary artery with angina pectoris, unspecified whether native or transplanted heart ([facility] [address]    Unilateral primary osteoarthritis, left hip    Primary osteoarthritis, right ankle and foot    Unspecified sprain of right foot, initial encounter    Coronary arteriosclerosis    Hypertensive disorder    Peripheral vascular disease ([facility] [address]    History of repair of hip joint    History of total hip arthroplasty    S/P hip replacement    MCI (mild cognitive impairment)    Ulnar neuropathy at wrist, left    Memory loss, short term    Small vessel cerebrovascular accident (CVA) ([facility] [address]    Mild cognitive disorder    Chronic obstructive pulmonary disease, unspecified COPD type ([facility] [address]       There are no discontinued medications. Modified Medications    No medications on file       No orders of the defined types were placed in this encounter. Assessment/Plan:    1. Essential hypertension  Stable - continue meds. Low salt diet    2. Coronary artery disease involving native coronary artery of native heart without angina pectoris  No angina - continieu meds. 3. Hyperlipidemia, unspecified hyperlipidemia type   statin - He will rechecl Labs and adjust Statins accordingly to kep LDL <100 and closer to 70. 4. PAD (peripheral artery disease) (HCC)  STABLE - no claudication wlaks daily    5. Miller bruits- CUS -- f/u CUS - stable     Continue meds. Continue heart healthy lifestyle        Counseling:  Heart Healthy Lifestyle, Low Salt Diet, Take Precautions to Prevent Falls and Walk Daily    Return in about 4 months (around 2/4/2022).       Electronically signed by Khari Qiu MD on 10/4/2021 at 12:07 PM

## 2021-10-26 DIAGNOSIS — I10 ESSENTIAL HYPERTENSION: ICD-10-CM

## 2021-10-26 DIAGNOSIS — E78.5 HYPERLIPIDEMIA, UNSPECIFIED HYPERLIPIDEMIA TYPE: ICD-10-CM

## 2021-10-26 LAB
ALBUMIN SERPL-MCNC: 4.3 G/DL (ref 3.5–4.6)
ALP BLD-CCNC: 61 U/L (ref 35–104)
ALT SERPL-CCNC: 19 U/L (ref 0–41)
AST SERPL-CCNC: 23 U/L (ref 0–40)
BILIRUB SERPL-MCNC: 0.8 MG/DL (ref 0.2–0.7)
BILIRUBIN DIRECT: <0.2 MG/DL (ref 0–0.4)
BILIRUBIN, INDIRECT: ABNORMAL MG/DL (ref 0–0.6)
CHOLESTEROL, TOTAL: 138 MG/DL (ref 0–199)
HDLC SERPL-MCNC: 54 MG/DL (ref 40–59)
LDL CHOLESTEROL CALCULATED: 66 MG/DL (ref 0–129)
TOTAL PROTEIN: 6.3 G/DL (ref 6.3–8)
TRIGL SERPL-MCNC: 88 MG/DL (ref 0–150)

## 2021-11-02 ENCOUNTER — OFFICE VISIT (OUTPATIENT)
Dept: FAMILY MEDICINE CLINIC | Age: 76
End: 2021-11-02
Payer: MEDICARE

## 2021-11-02 VITALS
OXYGEN SATURATION: 97 % | WEIGHT: 172 LBS | RESPIRATION RATE: 14 BRPM | TEMPERATURE: 97.7 F | HEIGHT: 71 IN | DIASTOLIC BLOOD PRESSURE: 80 MMHG | BODY MASS INDEX: 24.08 KG/M2 | SYSTOLIC BLOOD PRESSURE: 130 MMHG | HEART RATE: 66 BPM

## 2021-11-02 DIAGNOSIS — I25.119 ATHEROSCLEROSIS OF NATIVE CORONARY ARTERY WITH ANGINA PECTORIS, UNSPECIFIED WHETHER NATIVE OR TRANSPLANTED HEART (HCC): ICD-10-CM

## 2021-11-02 DIAGNOSIS — E78.5 HYPERLIPIDEMIA, UNSPECIFIED HYPERLIPIDEMIA TYPE: Primary | ICD-10-CM

## 2021-11-02 PROCEDURE — 1036F TOBACCO NON-USER: CPT | Performed by: FAMILY MEDICINE

## 2021-11-02 PROCEDURE — G8420 CALC BMI NORM PARAMETERS: HCPCS | Performed by: FAMILY MEDICINE

## 2021-11-02 PROCEDURE — 99213 OFFICE O/P EST LOW 20 MIN: CPT | Performed by: FAMILY MEDICINE

## 2021-11-02 PROCEDURE — G8484 FLU IMMUNIZE NO ADMIN: HCPCS | Performed by: FAMILY MEDICINE

## 2021-11-02 PROCEDURE — G8427 DOCREV CUR MEDS BY ELIG CLIN: HCPCS | Performed by: FAMILY MEDICINE

## 2021-11-02 PROCEDURE — 4040F PNEUMOC VAC/ADMIN/RCVD: CPT | Performed by: FAMILY MEDICINE

## 2021-11-02 PROCEDURE — 1123F ACP DISCUSS/DSCN MKR DOCD: CPT | Performed by: FAMILY MEDICINE

## 2021-11-02 ASSESSMENT — ENCOUNTER SYMPTOMS: ABDOMINAL PAIN: 0

## 2021-11-02 NOTE — PROGRESS NOTES
 Attends Anabaptist Services:     Active Member of Clubs or Organizations:     Attends Club or Organization Meetings:     Marital Status:    Intimate Partner Violence:     Fear of Current or Ex-Partner:     Emotionally Abused:     Physically Abused:     Sexually Abused:      Current Outpatient Medications   Medication Sig Dispense Refill    atorvastatin (LIPITOR) 10 MG tablet Take 1 tablet by mouth daily 90 tablet 0    donepezil (ARICEPT) 10 MG tablet Take 1 tablet by mouth daily In am 30 tablet 6    isosorbide mononitrate (IMDUR) 60 MG extended release tablet Take 1 tablet by mouth daily 90 tablet 3    metoprolol succinate (TOPROL XL) 200 MG extended release tablet TAKE 1 TABLET BY MOUTH  DAILY 90 tablet 3    Multiple Vitamins-Minerals (MULTIVITAMIN ADULT PO) Take by mouth      aspirin 81 MG chewable tablet Take 1 tablet by mouth 2 times daily (Patient taking differently: Take 81 mg by mouth daily One time daily) 60 tablet 0     No current facility-administered medications for this visit. Family History   Problem Relation Age of Onset    High Blood Pressure Mother     Other Mother         BRAIN TUMOR    Cancer Father         LUNG     Past Medical History:   Diagnosis Date    CAD (coronary artery disease)     Eczema     Granuloma faciale     History of extremity bypass graft 11/16/2015    Left leg 11/14/2003    History of tobacco abuse 11/16/2015    Hyperlipidemia     Hypertension     Lipoma     PVD (peripheral vascular disease) ([facility] [address]     Seborrheic keratosis     Small vessel cerebrovascular accident (CVA) ([facility] [address] 12/7/2020     Objective:   /80   Pulse 66   Temp 97.7 °F (36.5 °C)   Resp 14   Ht 5' 11\" (1.803 m)   Wt 172 lb (78 kg)   SpO2 97%   BMI 23.99 kg/m²     Physical Exam  No exam done -reviewed vitals   Assessment:       Diagnosis Orders   1. Hyperlipidemia, unspecified hyperlipidemia type     2.  Atherosclerosis of native coronary artery with angina pectoris, unspecified whether native or transplanted heart [facility])           Plan:      Continue other meds   F/u in 6 months

## 2021-11-10 NOTE — TELEPHONE ENCOUNTER
Manuel Bryan calls, [facility] called to ask If the dosage strength change on the Metporolol was correct before they fill it. Please reply ASAP and I will call the pharmacy to clarify for you. Tyrell Servin is running out and it still needs to be mailed.  [phone]
no

## 2021-11-24 RX ORDER — ATORVASTATIN CALCIUM 10 MG/1
10 TABLET, FILM COATED ORAL DAILY
Qty: 90 TABLET | Refills: 1 | Status: SHIPPED | OUTPATIENT
Start: 2021-11-24 | End: 2022-03-14 | Stop reason: SDUPTHER

## 2021-11-24 NOTE — TELEPHONE ENCOUNTER
Patient requesting medication refill.  Please approve or deny this request.    Rx requested:  Requested Prescriptions     Pending Prescriptions Disp Refills    atorvastatin (LIPITOR) 10 MG tablet 90 tablet 1     Sig: Take 1 tablet by mouth daily         Last Office Visit:   11/2/2021      Next Visit Date:  Future Appointments   Date Time Provider Jeremy Umana   12/20/2021  3:45 PM Guillermo Thomas MD [facility]   5/9/2022  8:45 AM Harvy Duane, MD [address] [place] [address]   5/18/2022  2:15 PM Elsa Libman, MD [address] [place] [address]   6/8/2022 10:00 AM Essie Angelo MD [facility]

## 2021-12-20 ENCOUNTER — OFFICE VISIT (OUTPATIENT)
Dept: NEUROLOGY | Age: 76
End: 2021-12-20
Payer: MEDICARE

## 2021-12-20 VITALS
SYSTOLIC BLOOD PRESSURE: 128 MMHG | WEIGHT: 169 LBS | BODY MASS INDEX: 23.57 KG/M2 | HEART RATE: 81 BPM | DIASTOLIC BLOOD PRESSURE: 84 MMHG

## 2021-12-20 DIAGNOSIS — G30.9 ALZHEIMER'S DISEASE, UNSPECIFIED (CODE) (HCC): ICD-10-CM

## 2021-12-20 DIAGNOSIS — F09 MILD COGNITIVE DISORDER: Primary | ICD-10-CM

## 2021-12-20 PROCEDURE — G8427 DOCREV CUR MEDS BY ELIG CLIN: HCPCS | Performed by: PSYCHIATRY & NEUROLOGY

## 2021-12-20 PROCEDURE — G8420 CALC BMI NORM PARAMETERS: HCPCS | Performed by: PSYCHIATRY & NEUROLOGY

## 2021-12-20 PROCEDURE — 99213 OFFICE O/P EST LOW 20 MIN: CPT | Performed by: PSYCHIATRY & NEUROLOGY

## 2021-12-20 PROCEDURE — 4040F PNEUMOC VAC/ADMIN/RCVD: CPT | Performed by: PSYCHIATRY & NEUROLOGY

## 2021-12-20 PROCEDURE — 1123F ACP DISCUSS/DSCN MKR DOCD: CPT | Performed by: PSYCHIATRY & NEUROLOGY

## 2021-12-20 PROCEDURE — G8484 FLU IMMUNIZE NO ADMIN: HCPCS | Performed by: PSYCHIATRY & NEUROLOGY

## 2021-12-20 PROCEDURE — 1036F TOBACCO NON-USER: CPT | Performed by: PSYCHIATRY & NEUROLOGY

## 2021-12-20 RX ORDER — DONEPEZIL HYDROCHLORIDE 10 MG/1
10 TABLET, FILM COATED ORAL DAILY
Qty: 90 TABLET | Refills: 3 | Status: SHIPPED | OUTPATIENT
Start: 2021-12-20 | End: 2022-10-17 | Stop reason: SDUPTHER

## 2021-12-20 ASSESSMENT — ENCOUNTER SYMPTOMS
CHOKING: 0
SHORTNESS OF BREATH: 0
NAUSEA: 0
VOMITING: 0
TROUBLE SWALLOWING: 0
PHOTOPHOBIA: 0
BACK PAIN: 0
COLOR CHANGE: 0

## 2021-12-20 NOTE — PROGRESS NOTES
Subjective:      Patient ID: Ross Hernandez is a 68 y.o. male who presents today for:  Chief Complaint   Patient presents with    Follow-up     Pt states that things have been going okay. Pt says things are pretty much the same. HPI 70-year-old right-handed man with a history of  Vascular dementia and small vessel ischemic changes. Patient has memory issues which are short-term. He has good days and bad days and still has been functional.  [facility] not a C8-T1 radiculopathy. This has not worsened and is not any further symptoms on the other hand either. He sleeps well is not any agitation or hallucinations and he has not any wandering around. He still remains independent in his activities of daily living. Patient has not any hospitalizations  To the wife is doing about the same but has become more quieter.   Past Medical History:   Diagnosis Date    CAD (coronary artery disease)     Eczema     Granuloma faciale     History of extremity bypass graft 11/16/2015    Left leg 11/14/2003    History of tobacco abuse 11/16/2015    Hyperlipidemia     Hypertension     Lipoma     [facility]     Seborrheic keratosis     [facility] vessel cerebrovascular accident (CVA) ([facility] [address] 12/7/2020     Past Surgical History:   Procedure Laterality Date    CARDIAC CATHETERIZATION  2003    COLONOSCOPY  10-19-12    CORONARY ANGIOPLASTY WITH STENT PLACEMENT  2003    DIAGNOSTIC CARDIAC CATH LAB PROCEDURE      HAND SURGERY  2003    L    HIP SURGERY      JOINT REPLACEMENT      LUNG BIOPSY Right 09/05/2018    CT guided Left Lung Biopsy by Dr Beatriz Barnard Right     9/5/2018 per Dr Karen Yang [address] DX/TX INTERVENTION [address] LES N/A 8/17/2018    EBUS WITH TRANSBRONCHIAL NEEDLE ASPIRATION W/ FLUOROSCOPY performed by Necia Cheadle, MD at [place] [address] Left 6/21/2019    LEFT  HIP TOTAL ARTHROPLASTY performed by Itz Chen MD at [facility] Marital status:      Spouse name: Not on file    Number of children: Not on file    Years of education: Not on file    Highest education level: Not on file   Occupational History     Employer: [employer]     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   Tobacco Use    Smoking status: Former Smoker     Packs/day: 1.00     Years: 40.00     Pack years: 40.00     Types: Cigarettes     Start date: 1965     Quit date: 2005     Years since quittin.4    Smokeless tobacco: Never Used   Vaping Use    Vaping Use: Never used   Substance and Sexual Activity    Alcohol use: Yes     Types: 5 Glasses of wine, 2 Cans of beer per week     Comment: 3- 4 TIMES A WEEK  WINE ONLY     Drug use: No    Sexual activity: Not on file   Other Topics Concern    Not on file   Social History Narrative    Not on file     Social Determinants of Health     Financial Resource Strain: Low Risk     Difficulty of Paying Living Expenses: Not hard at all   Food Insecurity: No Food Insecurity    Worried About [address] [place] [address] in the Last Year: Never true    Susanna of Food in the Last Year: Never true   Transportation Needs:     Lack of Transportation (Medical): Not on file    Lack of Transportation (Non-Medical):  Not on file   Physical Activity:     Days of Exercise per Week: Not on file    Minutes of Exercise per Session: Not on file   Stress:     Feeling of Stress : Not on file   Social Connections:     Frequency of Communication with Friends and Family: Not on file    Frequency of Social Gatherings with Friends and Family: Not on file    Attends Yazidi Services: Not on file    Active Member of Clubs or Organizations: Not on file    Attends Club or Organization Meetings: Not on file    Marital Status: Not on file   Intimate Partner Violence:     Fear of Current or Ex-Partner: Not on file    Emotionally Abused: Not on file   Folridalma Negron Physically Abused: Not on file    Sexually Abused: Not on file   Housing Stability:     Unable to Pay for Housing in the Last Year: Not on file    Number of Places Lived in the Last Year: Not on file    Unstable Housing in the Last Year: Not on file     Family History   Problem Relation Age of Onset    High Blood Pressure Mother     Other Mother         BRAIN TUMOR    Cancer Father         LUNG     Allergies   Allergen Reactions    Lisinopril Anaphylaxis and Swelling    Contrast [Iodides] Other (See Comments)     Pt unsure of reaction.  Crestor [Rosuvastatin]     Pravachol [Pravastatin] Other (See Comments)     Joint / Muscle aches       Current Outpatient Medications   Medication Sig Dispense Refill    donepezil (ARICEPT) 10 MG tablet Take 1 tablet by mouth daily In am 90 tablet 3    atorvastatin (LIPITOR) 10 MG tablet Take 1 tablet by mouth daily 90 tablet 1    isosorbide mononitrate (IMDUR) 60 MG extended release tablet Take 1 tablet by mouth daily 90 tablet 3    metoprolol succinate (TOPROL XL) 200 MG extended release tablet TAKE 1 TABLET BY MOUTH  DAILY 90 tablet 3    Multiple Vitamins-Minerals (MULTIVITAMIN ADULT PO) Take by mouth      aspirin 81 MG chewable tablet Take 1 tablet by mouth 2 times daily (Patient taking differently: Take 81 mg by mouth daily One time daily) 60 tablet 0     No current facility-administered medications for this visit. Review of Systems   Constitutional: Negative for fever. HENT: Negative for ear pain, tinnitus and trouble swallowing. Eyes: Negative for photophobia and visual disturbance. Respiratory: Negative for choking and shortness of breath. Cardiovascular: Negative for chest pain and palpitations. Gastrointestinal: Negative for nausea and vomiting. Musculoskeletal: Negative for back pain, gait problem, joint swelling, myalgias, neck pain and neck stiffness. Skin: Negative for color change.    Allergic/Immunologic: Negative for food allergies. Neurological: Negative for dizziness, tremors, seizures, syncope, facial asymmetry, speech difficulty, weakness, light-headedness, numbness and headaches. Psychiatric/Behavioral: Negative for behavioral problems, confusion, hallucinations and sleep disturbance. Objective:   /84 (Site: Left Upper Arm, Position: Sitting, Cuff Size: Medium Adult)   Pulse 81   Wt 169 lb (76.7 kg)   BMI 23.57 kg/m²     Physical Exam  Vitals reviewed. Eyes:      Pupils: Pupils are equal, round, and reactive to light. Cardiovascular:      Rate and Rhythm: Normal rate and regular rhythm. Heart sounds: No murmur heard. Pulmonary:      Effort: Pulmonary effort is normal.      Breath sounds: Normal breath sounds. Abdominal:      General: Bowel sounds are normal.   Musculoskeletal:         General: Normal range of motion. Cervical back: Normal range of motion. Skin:     General: Skin is warm. Neurological:      Mental Status: He is alert and oriented to person, place, and time. Cranial Nerves: No cranial nerve deficit. Sensory: No sensory deficit. Motor: No abnormal muscle tone. Coordination: Coordination normal.      Deep Tendon Reflexes: Reflexes are normal and symmetric. Babinski sign absent on the right side. Babinski sign absent on the left side. Psychiatric:         Mood and Affect: Mood normal.         CT CHEST WO CONTRAST    Result Date: 2021  EXAMINATION: CT CHEST WITHOUT CONTRAST CLINICAL HISTORY:R91.1 Lung nodule ICD10 follow-up right upper lobe lung nodule COMPARISONS: 2020, 2019 TECHNIQUE: TECHNIQUE: Contiguous axial images were obtained through the chest without contrast. Coronal and sagittal reformations were made. FINDINGS: Lungs nodules: Right lun. There is a 1.4 cm rounded well-defined nodule which lies adjacent to the pleura within the anterior aspect of the right upper lobe, series 3 image 125, unchanged.  2. There is a 3 mm nodule in the right upper lobe, unchanged, series 3 image 77. 3. There is a 2 mm nodule within the right upper lobe, series 3 image 96, unchanged. 4. Parenchymal scarring right apex, unchanged. Left lung: There is pleural thickening along the left major fissure posteriorly, unchanged. There is some fluid within this fissure, unchanged. No definite lung nodule. Minimal probable scarring left apex, unchanged. No evidence of significant aortic aneurysm. There are atherosclerotic calcifications in the aorta and coronary arteries. There are small middle mediastinal lymph nodes, similar to prior volume. There is small anterior pericardial effusion No pleural effusion. No acute infiltrate. No pneumothorax. Upper abdomen is unremarkable. Bones are intact. STABLE RIGHT LUNG NODULES. LARGEST NODULE IS WITHIN THE ANTERIOR ASPECT OF THE RIGHT UPPER LOBE, UNCHANGED. PLEURAL THICKENING OR FLUID IN LEFT MAJOR FISSURE, UNCHANGED. All CT scans at this facility use dose modulation, iterative reconstruction, and/or weight based dosing when appropriate to reduce radiation dose to as low as reasonably achievable.        Lab Results   Component Value Date    WBC 5.2 09/01/2021    RBC 5.43 09/01/2021    RBC 4.94 10/14/2011    HGB 16.5 09/01/2021    HCT 48.5 09/01/2021    MCV 89.3 09/01/2021    MCH 30.4 09/01/2021    MCHC 34.0 09/01/2021    RDW 12.9 09/01/2021     09/01/2021    MPV 7.9 07/22/2015     Lab Results   Component Value Date     09/01/2021    K 4.4 09/01/2021    K 4.3 06/22/2019     09/01/2021    CO2 30 09/01/2021    BUN 20 09/01/2021    CREATININE 1.10 09/01/2021    GFRAA >60.0 09/01/2021    LABGLOM >60.0 09/01/2021    GLUCOSE 102 09/01/2021    GLUCOSE 104 10/14/2011    PROT 6.3 10/26/2021    LABALBU 4.3 10/26/2021    LABALBU 4.4 10/14/2011    CALCIUM 8.9 09/01/2021    BILITOT 0.8 10/26/2021    ALKPHOS 61 10/26/2021    AST 23 10/26/2021    ALT 19 10/26/2021     Lab Results   Component Value Date PROTIME 10.8 08/13/2018    INR 1.0 08/13/2018     Lab Results   Component Value Date    TSH 1.920 08/12/2020    YTYBOPPG61 530 08/12/2020    FOLATE 17.7 08/12/2020     Lab Results   Component Value Date    TRIG 88 10/26/2021    HDL 54 10/26/2021    LDLCALC 66 10/26/2021     No results found for: Stoney Speedy, LABBENZ, CANNAB, COCAINESCRN, LABMETH, OPIATESCREENURINE, PHENCYCLIDINESCREENURINE, PPXUR, ETOH  No results found for: LITHIUM, DILFRTOT, VALPROATE    Assessment:       Diagnosis Orders   1. Mild cognitive disorder     2. Alzheimer's disease, unspecified     Mild cognitive impairment with borderline scores. Patient is not quite gone into a true Alzheimer's dementia. He does fluctuate. He is on Aricept 10 mg without any side effects still independent in his activities of daily living he drives he does not get lost.  Is become somewhat quieter but nothing else has changed. He sleeps well. At this time we have not recommended addition of any medications though in the future we may consider adding Namenda. We will see him when he comes back from [place]. Plan:      No orders of the defined types were placed in this encounter. Orders Placed This Encounter   Medications    donepezil (ARICEPT) 10 MG tablet     Sig: Take 1 tablet by mouth daily In am     Dispense:  90 tablet     Refill:  3       No follow-ups on file.       Re Mckeon MD

## 2022-03-14 RX ORDER — METOPROLOL SUCCINATE 200 MG/1
200 TABLET, EXTENDED RELEASE ORAL DAILY
Qty: 90 TABLET | Refills: 3 | Status: SHIPPED | OUTPATIENT
Start: 2022-03-14

## 2022-03-14 RX ORDER — ISOSORBIDE MONONITRATE 60 MG/1
60 TABLET, EXTENDED RELEASE ORAL DAILY
Qty: 90 TABLET | Refills: 3 | Status: SHIPPED | OUTPATIENT
Start: 2022-03-14

## 2022-03-14 RX ORDER — ATORVASTATIN CALCIUM 10 MG/1
10 TABLET, FILM COATED ORAL DAILY
Qty: 90 TABLET | Refills: 1 | Status: SHIPPED | OUTPATIENT
Start: 2022-03-14 | End: 2022-05-09 | Stop reason: SDUPTHER

## 2022-03-14 NOTE — TELEPHONE ENCOUNTER
Please approve or deny this refill request. The order is pended. Thank you.     LOV 10/4/2021    Next Visit Date:  Future Appointments   Date Time Provider Jeremy Calderoni   5/9/2022  8:45 AM Larissa Novak MD [address] [place] [address]   5/18/2022  2:15 PM Lennie Marte MD [address] [place] [address]   6/8/2022  2:00 PM Alec Wallace MD [facility]   7/18/2022  1:15 PM Eva Garg MD [facility]

## 2022-05-09 ENCOUNTER — OFFICE VISIT (OUTPATIENT)
Dept: FAMILY MEDICINE CLINIC | Age: 77
End: 2022-05-09
Payer: MEDICARE

## 2022-05-09 VITALS
RESPIRATION RATE: 14 BRPM | DIASTOLIC BLOOD PRESSURE: 70 MMHG | BODY MASS INDEX: 25 KG/M2 | TEMPERATURE: 97.7 F | OXYGEN SATURATION: 95 % | WEIGHT: 178.6 LBS | HEIGHT: 71 IN | HEART RATE: 64 BPM | SYSTOLIC BLOOD PRESSURE: 120 MMHG

## 2022-05-09 DIAGNOSIS — I10 ESSENTIAL HYPERTENSION: Primary | ICD-10-CM

## 2022-05-09 DIAGNOSIS — E78.5 HYPERLIPIDEMIA, UNSPECIFIED HYPERLIPIDEMIA TYPE: ICD-10-CM

## 2022-05-09 PROCEDURE — G8427 DOCREV CUR MEDS BY ELIG CLIN: HCPCS | Performed by: FAMILY MEDICINE

## 2022-05-09 PROCEDURE — 1036F TOBACCO NON-USER: CPT | Performed by: FAMILY MEDICINE

## 2022-05-09 PROCEDURE — 99213 OFFICE O/P EST LOW 20 MIN: CPT | Performed by: FAMILY MEDICINE

## 2022-05-09 PROCEDURE — 4040F PNEUMOC VAC/ADMIN/RCVD: CPT | Performed by: FAMILY MEDICINE

## 2022-05-09 PROCEDURE — 1123F ACP DISCUSS/DSCN MKR DOCD: CPT | Performed by: FAMILY MEDICINE

## 2022-05-09 PROCEDURE — G8420 CALC BMI NORM PARAMETERS: HCPCS | Performed by: FAMILY MEDICINE

## 2022-05-09 RX ORDER — ATORVASTATIN CALCIUM 10 MG/1
10 TABLET, FILM COATED ORAL DAILY
Qty: 90 TABLET | Refills: 1 | Status: CANCELLED | OUTPATIENT
Start: 2022-05-09

## 2022-05-09 RX ORDER — ATORVASTATIN CALCIUM 10 MG/1
10 TABLET, FILM COATED ORAL DAILY
Qty: 90 TABLET | Refills: 1 | Status: SHIPPED | OUTPATIENT
Start: 2022-05-09 | End: 2022-09-20

## 2022-05-09 ASSESSMENT — PATIENT HEALTH QUESTIONNAIRE - PHQ9
SUM OF ALL RESPONSES TO PHQ QUESTIONS 1-9: 0
2. FEELING DOWN, DEPRESSED OR HOPELESS: 0
SUM OF ALL RESPONSES TO PHQ9 QUESTIONS 1 & 2: 0
SUM OF ALL RESPONSES TO PHQ QUESTIONS 1-9: 0
1. LITTLE INTEREST OR PLEASURE IN DOING THINGS: 0

## 2022-05-09 ASSESSMENT — ENCOUNTER SYMPTOMS
COUGH: 0
ABDOMINAL PAIN: 0

## 2022-05-09 NOTE — PROGRESS NOTES
Subjective:      Patient ID: Bravo Roberts is a 68 y.o. male. HPI  Here in follow up for htn and lipids. Review of Systems   Constitutional: Negative for chills and fever. Respiratory: Negative for cough. Cardiovascular: Negative for chest pain. Gastrointestinal: Negative for abdominal pain. Skin: Negative for rash. Neurological: Negative for weakness. Treatment Adherence:   Medication compliance:  compliant most of the time  Diet compliance:  compliant most of the time  Weight trend: increasing  Current exercise: walks 7 time(s) per week  Barriers: none    Hypertension:  Home blood pressure monitoring: Yes - . He is adherent to a low sodium diet. Patient denies chest pain. Antihypertensive medication side effects: no medication side effects noted. Use of agents associated with hypertension: none. Sodium (mEq/L)   Date Value   09/01/2021 141    BUN (mg/dL)   Date Value   09/01/2021 20    Glucose (mg/dL)   Date Value   09/01/2021 102 (H)   10/14/2011 104      Potassium (mEq/L)   Date Value   09/01/2021 4.4     Potassium reflex Magnesium (mEq/L)   Date Value   06/22/2019 4.3    CREATININE (mg/dL)   Date Value   09/01/2021 1.10         Hyperlipidemia:  No new myalgias or GI upset on atorvastatin (Lipitor). Lab Results   Component Value Date    CHOL 138 10/26/2021    TRIG 88 10/26/2021    HDL 54 10/26/2021    LDLCALC 66 10/26/2021     Lab Results   Component Value Date    ALT 19 10/26/2021    AST 23 10/26/2021      Reviewed allergy, medical, social, surgical, family and med list changes and updated   files  Social History     Socioeconomic History    Marital status:       Spouse name: None    Number of children: None    Years of education: None    Highest education level: None   Occupational History     Employer: [employer]     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   Tobacco Use    Smoking status: Former Smoker     Packs/day: 1.00     Years: 40.00     Pack years: 40.00     Types: Cigarettes     Start date: 1965     Quit date: 2005     Years since quittin.8    Smokeless tobacco: Never Used   Vaping Use    Vaping Use: Never used   Substance and Sexual Activity    Alcohol use: Yes     Types: 5 Glasses of wine, 2 Cans of beer per week     Comment: 3- 4 TIMES A WEEK  WINE ONLY     Drug use: No    Sexual activity: None   Other Topics Concern    None   Social History Narrative    None     Social Determinants of Health     Financial Resource Strain: Low Risk     Difficulty of Paying Living Expenses: Not hard at all   Food Insecurity: No Food Insecurity    Worried About Running Out of Food in the Last Year: Never true    Susanna of Food in the Last Year: Never true   Transportation Needs:     Lack of Transportation (Medical): Not on file    Lack of Transportation (Non-Medical):  Not on file   Physical Activity:     Days of Exercise per Week: Not on file    Minutes of Exercise per Session: Not on file   Stress:     Feeling of Stress : Not on file   Social Connections:     Frequency of Communication with Friends and Family: Not on file    Frequency of Social Gatherings with Friends and Family: Not on file    Attends Latter day Services: Not on file    Active Member of [address] [place] [address] [employer] or Organizations: Not on file    Attends Club or Organization Meetings: Not on file    Marital Status: Not on file   Intimate Partner Violence:     Fear of Current or Ex-Partner: Not on file    Emotionally Abused: Not on file    Physically Abused: Not on file    Sexually Abused: Not on file   Housing Stability:     Unable to Pay for Housing in the Last Year: Not on file    Number of [place] in the Last Year: Not on file    Unstable Housing in the Last Year: Not on file     Current Outpatient Medications   Medication Sig Dispense Refill    metoprolol succinate (TOPROL XL) 200 MG extended release tablet TAKE 1 TABLET BY MOUTH  DAILY 90 tablet 3    atorvastatin (LIPITOR) 10 MG tablet Take 1 tablet by mouth daily 90 tablet 1    isosorbide mononitrate (IMDUR) 60 MG extended release tablet Take 1 tablet by mouth daily 90 tablet 3    Multiple Vitamins-Minerals (MULTIVITAMIN ADULT PO) Take by mouth      aspirin 81 MG chewable tablet Take 1 tablet by mouth 2 times daily (Patient taking differently: Take 81 mg by mouth daily One time daily) 60 tablet 0    donepezil (ARICEPT) 10 MG tablet Take 1 tablet by mouth daily In am 90 tablet 3     No current facility-administered medications for this visit. Family History   Problem Relation Age of Onset    High Blood Pressure Mother     Other Mother         BRAIN TUMOR    Cancer Father         LUNG     Past Medical History:   Diagnosis Date    CAD (coronary artery disease)     Eczema     Granuloma faciale     History of extremity bypass graft 11/16/2015    Left leg 11/14/2003    History of tobacco abuse 11/16/2015    Hyperlipidemia     Hypertension     Lipoma     PVD (peripheral vascular disease) ([facility] [address] [facility]     Seborrheic keratosis     Small vessel cerebrovascular accident (CVA) ([facility] [address] 12/7/2020     Objective:   Physical Exam  Neck:no carotid bruits. No masses. No adenopathy. No thyroid asymmetry. Lungs:clear and equal breath sounds. No wheezes or rales. Heart:rate reg. No murmur. No gallops   Pulses:Radials 2+ equal              D.P  1+ equal  Extremities:no edema in either leg  Gen: In no acute distress  Abdomen; B.S present. Soft  Non tender. No hepatosplenomegaly. No masses   Assessment / Plan:       Diagnosis Orders   1. Essential hypertension     2.  Hyperlipidemia, unspecified hyperlipidemia type        Orders Placed This Encounter   Procedures    Lipid Panel     Standing Status:   Future     Standing Expiration Date:   5/9/2023     Order Specific Question:   Is Patient Fasting?/# of Hours     Answer:   9    Comprehensive Metabolic Panel     Standing Status:   Future     Standing Expiration Date:   5/9/2023    CBC with Auto Differential     Standing Status:   Future     Standing Expiration Date:   5/9/2023     Orders Placed This Encounter   Medications    atorvastatin (LIPITOR) 10 MG tablet     Sig: Take 1 tablet by mouth daily     Dispense:  90 tablet     Refill:  1     Requesting 1 year supply   Continue current medications   Fasting blood work in 6 months and f/u after done

## 2022-06-09 ENCOUNTER — HOSPITAL ENCOUNTER (OUTPATIENT)
Dept: CT IMAGING | Age: 77
Discharge: HOME | End: 2022-06-11
Payer: MEDICARE

## 2022-06-09 DIAGNOSIS — R91.8 LUNG NODULES: ICD-10-CM

## 2022-06-09 PROCEDURE — 71250 CT THORAX DX C-: CPT

## 2022-06-20 ENCOUNTER — OFFICE VISIT (OUTPATIENT)
Dept: PULMONOLOGY | Age: 77
End: 2022-06-20
Payer: MEDICARE

## 2022-06-20 VITALS
DIASTOLIC BLOOD PRESSURE: 80 MMHG | SYSTOLIC BLOOD PRESSURE: 138 MMHG | HEIGHT: 71 IN | WEIGHT: 174.2 LBS | BODY MASS INDEX: 24.39 KG/M2 | TEMPERATURE: 97.1 F | RESPIRATION RATE: 16 BRPM | OXYGEN SATURATION: 98 % | HEART RATE: 63 BPM

## 2022-06-20 DIAGNOSIS — J44.9 CHRONIC OBSTRUCTIVE PULMONARY DISEASE, UNSPECIFIED COPD TYPE (HCC): ICD-10-CM

## 2022-06-20 DIAGNOSIS — I51.89 DIASTOLIC DYSFUNCTION: ICD-10-CM

## 2022-06-20 DIAGNOSIS — R91.8 LUNG NODULES: Primary | ICD-10-CM

## 2022-06-20 PROCEDURE — G8420 CALC BMI NORM PARAMETERS: HCPCS | Performed by: INTERNAL MEDICINE

## 2022-06-20 PROCEDURE — 1036F TOBACCO NON-USER: CPT | Performed by: INTERNAL MEDICINE

## 2022-06-20 PROCEDURE — 99213 OFFICE O/P EST LOW 20 MIN: CPT | Performed by: INTERNAL MEDICINE

## 2022-06-20 PROCEDURE — 3023F SPIROM DOC REV: CPT | Performed by: INTERNAL MEDICINE

## 2022-06-20 PROCEDURE — 1123F ACP DISCUSS/DSCN MKR DOCD: CPT | Performed by: INTERNAL MEDICINE

## 2022-06-20 PROCEDURE — G8427 DOCREV CUR MEDS BY ELIG CLIN: HCPCS | Performed by: INTERNAL MEDICINE

## 2022-06-20 NOTE — PROGRESS NOTES
Subjective:     Milena Solano is a 68 y.o. male who complains today of:     Chief Complaint   Patient presents with    Follow-up     1 YR F/U for COPD and Lung Nodules    Results     CT Scan of Chest       HPI  Patient presents for lung nodule      Doing good, has no complaint, he walks every day for 2 miles, no shortness of breath, no coughing, no chest pain, sleeps well, no nasal congestion postnasal drip, no heartburn and weight is stable. Has no lower extremity edema, no joint swelling stiffness or pain.             Allergies:  Lisinopril, Contrast [iodides], Crestor [rosuvastatin], and Pravachol [pravastatin]  Past Medical History:   Diagnosis Date    CAD (coronary artery disease)     Eczema     Granuloma faciale     History of extremity bypass graft 11/16/2015    Left leg 11/14/2003    History of tobacco abuse 11/16/2015    Hyperlipidemia     Hypertension     Lipoma     PVD [facility]peripheral vascular [facility]) ([facility]     Seborrheic keratosis     Small vessel cerebrovascular accident (CVA) ([facility] [address] 12/7/2020     Past Surgical History:   Procedure Laterality Date    CARDIAC CATHETERIZATION  2003    COLONOSCOPY  10-19-12    CORONARY ANGIOPLASTY WITH STENT PLACEMENT  2003    DIAGNOSTIC CARDIAC CATH LAB PROCEDURE      HAND SURGERY  2003    L    HIP SURGERY      JOINT REPLACEMENT      LUNG BIOPSY Right 09/05/2018    CT guided Left Lung Biopsy by Dr Jacqueline Rees Right     9/5/2018 per Dr Almita Nava 70. EBUS DX/TX INTERVENTION [address] LES N/A 8/17/2018    EBUS WITH TRANSBRONCHIAL NEEDLE ASPIRATION W/ FLUOROSCOPY performed by Theo Mustafa MD at [address] Left 6/21/2019    LEFT  HIP TOTAL ARTHROPLASTY performed by Mali Hess MD at [facility]     Family History   Problem Relation Age of Onset    High Blood Pressure Mother     Other Mother         BRAIN TUMOR    Cancer Father         LUNG     Social History     Socioeconomic History    Marital status:      Spouse name: Not on file    Number of children: Not on file    Years of education: Not on file    Highest education level: Not on file   Occupational History     Employer: [employer]     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   Tobacco Use    Smoking status: Former Smoker     Packs/day: 1.00     Years: 40.00     Pack years: 40.00     Types: Cigarettes     Start date: 1965     Quit date: 2005     Years since quittin.9    Smokeless tobacco: Never Used   Vaping Use    Vaping Use: Never used   Substance and Sexual Activity    Alcohol use: Yes     Types: 5 Glasses of wine, 2 Cans of beer per week     Comment: 3- 4 TIMES A WEEK  WINE ONLY     Drug use: No    Sexual activity: Not on file   Other Topics Concern    Not on file   Social History Narrative    Not on file     Social Determinants of Health     Financial Resource Strain: Low Risk     Difficulty of Paying Living Expenses: Not hard at all   Food Insecurity: No Food Insecurity    Worried About [address] [place] [address] in the Last Year: Never true    Susanna of Food in the Last Year: Never true   Transportation Needs:     Lack of Transportation (Medical): Not on file    Lack of Transportation (Non-Medical):  Not on file   Physical Activity:     Days of Exercise per Week: Not on file    Minutes of Exercise per Session: Not on file   Stress:     Feeling of Stress : Not on file   Social Connections:     Frequency of Communication with Friends and Family: Not on file    Frequency of Social Gatherings with Friends and Family: Not on file    Attends Scientologist Services: Not on file    Active Member of Clubs or Organizations: Not on file    Attends Club or Organization Meetings: Not on file    Marital Status: Not on file   Intimate Partner Violence:     Fear of Current or Ex-Partner: Not on file    Emotionally Abused: Not on file    Physically Abused: Not on file    Sexually Abused: Not on file   Housing Stability:     Unable to Pay for Housing in the Last Year: Not on file    Number of Places Lived in the Last Year: Not on file    Unstable Housing in the Last Year: Not on file         Review of Systems      ROS: 10 organs review of system is done including general, psychological, ENT, hematological, endocrine, respiratory, cardiovascular, gastrointestinal,musculoskeletal, neurological,  allergy and Immunology is done and is otherwise negative. Current Outpatient Medications   Medication Sig Dispense Refill    atorvastatin (LIPITOR) 10 MG tablet Take 1 tablet by mouth daily 90 tablet 1    metoprolol succinate (TOPROL XL) 200 MG extended release tablet TAKE 1 TABLET BY MOUTH  DAILY 90 tablet 3    isosorbide mononitrate (IMDUR) 60 MG extended release tablet Take 1 tablet by mouth daily 90 tablet 3    Multiple Vitamins-Minerals (MULTIVITAMIN ADULT PO) Take by mouth      aspirin 81 MG chewable tablet Take 1 tablet by mouth 2 times daily (Patient taking differently: Take 81 mg by mouth daily One time daily) 60 tablet 0    donepezil (ARICEPT) 10 MG tablet Take 1 tablet by mouth daily In am 90 tablet 3     No current facility-administered medications for this visit. Objective:     Vitals:    06/20/22 1519   BP: 138/80   Site: Right Upper Arm   Position: Sitting   Cuff Size: Medium Adult   Pulse: 63   Resp: 16   Temp: 97.1 °F (36.2 °C)   TempSrc: Infrared   SpO2: 98%   Weight: 174 lb 3.2 oz (79 kg)   Height: 5' 11\" (1.803 m)         Physical Exam  Constitutional:       Appearance: He is well-developed. HENT:      Head: Normocephalic and atraumatic. Eyes:      General:         Left eye: No discharge. Conjunctiva/sclera: Conjunctivae normal.      Pupils: Pupils are equal, round, and reactive to light. Neck:      Vascular: No JVD. Cardiovascular:      Rate and Rhythm: Normal rate and regular rhythm. Heart sounds: Normal heart sounds. No murmur heard. No friction rub. No gallop.     Pulmonary:      Effort: Pulmonary effort is normal. No respiratory distress. Breath sounds: Normal breath sounds. No wheezing or rales. Chest:      Chest wall: No tenderness. Abdominal:      General: Bowel sounds are normal.      Palpations: Abdomen is soft. Musculoskeletal:         General: No tenderness or deformity. Cervical back: Normal range of motion and neck supple. Right lower leg: No edema. Left lower leg: No edema. Skin:     General: Skin is warm and dry. Neurological:      Mental Status: He is alert and oriented to person, place, and time. Psychiatric:         Judgment: Judgment normal.         Imaging studies reviewed by me CT chest June 2022, shows stable lung nodules compared to 2018, stable and slightly smaller nodule in the right upper lobe. Lab results reviewed in chart  PFT 2018 FEV1 94%, FEV1/FVC 0.69  ECHO: 2019 shows EF 43%, with diastolic dysfunction    Assessment and Plan       Diagnosis Orders   1. Lung nodules     2. Chronic obstructive pulmonary disease, unspecified COPD type ([facility] [address]     3. Diastolic dysfunction       · Lung nodules, stable, and slightly smaller, indicating benign etiology, no further follow-up, he quit smoking more than 20 years ago not candidate for CT lung cancer screening  · COPD, mild, active, and asymptomatic  · Diastolic dysfunction, maintain euvolemic      No orders of the defined types were placed in this encounter. No orders of the defined types were placed in this encounter. Patient will follow-up with pulmonary as needed      Discussed with patient the importance of exercise and weight control and  overall health and well-being. Reviewed with the patient: current clinical status, medications, activities and diet. Side effects, adverse effects of the medication prescribed today, as well as treatment plan and result expectations have been discussed with the patient who expresses understanding and desires to proceed.        Return if symptoms worsen or fail to improve.       Vikas Bonilla MD

## 2022-06-28 ENCOUNTER — OFFICE VISIT (OUTPATIENT)
Dept: CARDIOLOGY CLINIC | Age: 77
End: 2022-06-28
Payer: MEDICARE

## 2022-06-28 VITALS
HEIGHT: 71 IN | WEIGHT: 170 LBS | SYSTOLIC BLOOD PRESSURE: 132 MMHG | OXYGEN SATURATION: 96 % | HEART RATE: 64 BPM | BODY MASS INDEX: 23.8 KG/M2 | DIASTOLIC BLOOD PRESSURE: 70 MMHG

## 2022-06-28 DIAGNOSIS — I73.9 PAD (PERIPHERAL ARTERY DISEASE) (HCC): ICD-10-CM

## 2022-06-28 DIAGNOSIS — I10 ESSENTIAL HYPERTENSION: ICD-10-CM

## 2022-06-28 DIAGNOSIS — I25.10 CORONARY ARTERY DISEASE INVOLVING NATIVE CORONARY ARTERY OF NATIVE HEART WITHOUT ANGINA PECTORIS: ICD-10-CM

## 2022-06-28 DIAGNOSIS — I73.9 PVD (PERIPHERAL VASCULAR DISEASE) (HCC): ICD-10-CM

## 2022-06-28 DIAGNOSIS — R09.89 BILATERAL CAROTID BRUITS: Primary | ICD-10-CM

## 2022-06-28 DIAGNOSIS — I73.9 PERIPHERAL VASCULAR DISEASE (HCC): ICD-10-CM

## 2022-06-28 DIAGNOSIS — Z95.5 HISTORY OF PLACEMENT OF STENT IN LAD CORONARY ARTERY: ICD-10-CM

## 2022-06-28 DIAGNOSIS — E78.5 HYPERLIPIDEMIA, UNSPECIFIED HYPERLIPIDEMIA TYPE: ICD-10-CM

## 2022-06-28 DIAGNOSIS — I73.9 PERIPHERAL ARTERIAL DISEASE (HCC): ICD-10-CM

## 2022-06-28 PROCEDURE — 1123F ACP DISCUSS/DSCN MKR DOCD: CPT | Performed by: INTERNAL MEDICINE

## 2022-06-28 PROCEDURE — G8420 CALC BMI NORM PARAMETERS: HCPCS | Performed by: INTERNAL MEDICINE

## 2022-06-28 PROCEDURE — G8427 DOCREV CUR MEDS BY ELIG CLIN: HCPCS | Performed by: INTERNAL MEDICINE

## 2022-06-28 PROCEDURE — 1036F TOBACCO NON-USER: CPT | Performed by: INTERNAL MEDICINE

## 2022-06-28 PROCEDURE — 99214 OFFICE O/P EST MOD 30 MIN: CPT | Performed by: INTERNAL MEDICINE

## 2022-06-28 ASSESSMENT — ENCOUNTER SYMPTOMS
BLOOD IN STOOL: 0
SHORTNESS OF BREATH: 0
RESPIRATORY NEGATIVE: 1
STRIDOR: 0
COUGH: 0
CHEST TIGHTNESS: 0
GASTROINTESTINAL NEGATIVE: 1
WHEEZING: 0
EYES NEGATIVE: 1
NAUSEA: 0

## 2022-06-28 NOTE — PROGRESS NOTES
Subsequent Progress Note  Patient: Chyna Thompson  YOB: 1945  MRN: [id number]    Chief Complaint: pad cad htn  Chief Complaint   Patient presents with    Follow-up    Coronary Artery Disease    Hypertension    Hyperlipidemia       CV Data:  2003 Left Leg Bypass - Dr. Emmanuelle Zee  2003 LAD Stent. RCA occluded  7/2015 echo 65%  6/2019  SPECT ABN Defect known RCA occlusion fills from LAD   6/2018 CUS mild  10/2019 CUS - mild. 6/2019 EF 55% 1-2+ MR.   11/2020 CUS mild       Subjective/HPI: no cp no sob still walks 2 miles daily no falls no bleed. spect abn but in the territory of known RCA occlusion     10/21/2019: doing well. Did well with L hip sx. No cp no sob. Now having recurrent gout attacks. 5/13/2020 TELEHEALTH EVALUATION -- [facility]    Recently returned from [address] Doing well. No cp no sob no bleed no falls    7/16/2020 TELEHEALTH EVALUATION -- [facility]Visual [facility]    Doing well. They really do not go anywhere due to COVID. He still exercises daily. No pc no osb no falls nobleed. Takes meds    10/22/2020 no cp no sob no falls no bleed takes meds. No leg pain. 1/21/21 no cp no sob no falls no bleed takes meds     5/25/21 active no angina. Takes meds. BP elevated. Has not cheked at home lately.      10/4/21 no cp no son active at home no falls no bleed    6/28/22 doing well no cp no sob no falls  No bleed    Nonsmoker since 2003  Retired -[employer] w wife       EKG: SR    Past Medical History:   Diagnosis Date    CAD (coronary artery disease)     Eczema     Granuloma faciale     History of extremity bypass graft 11/16/2015    Left leg 11/14/2003    History of tobacco abuse 11/16/2015    Hyperlipidemia     Hypertension     Lipoma     [facility]     Seborrheic keratosis     Small vessel cerebrovascular accident (CVA) ([address] 12/7/2020       Past Surgical History: Procedure Laterality Date    CARDIAC CATHETERIZATION  2003    COLONOSCOPY  10-19-12    CORONARY ANGIOPLASTY WITH STENT PLACEMENT      DIAGNOSTIC CARDIAC CATH LAB PROCEDURE      HAND SURGERY  2003    L    HIP SURGERY      JOINT REPLACEMENT      LUNG BIOPSY Right 2018    CT guided Left Lung Biopsy by Dr Beverly Oreilly Right     2018 per Dr Jay Hanks 70. EBUS DX/TX INTERVENTION [address] LES N/A 2018    EBUS WITH TRANSBRONCHIAL NEEDLE ASPIRATION W/ FLUOROSCOPY performed by Margaret Lopez MD at [place] [address] Left 2019    LEFT  HIP TOTAL ARTHROPLASTY performed by April Amaya MD at [facility]       Family History   Problem Relation Age of Onset    High Blood Pressure Mother     Other Mother         BRAIN TUMOR    Cancer Father         LUNG       Social History     Socioeconomic History    Marital status:       Spouse name: None    Number of children: None    Years of education: None    Highest education level: None   Occupational History     Employer: [employer]     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   Tobacco Use    Smoking status: Former Smoker     Packs/day: 1.00     Years: 40.00     Pack years: 40.00     Types: Cigarettes     Start date: 1965     Quit date: 2005     Years since quittin.0    Smokeless tobacco: Never Used   Vaping Use    Vaping Use: Never used   Substance and Sexual Activity    Alcohol use: Yes     Types: 5 Glasses of wine, 2 Cans of beer per week     Comment: 3- 4 TIMES A WEEK  WINE ONLY     Drug use: No    Sexual activity: None   Other Topics Concern    None   Social History Narrative    None     Social Determinants of Health     Financial Resource Strain:     Difficulty of Paying Living Expenses: Not on file   Food Insecurity:     Worried About Running Out of Food in the Last Year: Not on file    Susanna of Food in the Last Year: Not on file   Transportation Needs:     Lack of Transportation (Medical): Not on file    Lack of Transportation (Non-Medical): Not on file   Physical Activity:     Days of Exercise per Week: Not on file    Minutes of Exercise per Session: Not on file   Stress:     Feeling of Stress : Not on file   Social Connections:     Frequency of Communication with Friends and Family: Not on file    Frequency of Social Gatherings with Friends and Family: Not on file    Attends Confucianism Services: Not on file    Active Member of [address] [place] [address] or Organizations: Not on file    Attends Club or Organization Meetings: Not on file    Marital Status: Not on file   Intimate Partner Violence:     Fear of Current or Ex-Partner: Not on file    Emotionally Abused: Not on file    Physically Abused: Not on file    Sexually Abused: Not on file   Housing Stability:     Unable to Pay for Housing in the Last Year: Not on file    Number of [place] in the Last Year: Not on file    Unstable Housing in the Last Year: Not on file       Allergies   Allergen Reactions    Lisinopril Anaphylaxis and Swelling    Contrast [Iodides] Other (See Comments)     Pt unsure of reaction.     Crestor [Rosuvastatin]     Pravachol [Pravastatin] Other (See Comments)     Joint / Muscle aches       Current Outpatient Medications   Medication Sig Dispense Refill    atorvastatin (LIPITOR) 10 MG tablet Take 1 tablet by mouth daily 90 tablet 1    metoprolol succinate (TOPROL XL) 200 MG extended release tablet TAKE 1 TABLET BY MOUTH  DAILY 90 tablet 3    isosorbide mononitrate (IMDUR) 60 MG extended release tablet Take 1 tablet by mouth daily 90 tablet 3    donepezil (ARICEPT) 10 MG tablet Take 1 tablet by mouth daily In am 90 tablet 3    Multiple Vitamins-Minerals (MULTIVITAMIN ADULT PO) Take by mouth      aspirin 81 MG chewable tablet Take 1 tablet by mouth 2 times daily (Patient taking differently: Take 81 mg by mouth daily One time daily) 60 tablet 0     No current facility-administered medications for this visit. Review of Systems:   Review of Systems   Constitutional: Negative. Negative for diaphoresis and fatigue. HENT: Negative. Eyes: Negative. Respiratory: Negative. Negative for cough, chest tightness, shortness of breath, wheezing and stridor. Cardiovascular: Negative. Negative for chest pain, palpitations and leg swelling. Gastrointestinal: Negative. Negative for blood in stool and nausea. Genitourinary: Negative. Musculoskeletal: Positive for arthralgias. Skin: Negative. Neurological: Negative. Negative for dizziness, syncope, weakness and light-headedness. Hematological: Negative. Psychiatric/Behavioral: Negative. Physical Examination:    /70 (Site: Left Upper Arm, Position: Sitting, Cuff Size: Medium Adult)   Pulse 64   Ht 5' 11\" (1.803 m)   Wt 170 lb (77.1 kg)   SpO2 96%   BMI 23.71 kg/m²    Physical Exam   Constitutional: He appears healthy. No distress. HENT:   Normal cephalic and Atraumatic   Eyes: Pupils are equal, round, and reactive to light. Neck: Thyroid normal. No JVD present. No neck adenopathy. No thyromegaly present. Cardiovascular: Normal rate, regular rhythm, intact distal pulses and normal pulses. Murmur heard. Pulses:       Carotid pulses are on the right side with bruit and on the left side with bruit. Pulmonary/Chest: Effort normal and breath sounds normal. He has no wheezes. He has no rales. He exhibits no tenderness. Abdominal: Soft. Bowel sounds are normal. There is no abdominal tenderness. Musculoskeletal:         General: No tenderness or edema. Normal range of motion. Cervical back: Normal range of motion and neck supple. Neurological: He is alert and oriented to person, place, and time. Skin: Skin is warm. No cyanosis. Nails show no clubbing.        LABS:  CBC:   Lab Results   Component Value Date    WBC 5.2 09/01/2021    RBC 5.43 09/01/2021    RBC 4.94 10/14/2011    HGB 16.5 09/01/2021    HCT 48.5 09/01/2021    MCV 89.3 09/01/2021    MCH 30.4 09/01/2021    MCHC 34.0 09/01/2021    RDW 12.9 09/01/2021     09/01/2021    MPV 7.9 07/22/2015     Lipids:  Lab Results   Component Value Date    CHOL 138 10/26/2021    CHOL 176 09/01/2021    CHOL 154 08/12/2020     Lab Results   Component Value Date    TRIG 88 10/26/2021    TRIG 87 09/01/2021    TRIG 112 08/12/2020     Lab Results   Component Value Date    HDL 54 10/26/2021    HDL 54 09/01/2021    HDL 56 08/12/2020     Lab Results   Component Value Date    LDLCALC 66 10/26/2021    LDLCALC 105 09/01/2021    LDLCALC 76 08/12/2020     No results found for: LABVLDL, VLDL  Lab Results   Component Value Date    CHOLHDLRATIO 3.7 10/05/2012    CHOLHDLRATIO 4.0 10/14/2011     CMP:    Lab Results   Component Value Date     09/01/2021    K 4.4 09/01/2021    K 4.3 06/22/2019     09/01/2021    CO2 30 09/01/2021    BUN 20 09/01/2021    CREATININE 1.10 09/01/2021    GFRAA >60.0 09/01/2021    LABGLOM >60.0 09/01/2021    GLUCOSE 102 09/01/2021    GLUCOSE 104 10/14/2011    PROT 6.3 10/26/2021    LABALBU 4.3 10/26/2021    LABALBU 4.4 10/14/2011    CALCIUM 8.9 09/01/2021    BILITOT 0.8 10/26/2021    ALKPHOS 61 10/26/2021    AST 23 10/26/2021    ALT 19 10/26/2021     BMP:    Lab Results   Component Value Date     09/01/2021    K 4.4 09/01/2021    K 4.3 06/22/2019     09/01/2021    CO2 30 09/01/2021    BUN 20 09/01/2021    LABALBU 4.3 10/26/2021    LABALBU 4.4 10/14/2011    CREATININE 1.10 09/01/2021    CALCIUM 8.9 09/01/2021    GFRAA >60.0 09/01/2021    LABGLOM >60.0 09/01/2021    GLUCOSE 102 09/01/2021    GLUCOSE 104 10/14/2011     Magnesium:    Lab Results   Component Value Date    MG 2.4 07/19/2015     TSH:  Lab Results   Component Value Date    TSH 1.920 08/12/2020       Patient Active Problem List   Diagnosis    Lipoma    Eczema    Hyperlipidemia    Senile hyperkeratosis    Granuloma faciale    Impotence    History of placement of stent in LAD coronary artery    History of arterial bypass of lower extremity    History of tobacco abuse    [facility]    Lung mass    Status post total hip replacement, right    Status post total hip replacement, left    Aftercare following joint replacement surgery    Carotid bruit    Essential hypertension    Atherosclerosis of native coronary artery with angina pectoris, unspecified whether native or transplanted heart ([facility])    Unilateral primary osteoarthritis, left hip    Primary osteoarthritis, right ankle and foot    Unspecified sprain of right foot, initial encounter    Coronary arteriosclerosis    Hypertensive disorder    Peripheral vascular disease ([facility] [address]    History of repair of hip joint    History of total hip arthroplasty    S/P hip replacement    MCI (mild cognitive impairment)    Ulnar neuropathy at wrist, left    Memory loss, short term    Small vessel cerebrovascular accident (CVA) ([facility] [address]    Mild cognitive disorder    Chronic obstructive pulmonary disease, unspecified COPD type ([facility] [address]    Alzheimer's disease, unspecified       There are no discontinued medications. Modified Medications    No medications on file       No orders of the defined types were placed in this encounter. Assessment/Plan:    1. Essential hypertension  Stable - continue meds. Low salt diet    2. Coronary artery disease involving native coronary artery of native heart without angina pectoris  No angina - continieu meds. 3. Hyperlipidemia, unspecified hyperlipidemia type   statin - He will rechecl Labs and adjust Statins accordingly to kep LDL <100 and closer to 70. F/u labs     4. PAD (peripheral artery disease) ([facility])  STABLE - no claudication wlaks daily    5. Miller bruits- CUS -- f/u CUS - stable - f/u test     PAD- puklses are strong    Continue meds.   Continue heart healthy lifestyle        Counseling:  Heart Healthy Lifestyle, Low Salt Diet, Take Precautions to Prevent Falls and Walk Daily    Return in about 3 months (around 9/28/2022).       Electronically signed by Shyann Summers MD on 6/28/2022 at 3:40 PM

## 2022-07-06 ENCOUNTER — TELEPHONE (OUTPATIENT)
Dept: FAMILY MEDICINE CLINIC | Age: 77
End: 2022-07-06

## 2022-07-08 NOTE — TELEPHONE ENCOUNTER
Needs referral placed so insurance will pay for it - Liss Pavon would like a call once it's complete so she can ensure it's done

## 2022-07-08 NOTE — TELEPHONE ENCOUNTER
I will call Dr Katty Rogers office ([phone])GZ Monday to see what they need.  He has medicare and they don't usually need a referral.

## 2022-07-18 ENCOUNTER — OFFICE VISIT (OUTPATIENT)
Dept: NEUROLOGY | Age: 77
End: 2022-07-18
Payer: MEDICARE

## 2022-07-18 VITALS
HEIGHT: 71 IN | DIASTOLIC BLOOD PRESSURE: 80 MMHG | WEIGHT: 174 LBS | BODY MASS INDEX: 24.36 KG/M2 | HEART RATE: 62 BPM | SYSTOLIC BLOOD PRESSURE: 130 MMHG

## 2022-07-18 DIAGNOSIS — F01.50 VASCULAR DEMENTIA WITHOUT BEHAVIORAL DISTURBANCE (HCC): ICD-10-CM

## 2022-07-18 DIAGNOSIS — F09 MILD COGNITIVE DISORDER: Primary | ICD-10-CM

## 2022-07-18 DIAGNOSIS — I63.9 SMALL VESSEL CEREBROVASCULAR ACCIDENT (CVA) (HCC): ICD-10-CM

## 2022-07-18 DIAGNOSIS — G56.22 ULNAR NEUROPATHY AT WRIST, LEFT: ICD-10-CM

## 2022-07-18 PROCEDURE — 1123F ACP DISCUSS/DSCN MKR DOCD: CPT | Performed by: PSYCHIATRY & NEUROLOGY

## 2022-07-18 PROCEDURE — 1036F TOBACCO NON-USER: CPT | Performed by: PSYCHIATRY & NEUROLOGY

## 2022-07-18 PROCEDURE — 99214 OFFICE O/P EST MOD 30 MIN: CPT | Performed by: PSYCHIATRY & NEUROLOGY

## 2022-07-18 PROCEDURE — G8427 DOCREV CUR MEDS BY ELIG CLIN: HCPCS | Performed by: PSYCHIATRY & NEUROLOGY

## 2022-07-18 PROCEDURE — G8420 CALC BMI NORM PARAMETERS: HCPCS | Performed by: PSYCHIATRY & NEUROLOGY

## 2022-07-18 ASSESSMENT — ENCOUNTER SYMPTOMS
CHOKING: 0
SHORTNESS OF BREATH: 0
BACK PAIN: 0
VOMITING: 0
COLOR CHANGE: 0
TROUBLE SWALLOWING: 0
NAUSEA: 0
PHOTOPHOBIA: 0

## 2022-07-18 NOTE — PROGRESS NOTES
Subjective:      Patient ID: Katina Latham is a 68 y.o. male who presents today for:  Chief Complaint   Patient presents with    6 Month Follow-Up     Mild Cognitive Disorder, patients daughter in law does not think his brain is normal, and wants to discuss medications also. HPI skin dementia with small vessel ischemic change. He has good days and bad days. When last seen he had not worsened or showing some sign of worsening with mild cognitive impairment now. He is here with niece who was not initially involved he has slowly become more quiet  Last MRI was done in 2020 which shows small vessel ischemic changes with some minor atrophy. He is on Aricept 10 mg and we had not added anything further since we have not seen him for a while. Multiple questions were answered therefore. Patient appears to be stable as has good days and bad days Multiple functional.  Does get frustrated with things that he cannot finish.   He sleeps well  Past Medical History:   Diagnosis Date    CAD (coronary artery disease)     Eczema     Granuloma faciale     History of extremity bypass graft 11/16/2015    Left leg 11/14/2003    History of tobacco abuse 11/16/2015    Hyperlipidemia     Hypertension     Lipoma     PVD (peripheral vascular disease) ([facility])     Seborrheic keratosis     Small vessel cerebrovascular accident (CVA) ([facility] [address] 12/7/2020     Past Surgical History:   Procedure Laterality Date    CARDIAC CATHETERIZATION  2003    COLONOSCOPY  10-19-12    CORONARY ANGIOPLASTY WITH STENT PLACEMENT  2003    DIAGNOSTIC CARDIAC CATH LAB PROCEDURE      HAND SURGERY  2003    L    HIP SURGERY      JOINT REPLACEMENT      LUNG BIOPSY Right 09/05/2018    CT guided Left Lung Biopsy by Dr Allie Carney Right     9/5/2018 per Dr Miguel Obrien [facility] DX/[place] INTERVENTION [address] LES N/A 8/17/2018    EBUS WITH TRANSBRONCHIAL NEEDLE ASPIRATION W/ FLUOROSCOPY performed by Tracey Doshi MD at [address] [place] [address] ARTHROPLASTY Left 2019    LEFT  HIP TOTAL ARTHROPLASTY performed by Tanmay Johansen MD at [address] [facility] History     Socioeconomic History    Marital status:      Spouse name: Not on file    Number of children: Not on file    Years of education: Not on file    Highest education level: Not on file   Occupational History     Employer: [employer]     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   Tobacco Use    Smoking status: Former     Packs/day: 1.00     Years: 40.00     Pack years: 40.00     Types: Cigarettes     Start date: 1965     Quit date: 2005     Years since quittin.0    Smokeless tobacco: Never   Vaping Use    Vaping Use: Never used   Substance and Sexual Activity    Alcohol use: Yes     Types: 5 Glasses of wine, 2 Cans of beer per week     Comment: 3- 4 TIMES A WEEK  WINE ONLY     Drug use: No    Sexual activity: Not on file   Other Topics Concern    Not on file   Social History Narrative    Not on file     Social Determinants of Health     Financial Resource Strain: Not on file   Food Insecurity: Not on file   Transportation Needs: Not on file   Physical Activity: Not on file   Stress: Not on file   Social Connections: Not on file   Intimate Partner Violence: Not on file   Housing Stability: Not on file     Family History   Problem Relation Age of Onset    High Blood Pressure Mother     Other Mother         BRAIN TUMOR    Cancer Father         LUNG     Allergies   Allergen Reactions    Lisinopril Anaphylaxis and Swelling    Contrast [Iodides] Other (See Comments)     Pt unsure of reaction.     Crestor [Rosuvastatin]     Pravachol [Pravastatin] Other (See Comments)     Joint / Muscle aches       Current Outpatient Medications   Medication Sig Dispense Refill    atorvastatin (LIPITOR) 10 MG tablet Take 1 tablet by mouth daily 90 tablet 1    metoprolol succinate (TOPROL XL) 200 MG extended release tablet TAKE 1 TABLET BY MOUTH  DAILY 90 tablet 3    isosorbide mononitrate (IMDUR) 60 MG is warm. Neurological:      Mental Status: He is alert and oriented to person, place, and time. Cranial Nerves: No cranial nerve deficit. Sensory: No sensory deficit. Motor: No abnormal muscle tone. Coordination: Coordination normal.      Deep Tendon Reflexes: Reflexes are normal and symmetric. Babinski sign absent on the right side. Babinski sign absent on the left side. Psychiatric:         Mood and Affect: Mood normal.   Chronic left hand ulnar neuropathy which is distal is noted    CT CHEST WO CONTRAST    Result Date: 6/10/2022  EXAMINATION: CT CHEST WO CONTRAST DATE:6/9/2022 7:31 AM CLINICAL HISTORY: Anterior chest pain. Shortness of breath. R91.8 Lung nodules ICD10 COMPARISON:  May 24, 1587 TECHNIQUE: Helical CT was performed through the chest without IV contrast. All CT scans at this facility use dose modulation, iterative reconstruction, and/or weight based dosing when appropriate to reduce radiation dose to as low as reasonably achievable. Some of this report was completed using Power [employer]ribe 129 [id number] and may include unintended errors with respect to translation of words, typographical errors or grammatical errors which may not have been identified prior to the finalization of this report. FINDINGS:   Lungs: 1.3 cm nodule right upper lobe medially. This is stable and actually decreased in size when compared to June 19, 2018 study at which time it measured 1.7 cm. Stable equivocal 2 mm right upper lung nodule image 59 series 3. This is unchanged from June 2018. Stable interstitial reticulation scarring along the major fissure in the left midlung zone posteriorly. This is unchanged 2018. Pleura:Normal. No effusion or thickening  Mediastinum:Mediastinum and sharon are unremarkable. Vessels:Thoracic aorta is intact. Bones:No acute osseous abnormalities. Other:None     PERSISTENT 1.3 CM NODULE RIGHT UPPER LOBE THAT HAS DECREASED IN SIZE FROM 2018.  APPEARANCE CONSISTENT WITH BENIGN BEHAVING NODULE. Lab Results   Component Value Date/Time    WBC 5.2 09/01/2021 10:11 AM    RBC 5.43 09/01/2021 10:11 AM    RBC 4.94 10/14/2011 07:15 AM    HGB 16.5 09/01/2021 10:11 AM    HCT 48.5 09/01/2021 10:11 AM    MCV 89.3 09/01/2021 10:11 AM    MCH 30.4 09/01/2021 10:11 AM    MCHC 34.0 09/01/2021 10:11 AM    RDW 12.9 09/01/2021 10:11 AM     09/01/2021 10:11 AM    MPV 7.9 07/22/2015 05:46 AM     Lab Results   Component Value Date/Time     09/01/2021 10:11 AM    K 4.4 09/01/2021 10:11 AM    K 4.3 06/22/2019 05:56 AM     09/01/2021 10:11 AM    CO2 30 09/01/2021 10:11 AM    BUN 20 09/01/2021 10:11 AM    CREATININE 1.10 09/01/2021 10:11 AM    GFRAA >60.0 09/01/2021 10:11 AM    LABGLOM >60.0 09/01/2021 10:11 AM    GLUCOSE 102 09/01/2021 10:11 AM    GLUCOSE 104 10/14/2011 07:15 AM    PROT 6.3 10/26/2021 10:32 AM    LABALBU 4.3 10/26/2021 10:32 AM    LABALBU 4.4 10/14/2011 07:15 AM    CALCIUM 8.9 09/01/2021 10:11 AM    BILITOT 0.8 10/26/2021 10:32 AM    ALKPHOS 61 10/26/2021 10:32 AM    AST 23 10/26/2021 10:32 AM    ALT 19 10/26/2021 10:32 AM     Lab Results   Component Value Date/Time    PROTIME 10.8 08/13/2018 01:09 PM    INR 1.0 08/13/2018 01:09 PM     Lab Results   Component Value Date/Time    TSH 1.920 08/12/2020 09:08 AM    JCSMPCLK64 530 08/12/2020 09:08 AM    FOLATE 17.7 08/12/2020 09:08 AM     Lab Results   Component Value Date/Time    TRIG 88 10/26/2021 10:32 AM    HDL 54 10/26/2021 10:32 AM    LDLCALC 66 10/26/2021 10:32 AM     No results found for: LABAMPH, BARBSCNU, LABBENZ, CANNAB, COCAINESCRN, LABMETH, OPIATESCREENURINE, PHENCYCLIDINESCREENURINE, PPXUR, ETOH  No results found for: LITHIUM, DILFRTOT, VALPROATE    Assessment:       Diagnosis Orders   1. Mild cognitive disorder        2. Small vessel cerebrovascular accident (CVA) ([facility] [address]        3. Ulnar neuropathy at wrist, left        4.  Vascular dementia without behavioral disturbance (HCC)          Mild cognitive impairment with vascular dementia. Patient had an MRI which shows some minor spondylosis with changes and atrophy. His good days and bad days. Again a mixed dementia cannot be isolated from a vascular dementia the patient is doing well and he has good days and with days with clear days. He does get frustrated at certain small objects and performance is. Patient is here with his niece who is now getting into his care and had multiple questions that we have answered to the best of her abilities. At this time not expecting that he is going to decline short-term though this cannot be predicted, he has had some question regarding the new medication Aduhelm as we are no longer using in our institution or around in the city here due to the expected side effects. We will only keep him on Aricept for now in the future we may add Namenda   Plan:      No orders of the defined types were placed in this encounter. No orders of the defined types were placed in this encounter. No follow-ups on file.       James Wang MD

## 2022-07-29 ENCOUNTER — HOSPITAL ENCOUNTER (OUTPATIENT)
Dept: ULTRASOUND IMAGING | Age: 77
Discharge: HOME | End: 2022-07-31
Payer: MEDICARE

## 2022-07-29 DIAGNOSIS — R09.89 BILATERAL CAROTID BRUITS: ICD-10-CM

## 2022-07-29 PROCEDURE — 93880 EXTRACRANIAL BILAT STUDY: CPT

## 2022-07-29 PROCEDURE — 93880 EXTRACRANIAL BILAT STUDY: CPT | Performed by: INTERNAL MEDICINE

## 2022-09-01 DIAGNOSIS — I10 ESSENTIAL HYPERTENSION: ICD-10-CM

## 2022-09-01 DIAGNOSIS — E78.5 HYPERLIPIDEMIA, UNSPECIFIED HYPERLIPIDEMIA TYPE: ICD-10-CM

## 2022-09-01 LAB
ALBUMIN SERPL-MCNC: 4.5 G/DL (ref 3.5–4.6)
ALP BLD-CCNC: 65 U/L (ref 35–104)
ALT SERPL-CCNC: 22 U/L (ref 0–41)
ANION GAP SERPL CALCULATED.3IONS-SCNC: 15 MEQ/L (ref 9–15)
AST SERPL-CCNC: 27 U/L (ref 0–40)
BANDED NEUTROPHILS RELATIVE PERCENT: 2 %
BASOPHILS ABSOLUTE: 0 K/UL (ref 0–0.2)
BASOPHILS RELATIVE PERCENT: 0.7 %
BILIRUB SERPL-MCNC: 1 MG/DL (ref 0.2–0.7)
BUN BLDV-MCNC: 20 MG/DL (ref 8–23)
CALCIUM SERPL-MCNC: 9.1 MG/DL (ref 8.5–9.9)
CHLORIDE BLD-SCNC: 105 MEQ/L (ref 95–107)
CHOLESTEROL, TOTAL: 168 MG/DL (ref 0–199)
CO2: 22 MEQ/L (ref 20–31)
CREAT SERPL-MCNC: 1.13 MG/DL (ref 0.7–1.2)
EOSINOPHILS ABSOLUTE: 0 K/UL (ref 0–0.7)
EOSINOPHILS RELATIVE PERCENT: 1.2 %
GFR AFRICAN AMERICAN: >60
GFR NON-AFRICAN AMERICAN: >60
GLOBULIN: 2.4 G/DL (ref 2.3–3.5)
GLUCOSE BLD-MCNC: 104 MG/DL (ref 70–99)
HCT VFR BLD CALC: 46.5 % (ref 42–52)
HDLC SERPL-MCNC: 65 MG/DL (ref 40–59)
HEMOGLOBIN: 15.8 G/DL (ref 14–18)
LDL CHOLESTEROL CALCULATED: 83 MG/DL (ref 0–129)
LYMPHOCYTES ABSOLUTE: 1.3 K/UL (ref 1–4.8)
LYMPHOCYTES RELATIVE PERCENT: 23 %
MCH RBC QN AUTO: 30.2 PG (ref 27–31.3)
MCHC RBC AUTO-ENTMCNC: 34 % (ref 33–37)
MCV RBC AUTO: 88.8 FL (ref 80–100)
MONOCYTES ABSOLUTE: 0.4 K/UL (ref 0.2–0.8)
MONOCYTES RELATIVE PERCENT: 7.7 %
NEUTROPHILS ABSOLUTE: 3.8 K/UL (ref 1.4–6.5)
NEUTROPHILS RELATIVE PERCENT: 67 %
PDW BLD-RTO: 13.9 % (ref 11.5–14.5)
PLATELET # BLD: 184 K/UL (ref 130–400)
PLATELET SLIDE REVIEW: NORMAL
POIKILOCYTES: NORMAL
POTASSIUM SERPL-SCNC: 4.3 MEQ/L (ref 3.4–4.9)
RBC # BLD: 5.24 M/UL (ref 4.7–6.1)
SODIUM BLD-SCNC: 142 MEQ/L (ref 135–144)
TOTAL PROTEIN: 6.9 G/DL (ref 6.3–8)
TRIGL SERPL-MCNC: 101 MG/DL (ref 0–150)
WBC # BLD: 5.5 K/UL (ref 4.8–10.8)

## 2022-09-19 NOTE — TELEPHONE ENCOUNTER
requesting medication refill.  Please approve or deny this request.    Rx requested:  Requested Prescriptions     Pending Prescriptions Disp Refills    atorvastatin (LIPITOR) 10 MG tablet [Pharmacy Med Name: Atorvastatin Calcium 10 MG Oral Tablet] 90 tablet 3     Sig: TAKE 1 TABLET BY MOUTH  DAILY         Last Office Visit:   9/9/2022      Next Visit Date:  Future Appointments   Date Time Provider Jeremy Umana   9/20/2022  1:30 PM Bailee Silva MD [facility] [place]   9/20/2022  2:00 PM [facility]   9/26/2022 12:30 PM Lolis Boles MD [address] [place] [address]   7/17/2023  1:45 PM Estela Gomez MD [facility]

## 2022-09-20 RX ORDER — ATORVASTATIN CALCIUM 10 MG/1
10 TABLET, FILM COATED ORAL DAILY
Qty: 90 TABLET | Refills: 0 | Status: SHIPPED | OUTPATIENT
Start: 2022-09-20 | End: 2022-10-17 | Stop reason: SDUPTHER

## 2022-09-26 ENCOUNTER — OFFICE VISIT (OUTPATIENT)
Dept: CARDIOLOGY CLINIC | Age: 77
End: 2022-09-26
Payer: MEDICARE

## 2022-09-26 VITALS
BODY MASS INDEX: 24.27 KG/M2 | OXYGEN SATURATION: 98 % | SYSTOLIC BLOOD PRESSURE: 110 MMHG | HEART RATE: 66 BPM | DIASTOLIC BLOOD PRESSURE: 60 MMHG | WEIGHT: 174 LBS

## 2022-09-26 DIAGNOSIS — E78.5 HYPERLIPIDEMIA, UNSPECIFIED HYPERLIPIDEMIA TYPE: ICD-10-CM

## 2022-09-26 DIAGNOSIS — I25.10 CORONARY ARTERY DISEASE INVOLVING NATIVE CORONARY ARTERY OF NATIVE HEART WITHOUT ANGINA PECTORIS: ICD-10-CM

## 2022-09-26 DIAGNOSIS — I10 ESSENTIAL HYPERTENSION: ICD-10-CM

## 2022-09-26 DIAGNOSIS — R09.89 BILATERAL CAROTID BRUITS: Primary | ICD-10-CM

## 2022-09-26 DIAGNOSIS — I73.9 PERIPHERAL VASCULAR DISEASE (HCC): ICD-10-CM

## 2022-09-26 DIAGNOSIS — I73.9 PERIPHERAL ARTERIAL DISEASE (HCC): ICD-10-CM

## 2022-09-26 DIAGNOSIS — I25.119 ATHEROSCLEROSIS OF NATIVE CORONARY ARTERY WITH ANGINA PECTORIS, UNSPECIFIED WHETHER NATIVE OR TRANSPLANTED HEART (HCC): ICD-10-CM

## 2022-09-26 PROCEDURE — G8420 CALC BMI NORM PARAMETERS: HCPCS | Performed by: INTERNAL MEDICINE

## 2022-09-26 PROCEDURE — G8427 DOCREV CUR MEDS BY ELIG CLIN: HCPCS | Performed by: INTERNAL MEDICINE

## 2022-09-26 PROCEDURE — 1123F ACP DISCUSS/DSCN MKR DOCD: CPT | Performed by: INTERNAL MEDICINE

## 2022-09-26 PROCEDURE — 1036F TOBACCO NON-USER: CPT | Performed by: INTERNAL MEDICINE

## 2022-09-26 PROCEDURE — 99214 OFFICE O/P EST MOD 30 MIN: CPT | Performed by: INTERNAL MEDICINE

## 2022-09-26 ASSESSMENT — ENCOUNTER SYMPTOMS
BLOOD IN STOOL: 0
GASTROINTESTINAL NEGATIVE: 1
EYES NEGATIVE: 1
CHEST TIGHTNESS: 0
RESPIRATORY NEGATIVE: 1
COUGH: 0
NAUSEA: 0
SHORTNESS OF BREATH: 0
WHEEZING: 0
STRIDOR: 0

## 2022-09-26 NOTE — PROGRESS NOTES
Subsequent Progress Note  Patient: Darnell Gonsalez  YOB: 1945  MRN: [id number]    Chief Complaint: pad cad htn  Chief Complaint   Patient presents with    Coronary Artery Disease    Hypertension    3 Month Follow-Up       CV Data:  2003 Left Leg Bypass - Dr. Edith Brown  2003 LAD Stent. RCA occluded  7/2015 echo 65%  6/2019  SPECT ABN Defect known RCA occlusion fills from LAD   6/2018 CUS mild  10/2019 CUS - mild. 6/2019 EF 55% 1-2+ MR.   11/2020 CUS mild   8/22 CUS mild mod. Subjective/HPI: no cp no sob still walks 2 miles daily no falls no bleed. spect abn but in the territory of known RCA occlusion     10/21/2019: doing well. Did well with L hip sx. No cp no sob. Now having recurrent gout attacks. 5/13/2020 TELEHEALTH EVALUATION -- Audio/Visual (During BYWMA-40 public health emergency)    Recently returned from [id number] [facility]. Doing well. No cp no sob no bleed no falls    7/16/2020 TELEHEALTH EVALUATION -- Audio/Visual (During INOBV-42 public health emergency)    Doing well. They really do not go anywhere due to COVID. He still exercises daily. No pc no osb no falls nobleed. Takes meds    10/22/2020 no cp no sob no falls no bleed takes meds. No leg pain. 1/21/21 no cp no sob no falls no bleed takes meds     5/25/21 active no angina. Takes meds. BP elevated. Has not cheked at home lately. 10/4/21 no cp no son active at home no falls no bleed    6/28/22 doing well no cp no sob no falls  No bleed    9/26/22 doing well will go to [place], [address] [facility] for winter. No cp no sob no falls no bleed. Takes meds.      Nonsmoker since 2003  Retired -[employer] w wife       EKG: SR    Past Medical History:   Diagnosis Date    CAD (coronary artery disease)     Eczema     Granuloma faciale     History of extremity bypass graft 11/16/2015    Left leg 11/14/2003    History of tobacco abuse 11/16/2015    Hyperlipidemia     Hypertension     Lipoma     PVD (peripheral vascular disease) ([facility])     Seborrheic keratosis Small vessel cerebrovascular accident (CVA) ([facility] [address] 2020       Past Surgical History:   Procedure Laterality Date    CARDIAC CATHETERIZATION      COLONOSCOPY  10-19-12    CORONARY ANGIOPLASTY WITH STENT PLACEMENT      DIAGNOSTIC CARDIAC CATH LAB PROCEDURE      HAND SURGERY      L    HIP SURGERY      JOINT REPLACEMENT      LUNG BIOPSY Right 2018    CT guided Left Lung Biopsy by Dr Amara Haynes Right     2018 per Dr John Pacheco [address] [place] INTERVENTION [address] LES N/A 2018    EBUS WITH TRANSBRONCHIAL NEEDLE ASPIRATION W/ FLUOROSCOPY performed by Jj Hendricks MD at [facility] [address] Left 2019    LEFT  HIP TOTAL ARTHROPLASTY performed by Anisa Gomez MD at [facility]       Family History   Problem Relation Age of Onset    High Blood Pressure Mother     Other Mother         BRAIN TUMOR    Cancer Father         LUNG       Social History     Socioeconomic History    Marital status:    Occupational History     Employer: [employer]     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   Tobacco Use    Smoking status: Former     Packs/day: 1.00     Years: 40.00     Pack years: 40.00     Types: Cigarettes     Start date: 1965     Quit date: 2005     Years since quittin.2    Smokeless tobacco: Never   Vaping Use    Vaping Use: Never used   Substance and Sexual Activity    Alcohol use: Yes     Types: 5 Glasses of wine, 2 Cans of beer per week     Comment: 3- 4 TIMES A WEEK  WINE ONLY     Drug use: No     Social Determinants of Health     Financial Resource Strain: Low Risk     Difficulty of Paying Living Expenses: Not hard at all   Food Insecurity: No Food Insecurity    Worried About Running Out of Food in the Last Year: Never true    Ran Out of Food in the Last Year: Never true       Allergies   Allergen Reactions    Lisinopril Anaphylaxis and Swelling    Contrast [Iodides] Other (See Comments)     Pt unsure of reaction.     Crestor [Rosuvastatin]     Pravachol [Pravastatin] Other (See Comments)     Joint / Muscle aches       Current Outpatient Medications   Medication Sig Dispense Refill    atorvastatin (LIPITOR) 10 MG tablet TAKE 1 TABLET BY MOUTH  DAILY 90 tablet 0    metoprolol succinate (TOPROL XL) 200 MG extended release tablet TAKE 1 TABLET BY MOUTH  DAILY 90 tablet 3    isosorbide mononitrate (IMDUR) 60 MG extended release tablet Take 1 tablet by mouth daily 90 tablet 3    Multiple Vitamins-Minerals (MULTIVITAMIN ADULT PO) Take by mouth      aspirin 81 MG chewable tablet Take 1 tablet by mouth 2 times daily (Patient taking differently: Take 81 mg by mouth daily One time daily) 60 tablet 0    donepezil (ARICEPT) 10 MG tablet Take 1 tablet by mouth daily In am 90 tablet 3     No current facility-administered medications for this visit. Review of Systems:   Review of Systems   Constitutional: Negative. Negative for diaphoresis and fatigue. HENT: Negative. Eyes: Negative. Respiratory: Negative. Negative for cough, chest tightness, shortness of breath, wheezing and stridor. Cardiovascular: Negative. Negative for chest pain, palpitations and leg swelling. Gastrointestinal: Negative. Negative for blood in stool and nausea. Genitourinary: Negative. Musculoskeletal:  Positive for arthralgias. Skin: Negative. Neurological: Negative. Negative for dizziness, syncope, weakness and light-headedness. Hematological: Negative. Psychiatric/Behavioral: Negative. Physical Examination:    /60 (Site: Left Upper Arm, Position: Sitting, Cuff Size: Small Adult)   Pulse 66   Wt 174 lb (78.9 kg)   SpO2 98%   BMI 24.27 kg/m²    Physical Exam   Constitutional: He appears healthy. No distress. HENT:   Normal cephalic and Atraumatic   Eyes: Pupils are equal, round, and reactive to light. Neck: Thyroid normal. No JVD present. No neck adenopathy. No thyromegaly present.    Cardiovascular: Normal rate, regular rhythm, intact distal pulses and normal pulses. Murmur heard. Pulses:       Carotid pulses are  on the right side with bruit and  on the left side with bruit. Pulmonary/Chest: Effort normal and breath sounds normal. He has no wheezes. He has no rales. He exhibits no tenderness. Abdominal: Soft. Bowel sounds are normal. There is no abdominal tenderness. Musculoskeletal:         General: No tenderness or edema. Normal range of motion. Cervical back: Normal range of motion and neck supple. Neurological: He is alert and oriented to person, place, and time. Skin: Skin is warm. No cyanosis. Nails show no clubbing.      LABS:  CBC:   Lab Results   Component Value Date/Time    WBC 5.5 09/01/2022 09:51 AM    RBC 5.24 09/01/2022 09:51 AM    RBC 4.94 10/14/2011 07:15 AM    HGB 15.8 09/01/2022 09:51 AM    HCT 46.5 09/01/2022 09:51 AM    MCV 88.8 09/01/2022 09:51 AM    MCH 30.2 09/01/2022 09:51 AM    MCHC 34.0 09/01/2022 09:51 AM    RDW 13.9 09/01/2022 09:51 AM     09/01/2022 09:51 AM    MPV 7.9 07/22/2015 05:46 AM     Lipids:  Lab Results   Component Value Date    CHOL 168 09/01/2022    CHOL 138 10/26/2021    CHOL 176 09/01/2021     Lab Results   Component Value Date    TRIG 101 09/01/2022    TRIG 88 10/26/2021    TRIG 87 09/01/2021     Lab Results   Component Value Date    HDL 65 (H) 09/01/2022    HDL 54 10/26/2021    HDL 54 09/01/2021     Lab Results   Component Value Date    LDLCALC 83 09/01/2022    LDLCALC 66 10/26/2021    LDLCALC 105 09/01/2021     No results found for: LABVLDL, VLDL  Lab Results   Component Value Date    CHOLHDLRATIO 3.7 10/05/2012    CHOLHDLRATIO 4.0 10/14/2011     CMP:    Lab Results   Component Value Date/Time     09/01/2022 09:51 AM    K 4.3 09/01/2022 09:51 AM    K 4.3 06/22/2019 05:56 AM     09/01/2022 09:51 AM    CO2 22 09/01/2022 09:51 AM    BUN 20 09/01/2022 09:51 AM    CREATININE 1.13 09/01/2022 09:51 AM    GFRAA >60.0 09/01/2022 09:51 AM    LABGLOM >60.0 09/01/2022 09:51 AM    GLUCOSE 104 09/01/2022 09:51 AM    GLUCOSE 104 10/14/2011 07:15 AM    PROT 6.9 09/01/2022 09:51 AM    LABALBU 4.5 09/01/2022 09:51 AM    LABALBU 4.4 10/14/2011 07:15 AM    CALCIUM 9.1 09/01/2022 09:51 AM    BILITOT 1.0 09/01/2022 09:51 AM    ALKPHOS 65 09/01/2022 09:51 AM    AST 27 09/01/2022 09:51 AM    ALT 22 09/01/2022 09:51 AM     BMP:    Lab Results   Component Value Date/Time     09/01/2022 09:51 AM    K 4.3 09/01/2022 09:51 AM    K 4.3 06/22/2019 05:56 AM     09/01/2022 09:51 AM    CO2 22 09/01/2022 09:51 AM    BUN 20 09/01/2022 09:51 AM    LABALBU 4.5 09/01/2022 09:51 AM    LABALBU 4.4 10/14/2011 07:15 AM    CREATININE 1.13 09/01/2022 09:51 AM    CALCIUM 9.1 09/01/2022 09:51 AM    GFRAA >60.0 09/01/2022 09:51 AM    LABGLOM >60.0 09/01/2022 09:51 AM    GLUCOSE 104 09/01/2022 09:51 AM    GLUCOSE 104 10/14/2011 07:15 AM     Magnesium:    Lab Results   Component Value Date/Time    MG 2.4 07/19/2015 07:18 AM     TSH:  Lab Results   Component Value Date    TSH 1.920 08/12/2020       Patient Active Problem List   Diagnosis    Lipoma    Eczema    Hyperlipidemia    Senile hyperkeratosis    Granuloma faciale    Impotence    History of placement of stent in LAD coronary artery    History of arterial bypass of lower extremity    History of tobacco abuse    PAD (peripheral artery disease) ([facility])    Lung mass    Status post total hip replacement, right    Status post total hip replacement, left    Aftercare following joint replacement surgery    Carotid bruit    Essential hypertension    Atherosclerosis of native coronary artery with angina pectoris, unspecified whether native or transplanted heart ([facility])    Unilateral primary osteoarthritis, left hip    Primary osteoarthritis, right ankle and foot    Unspecified sprain of right foot, initial encounter    Coronary arteriosclerosis    Hypertensive disorder    Peripheral vascular disease ([facility] [address]    History of repair of hip joint    History of total hip arthroplasty    S/P hip replacement    MCI (mild cognitive impairment)    Ulnar neuropathy at wrist, left    Memory loss, short term    Small vessel cerebrovascular accident (CVA) ([facility] [address]    Mild cognitive disorder    Chronic obstructive pulmonary disease, unspecified COPD type ([facility] [address]    Alzheimer's disease, unspecified    Vascular dementia without behavioral disturbance ([facility] [address]       There are no discontinued medications. Modified Medications    No medications on file       No orders of the defined types were placed in this encounter. Assessment/Plan:    1. Essential hypertension  Stable - continue meds. Low salt diet    2. Coronary artery disease involving native coronary artery of native heart without angina pectoris  No angina - continue meds. 3. Hyperlipidemia, unspecified hyperlipidemia type   statin - He will rechecl Labs and adjust Statins accordingly to kep LDL <100 and closer to 70. F/u labs     4. PAD (peripheral artery disease) (HCC)  STABLE - no claudication wlaks daily    5. Miller bruits- CUS -- f/u CUS - stable - f/u test     PAD- pulses are strong    Continue meds. Continue heart healthy lifestyle        Counseling:  Heart Healthy Lifestyle, Low Salt Diet, Take Precautions to Prevent Falls and Walk Daily    Return in about 6 months (around 3/26/2023).       Electronically signed by Remigio Valdes MD on 9/26/2022 at 12:23 PM

## 2022-09-28 ENCOUNTER — OFFICE VISIT (OUTPATIENT)
Dept: FAMILY MEDICINE CLINIC | Age: 77
End: 2022-09-28
Payer: MEDICARE

## 2022-09-28 VITALS
BODY MASS INDEX: 24.36 KG/M2 | RESPIRATION RATE: 14 BRPM | WEIGHT: 174 LBS | TEMPERATURE: 97.3 F | SYSTOLIC BLOOD PRESSURE: 120 MMHG | HEIGHT: 71 IN | HEART RATE: 70 BPM | OXYGEN SATURATION: 98 % | DIASTOLIC BLOOD PRESSURE: 82 MMHG

## 2022-09-28 DIAGNOSIS — E78.5 HYPERLIPIDEMIA, UNSPECIFIED HYPERLIPIDEMIA TYPE: ICD-10-CM

## 2022-09-28 DIAGNOSIS — I10 ESSENTIAL HYPERTENSION: Primary | ICD-10-CM

## 2022-09-28 PROCEDURE — 1123F ACP DISCUSS/DSCN MKR DOCD: CPT | Performed by: FAMILY MEDICINE

## 2022-09-28 PROCEDURE — 99213 OFFICE O/P EST LOW 20 MIN: CPT | Performed by: FAMILY MEDICINE

## 2022-09-28 PROCEDURE — G8427 DOCREV CUR MEDS BY ELIG CLIN: HCPCS | Performed by: FAMILY MEDICINE

## 2022-09-28 PROCEDURE — 1036F TOBACCO NON-USER: CPT | Performed by: FAMILY MEDICINE

## 2022-09-28 PROCEDURE — G8420 CALC BMI NORM PARAMETERS: HCPCS | Performed by: FAMILY MEDICINE

## 2022-09-28 RX ORDER — ASPIRIN 81 MG/1
81 TABLET ORAL DAILY
COMMUNITY

## 2022-09-28 ASSESSMENT — ENCOUNTER SYMPTOMS
VOMITING: 0
COUGH: 0
DIARRHEA: 0

## 2022-09-28 ASSESSMENT — PATIENT HEALTH QUESTIONNAIRE - PHQ9
SUM OF ALL RESPONSES TO PHQ QUESTIONS 1-9: 0
SUM OF ALL RESPONSES TO PHQ9 QUESTIONS 1 & 2: 0
1. LITTLE INTEREST OR PLEASURE IN DOING THINGS: 0
SUM OF ALL RESPONSES TO PHQ QUESTIONS 1-9: 0
2. FEELING DOWN, DEPRESSED OR HOPELESS: 0
SUM OF ALL RESPONSES TO PHQ QUESTIONS 1-9: 0
SUM OF ALL RESPONSES TO PHQ QUESTIONS 1-9: 0

## 2022-09-28 NOTE — PROGRESS NOTES
Subjective:      Patient ID: Shira Ferrari is a 68 y.o. male. HPI    Review of Systems  Treatment Adherence:   Medication compliance:  {Desc; compliance:5303::\"compliant most of the time\"}  Diet compliance:  {Desc; compliance:5303::\"compliant most of the time\"}  Weight trend: {INCREASING/DECREASING/STABLE:16413}  Current exercise: {EXERCISE XFCA:233291248}  Barriers: {Barriers to success:47579}    Hypertension:  Home blood pressure monitoring: {NO/YES:5661760016}. He {is/is not:9024} adherent to a low sodium diet. Patient {denies/complains:64090} {Symptoms of Hypertension, Denies:95805}. Antihypertensive medication side effects: {Hypertension med side effects:5728::\"no medication side effects noted\"}. Use of agents associated with hypertension: {bp agents assoc with hypertension:511::\"none\"}. Sodium (mEq/L)   Date Value   09/01/2022 142    BUN (mg/dL)   Date Value   09/01/2022 20    Glucose (mg/dL)   Date Value   09/01/2022 104 (H)   10/14/2011 104      Potassium (mEq/L)   Date Value   09/01/2022 4.3     Potassium reflex Magnesium (mEq/L)   Date Value   06/22/2019 4.3    Creatinine (mg/dL)   Date Value   09/01/2022 1.13         Hyperlipidemia:  No new myalgias or GI upset on {RP HYPERLIPIDEMIA MEDS:33176}.      Lab Results   Component Value Date    CHOL 168 09/01/2022    TRIG 101 09/01/2022    HDL 65 (H) 09/01/2022    LDLCALC 83 09/01/2022     Lab Results   Component Value Date    ALT 22 09/01/2022    AST 27 09/01/2022          Objective:   Physical Exam    Assessment / Plan:

## 2022-09-28 NOTE — PROGRESS NOTES
Subjective:      Patient ID: Ruby Salinas is a 68 y.o. male    Hyperlipidemia  This is a chronic problem. The current episode started more than 1 year ago. The problem is uncontrolled. Current antihyperlipidemic treatment includes statins. The current treatment provides mild improvement of lipids. Here in follow up for lipids and blood work. No missed doses since last time   Review of Systems   Constitutional:  Negative for chills and fever. Respiratory:  Negative for cough. Gastrointestinal:  Negative for diarrhea and vomiting. Neurological:  Negative for weakness. 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   Tobacco Use    Smoking status: Former     Packs/day: 1.00     Years: 40.00     Pack years: 40.00     Types: Cigarettes     Start date: 1965     Quit date: 2005     Years since quittin.2    Smokeless tobacco: Never   Vaping Use    Vaping Use: Never used   Substance and Sexual Activity    Alcohol use: Yes     Types: 5 Glasses of wine, 2 Cans of beer per week     Comment: 3- 4 TIMES A WEEK  WINE ONLY     Drug use: No     Social Determinants of Health     Financial Resource Strain: Low Risk     Difficulty of Paying Living Expenses: Not hard at all   Food Insecurity: No Food Insecurity    Worried About Running Out of Food in the Last Year: Never true    Ran Out of Food in the Last Year: Never true     Current Outpatient Medications   Medication Sig Dispense Refill    aspirin 81 MG EC tablet Take 81 mg by mouth daily      atorvastatin (LIPITOR) 10 MG tablet TAKE 1 TABLET BY MOUTH  DAILY 90 tablet 0    metoprolol succinate (TOPROL XL) 200 MG extended release tablet TAKE 1 TABLET BY MOUTH  DAILY 90 tablet 3    isosorbide mononitrate (IMDUR) 60 MG extended release tablet Take 1 tablet by mouth daily 90 tablet 3    Multiple Vitamins-Minerals (MULTIVITAMIN ADULT PO) Take by mouth      donepezil (ARICEPT) 10 MG tablet Take 1 tablet by mouth daily In am 90 tablet 3     No current facility-administered medications for this visit. 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 No masses. No adenopathy. No thyroid asymmetry. Lungs:clear and equal breath sounds. No wheezes or rales. Heart:rate reg. No murmur. No gallops   Pulses:Radials 2+ equal               D.P  1+ equal  Extremities:no edema in either leg  Gen: In no acute distress    Assessment:       Diagnosis Orders   1. Essential hypertension        2.  Hyperlipidemia, unspecified hyperlipidemia type               Plan:      Increase lipitor 20 mg daily   Orders Placed This Encounter   Procedures    Lipid Panel     Standing Status:   Future     Standing Expiration Date:   9/28/2023    Comprehensive Metabolic Panel     Standing Status:   Future     Standing Expiration Date:   9/28/2023      Fasting blood work in 4 weeks and f/u after done

## 2022-10-12 ENCOUNTER — TELEPHONE (OUTPATIENT)
Dept: FAMILY MEDICINE CLINIC | Age: 77
End: 2022-10-12

## 2022-10-12 NOTE — TELEPHONE ENCOUNTER
Patients wife called in upset when she got a phone call from insurance stating that her husbands Medicare Annual Wellness Visit still hasn't been done. Patient was supposed to come in on 9/20/22 and had to change date to 9/28/22 it was supposed to be marked as a [address] for some reason it didn't carry over correct visit type in data and patient is leaving town and needs it changed in the coding ASAP so they don't have to worry about this insurance problem when there away all winter as there leaving in a week and wont be back anytime soon.  Please give patient a call to when done to stop worrying please [phone]   Please Advise Thank You

## 2022-10-17 DIAGNOSIS — E78.5 HYPERLIPIDEMIA, UNSPECIFIED HYPERLIPIDEMIA TYPE: ICD-10-CM

## 2022-10-17 DIAGNOSIS — I10 ESSENTIAL HYPERTENSION: ICD-10-CM

## 2022-10-17 LAB
ALBUMIN SERPL-MCNC: 4.9 G/DL (ref 3.5–4.6)
ALP BLD-CCNC: 66 U/L (ref 35–104)
ALT SERPL-CCNC: 19 U/L (ref 0–41)
ANION GAP SERPL CALCULATED.3IONS-SCNC: 9 MEQ/L (ref 9–15)
AST SERPL-CCNC: 22 U/L (ref 0–40)
BILIRUB SERPL-MCNC: 1 MG/DL (ref 0.2–0.7)
BUN BLDV-MCNC: 24 MG/DL (ref 8–23)
CALCIUM SERPL-MCNC: 9.2 MG/DL (ref 8.5–9.9)
CHLORIDE BLD-SCNC: 103 MEQ/L (ref 95–107)
CHOLESTEROL, TOTAL: 173 MG/DL (ref 0–199)
CO2: 29 MEQ/L (ref 20–31)
CREAT SERPL-MCNC: 1.08 MG/DL (ref 0.7–1.2)
GFR AFRICAN AMERICAN: >60
GFR NON-AFRICAN AMERICAN: >60
GLOBULIN: 2.4 G/DL (ref 2.3–3.5)
GLUCOSE BLD-MCNC: 114 MG/DL (ref 70–99)
HDLC SERPL-MCNC: 62 MG/DL (ref 40–59)
LDL CHOLESTEROL CALCULATED: 87 MG/DL (ref 0–129)
POTASSIUM SERPL-SCNC: 4.1 MEQ/L (ref 3.4–4.9)
SODIUM BLD-SCNC: 141 MEQ/L (ref 135–144)
TOTAL PROTEIN: 7.3 G/DL (ref 6.3–8)
TRIGL SERPL-MCNC: 119 MG/DL (ref 0–150)

## 2022-10-17 RX ORDER — ATORVASTATIN CALCIUM 20 MG/1
20 TABLET, FILM COATED ORAL DAILY
Qty: 90 TABLET | Refills: 0 | Status: SHIPPED | OUTPATIENT
Start: 2022-10-17 | End: 2022-12-06 | Stop reason: SDUPTHER

## 2022-10-17 RX ORDER — DONEPEZIL HYDROCHLORIDE 10 MG/1
10 TABLET, FILM COATED ORAL DAILY
Qty: 90 TABLET | Refills: 3 | Status: SHIPPED | OUTPATIENT
Start: 2022-10-17 | End: 2022-11-16

## 2022-10-17 NOTE — TELEPHONE ENCOUNTER
patient requesting medication refill.  Please approve or deny this request.    Rx requested:  Requested Prescriptions     Pending Prescriptions Disp Refills    atorvastatin (LIPITOR) 10 MG tablet 90 tablet 0     Sig: Take 1 tablet by mouth daily         Last Office Visit:   9/28/2022      Next Visit Date:  Future Appointments   Date Time Provider Jeremy Umana   5/8/2023 12:30 PM Suni Jack MD [facility]   7/17/2023  1:45 PM Natty Curran MD [facility]

## 2022-10-17 NOTE — TELEPHONE ENCOUNTER
Patient is requesting medication refill.  Please approve or deny this request.    Rx requested:  Requested Prescriptions     Pending Prescriptions Disp Refills    donepezil (ARICEPT) 10 MG tablet 90 tablet 3     Sig: Take 1 tablet by mouth daily In am         Last Office Visit:   7/18/2022      Next Visit Date:  Future Appointments   Date Time Provider Jeremy Umana   5/8/2023 12:30 PM Rosy Quintanilla MD [address] [place] [address]   7/17/2023  1:45 PM Dwayne Booth MD [facility]

## 2022-10-19 NOTE — TELEPHONE ENCOUNTER
Patient called and was asking about the AWV that was cancelled on 9/20/22   patient was fransisco'd for the OV but the AWV did not get fransisco'd   I left a message on both the home & cell for patient to call the office.    I will be happy to filomena the patient for an in office or VV

## 2022-10-20 ENCOUNTER — TELEMEDICINE (OUTPATIENT)
Dept: FAMILY MEDICINE CLINIC | Age: 77
End: 2022-10-20
Payer: MEDICARE

## 2022-10-20 DIAGNOSIS — Z00.00 MEDICARE ANNUAL WELLNESS VISIT, SUBSEQUENT: Primary | ICD-10-CM

## 2022-10-20 PROCEDURE — 1123F ACP DISCUSS/DSCN MKR DOCD: CPT | Performed by: FAMILY MEDICINE

## 2022-10-20 PROCEDURE — G0439 PPPS, SUBSEQ VISIT: HCPCS | Performed by: FAMILY MEDICINE

## 2022-10-20 PROCEDURE — G8484 FLU IMMUNIZE NO ADMIN: HCPCS | Performed by: FAMILY MEDICINE

## 2022-10-20 ASSESSMENT — PATIENT HEALTH QUESTIONNAIRE - PHQ9
SUM OF ALL RESPONSES TO PHQ9 QUESTIONS 1 & 2: 0
SUM OF ALL RESPONSES TO PHQ QUESTIONS 1-9: 0
2. FEELING DOWN, DEPRESSED OR HOPELESS: 0
SUM OF ALL RESPONSES TO PHQ QUESTIONS 1-9: 0
1. LITTLE INTEREST OR PLEASURE IN DOING THINGS: 0
SUM OF ALL RESPONSES TO PHQ QUESTIONS 1-9: 0
SUM OF ALL RESPONSES TO PHQ QUESTIONS 1-9: 0

## 2022-10-20 NOTE — PROGRESS NOTES
Medicare Annual Wellness Visit    Jeff Forbes is here for Medicare AWV (Telephone Medicare AWV)    Assessment & Plan    Recommendations for Preventive Services Due: see orders and patient instructions/AVS.  Recommended screening schedule for the next 5-10 years is provided to the patient in written form: see Patient Instructions/AVS.     Pt. Will call pharmacy re: #2 Shingrix vaccine    No follow-ups on file. Subjective       Patient's complete Health Risk Assessment and screening values have been reviewed and are found in Flowsheets. The following problems were reviewed today and where indicated follow up appointments were made and/or referrals ordered. Positive Risk Factor Screenings with Interventions:             General Health and ACP:  General  In general, how would you say your health is?: Very Good  In the past 7 days, have you experienced any of the following: New or Increased Pain, New or Increased Fatigue, Loneliness, Social Isolation, Stress or Anger?: No  Do you get the social and emotional support that you need?: Yes  Do you have a Living Will?: Yes    Advance Directives       Power of  Living Will ACP-Advance Directive ACP-Power of     Not on File Not on File Not on File Not on File        General Health Risk Interventions:  Stress: relaxation techniques discussed     Hearing/Vision:  Do you or your family notice any trouble with your hearing that hasn't been managed with hearing aids?: (!) Yes (has hearing aids)  Do you have difficulty driving, watching TV, or doing any of your daily activities because of your eyesight?: No  Have you had an eye exam within the past year?: Yes  No results found.   Hearing/Vision Interventions:  Hearing concerns:  patient declines any further evaluation/treatment for hearing issues            Objective      Patient-Reported Vitals  No data recorded          Allergies   Allergen Reactions    Lisinopril Anaphylaxis and Swelling    Contrast [Iodides] Other (See Comments)     Pt unsure of reaction. Crestor [Rosuvastatin]     Pravachol [Pravastatin] Other (See Comments)     Joint / Muscle aches     Prior to Visit Medications    Medication Sig Taking? Authorizing Provider   atorvastatin (LIPITOR) 20 MG tablet Take 1 tablet by mouth daily Yes Shonda Deleon MD   donepezil (ARICEPT) 10 MG tablet Take 1 tablet by mouth daily In am Yes Gaudencio Sanabria MD   aspirin 81 MG EC tablet Take 81 mg by mouth daily Yes Historical Provider, MD   metoprolol succinate (TOPROL XL) 200 MG extended release tablet TAKE 1 TABLET BY MOUTH  DAILY Yes Lindsay Whitley MD   isosorbide mononitrate (IMDUR) 60 MG extended release tablet Take 1 tablet by mouth daily Yes Lindsay Whitley MD   Multiple Vitamins-Minerals (MULTIVITAMIN ADULT PO) Take by mouth Yes Historical Provider, MD [place] (Including outside providers/suppliers regularly involved in providing care):   Patient Care Team:  Shonda Deleon MD as PCP - General (Family Medicine)  Shonda Deleon MD as PCP - [facility]  Brook Funez RN as Nurse Georgia Thomas MD as Consulting Physician (Pulmonology)  Lindsay Whitley MD as Cardiologist (Cardiology)  Gaudencio Sanabria MD as Consulting Physician (Neurology)     Reviewed and updated this visit:  Allergies  Meds       AVS mailed to pt. Supa Galeano, was evaluated through a synchronous (real-time) audio-video encounter. The patient (or guardian if applicable) is aware that this is a billable service, which includes applicable co-pays. This Virtual Visit was conducted with patient's (and/or legal guardian's) consent. The visit was conducted pursuant to the emergency declaration under the [facility], [address] [place] [address] authority and the [employer] General Act. Patient identification was verified, and a caregiver was present when appropriate.    The patient was located at Home: [address] [place], [place] [id number]. Provider was located at Home ([place] [address]): [place]. This encounter was performed under my, Odalis Malone, direct supervision, 10/20/2022. Tracey Gallardo LPN, 25/37/7744, performed the documented evaluation under the direct supervision of the attending physician.

## 2022-10-20 NOTE — PATIENT INSTRUCTIONS
that limits sodium , certain types of fat , and cholesterol . This type of diet is recommended for:   People with any form of cardiovascular disease (eg, coronary heart disease , peripheral vascular disease , previous heart attack , previous stroke )   People with risk factors for cardiovascular disease, such as high blood pressure , high cholesterol , or diabetes   Anyone who wants to lower their risk of developing cardiovascular disease   Sodium    Sodium is a mineral found in many foods. In general, most people consume much more sodium than they need. Diets high in sodium can increase blood pressure and lead to edema (water retention). On a heart-healthy diet, you should consume no more than 2,300 mg (milligrams) of sodium per dayabout the amount in one teaspoon of table salt. The foods highest in sodium include table salt (about 50% sodium), processed foods, convenience foods, and preserved foods. Cholesterol    Cholesterol is a fat-like, waxy substance in your blood. Our bodies make some cholesterol. It is also found in animal products, with the highest amounts in fatty meat, egg yolks, whole milk, cheese, shellfish, and organ meats. On a heart-healthy diet, you should limit your cholesterol intake to less than 200 mg per day. It is normal and important to have some cholesterol in your bloodstream. But too much cholesterol can cause plaque to build up within your arteries, which can eventually lead to a heart attack or stroke. The two types of cholesterol that are most commonly referred to are:   Low-density lipoprotein (LDL) cholesterol  Also known as bad cholesterol, this is the cholesterol that tends to build up along your arteries. Bad cholesterol levels are increased by eating fats that are saturated or hydrogenated. Optimal level of this cholesterol is less than 100. Over 130 starts to get risky for heart disease.    High-density lipoprotein (HDL) cholesterol  Also known as good cholesterol, this type of cholesterol actually carries cholesterol away from your arteries and may, therefore, help lower your risk of having a heart attack. You want this level to be high (ideally greater than 60). It is a risk to have a level less than 40. You can raise this good cholesterol by eating olive oil, canola oil, avocados, or nuts. Exercise raises this level, too. Fat    Fat is calorie dense and packs a lot of calories into a small amount of food. Even though fats should be limited due to their high calorie content, not all fats are bad. In fact, some fats are quite healthful. Fat can be broken down into four main types. The good-for-you fats are:   Monounsaturated fat  found in oils such as olive and canola, avocados, and nuts and natural nut butters; can decrease cholesterol levels, while keeping levels of HDL cholesterol high   Polyunsaturated fat  found in oils such as safflower, sunflower, soybean, corn, and sesame; can decrease total cholesterol and LDL cholesterol   Omega-3 fatty acids  particularly those found in fatty fish (such as salmon, trout, tuna, mackerel, herring, and sardines); can decrease risk of arrhythmias, decrease triglyceride levels, and slightly lower blood pressure   The fats that you want to limit are:   Saturated fat  found in animal products, many fast foods, and a few vegetables; increases total blood cholesterol, including LDL levels   Animal fats that are saturated include: butter, lard, whole-milk dairy products, meat fat, and poultry skin   Vegetable fats that are saturated include: hydrogenated shortening, palm oil, coconut oil, cocoa butter   Hydrogenated or trans fat  found in margarine and vegetable shortening, most shelf stable snack foods, and fried foods; increases LDL and decreases HDL     It is generally recommended that you limit your total fat for the day to less than 30% of your total calories.  If you follow an 1800-calorie heart healthy diet, for example, this would mean 60 grams of fat or less per day. Saturated fat and trans fat in your diet raises your blood cholesterol the most, much more than dietary cholesterol does. For this reason, on a heart-healthy diet, less than 7% of your calories should come from saturated fat and ideally 0% from trans fat. On an 1800-calorie diet, this translates into less than 14 grams of saturated fat per day, leaving 46 grams of fat to come from mono- and polyunsaturated fats.    Food Choices on a Heart Healthy Diet   Food Category   Foods Recommended   Foods to Avoid   Grains   Breads and rolls without salted tops Most dry and cooked cereals Unsalted crackers and breadsticks Low-sodium or homemade breadcrumbs or stuffing All rice and pastas   Breads, rolls, and crackers with salted tops High-fat baked goods (eg, muffins, donuts, pastries) Quick breads, self-rising flour, and biscuit mixes Regular bread crumbs Instant hot cereals Commercially prepared rice, pasta, or stuffing mixes   Vegetables   Most fresh, frozen, and low-sodium canned vegetables Low-sodium and salt-free vegetable juices Canned vegetables if unsalted or rinsed   Regular canned vegetables and juices, including sauerkraut and pickled vegetables Frozen vegetables with sauces Commercially prepared potato and vegetable mixes   Fruits   Most fresh, frozen, and canned fruits All fruit juices   Fruits processed with salt or sodium   Milk   Nonfat or low-fat (1%) milk Nonfat or low-fat yogurt Cottage cheese, low-fat ricotta, cheeses labeled as low-fat and low-sodium   Whole milk Reduced-fat (2%) milk Malted and chocolate milk Full fat yogurt Most cheeses (unless low-fat and low salt) Buttermilk (no more than 1 cup per week)   Meats and Beans   Lean cuts of fresh or frozen beef, veal, lamb, or pork (look for the word loin) Fresh or frozen poultry without the skin Fresh or frozen fish and some shellfish Egg whites and egg substitutes (Limit whole eggs to three per week) Tofu Nuts or seeds (unsalted, dry-roasted), low-sodium peanut butter Dried peas, beans, and lentils   Any smoked, cured, salted, or canned meat, fish, or poultry (including polk, chipped beef, cold cuts, hot dogs, sausages, sardines, and anchovies) Poultry skins Breaded and/or fried fish or meats Canned peas, beans, and lentils Salted nuts   Fats and Oils   Olive oil and canola oil Low-sodium, low-fat salad dressings and mayonnaise   Butter, margarine, coconut and palm oils, polk fat   Snacks, Sweets, and Condiments   Low-sodium or unsalted versions of broths, soups, soy sauce, and condiments Pepper, herbs, and spices; vinegar, lemon, or lime juice Low-fat frozen desserts (yogurt, sherbet, fruit bars) Sugar, cocoa powder, honey, syrup, jam, and preserves Low-fat, trans-fat free cookies, cakes, and pies Jalen and animal crackers, fig bars, anjel snaps   High-fat desserts Broth, soups, gravies, and sauces, made from instant mixes or other high-sodium ingredients Salted snack foods Canned olives Meat tenderizers, seasoning salt, and most flavored vinegars   Beverages   Low-sodium carbonated beverages Tea and coffee in moderation Soy milk   Commercially softened water   Suggestions   Make whole grains, fruits, and vegetables the base of your diet. Choose heart-healthy fats such as canola, olive, and flaxseed oil, and foods high in heart-healthy fats, such as nuts, seeds, soybeans, tofu, and fish. Eat fish at least twice per week; the fish highest in omega-3 fatty acids and lowest in mercury include salmon, herring, mackerel, sardines, and canned chunk light tuna. If you eat fish less than twice per week or have high triglycerides, talk to your doctor about taking fish oil supplements. Read food labels. For products low in fat and cholesterol, look for fat free, low-fat, cholesterol free, saturated fat free, and trans fat freeAlso scan the Nutrition Facts Label, which lists saturated fat, trans fat, and cholesterol amounts. For products low in sodium, look for sodium free, very low sodium, low sodium, no added salt, and unsalted   Skip the salt when cooking or at the table; if food needs more flavor, get creative and try out different herbs and spices. Garlic and onion also add substantial flavor to foods. Trim any visible fat off meat and poultry before cooking, and drain the fat off after [place]. Use cooking methods that require little or no added fat, such as grilling, boiling, baking, poaching, broiling, roasting, steaming, stir-frying, and sauting. Avoid fast food and convenience food. They tend to be high in saturated and trans fat and have a lot of added salt. Talk to a registered dietitian for individualized diet advice. Last Reviewed: March 2011 Mariela Pérez MS, MPH, RD   Updated: 3/29/2011     Keep Your Memory Excell Glaze       Many factors can affect your ability to remembera hectic lifestyle, aging, stress, chronic disease, and certain medicines. But, there are steps you can take to sharpen your mind and help preserve your memory. Challenge Your Brain   Regularly challenging your mind may help keeps it in top shape. Good mental exercises include:   Crossword puzzlesUse a dictionary if you need it; you will learn more that way. Brainteasers Try some! Crafts, such as wood working and sewing   Hobbies, such as gardening and building model airplanes   SocializingVisit old friends or join groups to meet new ones. Reading   Learning a new language   Taking a class, whether it be art history or janneth chi   TravelingExperience the food, history, and culture of your destination   Learning to use a computer   Going to museums, the theater, or thought-provoking movies   Changing things in your daily life, such as reversing your pattern in the grocery store or brushing your teeth using your nondominant hand   Use Memory Aids   There is no need to remember every detail on your own.  These memory aids can help: Calendars and day planners   Electronic organizers to store all sorts of helpful informationThese devices can \"beep\" to remind you of appointments. A book of days to record birthdays, anniversaries, and other occasions that occur on the same date every year   Detailed \"to-do\" lists and strategically placed sticky notes   Quick \"study\" sessionsBefore a gathering, review who will be there so their names will be fresh in your mind. Establish routinesFor example, keep your keys, wallet, and umbrella in the same place all the time or take medicine with your 8:00 AM glass of juice   Live a Healthy Life   Many actions that will keep your body strong will do the same for your mind. For example:   Talk to Your Doctor About Herbs and Supplements    Malnutrition and vitamin deficiencies can impair your mental function. For example, vitamin B12 deficiency can cause a range of symptoms, including confusion. But, what if your nutritional needs are being met? Can herbs and supplements still offer a benefit? Researchers have investigated a range of natural remedies, such as ginkgo , ginseng , and the supplement phosphatidylserine (PS). So far, though, the evidence is inconsistent as to whether these products can improve memory or thinking. If you are interested in taking herbs and supplements, talk to your doctor first because they may interact with other medicines that you are taking. Exercise Regularly    Among the many benefits of regular exercise are increased blood flow to the brain and decreased risk of certain diseases that can interfere with memory function. One study found that even moderate exercise has a beneficial effect. Examples of \"moderate\" exercise include:   Playing 18 holes of golf once a week, without a cart   Playing tennis twice a week   Walking one mile per day   Manage Stress    It can be tough to remember what is important when your mind is cluttered. Make time for relaxation.  Choose activities that calm you down, and make it routine. Manage Chronic Conditions    Side effects of high blood pressure , diabetes, and heart disease can interfere with mental function. Many of the lifestyle steps discussed here can help manage these conditions. Strive to eat a healthy diet, exercise regularly, get stress under control, and follow your doctor's advice for your condition. Minimize Medications    Talk to your doctor about the medicines that you take. Some may be unnecessary. Also, healthy lifestyle habits may lower the need for certain drugs. Last Reviewed: April 2010 Silvia Dias MD   Updated: 4/13/2010     Keeping Home a [address] [facility]       As we get older, changes in balance, gait, strength, vision, hearing, and cognition make even the most youthful senior more prone to accidents. Falls are one of the leading health risks for older people. This increased risk of falling is related to:   Aging process (eg, decreased muscle strength, slowed reflexes)   Higher incidence of chronic health problems (eg, arthritis, diabetes) that may limit mobility, agility or sensory awareness   Side effects of medicine (eg, dizziness, blurred vision)especially medicines like prescription pain medicines and drugs used to treat mental health conditions   Depending on the brittleness of your bones, the consequences of a fall can be serious and long lasting. Home Life   Research by the Association of Aging [facility]) shows that some home accidents among older adults can be prevented by making simple lifestyle changes and basic modifications and repairs to the home environment. Here are some lifestyle changes that experts recommend:   Have your hearing and vision checked regularly. Be sure to wear prescription glasses that are right for you. Speak to your doctor or pharmacist about the possible side effects of your medicines. A number of medicines can cause dizziness. If you have problems with sleep, talk to your doctor.    Limit your intake of alcohol. If necessary, use a cane or walker to help maintain your balance. Wear supportive, rubber-soled shoes, even at home. If you live in a region that gets wintry weather, you may want to put special cleats on your shoes to prevent you from slipping on the snow and ice. Exercise regularly to help maintain muscle tone, agility, and balance. Always hold the banister when going up or down stairs. Also, use  bars when getting in or out of the bath or shower, or using the toilet. To avoid dizziness, get up slowly from a lying down position. Sit up first, dangling your legs for a minute or two before rising to a standing position. Overall Home Safety Check   According to the Consumer Product Safety Commision's \"Older Consumer Home Safety Checklist,\" it is important to check for potential hazards in each room. And remember, proper lighting is an essential factor in home safety. If you cannot see clearly, you are more likely to fall. Important questions to ask yourself include:   Are lamp, electric, extension, and telephone cords placed out of the flow of traffic and maintained in good condition? Have frayed cords been replaced? Are all small rugs and runners slip resistant? If not, you can secure them to the floor with a special double-sided carpet tape. Are smoke detectors properly locatedone on every floor of your home and one outside of every sleeping area? Are they in good working order? Are batteries replaced at least once a year? Do you have a well-maintained carbon monoxide detector outside every sleeping are in your home? Does your furniture layout leave plenty of space to maneuver between and around chairs, tables, beds, and sofas? Are hallways, stairs and passages between rooms well lit? Can you reach a lamp without getting out of bed? Are floor surfaces well maintained?  Shag rugs, high-pile carpeting, tile floors, and polished wood floors can be particularly slippery. Stairs should always have handrails and be carpeted or fitted with a non-skid tread. Is your telephone easily reachable. Is the cord safely tucked away? Room by Room   According to the Association of Aging, bathrooms and alexys are the two most potentially hazardous rooms in your home. In the Kitchen    Be sure your stove is in proper working order and always make sure burners and the oven are off before you go out or go to sleep. Keep pots on the back burners, turn handles away from the front of the stove, and keep stove clean and free of grease build-up. Kitchen ventilation systems and range exhausts should be working properly. Keep flammable objects such as towels and pot holders away from the cooking area except when in use. Make sure kitchen curtains are tied back. Move cords and appliances away from the sink and hot surfaces. If extension cords are needed, install wiring guides so they do not hang over the sink, range, or working areas. Look for coffee pots, kettles and toaster ovens with automatic shut-offs. Keep a mop handy in the kitchen so you can wipe up spills instantly. You should also have a small fire extinguisher. Arrange your kitchen with frequently used items on lower shelves to avoid the need to stand on a stepstool to reach them. Make sure countertops are well-lit to avoid injuries while cutting and preparing food. In the Bathroom    Use a non-slip mat or decals in the tub and shower, since wet, soapy tile or porcelain surfaces are extremely slippery. Make sure bathroom rugs are non-skid or tape them firmly to the floor. Bathtubs should have at least one, preferably two, grab bars, firmly attached to structural supports in the wall. (Do not use built-in soap holders or glass shower doors as grab bars.)    Tub seats fitted with non-slip material on the legs allow you to wash sitting down.  For people with limited mobility, bathtub transfer benches allow you to slide safely into the tub. Raised toilet seats and toilet safety rails are helpful for those with knee or hip problems. In the [facility]    Make sure you use a nightlight and that the area around your bed is clear of potential obstacles. Be careful with electric blankets and never go to sleep with a heating pad, which can cause serious burns even if on a low setting. Use fire-resistant mattress covers and pillows, and NEVER smoke in bed. Keep a phone next to the bed that is programmed to dial 911 at the push of a button. If you have a chronic condition, you may want to sign on with an automatic call-in service. Typically the system includes a small pendant that connects directly to an emergency medical voice-response system. You should also make arrangements to stay in contact with someonefriend, neighbor, family memberon a regular schedule. Fire Prevention   According to the [employer]. (Smoke Alarms for Every) [address] [place] [address], senior citizens are one of the two highest risk groups for death and serious injuries due to residential fires. When cooking, wear short-sleeved items, never a bulky long-sleeved robe. The [facility]'s Safety Checklist for Older Consumers emphasizes the importance of checking basements, garages, workshops and storage areas for fire hazards, such as volatile liquids, piles of old rags or clothing and overloaded circuits. Never smoke in bed or when lying down on a couch or recliner chair. Small portable electric or kerosene heaters are responsible for many home fires and should be used cautiously if at all. If you do use one, be sure to keep them away from flammable materials. In case of fire, make sure you have a pre-established emergency exit plan. Have a professional check your fireplace and other fuel-burning appliances yearly.     Helping Hands   Baby boomers entering the sun years will continue to see the development of new products to help older adults live safely and independently in spite of age-related changes. Making Life More Livable  , by Scotty Arguelles, lists over 1,000 products for \"living well in the mature years,\" such as bathing and mobility aids, household security devices, ergonomically designed knives and peelers, and faucet valves and knobs for temperature control. Medical supply stores and organizations are good sources of information about products that improve your quality of life and insure your safety.      Last Reviewed: November 2009 Mitali Atkinson MD   Updated: 3/7/2011

## 2022-10-24 ENCOUNTER — TELEPHONE (OUTPATIENT)
Dept: FAMILY MEDICINE CLINIC | Age: 77
End: 2022-10-24

## 2022-10-24 NOTE — TELEPHONE ENCOUNTER
Patient needs a call back to go over blood work patient is leaving for [place] in morning 10/25 till May 2023 and doesn't have time to set up a appointment.  Please call [phone]      Please Advise Thank You

## 2022-10-24 NOTE — TELEPHONE ENCOUNTER
Pre diabetes and lipids not at goal -needs follow up for these problems -would not change any meds if going out of state-can  see physician prior to returning if any adjustment to meds going to take place

## 2022-10-24 NOTE — TELEPHONE ENCOUNTER
Erin Manriquez returned call and was given bw results. The patient does not see a physician in [place] any longer. She scheduled an appointment for 5/10/23.

## 2022-12-06 RX ORDER — ATORVASTATIN CALCIUM 20 MG/1
20 TABLET, FILM COATED ORAL DAILY
Qty: 90 TABLET | Refills: 0 | Status: SHIPPED | OUTPATIENT
Start: 2022-12-06

## 2022-12-06 NOTE — TELEPHONE ENCOUNTER
----- Message from William Duarte sent at 12/5/2022 11:38 AM EST -----  Subject: Refill Request    QUESTIONS  Name of Medication? atorvastatin (LIPITOR) 20 MG tablet  Patient-reported dosage and instructions? 20 mg  How many days do you have left? 0  Preferred Pharmacy? [facility] ([address])  Pharmacy phone number (if available)? [phone]  Additional Information for Provider? patient needs refills to say take 2 a   day   ---------------------------------------------------------------------------  --------------  CALL BACK INFO  What is the best way for the office to contact you? OK to leave message on   voicemail  Preferred Call Back Phone Number? [phone]  ---------------------------------------------------------------------------  --------------  SCRIPT ANSWERS  Relationship to Patient?  Self

## 2023-01-05 ENCOUNTER — TELEPHONE (OUTPATIENT)
Dept: FAMILY MEDICINE CLINIC | Age: 78
End: 2023-01-05

## 2023-01-05 NOTE — TELEPHONE ENCOUNTER
Please clarify dosage of atorvastatin. Last prescription on 9-28-22 says to increase to 20mg daily. Patient is stating he is to take  2 pills daily.

## 2023-01-05 NOTE — TELEPHONE ENCOUNTER
Debbie Bledsoe, on hipaa, states patient was informed at his 9/28 appt to increase the Atorvastatin from 1 tablet to 2. The script that was sent on 12/6 states to only take one tablet daily. She also states the script was supposed to have refills. Please advise. Thank you.

## 2023-01-09 RX ORDER — ATORVASTATIN CALCIUM 20 MG/1
20 TABLET, FILM COATED ORAL 2 TIMES DAILY
Qty: 180 TABLET | Refills: 1 | Status: SHIPPED | OUTPATIENT
Start: 2023-01-09 | End: 2023-04-09

## 2023-01-19 ENCOUNTER — TELEPHONE (OUTPATIENT)
Dept: FAMILY MEDICINE CLINIC | Age: 78
End: 2023-01-19

## 2023-01-19 NOTE — TELEPHONE ENCOUNTER
Patient and wife went for a awalk 2wks ago. Patient came back home sweating through his shirt and jacket. EMS was called and came out patient checked out OK. Wife is just calling because she is worried says this isn't normal.      Thank you

## 2023-01-19 NOTE — TELEPHONE ENCOUNTER
Patients wife calling asking for a call back [phone] OK TO LMOVM patient got LIPITOR RX from [facility] patient was informed to take 2 2xs daily. On RX sent it says that as well. But when patient received RX it says to take 1 1x daily. Patient needs to know does pharmacy owe him more and he should be taking 2x daily.     Please advise thank you

## 2023-02-12 DIAGNOSIS — I10 ESSENTIAL HYPERTENSION: Primary | ICD-10-CM

## 2023-02-13 RX ORDER — METOPROLOL SUCCINATE 200 MG/1
200 TABLET, EXTENDED RELEASE ORAL DAILY
Qty: 90 TABLET | Refills: 3 | Status: SHIPPED | OUTPATIENT
Start: 2023-02-13

## 2023-02-13 NOTE — TELEPHONE ENCOUNTER
Requesting medication refill. Please approve or deny this request.    Rx requested:  Requested Prescriptions     Pending Prescriptions Disp Refills    metoprolol succinate (TOPROL XL) 200 MG extended release tablet [Pharmacy Med Name: Metoprolol Succinate  MG Oral Tablet Extended Release 24 Hour] 10 tablet 35     Sig: TAKE 1 TABLET BY MOUTH  DAILY         Last Office Visit:   9/26/2022      Next Visit Date:  Future Appointments   Date Time Provider Jeremy Calderoni   5/8/2023 12:30 PM Cecille Ferris MD [facility]   5/10/2023  9:00 AM Antonio Barron MD [address] [place] [address]   7/17/2023  1:45 PM MD [facility] -               Please approve or deny.

## 2023-02-17 NOTE — TELEPHONE ENCOUNTER
Carl is requesting medication refill. Wife has has confirmed the pharmacy.    Rx requested:  Requested Prescriptions     Pending Prescriptions Disp Refills    atorvastatin (LIPITOR) 20 MG tablet 180 tablet 1     Sig: Take 1 tablet by mouth 2 times daily       Last Office Visit:   10/20/2022    Last Tox screen:    ?    Last Medication contract:    ?    Next Visit Date:  Future Appointments   Date Time Provider Department Center   5/8/2023 12:30 PM MD Melissa Garza [facility] Sanchez   5/10/2023  9:00 AM Buddy Chaudhry MD [facility]   8/7/2023  3:30 PM MD MELISSA Ivy [facility] Neurology -

## 2023-02-18 RX ORDER — ATORVASTATIN CALCIUM 20 MG/1
20 TABLET, FILM COATED ORAL DAILY
Qty: 90 TABLET | Refills: 1 | Status: SHIPPED | OUTPATIENT
Start: 2023-02-18 | End: 2023-05-19

## 2023-04-17 RX ORDER — ISOSORBIDE MONONITRATE 60 MG/1
60 TABLET, EXTENDED RELEASE ORAL DAILY
Qty: 90 TABLET | Refills: 3 | Status: SHIPPED | OUTPATIENT
Start: 2023-04-17

## 2023-04-17 NOTE — TELEPHONE ENCOUNTER
Requesting medication refill. Please approve or deny this request.    Rx requested:  Requested Prescriptions     Pending Prescriptions Disp Refills    isosorbide mononitrate (IMDUR) 60 MG extended release tablet [Pharmacy Med Name: Isosorbide Mononitrate ER 60 MG Oral Tablet Extended Release 24 Hour] 90 tablet 3     Sig: TAKE 1 TABLET BY MOUTH  DAILY         Last Office Visit:   9/26/2022      Next Visit Date:  Future Appointments   Date Time Provider Jeremy Umana   5/8/2023 12:30 PM Gretta Rojas MD [address] [place] [address]   5/10/2023  9:00 AM Reinier Bacon MD [facility]   8/7/2023  3:30 PM MD Goyo Garcia Neurology -               Last refill 3/14/22. Please approve or deny.

## 2023-05-02 NOTE — PROGRESS NOTES
Patient sent e-advice yesterday 5/1/23. Recommendations relayed to patient through e-advice. Waiting for response in regard to imaging orders.    Physical Therapy  Facility/Department: [facility] MED SURG UNIT  Daily Treatment Note  NAME: Hebert Casillas  :   MRN: [id number]    Date of Service: 2019    Discharge Recommendations:  Continue to assess pending progress, Patient would benefit from continued therapy after discharge   PT Equipment Recommendations  Equipment Needed: No    Assessment   Body structures, Functions, Activity limitations: Decreased functional mobility ; Decreased endurance;Decreased balance; Increased Pain;Decreased strength  Assessment: Pt performed seated and standing exercises with mild increase in pain. Pt ambulated with steady step thru gait and steady pace. Donned ice post tx. Prognosis: Good  REQUIRES PT FOLLOW UP: Yes  Activity Tolerance  Activity Tolerance: Patient Tolerated treatment well     Patient Diagnosis(es): There were no encounter diagnoses. has a past medical history of CAD (coronary artery disease), Eczema, Granuloma faciale, History of extremity bypass graft, History of tobacco abuse, Hyperlipidemia, Hypertension, Lipoma, PVD (peripheral vascular disease) ([address] and Seborrheic keratosis. has a past surgical history that includes Hand surgery (); Coronary angioplasty with stent (); Cardiac catheterization (); Colonoscopy (10-19-12); Diagnostic Cardiac Cath Lab Procedure; hip surgery; [facility] ebus dx/tx intervention perph les (N/A, 2018); Lung biopsy (Right, 2018); Lung biopsy (Right); joint replacement; and Total hip arthroplasty (Left, 2019).     Restrictions  Restrictions/Precautions  Restrictions/Precautions: Weight Bearing, Fall Risk  Required Braces or Orthoses?: No  Implants present? : Metal implants  Lower Extremity Weight Bearing Restrictions  Left Lower Extremity Weight Bearing: Weight Bearing As Tolerated  Position Activity Restriction  Hip Precautions: Posterior hip precautions  Subjective   General  Chart Reviewed: Yes  Family / Caregiver Present: MET)  Long term goal 4: pt to navigate 4 steps with supervision 1 HR R ascending(GOAL MET)  Patient Goals   Patient goals : Be able to walk without AD or with least restrictive AD before d/c    Plan    Plan  Times per week: 5-7  Times per day: Twice a day  Plan weeks: 1 week  Current Treatment Recommendations: Strengthening, Transfer Training, Endurance Training, Patient/Caregiver Education & Training, Positioning, ROM, Gait Training, Home Exercise Program, Functional Mobility Training, Stair training  Safety Devices  Type of devices:  All fall risk precautions in place, Bed alarm in place, Call light within reach     Therapy Time   Individual Concurrent Group Co-treatment   Time In  1100         Time Out  1200         Minutes  E[address] 27, PTA  License and [address] Number: [id number]

## 2023-05-08 ENCOUNTER — OFFICE VISIT (OUTPATIENT)
Dept: CARDIOLOGY CLINIC | Age: 78
End: 2023-05-08
Payer: MEDICARE

## 2023-05-08 VITALS
HEART RATE: 60 BPM | DIASTOLIC BLOOD PRESSURE: 62 MMHG | WEIGHT: 179.2 LBS | BODY MASS INDEX: 24.99 KG/M2 | OXYGEN SATURATION: 95 % | SYSTOLIC BLOOD PRESSURE: 126 MMHG

## 2023-05-08 DIAGNOSIS — I25.119 ATHEROSCLEROSIS OF NATIVE CORONARY ARTERY WITH ANGINA PECTORIS, UNSPECIFIED WHETHER NATIVE OR TRANSPLANTED HEART (HCC): ICD-10-CM

## 2023-05-08 DIAGNOSIS — I10 ESSENTIAL HYPERTENSION: ICD-10-CM

## 2023-05-08 DIAGNOSIS — I73.9 PERIPHERAL ARTERIAL DISEASE (HCC): ICD-10-CM

## 2023-05-08 DIAGNOSIS — R09.89 BILATERAL CAROTID BRUITS: ICD-10-CM

## 2023-05-08 DIAGNOSIS — Z00.00 PE (PHYSICAL EXAM), ANNUAL: Primary | ICD-10-CM

## 2023-05-08 DIAGNOSIS — E78.5 HYPERLIPIDEMIA, UNSPECIFIED HYPERLIPIDEMIA TYPE: ICD-10-CM

## 2023-05-08 DIAGNOSIS — I73.9 PERIPHERAL VASCULAR DISEASE (HCC): ICD-10-CM

## 2023-05-08 DIAGNOSIS — I73.9 PAD (PERIPHERAL ARTERY DISEASE) (HCC): ICD-10-CM

## 2023-05-08 DIAGNOSIS — I25.10 CORONARY ARTERY DISEASE INVOLVING NATIVE CORONARY ARTERY OF NATIVE HEART WITHOUT ANGINA PECTORIS: ICD-10-CM

## 2023-05-08 PROCEDURE — G8427 DOCREV CUR MEDS BY ELIG CLIN: HCPCS | Performed by: INTERNAL MEDICINE

## 2023-05-08 PROCEDURE — 1123F ACP DISCUSS/DSCN MKR DOCD: CPT | Performed by: INTERNAL MEDICINE

## 2023-05-08 PROCEDURE — G8420 CALC BMI NORM PARAMETERS: HCPCS | Performed by: INTERNAL MEDICINE

## 2023-05-08 PROCEDURE — 99214 OFFICE O/P EST MOD 30 MIN: CPT | Performed by: INTERNAL MEDICINE

## 2023-05-08 PROCEDURE — 93000 ELECTROCARDIOGRAM COMPLETE: CPT | Performed by: INTERNAL MEDICINE

## 2023-05-08 PROCEDURE — 3074F SYST BP LT 130 MM HG: CPT | Performed by: INTERNAL MEDICINE

## 2023-05-08 PROCEDURE — 1036F TOBACCO NON-USER: CPT | Performed by: INTERNAL MEDICINE

## 2023-05-08 PROCEDURE — 3078F DIAST BP <80 MM HG: CPT | Performed by: INTERNAL MEDICINE

## 2023-05-08 ASSESSMENT — ENCOUNTER SYMPTOMS
EYES NEGATIVE: 1
NAUSEA: 0
COUGH: 0
STRIDOR: 0
RESPIRATORY NEGATIVE: 1
SHORTNESS OF BREATH: 0
BLOOD IN STOOL: 0
GASTROINTESTINAL NEGATIVE: 1
CHEST TIGHTNESS: 0
WHEEZING: 0

## 2023-05-08 NOTE — PROGRESS NOTES
Subsequent Progress Note  Patient: Huyen Willis  YOB: 1945  MRN: [id number]    Chief Complaint: pad cad htn  Chief Complaint   Patient presents with    6 Month Follow-Up     Bilateral carotid bruits       CV Data:  2003 Left Leg Bypass - Dr. Edyta Lawler  2003 LAD Stent. RCA occluded  7/2015 echo 65%  6/2019  SPECT ABN Defect known RCA occlusion fills from LAD   6/2018 CUS mild  10/2019 CUS - mild. 6/2019 EF 55% 1-2+ MR.   11/2020 CUS mild   8/22 CUS mild mod. Subjective/HPI: no cp no sob still walks 2 miles daily no falls no bleed. spect abn but in the territory of known RCA occlusion     10/21/2019: doing well. Did well with L hip sx. No cp no sob. Now having recurrent gout attacks. 5/13/2020 TELEHEALTH EVALUATION -- Audio/Visual (During XDRRW-92 public health emergency)    Recently returned from [id number] [place] [address]. Doing well. No cp no sob no bleed no falls    7/16/2020 TELEHEALTH EVALUATION -- Audio/Visual (During VWDDG-42 public health emergency)    Doing well. They really do not go anywhere due to COVID. He still exercises daily. No pc no osb no falls nobleed. Takes meds    10/22/2020 no cp no sob no falls no bleed takes meds. No leg pain. 1/21/21 no cp no sob no falls no bleed takes meds     5/25/21 active no angina. Takes meds. BP elevated. Has not cheked at home lately. 10/4/21 no cp no son active at home no falls no bleed    6/28/22 doing well no cp no sob no falls  No bleed    9/26/22 doing well will go to [facility] [id number] [place] [address] for winter. No cp no sob no falls no bleed. Takes meds. 5/8/23 in [id number] [place] [address] had 1 episode near heat injury while walking. He was dehydrated. No further episodes. No cp no sob no falls no bleed.      Nonsmoker since 2003  Retired [email or web address] w wife       EKG: SR 61    Past Medical History:   Diagnosis Date    CAD (coronary artery disease)     Eczema     Granuloma faciale     History of extremity bypass graft 11/16/2015    Left leg 11/14/2003    History of tobacco abuse

## 2023-05-10 ENCOUNTER — OFFICE VISIT (OUTPATIENT)
Dept: FAMILY MEDICINE CLINIC | Age: 78
End: 2023-05-10

## 2023-05-10 VITALS
HEART RATE: 64 BPM | BODY MASS INDEX: 24.81 KG/M2 | TEMPERATURE: 97.2 F | RESPIRATION RATE: 14 BRPM | OXYGEN SATURATION: 98 % | WEIGHT: 177.2 LBS | DIASTOLIC BLOOD PRESSURE: 84 MMHG | HEIGHT: 71 IN | SYSTOLIC BLOOD PRESSURE: 120 MMHG

## 2023-05-10 DIAGNOSIS — E78.5 HYPERLIPIDEMIA, UNSPECIFIED HYPERLIPIDEMIA TYPE: ICD-10-CM

## 2023-05-10 DIAGNOSIS — I10 ESSENTIAL HYPERTENSION: Primary | ICD-10-CM

## 2023-05-10 RX ORDER — ISOSORBIDE MONONITRATE 60 MG/1
60 TABLET, EXTENDED RELEASE ORAL DAILY
Qty: 90 TABLET | Refills: 3 | Status: SHIPPED | OUTPATIENT
Start: 2023-05-10

## 2023-05-10 SDOH — ECONOMIC STABILITY: INCOME INSECURITY: HOW HARD IS IT FOR YOU TO PAY FOR THE VERY BASICS LIKE FOOD, HOUSING, MEDICAL CARE, AND HEATING?: NOT HARD AT ALL

## 2023-05-10 SDOH — ECONOMIC STABILITY: HOUSING INSECURITY
IN THE LAST 12 MONTHS, WAS THERE A TIME WHEN YOU DID NOT HAVE A STEADY PLACE TO SLEEP OR SLEPT IN A SHELTER (INCLUDING NOW)?: NO

## 2023-05-10 SDOH — ECONOMIC STABILITY: FOOD INSECURITY: WITHIN THE PAST 12 MONTHS, YOU WORRIED THAT YOUR FOOD WOULD RUN OUT BEFORE YOU GOT MONEY TO BUY MORE.: NEVER TRUE

## 2023-05-10 SDOH — ECONOMIC STABILITY: FOOD INSECURITY: WITHIN THE PAST 12 MONTHS, THE FOOD YOU BOUGHT JUST DIDN'T LAST AND YOU DIDN'T HAVE MONEY TO GET MORE.: NEVER TRUE

## 2023-05-10 ASSESSMENT — PATIENT HEALTH QUESTIONNAIRE - PHQ9
2. FEELING DOWN, DEPRESSED OR HOPELESS: 0
1. LITTLE INTEREST OR PLEASURE IN DOING THINGS: 0
SUM OF ALL RESPONSES TO PHQ QUESTIONS 1-9: 0
SUM OF ALL RESPONSES TO PHQ9 QUESTIONS 1 & 2: 0
SUM OF ALL RESPONSES TO PHQ QUESTIONS 1-9: 0

## 2023-05-10 ASSESSMENT — ENCOUNTER SYMPTOMS
VOMITING: 0
DIARRHEA: 0
COUGH: 0

## 2023-05-10 NOTE — TELEPHONE ENCOUNTER
Comments: pt called for refill. Pharm does not show any    Last Office Visit (last PCP visit):   5/8/2023    Next Visit Date:  Future Appointments   Date Time Provider Jeremy Velazquezisti   5/18/2023  9:30 AM Rebeca Santana  [place] [address]   8/7/2023  3:30 PM Andie Combs MD [facility] Neurology -   8/21/2023 10:00 AM SHAINA Veras [facility] Sanchez   8/28/2023  1:15 PM Vini Barillas  [facility]       **If hasn't been seen in over a year OR hasn't followed up according to last diabetes/ADHD visit, make appointment for patient before sending refill to provider.     Rx requested:  Requested Prescriptions     Pending Prescriptions Disp Refills    isosorbide mononitrate (IMDUR) 60 MG extended release tablet 90 tablet 3     Sig: Take 1 tablet by mouth daily

## 2023-05-10 NOTE — PROGRESS NOTES
Subjective:      Patient ID: Brody Redding is a 66 y.o. male    Hypertension  The current episode started more than 1 year ago. Past treatments include beta blockers. The current treatment provides moderate improvement. Hyperlipidemia  The current episode started more than 1 year ago. Current antihyperlipidemic treatment includes statins. Here in follow up for htn and lipids. Has tolerated increased dose of lipitor well from last time. No missed doses   weight about the same as last time. Review of Systems   Constitutional:  Negative for chills and fever. Respiratory:  Negative for cough. Gastrointestinal:  Negative for diarrhea and vomiting. Neurological:  Negative for weakness. Reviewed allergy, medical, social, surgical, family and med list changes and updated   Files     Social History     Socioeconomic History    Marital status:    Occupational History     Employer: [employer]     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   Tobacco Use    Smoking status: Former     Packs/day: 1.00     Years: 40.00     Pack years: 40.00     Types: Cigarettes     Start date: 1965     Quit date: 2005     Years since quittin.8    Smokeless tobacco: Never   Vaping Use    Vaping Use: Never used   Substance and Sexual Activity    Alcohol use: Yes     Types: 5 Glasses of wine, 2 Cans of beer per week     Comment: 3- 4 TIMES A WEEK  WINE ONLY     Drug use: No     Social Determinants of Health     Financial Resource Strain: Low Risk     Difficulty of Paying Living Expenses: Not hard at all   Food Insecurity: No Food Insecurity    Worried About Running Out of Food in the Last Year: Never true    Ran Out of Food in the Last Year: Never true   Transportation Needs: Unknown    Lack of Transportation (Non-Medical):  No   Physical Activity: Sufficiently Active    Days of Exercise per Week: 7 days    Minutes of Exercise per Session: 40 min   Housing Stability: Unknown    Unstable Housing in the Last Year: No

## 2023-05-12 DIAGNOSIS — I10 ESSENTIAL HYPERTENSION: ICD-10-CM

## 2023-05-12 DIAGNOSIS — E78.5 HYPERLIPIDEMIA, UNSPECIFIED HYPERLIPIDEMIA TYPE: ICD-10-CM

## 2023-05-12 LAB
ALBUMIN SERPL-MCNC: 4 G/DL (ref 3.5–4.6)
ALP SERPL-CCNC: 57 U/L (ref 35–104)
ALT SERPL-CCNC: 22 U/L (ref 0–41)
ANION GAP SERPL CALCULATED.3IONS-SCNC: 11 MEQ/L (ref 9–15)
AST SERPL-CCNC: 25 U/L (ref 0–40)
BASOPHILS # BLD: 0 K/UL (ref 0–0.2)
BASOPHILS NFR BLD: 0.7 %
BILIRUB SERPL-MCNC: 0.8 MG/DL (ref 0.2–0.7)
BUN SERPL-MCNC: 20 MG/DL (ref 8–23)
CALCIUM SERPL-MCNC: 8.8 MG/DL (ref 8.5–9.9)
CHLORIDE SERPL-SCNC: 105 MEQ/L (ref 95–107)
CHOLEST SERPL-MCNC: 129 MG/DL (ref 0–199)
CO2 SERPL-SCNC: 25 MEQ/L (ref 20–31)
CREAT SERPL-MCNC: 1.04 MG/DL (ref 0.7–1.2)
EOSINOPHIL # BLD: 0.1 K/UL (ref 0–0.7)
EOSINOPHIL NFR BLD: 1.7 %
ERYTHROCYTE [DISTWIDTH] IN BLOOD BY AUTOMATED COUNT: 13.7 % (ref 11.5–14.5)
GLOBULIN SER CALC-MCNC: 2.2 G/DL (ref 2.3–3.5)
GLUCOSE SERPL-MCNC: 92 MG/DL (ref 70–99)
HCT VFR BLD AUTO: 44.1 % (ref 42–52)
HDLC SERPL-MCNC: 55 MG/DL (ref 40–59)
HGB BLD-MCNC: 14.9 G/DL (ref 14–18)
LDLC SERPL CALC-MCNC: 60 MG/DL (ref 0–129)
LYMPHOCYTES # BLD: 1.1 K/UL (ref 1–4.8)
LYMPHOCYTES NFR BLD: 18.2 %
MCH RBC QN AUTO: 30.1 PG (ref 27–31.3)
MCHC RBC AUTO-ENTMCNC: 33.8 % (ref 33–37)
MCV RBC AUTO: 89.2 FL (ref 79–92.2)
MONOCYTES # BLD: 0.4 K/UL (ref 0.2–0.8)
MONOCYTES NFR BLD: 6.5 %
NEUTROPHILS # BLD: 4.5 K/UL (ref 1.4–6.5)
NEUTS SEG NFR BLD: 72.9 %
PLATELET # BLD AUTO: 177 K/UL (ref 130–400)
POTASSIUM SERPL-SCNC: 4.3 MEQ/L (ref 3.4–4.9)
PROT SERPL-MCNC: 6.2 G/DL (ref 6.3–8)
RBC # BLD AUTO: 4.94 M/UL (ref 4.7–6.1)
SODIUM SERPL-SCNC: 141 MEQ/L (ref 135–144)
TRIGL SERPL-MCNC: 70 MG/DL (ref 0–150)
WBC # BLD AUTO: 6.1 K/UL (ref 4.8–10.8)

## 2023-05-18 ENCOUNTER — PREP FOR PROCEDURE (OUTPATIENT)
Dept: GASTROENTEROLOGY | Age: 78
End: 2023-05-18

## 2023-05-18 ENCOUNTER — OFFICE VISIT (OUTPATIENT)
Dept: FAMILY MEDICINE CLINIC | Age: 78
End: 2023-05-18

## 2023-05-18 VITALS
TEMPERATURE: 97.3 F | BODY MASS INDEX: 25.2 KG/M2 | HEIGHT: 71 IN | OXYGEN SATURATION: 95 % | HEART RATE: 62 BPM | RESPIRATION RATE: 16 BRPM | DIASTOLIC BLOOD PRESSURE: 80 MMHG | SYSTOLIC BLOOD PRESSURE: 130 MMHG | WEIGHT: 180 LBS

## 2023-05-18 DIAGNOSIS — F01.50 VASCULAR DEMENTIA WITHOUT BEHAVIORAL DISTURBANCE (HCC): ICD-10-CM

## 2023-05-18 DIAGNOSIS — E78.5 HYPERLIPIDEMIA, UNSPECIFIED HYPERLIPIDEMIA TYPE: Primary | ICD-10-CM

## 2023-05-18 DIAGNOSIS — Z12.11 COLON CANCER SCREENING: ICD-10-CM

## 2023-05-18 DIAGNOSIS — J44.9 CHRONIC OBSTRUCTIVE PULMONARY DISEASE, UNSPECIFIED COPD TYPE (HCC): ICD-10-CM

## 2023-05-18 ASSESSMENT — ENCOUNTER SYMPTOMS: VOMITING: 0

## 2023-05-18 NOTE — PROGRESS NOTES
Subjective:      Patient ID: Avani Sanchez is a 66 y.o. male    HPI  Here in follow up from recent blood work. No new complaints    Review of Systems   Constitutional:  Negative for unexpected weight change. Gastrointestinal:  Negative for vomiting. Skin:  Negative for rash. Neurological:  Negative for weakness. Reviewed allergy, medical, social, surgical, family and med list changes and updated   Files--reviewed blood work which is acceptable      Social History     Socioeconomic History    Marital status:    Occupational History     Employer: [employer]     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   Tobacco Use    Smoking status: Former     Packs/day: 1.00     Years: 40.00     Pack years: 40.00     Types: Cigarettes     Start date: 1965     Quit date: 2005     Years since quittin.8    Smokeless tobacco: Never   Vaping Use    Vaping Use: Never used   Substance and Sexual Activity    Alcohol use: Yes     Types: 5 Glasses of wine, 2 Cans of beer per week     Comment: 3- 4 TIMES A WEEK  WINE ONLY     Drug use: No     Social Determinants of Health     Financial Resource Strain: Low Risk     Difficulty of Paying Living Expenses: Not hard at all   Food Insecurity: No Food Insecurity    Worried About Running Out of Food in the Last Year: Never true    Susanna of Food in the Last Year: Never true   Transportation Needs: Unknown    Lack of Transportation (Non-Medical):  No   Physical Activity: Sufficiently Active    Days of Exercise per Week: 7 days    Minutes of Exercise per Session: 40 min   Housing Stability: Unknown    Unstable Housing in the Last Year: No     Current Outpatient Medications   Medication Sig Dispense Refill    isosorbide mononitrate (IMDUR) 60 MG extended release tablet Take 1 tablet by mouth daily 90 tablet 3    atorvastatin (LIPITOR) 20 MG tablet Take 1 tablet by mouth daily 90 tablet 1    metoprolol succinate (TOPROL XL) 200 MG extended release tablet TAKE 1

## 2023-06-17 PROBLEM — Z12.11 COLON CANCER SCREENING: Status: RESOLVED | Noted: 2023-05-18 | Resolved: 2023-06-17

## 2023-07-11 RX ORDER — ATORVASTATIN CALCIUM 20 MG/1
TABLET, FILM COATED ORAL
Qty: 90 TABLET | Refills: 1 | Status: SHIPPED | OUTPATIENT
Start: 2023-07-11

## 2023-08-07 ENCOUNTER — OFFICE VISIT (OUTPATIENT)
Dept: NEUROLOGY | Age: 78
End: 2023-08-07

## 2023-08-07 VITALS
DIASTOLIC BLOOD PRESSURE: 72 MMHG | SYSTOLIC BLOOD PRESSURE: 120 MMHG | WEIGHT: 175.8 LBS | BODY MASS INDEX: 24.52 KG/M2 | HEART RATE: 68 BPM

## 2023-08-07 DIAGNOSIS — G31.84 MCI (MILD COGNITIVE IMPAIRMENT): ICD-10-CM

## 2023-08-07 DIAGNOSIS — F01.50 VASCULAR DEMENTIA WITHOUT BEHAVIORAL DISTURBANCE (HCC): Primary | ICD-10-CM

## 2023-08-07 RX ORDER — MEMANTINE HYDROCHLORIDE 5 MG/1
TABLET ORAL
Qty: 180 TABLET | Refills: 3 | Status: SHIPPED | OUTPATIENT
Start: 2023-08-07

## 2023-08-07 RX ORDER — DONEPEZIL HYDROCHLORIDE 10 MG/1
10 TABLET, FILM COATED ORAL DAILY
Qty: 90 TABLET | Refills: 3 | Status: SHIPPED | OUTPATIENT
Start: 2023-08-07 | End: 2024-08-01

## 2023-08-07 ASSESSMENT — ENCOUNTER SYMPTOMS
COLOR CHANGE: 0
VOMITING: 0
BACK PAIN: 0
NAUSEA: 0
TROUBLE SWALLOWING: 0
SHORTNESS OF BREATH: 0
PHOTOPHOBIA: 0
CHOKING: 0

## 2023-08-21 RX ORDER — ATORVASTATIN CALCIUM 20 MG/1
20 TABLET, FILM COATED ORAL DAILY
Qty: 90 TABLET | Refills: 1 | Status: SHIPPED | OUTPATIENT
Start: 2023-08-21

## 2023-08-28 ENCOUNTER — OFFICE VISIT (OUTPATIENT)
Dept: CARDIOLOGY CLINIC | Age: 78
End: 2023-08-28
Payer: MEDICARE

## 2023-08-28 VITALS
SYSTOLIC BLOOD PRESSURE: 116 MMHG | HEART RATE: 65 BPM | DIASTOLIC BLOOD PRESSURE: 64 MMHG | OXYGEN SATURATION: 97 % | HEIGHT: 71 IN | BODY MASS INDEX: 24.81 KG/M2 | WEIGHT: 177.2 LBS

## 2023-08-28 DIAGNOSIS — R09.89 BILATERAL CAROTID BRUITS: ICD-10-CM

## 2023-08-28 DIAGNOSIS — I73.9 PVD (PERIPHERAL VASCULAR DISEASE) (HCC): ICD-10-CM

## 2023-08-28 DIAGNOSIS — I10 ESSENTIAL HYPERTENSION: Primary | ICD-10-CM

## 2023-08-28 DIAGNOSIS — I25.119 ATHEROSCLEROSIS OF NATIVE CORONARY ARTERY WITH ANGINA PECTORIS, UNSPECIFIED WHETHER NATIVE OR TRANSPLANTED HEART (HCC): ICD-10-CM

## 2023-08-28 DIAGNOSIS — Z95.5 HISTORY OF PLACEMENT OF STENT IN LAD CORONARY ARTERY: ICD-10-CM

## 2023-08-28 DIAGNOSIS — I25.10 CORONARY ARTERY DISEASE INVOLVING NATIVE CORONARY ARTERY OF NATIVE HEART WITHOUT ANGINA PECTORIS: ICD-10-CM

## 2023-08-28 DIAGNOSIS — E78.5 HYPERLIPIDEMIA, UNSPECIFIED HYPERLIPIDEMIA TYPE: ICD-10-CM

## 2023-08-28 DIAGNOSIS — I73.9 PERIPHERAL ARTERIAL DISEASE (HCC): ICD-10-CM

## 2023-08-28 DIAGNOSIS — I73.9 PERIPHERAL VASCULAR DISEASE (HCC): ICD-10-CM

## 2023-08-28 DIAGNOSIS — I73.9 PAD (PERIPHERAL ARTERY DISEASE) (HCC): ICD-10-CM

## 2023-08-28 PROCEDURE — G8427 DOCREV CUR MEDS BY ELIG CLIN: HCPCS | Performed by: INTERNAL MEDICINE

## 2023-08-28 PROCEDURE — 1036F TOBACCO NON-USER: CPT | Performed by: INTERNAL MEDICINE

## 2023-08-28 PROCEDURE — 3078F DIAST BP <80 MM HG: CPT | Performed by: INTERNAL MEDICINE

## 2023-08-28 PROCEDURE — 3074F SYST BP LT 130 MM HG: CPT | Performed by: INTERNAL MEDICINE

## 2023-08-28 PROCEDURE — 99214 OFFICE O/P EST MOD 30 MIN: CPT | Performed by: INTERNAL MEDICINE

## 2023-08-28 PROCEDURE — 1123F ACP DISCUSS/DSCN MKR DOCD: CPT | Performed by: INTERNAL MEDICINE

## 2023-08-28 PROCEDURE — G8420 CALC BMI NORM PARAMETERS: HCPCS | Performed by: INTERNAL MEDICINE

## 2023-08-28 ASSESSMENT — ENCOUNTER SYMPTOMS
EYES NEGATIVE: 1
NAUSEA: 0
SHORTNESS OF BREATH: 0
GASTROINTESTINAL NEGATIVE: 1
WHEEZING: 0
COUGH: 0
BLOOD IN STOOL: 0
STRIDOR: 0
CHEST TIGHTNESS: 0
RESPIRATORY NEGATIVE: 1

## 2023-08-28 NOTE — PROGRESS NOTES
([address])    Lung mass    Status post total hip replacement, right    Status post total hip replacement, left    Aftercare following joint replacement surgery    Carotid bruit    Essential hypertension    Atherosclerosis of native coronary artery with angina pectoris, unspecified whether native or transplanted heart (HCC)    Unilateral primary osteoarthritis, left hip    Primary osteoarthritis, right ankle and foot    Unspecified sprain of right foot, initial encounter    Coronary arteriosclerosis    Hypertensive disorder    Peripheral vascular disease ([address])    History of repair of hip joint    History of total hip arthroplasty    S/P hip replacement    MCI (mild cognitive impairment)    Ulnar neuropathy at wrist, left    Memory loss, short term    Small vessel cerebrovascular accident (CVA) ([address])    Mild cognitive disorder    Chronic obstructive pulmonary disease, unspecified COPD type ([address])    Alzheimer's disease, unspecified    Vascular dementia without behavioral disturbance ([address])       There are no discontinued medications. Modified Medications    No medications on file       No orders of the defined types were placed in this encounter. Assessment/Plan:    1. Essential hypertension  Stable - continue meds. Low salt diet    2. Coronary artery disease involving native coronary artery of native heart without angina pectoris  No angina - continue meds. 3. Hyperlipidemia, unspecified hyperlipidemia type   statin - He will rechecl Labs and adjust Statins accordingly to kep LDL <100 and closer to 70. F/u labs     4. PAD (peripheral artery disease) ([facility])  STABLE - no claudication wlaks daily    5. Miller bruits- CUS -- f/u CUS - stable - f/u test     PAD- pulses are strong           Counseling:  Heart Healthy Lifestyle, Low Salt Diet, Take Precautions to Prevent Falls and Walk Daily    Return in about 6 months (around 2/28/2024).       Electronically signed by Rica Figueredo MD on 8/28/2023 at 1:45 PM

## 2023-09-12 ENCOUNTER — HOSPITAL ENCOUNTER (OUTPATIENT)
Dept: ULTRASOUND IMAGING | Age: 78
Discharge: HOME | End: 2023-09-14
Attending: INTERNAL MEDICINE
Payer: MEDICARE

## 2023-09-12 DIAGNOSIS — R09.89 BILATERAL CAROTID BRUITS: ICD-10-CM

## 2023-09-12 LAB
VAS LEFT CCA DIST EDV: 16 CM/S
VAS LEFT CCA DIST PSV: 103 CM/S
VAS LEFT CCA MID EDV: 20 CM/S
VAS LEFT CCA MID PSV: 115 CM/S
VAS LEFT CCA PROX EDV: 10.7 CM/S
VAS LEFT CCA PROX PSV: 107 CM/S
VAS LEFT ECA EDV: 11.3 CM/S
VAS LEFT ECA PSV: 91.4 CM/S
VAS LEFT ICA DIST EDV: 12.6 CM/S
VAS LEFT ICA DIST PSV: 37.2 CM/S
VAS LEFT ICA MID EDV: 10.2 CM/S
VAS LEFT ICA MID PSV: 47.5 CM/S
VAS LEFT ICA PROX EDV: 23.7 CM/S
VAS LEFT ICA PROX PSV: 141 CM/S
VAS LEFT ICA/CCA PSV: 1.2
VAS LEFT VERTEBRAL EDV: 11.9 CM/S
VAS LEFT VERTEBRAL PSV: 56.6 CM/S
VAS RIGHT CCA DIST EDV: 18 CM/S
VAS RIGHT CCA DIST PSV: 109 CM/S
VAS RIGHT CCA MID EDV: 14.9 CM/S
VAS RIGHT CCA MID PSV: 91.9 CM/S
VAS RIGHT CCA PROX EDV: 12.4 CM/S
VAS RIGHT CCA PROX PSV: 96.3 CM/S
VAS RIGHT ECA EDV: 15.4 CM/S
VAS RIGHT ECA PSV: 144 CM/S
VAS RIGHT ICA DIST EDV: 9.1 CM/S
VAS RIGHT ICA DIST PSV: 29.3 CM/S
VAS RIGHT ICA MID EDV: 15.2 CM/S
VAS RIGHT ICA MID PSV: 56.5 CM/S
VAS RIGHT ICA PROX EDV: 15.7 CM/S
VAS RIGHT ICA PROX PSV: 62.3 CM/S
VAS RIGHT ICA/CCA PSV: 0.7
VAS RIGHT VERTEBRAL EDV: 5.86 CM/S
VAS RIGHT VERTEBRAL PSV: 39.5 CM/S

## 2023-09-12 PROCEDURE — 93880 EXTRACRANIAL BILAT STUDY: CPT

## 2023-09-12 PROCEDURE — 93880 EXTRACRANIAL BILAT STUDY: CPT | Performed by: INTERNAL MEDICINE

## 2023-09-25 ENCOUNTER — OFFICE VISIT (OUTPATIENT)
Dept: FAMILY MEDICINE CLINIC | Age: 78
End: 2023-09-25
Payer: MEDICARE

## 2023-09-25 VITALS
HEART RATE: 62 BPM | HEIGHT: 71 IN | OXYGEN SATURATION: 98 % | TEMPERATURE: 97.8 F | BODY MASS INDEX: 24.78 KG/M2 | DIASTOLIC BLOOD PRESSURE: 72 MMHG | SYSTOLIC BLOOD PRESSURE: 132 MMHG | WEIGHT: 177 LBS

## 2023-09-25 DIAGNOSIS — Z12.5 ENCOUNTER FOR SCREENING FOR MALIGNANT NEOPLASM OF PROSTATE: ICD-10-CM

## 2023-09-25 DIAGNOSIS — R35.0 BENIGN PROSTATIC HYPERPLASIA WITH URINARY FREQUENCY: ICD-10-CM

## 2023-09-25 DIAGNOSIS — R35.0 BENIGN PROSTATIC HYPERPLASIA WITH URINARY FREQUENCY: Primary | ICD-10-CM

## 2023-09-25 DIAGNOSIS — R31.1 BENIGN ESSENTIAL MICROSCOPIC HEMATURIA: ICD-10-CM

## 2023-09-25 DIAGNOSIS — I10 PRIMARY HYPERTENSION: ICD-10-CM

## 2023-09-25 DIAGNOSIS — N40.1 BENIGN PROSTATIC HYPERPLASIA WITH URINARY FREQUENCY: Primary | ICD-10-CM

## 2023-09-25 DIAGNOSIS — N40.1 BENIGN PROSTATIC HYPERPLASIA WITH URINARY FREQUENCY: ICD-10-CM

## 2023-09-25 DIAGNOSIS — N40.0 BENIGN PROSTATIC HYPERPLASIA, UNSPECIFIED WHETHER LOWER URINARY TRACT SYMPTOMS PRESENT: ICD-10-CM

## 2023-09-25 LAB
ANION GAP SERPL CALCULATED.3IONS-SCNC: 9 MEQ/L (ref 9–15)
BACTERIA URNS QL MICRO: NEGATIVE /HPF
BILIRUB UR QL STRIP: NEGATIVE
BILIRUBIN, POC: NORMAL
BLOOD URINE, POC: NORMAL
BUN SERPL-MCNC: 22 MG/DL (ref 8–23)
CALCIUM SERPL-MCNC: 8.5 MG/DL (ref 8.5–9.9)
CHLORIDE SERPL-SCNC: 106 MEQ/L (ref 95–107)
CLARITY UR: CLEAR
CLARITY, POC: YELLOW
CO2 SERPL-SCNC: 26 MEQ/L (ref 20–31)
COLOR UR: ABNORMAL
COLOR, POC: YELLOW
CREAT SERPL-MCNC: 1.09 MG/DL (ref 0.7–1.2)
EPI CELLS #/AREA URNS AUTO: ABNORMAL /HPF (ref 0–5)
GLUCOSE SERPL-MCNC: 100 MG/DL (ref 70–99)
GLUCOSE UR STRIP-MCNC: NEGATIVE MG/DL
GLUCOSE URINE, POC: NORMAL
HGB UR QL STRIP: ABNORMAL
HYALINE CASTS #/AREA URNS AUTO: ABNORMAL /HPF (ref 0–5)
KETONES UR STRIP-MCNC: ABNORMAL MG/DL
KETONES, POC: NORMAL
LEUKOCYTE EST, POC: NORMAL
LEUKOCYTE ESTERASE UR QL STRIP: NEGATIVE
NITRITE UR QL STRIP: NEGATIVE
NITRITE, POC: NORMAL
PH UR STRIP: 5.5 [PH] (ref 5–9)
PH, POC: 6
POTASSIUM SERPL-SCNC: 4.7 MEQ/L (ref 3.4–4.9)
PROT UR STRIP-MCNC: NEGATIVE MG/DL
PROTEIN, POC: NORMAL
PSA SERPL-MCNC: 4.08 NG/ML (ref 0–4)
RBC #/AREA URNS AUTO: ABNORMAL /HPF (ref 0–5)
SODIUM SERPL-SCNC: 141 MEQ/L (ref 135–144)
SP GR UR STRIP: 1.03 (ref 1–1.03)
SPECIFIC GRAVITY, POC: 1.03
UROBILINOGEN UR STRIP-ACNC: 0.2 E.U./DL
UROBILINOGEN, POC: NORMAL
WBC #/AREA URNS AUTO: ABNORMAL /HPF (ref 0–5)

## 2023-09-25 PROCEDURE — 1123F ACP DISCUSS/DSCN MKR DOCD: CPT | Performed by: PHYSICIAN ASSISTANT

## 2023-09-25 PROCEDURE — G8427 DOCREV CUR MEDS BY ELIG CLIN: HCPCS | Performed by: PHYSICIAN ASSISTANT

## 2023-09-25 PROCEDURE — 81003 URINALYSIS AUTO W/O SCOPE: CPT | Performed by: PHYSICIAN ASSISTANT

## 2023-09-25 PROCEDURE — 3075F SYST BP GE 130 - 139MM HG: CPT | Performed by: PHYSICIAN ASSISTANT

## 2023-09-25 PROCEDURE — 1036F TOBACCO NON-USER: CPT | Performed by: PHYSICIAN ASSISTANT

## 2023-09-25 PROCEDURE — 3078F DIAST BP <80 MM HG: CPT | Performed by: PHYSICIAN ASSISTANT

## 2023-09-25 PROCEDURE — G8420 CALC BMI NORM PARAMETERS: HCPCS | Performed by: PHYSICIAN ASSISTANT

## 2023-09-25 PROCEDURE — 99213 OFFICE O/P EST LOW 20 MIN: CPT | Performed by: PHYSICIAN ASSISTANT

## 2023-09-25 NOTE — PROGRESS NOTES
Subjective  Lorel Media, 66 y.o. male presents today with:  Chief Complaint   Patient presents with    Urinary Frequency     Patient presents today with urinary frequency for 3 months. HPI  Patient is here accompanied by his wife being seen acutely for urinary frequency for the past 3 months history of BPH denies dysuria hematuria feeling of incomplete voiding negative cystoscopy per patient's wife within the past year    Review of Systems   HENT:          Decreased hearing wears hearing aids   All other systems reviewed and are negative. Past Medical History:   Diagnosis Date    CAD (coronary artery disease)     Eczema     Granuloma faciale     History of extremity bypass graft 11/16/2015    Left leg 11/14/2003    History of tobacco abuse 11/16/2015    Hyperlipidemia     Hypertension     Lipoma     PVD (peripheral vascular disease) ([facility])     Seborrheic keratosis     Small vessel cerebrovascular accident (CVA) ([address]) 12/7/2020     Past Surgical History:   Procedure Laterality Date    CARDIAC CATHETERIZATION  2003    COLONOSCOPY  10-19-12    CORONARY ANGIOPLASTY WITH STENT PLACEMENT  2003    DIAGNOSTIC CARDIAC CATH LAB PROCEDURE      HAND SURGERY  2003    L    HIP SURGERY      JOINT REPLACEMENT      LUNG BIOPSY Right 09/05/2018    CT guided Left Lung Biopsy by Dr Montana Counter Right     9/5/2018 per Dr Verma Habermann [address] DX/TX INTERVENTION [facility] LES N/A 8/17/2018    EBUS WITH TRANSBRONCHIAL NEEDLE ASPIRATION W/ FLUOROSCOPY performed by Yeni Porter MD at [address] [facility] Left 6/21/2019    LEFT  HIP TOTAL ARTHROPLASTY performed by Jose F Castellanos MD at [address] [facility] [address] History     Socioeconomic History    Marital status:       Spouse name: Not on file    Number of children: Not on file    Years of education: Not on file    Highest education level: Not on file   Occupational History     Employer: [employer]     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

## 2023-09-27 LAB — BACTERIA UR CULT: NORMAL

## 2023-09-29 DIAGNOSIS — R31.1 BENIGN ESSENTIAL MICROSCOPIC HEMATURIA: Primary | ICD-10-CM

## 2023-10-02 ENCOUNTER — OFFICE VISIT (OUTPATIENT)
Dept: FAMILY MEDICINE CLINIC | Age: 78
End: 2023-10-02
Payer: MEDICARE

## 2023-10-02 VITALS
HEIGHT: 71 IN | HEART RATE: 60 BPM | RESPIRATION RATE: 14 BRPM | DIASTOLIC BLOOD PRESSURE: 80 MMHG | OXYGEN SATURATION: 98 % | TEMPERATURE: 97.4 F | BODY MASS INDEX: 24.36 KG/M2 | WEIGHT: 174 LBS | SYSTOLIC BLOOD PRESSURE: 130 MMHG

## 2023-10-02 VITALS
WEIGHT: 174 LBS | TEMPERATURE: 97.3 F | BODY MASS INDEX: 24.36 KG/M2 | HEART RATE: 60 BPM | SYSTOLIC BLOOD PRESSURE: 130 MMHG | RESPIRATION RATE: 14 BRPM | HEIGHT: 71 IN | OXYGEN SATURATION: 98 % | DIASTOLIC BLOOD PRESSURE: 80 MMHG

## 2023-10-02 DIAGNOSIS — R31.29 MICROSCOPIC HEMATURIA: ICD-10-CM

## 2023-10-02 DIAGNOSIS — Z00.00 MEDICARE ANNUAL WELLNESS VISIT, SUBSEQUENT: Primary | ICD-10-CM

## 2023-10-02 DIAGNOSIS — E78.5 HYPERLIPIDEMIA, UNSPECIFIED HYPERLIPIDEMIA TYPE: ICD-10-CM

## 2023-10-02 DIAGNOSIS — I10 PRIMARY HYPERTENSION: Primary | ICD-10-CM

## 2023-10-02 PROCEDURE — G8484 FLU IMMUNIZE NO ADMIN: HCPCS | Performed by: FAMILY MEDICINE

## 2023-10-02 PROCEDURE — 1036F TOBACCO NON-USER: CPT | Performed by: FAMILY MEDICINE

## 2023-10-02 PROCEDURE — 3075F SYST BP GE 130 - 139MM HG: CPT | Performed by: FAMILY MEDICINE

## 2023-10-02 PROCEDURE — 3079F DIAST BP 80-89 MM HG: CPT | Performed by: FAMILY MEDICINE

## 2023-10-02 PROCEDURE — G8420 CALC BMI NORM PARAMETERS: HCPCS | Performed by: FAMILY MEDICINE

## 2023-10-02 PROCEDURE — G0439 PPPS, SUBSEQ VISIT: HCPCS | Performed by: FAMILY MEDICINE

## 2023-10-02 PROCEDURE — 99214 OFFICE O/P EST MOD 30 MIN: CPT | Performed by: FAMILY MEDICINE

## 2023-10-02 PROCEDURE — 1123F ACP DISCUSS/DSCN MKR DOCD: CPT | Performed by: FAMILY MEDICINE

## 2023-10-02 PROCEDURE — G8427 DOCREV CUR MEDS BY ELIG CLIN: HCPCS | Performed by: FAMILY MEDICINE

## 2023-10-02 ASSESSMENT — PATIENT HEALTH QUESTIONNAIRE - PHQ9
SUM OF ALL RESPONSES TO PHQ QUESTIONS 1-9: 0
SUM OF ALL RESPONSES TO PHQ QUESTIONS 1-9: 0
SUM OF ALL RESPONSES TO PHQ9 QUESTIONS 1 & 2: 0
SUM OF ALL RESPONSES TO PHQ QUESTIONS 1-9: 0
2. FEELING DOWN, DEPRESSED OR HOPELESS: 0
2. FEELING DOWN, DEPRESSED OR HOPELESS: 0
SUM OF ALL RESPONSES TO PHQ QUESTIONS 1-9: 0
SUM OF ALL RESPONSES TO PHQ9 QUESTIONS 1 & 2: 0
1. LITTLE INTEREST OR PLEASURE IN DOING THINGS: 0
1. LITTLE INTEREST OR PLEASURE IN DOING THINGS: 0
SUM OF ALL RESPONSES TO PHQ QUESTIONS 1-9: 0
SUM OF ALL RESPONSES TO PHQ QUESTIONS 1-9: 0

## 2023-10-02 ASSESSMENT — ENCOUNTER SYMPTOMS
DIARRHEA: 0
COUGH: 0
VOMITING: 0

## 2023-10-17 ENCOUNTER — HOSPITAL ENCOUNTER (OUTPATIENT)
Dept: CT IMAGING | Age: 78
Discharge: HOME | End: 2023-10-19
Payer: MEDICARE

## 2023-10-17 DIAGNOSIS — R31.29 MICROSCOPIC HEMATURIA: ICD-10-CM

## 2023-10-17 PROCEDURE — 74176 CT ABD & PELVIS W/O CONTRAST: CPT

## 2023-10-24 ENCOUNTER — TELEPHONE (OUTPATIENT)
Dept: FAMILY MEDICINE CLINIC | Age: 78
End: 2023-10-24

## 2023-10-24 NOTE — TELEPHONE ENCOUNTER
Saji Dominion called- would like to get the imaging results from CT ould like someone to call with the results.  Does not want to make an appt to come into the office to discuss

## 2023-10-25 NOTE — TELEPHONE ENCOUNTER
Branden Mcdermott and patient aware no stones on CT has an appointment with urology first week of Sofy

## 2024-01-05 RX ORDER — ATORVASTATIN CALCIUM 20 MG/1
20 TABLET, FILM COATED ORAL DAILY
Qty: 90 TABLET | Refills: 0 | Status: SHIPPED | OUTPATIENT
Start: 2024-01-05

## 2024-01-05 NOTE — TELEPHONE ENCOUNTER
Future Appointments    Encounter Information   Provider Department Appt Notes   5/7/2024 Jeremy Leiva MD [facility] Cardiology 9 month follow up   5/13/2024 Buddy Chaudhry MD [facility] Primary and Specialty Care 6 month   8/5/2024 Jairo Angel MD [facility] Neurology 1 yr fup     Past Visits    Date Provider Specialty Visit Type Primary Dx   10/02/2023 Buddy Chaudhry MD Family Medicine Office Visit Medicare annual wellness visit, subsequent

## 2024-02-16 RX ORDER — ISOSORBIDE MONONITRATE 60 MG/1
60 TABLET, EXTENDED RELEASE ORAL DAILY
Qty: 90 TABLET | Refills: 3 | Status: SHIPPED | OUTPATIENT
Start: 2024-02-16

## 2024-02-16 NOTE — TELEPHONE ENCOUNTER
Requesting medication refill. Please approve or deny this request.    Rx requested:  Requested Prescriptions     Pending Prescriptions Disp Refills    isosorbide mononitrate (IMDUR) 60 MG extended release tablet [Pharmacy Med Name: Isosorbide Mononitrate ER 60 MG Oral Tablet Extended Release 24 Hour] 90 tablet 3     Sig: TAKE 1 TABLET BY MOUTH DAILY         Last Office Visit:   8/28/2023      Next Visit Date:  Future Appointments   Date Time Provider Department Center   5/7/2024 12:00 PM Jeremy Leiva MD [facility]   5/13/2024 10:00 AM Buddy Chaudhry MD [facility]   8/5/2024  1:30 PM Jairo Angel MD [place] [facility] Neurology -               Last refill 05/10/2023. Please approve or deny.

## 2024-03-28 RX ORDER — ATORVASTATIN CALCIUM 20 MG/1
20 TABLET, FILM COATED ORAL DAILY
Qty: 90 TABLET | Refills: 1 | Status: SHIPPED | OUTPATIENT
Start: 2024-03-28

## 2024-05-07 ENCOUNTER — TELEPHONE (OUTPATIENT)
Dept: CARDIOLOGY CLINIC | Age: 79
End: 2024-05-07

## 2024-05-07 ENCOUNTER — OFFICE VISIT (OUTPATIENT)
Dept: CARDIOLOGY CLINIC | Age: 79
End: 2024-05-07
Payer: MEDICARE

## 2024-05-07 VITALS
OXYGEN SATURATION: 97 % | RESPIRATION RATE: 15 BRPM | BODY MASS INDEX: 24.36 KG/M2 | DIASTOLIC BLOOD PRESSURE: 86 MMHG | SYSTOLIC BLOOD PRESSURE: 126 MMHG | HEIGHT: 71 IN | HEART RATE: 61 BPM | WEIGHT: 174 LBS

## 2024-05-07 DIAGNOSIS — I73.9 PAD (PERIPHERAL ARTERY DISEASE) (HCC): ICD-10-CM

## 2024-05-07 DIAGNOSIS — I25.119 ATHEROSCLEROSIS OF NATIVE CORONARY ARTERY WITH ANGINA PECTORIS, UNSPECIFIED WHETHER NATIVE OR TRANSPLANTED HEART (HCC): ICD-10-CM

## 2024-05-07 DIAGNOSIS — I10 ESSENTIAL HYPERTENSION: Primary | ICD-10-CM

## 2024-05-07 DIAGNOSIS — E78.5 HYPERLIPIDEMIA, UNSPECIFIED HYPERLIPIDEMIA TYPE: ICD-10-CM

## 2024-05-07 DIAGNOSIS — R09.89 BILATERAL CAROTID BRUITS: ICD-10-CM

## 2024-05-07 DIAGNOSIS — I73.9 PVD (PERIPHERAL VASCULAR DISEASE) (HCC): ICD-10-CM

## 2024-05-07 DIAGNOSIS — I73.9 PERIPHERAL VASCULAR DISEASE (HCC): ICD-10-CM

## 2024-05-07 DIAGNOSIS — I25.10 CORONARY ARTERY DISEASE INVOLVING NATIVE CORONARY ARTERY OF NATIVE HEART WITHOUT ANGINA PECTORIS: ICD-10-CM

## 2024-05-07 PROCEDURE — 93000 ELECTROCARDIOGRAM COMPLETE: CPT | Performed by: INTERNAL MEDICINE

## 2024-05-07 PROCEDURE — G8427 DOCREV CUR MEDS BY ELIG CLIN: HCPCS | Performed by: INTERNAL MEDICINE

## 2024-05-07 PROCEDURE — 1123F ACP DISCUSS/DSCN MKR DOCD: CPT | Performed by: INTERNAL MEDICINE

## 2024-05-07 PROCEDURE — 3079F DIAST BP 80-89 MM HG: CPT | Performed by: INTERNAL MEDICINE

## 2024-05-07 PROCEDURE — G8420 CALC BMI NORM PARAMETERS: HCPCS | Performed by: INTERNAL MEDICINE

## 2024-05-07 PROCEDURE — 3074F SYST BP LT 130 MM HG: CPT | Performed by: INTERNAL MEDICINE

## 2024-05-07 PROCEDURE — 1036F TOBACCO NON-USER: CPT | Performed by: INTERNAL MEDICINE

## 2024-05-07 PROCEDURE — 99214 OFFICE O/P EST MOD 30 MIN: CPT | Performed by: INTERNAL MEDICINE

## 2024-05-07 RX ORDER — ATORVASTATIN CALCIUM 20 MG/1
20 TABLET, FILM COATED ORAL DAILY
Qty: 90 TABLET | Refills: 3 | Status: SHIPPED | OUTPATIENT
Start: 2024-05-07

## 2024-05-07 RX ORDER — ISOSORBIDE MONONITRATE 60 MG/1
60 TABLET, EXTENDED RELEASE ORAL DAILY
Qty: 90 TABLET | Refills: 3 | Status: SHIPPED | OUTPATIENT
Start: 2024-05-07

## 2024-05-07 RX ORDER — METOPROLOL SUCCINATE 200 MG/1
200 TABLET, EXTENDED RELEASE ORAL DAILY
Qty: 90 TABLET | Refills: 3 | Status: SHIPPED | OUTPATIENT
Start: 2024-05-07

## 2024-05-07 ASSESSMENT — ENCOUNTER SYMPTOMS
EYES NEGATIVE: 1
NAUSEA: 0
CHEST TIGHTNESS: 0
WHEEZING: 0
RESPIRATORY NEGATIVE: 1
GASTROINTESTINAL NEGATIVE: 1
STRIDOR: 0
BLOOD IN STOOL: 0
SHORTNESS OF BREATH: 0
COUGH: 0

## 2024-05-07 NOTE — TELEPHONE ENCOUNTER
At check out- the person with pt stated that at the last appt the EKG was coded wrong and they had to pay for the EKG.   Pt has two insurances who should cover it.    Please make sure that the coding is correct from today's visit.

## 2024-05-07 NOTE — PROGRESS NOTES
Subsequent Progress Note    Patient: Carl Molina  YOB: 1945  MRN: [id number]    Chief Complaint: pad cad htn  Chief Complaint   Patient presents with    9 Month Follow-Up    Hypertension       CV Data:  2003 Left Leg Bypass - Dr. Mathur  2003 LAD Stent.  RCA occluded  7/2015 echo 65%  6/2019  SPECT ABN Defect known RCA occlusion fills from LAD   6/2018 CUS mild  10/2019 CUS - mild.   6/2019 EF 55% 1-2+ MR.   11/2020 CUS mild   8/22 CUS mild mod.   9/23 CUS mild       Subjective/HPI: no cp no sob still walks 2 miles daily no falls no bleed.  spect abn but in the territory of known RCA occlusion     10/21/2019: doing well. Did well with L hip sx. No cp no sob. Now having recurrent gout attacks.     5/13/2020 TELEHEALTH EVALUATION -- Audio/Visual (During COVID-19 public health emergency)    Recently returned from [place]. Doing well. No cp no sob no bleed no falls    7/16/2020 TELEHEALTH EVALUATION -- Audio/Visual (During COVID-19 public health emergency)    Doing well. They really do not go anywhere due to COVID. He still exercises daily. No pc no osb no falls nobleed. Takes meds    10/22/2020 no cp no sob no falls no bleed takes meds. No leg pain.     1/21/21 no cp no sob no falls no bleed takes meds     5/25/21 active no angina. Takes meds. BP elevated. Has not cheked at home lately.     10/4/21 no cp no son active at home no falls no bleed    6/28/22 doing well no cp no sob no falls  No bleed    9/26/22 doing well will go to [place], [place] for winter. No cp no sob no falls no bleed. Takes meds.     5/8/23 in [place] had 1 episode near heat injury while walking. He was dehydrated.  No further episodes. No cp no sob no falls no bleed.     8/28/23 doing very well no cp no sob no falls no bleed takes meds. Active no leg pains    5/7/24 doing very well no cp no sob still walks 1 hr daily no falls no bleed. Takes emds.     Nonsmoker since 2003  Retired -[employer]  Lives w wife       EKG: SR 60    Past Medical History:

## 2024-05-09 DIAGNOSIS — R35.0 BENIGN PROSTATIC HYPERPLASIA WITH URINARY FREQUENCY: ICD-10-CM

## 2024-05-09 DIAGNOSIS — N40.1 BENIGN PROSTATIC HYPERPLASIA WITH URINARY FREQUENCY: ICD-10-CM

## 2024-05-09 DIAGNOSIS — R31.1 BENIGN ESSENTIAL MICROSCOPIC HEMATURIA: ICD-10-CM

## 2024-05-13 ENCOUNTER — OFFICE VISIT (OUTPATIENT)
Dept: FAMILY MEDICINE CLINIC | Age: 79
End: 2024-05-13
Payer: MEDICARE

## 2024-05-13 VITALS
HEIGHT: 70 IN | TEMPERATURE: 97.6 F | DIASTOLIC BLOOD PRESSURE: 80 MMHG | WEIGHT: 174.6 LBS | SYSTOLIC BLOOD PRESSURE: 122 MMHG | BODY MASS INDEX: 25 KG/M2 | OXYGEN SATURATION: 98 % | HEART RATE: 60 BPM

## 2024-05-13 DIAGNOSIS — I10 PRIMARY HYPERTENSION: Primary | ICD-10-CM

## 2024-05-13 DIAGNOSIS — F01.50 VASCULAR DEMENTIA WITHOUT BEHAVIORAL DISTURBANCE (HCC): ICD-10-CM

## 2024-05-13 DIAGNOSIS — R73.03 PREDIABETES: ICD-10-CM

## 2024-05-13 DIAGNOSIS — R31.29 MICROSCOPIC HEMATURIA: ICD-10-CM

## 2024-05-13 DIAGNOSIS — E78.5 HYPERLIPIDEMIA, UNSPECIFIED HYPERLIPIDEMIA TYPE: ICD-10-CM

## 2024-05-13 DIAGNOSIS — J44.9 CHRONIC OBSTRUCTIVE PULMONARY DISEASE, UNSPECIFIED COPD TYPE (HCC): ICD-10-CM

## 2024-05-13 PROCEDURE — G8427 DOCREV CUR MEDS BY ELIG CLIN: HCPCS | Performed by: FAMILY MEDICINE

## 2024-05-13 PROCEDURE — 3023F SPIROM DOC REV: CPT | Performed by: FAMILY MEDICINE

## 2024-05-13 PROCEDURE — 99214 OFFICE O/P EST MOD 30 MIN: CPT | Performed by: FAMILY MEDICINE

## 2024-05-13 PROCEDURE — G8419 CALC BMI OUT NRM PARAM NOF/U: HCPCS | Performed by: FAMILY MEDICINE

## 2024-05-13 PROCEDURE — 1036F TOBACCO NON-USER: CPT | Performed by: FAMILY MEDICINE

## 2024-05-13 PROCEDURE — 3079F DIAST BP 80-89 MM HG: CPT | Performed by: FAMILY MEDICINE

## 2024-05-13 PROCEDURE — 3074F SYST BP LT 130 MM HG: CPT | Performed by: FAMILY MEDICINE

## 2024-05-13 PROCEDURE — 1123F ACP DISCUSS/DSCN MKR DOCD: CPT | Performed by: FAMILY MEDICINE

## 2024-05-13 SDOH — ECONOMIC STABILITY: FOOD INSECURITY: WITHIN THE PAST 12 MONTHS, YOU WORRIED THAT YOUR FOOD WOULD RUN OUT BEFORE YOU GOT MONEY TO BUY MORE.: NEVER TRUE

## 2024-05-13 SDOH — ECONOMIC STABILITY: FOOD INSECURITY: WITHIN THE PAST 12 MONTHS, THE FOOD YOU BOUGHT JUST DIDN'T LAST AND YOU DIDN'T HAVE MONEY TO GET MORE.: NEVER TRUE

## 2024-05-13 SDOH — ECONOMIC STABILITY: INCOME INSECURITY: HOW HARD IS IT FOR YOU TO PAY FOR THE VERY BASICS LIKE FOOD, HOUSING, MEDICAL CARE, AND HEATING?: NOT HARD AT ALL

## 2024-05-13 ASSESSMENT — PATIENT HEALTH QUESTIONNAIRE - PHQ9
2. FEELING DOWN, DEPRESSED OR HOPELESS: NOT AT ALL
SUM OF ALL RESPONSES TO PHQ9 QUESTIONS 1 & 2: 0
SUM OF ALL RESPONSES TO PHQ QUESTIONS 1-9: 0
1. LITTLE INTEREST OR PLEASURE IN DOING THINGS: NOT AT ALL
SUM OF ALL RESPONSES TO PHQ QUESTIONS 1-9: 0

## 2024-05-13 ASSESSMENT — ENCOUNTER SYMPTOMS
DIARRHEA: 0
COUGH: 0

## 2024-05-13 NOTE — PROGRESS NOTES
Subjective:      Patient ID: Carl Molina is a 79 y.o. male    Hypertension  The current episode started more than 1 year ago. Past treatments include beta blockers. The current treatment provides moderate improvement.   Hyperlipidemia  The current episode started more than 1 year ago. The problem is controlled. Current antihyperlipidemic treatment includes statins. The current treatment provides moderate improvement of lipids.     Here in follow up for lipids and htn and pre diabetes.  .  Did see urology thru [facility] from last visit.  Moves bowels regularly.  No diarrhea.  No weight changes since last time   walking daily    Review of Systems   Constitutional:  Negative for chills and fever.   Respiratory:  Negative for cough.    Gastrointestinal:  Negative for diarrhea and vomiting.   Skin:  Negative for rash.   Neurological:  Negative for weakness.     Reviewed allergy, medical, social, surgical, family and med list changes and updated   Files   --reviewed ct abdomen from end of last year-  Social History     Socioeconomic History    Marital status:    Occupational History     Employer: [employer]     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   Tobacco Use    Smoking status: Former     Current packs/day: 0.00     Average packs/day: 1 pack/day for 40.0 years (40.0 ttl pk-yrs)     Types: Cigarettes     Start date: 1965     Quit date: 2005     Years since quittin.8    Smokeless tobacco: Never   Vaping Use    Vaping Use: Never used   Substance and Sexual Activity    Alcohol use: Yes     Types: 5 Glasses of wine, 2 Cans of beer per week     Comment: 3- 4 TIMES A WEEK  WINE ONLY     Drug use: No     Social Determinants of Health     Financial Resource Strain: Low Risk  (2024)    Overall Financial Resource Strain (CARDIA)     Difficulty of Paying Living Expenses: Not hard at all   Food Insecurity: No Food Insecurity (2024)    Hunger Vital Sign     Worried About Running Out of Food in the Last Year:

## 2024-06-20 ENCOUNTER — TELEPHONE (OUTPATIENT)
Dept: FAMILY MEDICINE CLINIC | Age: 79
End: 2024-06-20

## 2024-06-20 NOTE — TELEPHONE ENCOUNTER
Laura Hedrick called in stating that patient will be moving into the community on Monday . They will fax over required paperwork today for PCP [ health assessment } to sign . They will also need TB results . He will be residing in the memory care unit

## 2024-06-21 NOTE — TELEPHONE ENCOUNTER
Spoke to Cynthia (on HIPAA). She was notified about TB testing and MA sched hours. She will be bringing Carl on Monday 6/24/2024 for TB testing.

## 2024-06-24 ENCOUNTER — NURSE ONLY (OUTPATIENT)
Dept: FAMILY MEDICINE CLINIC | Age: 79
End: 2024-06-24
Payer: MEDICARE

## 2024-06-24 DIAGNOSIS — Z23 NEED FOR TUBERCULOSIS VACCINATION: Primary | ICD-10-CM

## 2024-06-24 PROCEDURE — 86580 TB INTRADERMAL TEST: CPT | Performed by: FAMILY MEDICINE

## 2024-06-25 ENCOUNTER — TELEPHONE (OUTPATIENT)
Dept: FAMILY MEDICINE CLINIC | Age: 79
End: 2024-06-25

## 2024-06-25 NOTE — TELEPHONE ENCOUNTER
Laura Hedrick calling is needing the Physician Plan Of Care that was faxed last wk    Called Patricia says its on 's desk    ASAP says this is time sensitive for patient to be able to move in.    [phone]

## 2024-06-26 ENCOUNTER — NURSE ONLY (OUTPATIENT)
Dept: FAMILY MEDICINE CLINIC | Age: 79
End: 2024-06-26

## 2024-06-26 DIAGNOSIS — Z11.1 ENCOUNTER FOR PPD SKIN TEST READING: Primary | ICD-10-CM

## 2024-06-26 LAB
INDURATION: 0
TB SKIN TEST: NEGATIVE

## 2024-06-26 NOTE — PROGRESS NOTES
PPD Reading Note  PPD read and results entered in [facility].  Result: 0 mm induration.  Interpretation: negative/normal  If test not read within 48-72 hours of initial placement, patient advised to repeat in other arm 1-3 weeks after this test.  Allergic reaction: no

## 2024-07-05 ENCOUNTER — OFFICE VISIT (OUTPATIENT)
Dept: GERIATRIC MEDICINE | Age: 79
End: 2024-07-05
Payer: MEDICARE

## 2024-07-05 DIAGNOSIS — Z95.828 HISTORY OF ARTERIAL BYPASS OF LOWER EXTREMITY: ICD-10-CM

## 2024-07-05 DIAGNOSIS — I25.10 CORONARY ARTERIOSCLEROSIS: Primary | ICD-10-CM

## 2024-07-05 DIAGNOSIS — F01.50 VASCULAR DEMENTIA WITHOUT BEHAVIORAL DISTURBANCE (HCC): ICD-10-CM

## 2024-07-05 DIAGNOSIS — G30.9 ALZHEIMER'S DISEASE, UNSPECIFIED (CODE) (HCC): ICD-10-CM

## 2024-07-05 DIAGNOSIS — I10 ESSENTIAL HYPERTENSION: ICD-10-CM

## 2024-07-05 DIAGNOSIS — L30.9 ECZEMA, UNSPECIFIED TYPE: ICD-10-CM

## 2024-07-05 DIAGNOSIS — I73.9 PAD (PERIPHERAL ARTERY DISEASE) (HCC): ICD-10-CM

## 2024-07-05 DIAGNOSIS — Z87.891 HISTORY OF TOBACCO ABUSE: ICD-10-CM

## 2024-07-05 DIAGNOSIS — J44.9 CHRONIC OBSTRUCTIVE PULMONARY DISEASE, UNSPECIFIED COPD TYPE (HCC): ICD-10-CM

## 2024-07-05 DIAGNOSIS — Z86.73 HISTORY OF STROKE: ICD-10-CM

## 2024-07-05 PROCEDURE — 1036F TOBACCO NON-USER: CPT | Performed by: PHYSICIAN ASSISTANT

## 2024-07-05 PROCEDURE — 1123F ACP DISCUSS/DSCN MKR DOCD: CPT | Performed by: PHYSICIAN ASSISTANT

## 2024-07-05 PROCEDURE — G8419 CALC BMI OUT NRM PARAM NOF/U: HCPCS | Performed by: PHYSICIAN ASSISTANT

## 2024-07-05 PROCEDURE — 99344 HOME/RES VST NEW MOD MDM 60: CPT | Performed by: PHYSICIAN ASSISTANT

## 2024-07-31 NOTE — HOME CARE
Active with [facility]  End of Cert: 7/4/91  Active Disciplines SN PT  Specific care being provided: PRO INSTRUCTIONS. Beth Hurst  MEDICATION INSTRUCTION AND POTENTIAL S/S INFECTION AND DVT  Concerns/Special Considerations:SJ LIVES WITH PATIENT.1 STORY HOME WITH 3 ENTRANCE STEPS WITH RAIL  PATIENT HAD RECENT CHANGE IN METOPROLOL DOSE, Sent in yesterday

## 2024-08-05 ENCOUNTER — OFFICE VISIT (OUTPATIENT)
Dept: NEUROLOGY | Age: 79
End: 2024-08-05
Payer: MEDICARE

## 2024-08-05 ENCOUNTER — PATIENT MESSAGE (OUTPATIENT)
Dept: NEUROLOGY | Age: 79
End: 2024-08-05

## 2024-08-05 VITALS
DIASTOLIC BLOOD PRESSURE: 80 MMHG | HEART RATE: 66 BPM | SYSTOLIC BLOOD PRESSURE: 130 MMHG | WEIGHT: 179 LBS | BODY MASS INDEX: 25.68 KG/M2

## 2024-08-05 DIAGNOSIS — I63.9 SMALL VESSEL CEREBROVASCULAR ACCIDENT (CVA) (HCC): ICD-10-CM

## 2024-08-05 DIAGNOSIS — F01.50 VASCULAR DEMENTIA WITHOUT BEHAVIORAL DISTURBANCE (HCC): Primary | ICD-10-CM

## 2024-08-05 DIAGNOSIS — G30.9 ALZHEIMER'S DISEASE, UNSPECIFIED (CODE) (HCC): ICD-10-CM

## 2024-08-05 PROCEDURE — 1036F TOBACCO NON-USER: CPT | Performed by: PSYCHIATRY & NEUROLOGY

## 2024-08-05 PROCEDURE — 3075F SYST BP GE 130 - 139MM HG: CPT | Performed by: PSYCHIATRY & NEUROLOGY

## 2024-08-05 PROCEDURE — 3079F DIAST BP 80-89 MM HG: CPT | Performed by: PSYCHIATRY & NEUROLOGY

## 2024-08-05 PROCEDURE — 99213 OFFICE O/P EST LOW 20 MIN: CPT | Performed by: PSYCHIATRY & NEUROLOGY

## 2024-08-05 PROCEDURE — G8419 CALC BMI OUT NRM PARAM NOF/U: HCPCS | Performed by: PSYCHIATRY & NEUROLOGY

## 2024-08-05 PROCEDURE — G8427 DOCREV CUR MEDS BY ELIG CLIN: HCPCS | Performed by: PSYCHIATRY & NEUROLOGY

## 2024-08-05 PROCEDURE — 1123F ACP DISCUSS/DSCN MKR DOCD: CPT | Performed by: PSYCHIATRY & NEUROLOGY

## 2024-08-05 ASSESSMENT — ENCOUNTER SYMPTOMS
NAUSEA: 0
BACK PAIN: 0
COLOR CHANGE: 0
CHOKING: 0
TROUBLE SWALLOWING: 0
SHORTNESS OF BREATH: 0
PHOTOPHOBIA: 0
VOMITING: 0

## 2024-08-05 NOTE — PROGRESS NOTES
Subjective:      Patient ID: Carl Molina is a 79 y.o. male who presents today for:  Chief Complaint   Patient presents with    Follow-up     Pt is moved into a memory care center and he has declined he needs a lot of direction pt does not drive or have a phone anymore.        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  Patient has mild cognitive impairment.  Patient seen here on a yearly basis for the same.  When last seen he was showing some decline and we had him on Aricept at that time his repeat MMSE was 21 we therefore then added Namenda.  Patient is now moved into memory care center and he does not drive.    Patient is now in a secured memory unit where he is supervised and safe and he seems happy about it    Past Medical History:   Diagnosis Date    CAD (coronary artery disease)     Eczema     Granuloma faciale     History of extremity bypass graft 11/16/2015    Left leg 11/14/2003    History of tobacco abuse 11/16/2015    Hyperlipidemia     Hypertension     Lipoma     PVD (peripheral vascular disease) ([facility])     Seborrheic keratosis     Small vessel cerebrovascular accident (CVA) ([facility]) 12/7/2020     Past Surgical History:   Procedure Laterality Date    CARDIAC CATHETERIZATION  2003    COLONOSCOPY  10-19-12    CORONARY ANGIOPLASTY WITH STENT PLACEMENT  2003    DIAGNOSTIC CARDIAC CATH LAB PROCEDURE      HAND SURGERY  2003    L    HIP SURGERY      JOINT REPLACEMENT      LUNG BIOPSY Right 09/05/2018    CT guided Left Lung Biopsy by Dr Fortino Pires    LUNG BIOPSY Right     [facility] INTERVENTION PERPH LES N/A 8/17/2018    EBUS WITH TRANSBRONCHIAL NEEDLE ASPIRATION W/ FLUOROSCOPY performed by Demi Lopez [place] [facility] [place]    TOTAL HIP ARTHROPLASTY Left 6/21/2019    LEFT  HIP TOTAL ARTHROPLASTY performed by Josue Krishna MD at [facility]     Social History     Socioeconomic History    Marital status:

## 2024-08-20 ENCOUNTER — OFFICE VISIT (OUTPATIENT)
Dept: GERIATRIC MEDICINE | Age: 79
End: 2024-08-20
Payer: MEDICARE

## 2024-08-20 DIAGNOSIS — F01.50 VASCULAR DEMENTIA WITHOUT BEHAVIORAL DISTURBANCE (HCC): ICD-10-CM

## 2024-08-20 DIAGNOSIS — M25.552 CHRONIC LEFT HIP PAIN: ICD-10-CM

## 2024-08-20 DIAGNOSIS — G89.29 CHRONIC LEFT HIP PAIN: ICD-10-CM

## 2024-08-20 DIAGNOSIS — G30.9 ALZHEIMER'S DISEASE, UNSPECIFIED (CODE) (HCC): Primary | ICD-10-CM

## 2024-08-20 PROCEDURE — 99348 HOME/RES VST EST LOW MDM 30: CPT | Performed by: PHYSICIAN ASSISTANT

## 2024-08-20 PROCEDURE — G8419 CALC BMI OUT NRM PARAM NOF/U: HCPCS | Performed by: PHYSICIAN ASSISTANT

## 2024-08-20 PROCEDURE — 1123F ACP DISCUSS/DSCN MKR DOCD: CPT | Performed by: PHYSICIAN ASSISTANT

## 2024-08-20 PROCEDURE — 1036F TOBACCO NON-USER: CPT | Performed by: PHYSICIAN ASSISTANT

## 2024-09-24 ENCOUNTER — OFFICE VISIT (OUTPATIENT)
Dept: GERIATRIC MEDICINE | Age: 79
End: 2024-09-24
Payer: MEDICARE

## 2024-09-24 DIAGNOSIS — F03.911 AGITATION DUE TO DEMENTIA (HCC): ICD-10-CM

## 2024-09-24 DIAGNOSIS — R41.82 ALTERED MENTAL STATUS, UNSPECIFIED ALTERED MENTAL STATUS TYPE: Primary | ICD-10-CM

## 2024-09-24 PROCEDURE — 99347 HOME/RES VST EST SF MDM 20: CPT | Performed by: PHYSICIAN ASSISTANT

## 2024-09-24 PROCEDURE — 1036F TOBACCO NON-USER: CPT | Performed by: PHYSICIAN ASSISTANT

## 2024-09-24 PROCEDURE — G8419 CALC BMI OUT NRM PARAM NOF/U: HCPCS | Performed by: PHYSICIAN ASSISTANT

## 2024-09-24 PROCEDURE — 1123F ACP DISCUSS/DSCN MKR DOCD: CPT | Performed by: PHYSICIAN ASSISTANT

## 2024-10-01 ENCOUNTER — OFFICE VISIT (OUTPATIENT)
Dept: GERIATRIC MEDICINE | Age: 79
End: 2024-10-01
Payer: MEDICARE

## 2024-10-01 DIAGNOSIS — N30.00 ACUTE CYSTITIS WITHOUT HEMATURIA: Primary | ICD-10-CM

## 2024-10-01 DIAGNOSIS — G30.9 ALZHEIMER'S DISEASE, UNSPECIFIED (CODE) (HCC): ICD-10-CM

## 2024-10-01 PROCEDURE — 99348 HOME/RES VST EST LOW MDM 30: CPT | Performed by: PHYSICIAN ASSISTANT

## 2024-10-01 PROCEDURE — 1123F ACP DISCUSS/DSCN MKR DOCD: CPT | Performed by: PHYSICIAN ASSISTANT

## 2024-10-01 PROCEDURE — G8484 FLU IMMUNIZE NO ADMIN: HCPCS | Performed by: PHYSICIAN ASSISTANT

## 2024-10-01 PROCEDURE — 1036F TOBACCO NON-USER: CPT | Performed by: PHYSICIAN ASSISTANT

## 2024-10-01 PROCEDURE — G8419 CALC BMI OUT NRM PARAM NOF/U: HCPCS | Performed by: PHYSICIAN ASSISTANT

## 2024-10-09 LAB
BUN BLDV-MCNC: 16.23 MG/DL
CALCIUM SERPL-MCNC: 9.3 MG/DL
CHLORIDE BLD-SCNC: 99.95 MMOL/L
CO2: 29.18 MMOL/L
CREAT SERPL-MCNC: 1.16 MG/DL
EGFR: 64
GLUCOSE BLD-MCNC: 94.37 MG/DL
POTASSIUM SERPL-SCNC: 4.1 MMOL/L
SODIUM BLD-SCNC: 138.66 MMOL/L

## 2024-10-18 NOTE — PROGRESS NOTES
Past Medical History:   Diagnosis Date    CAD (coronary artery disease)     Eczema     Granuloma faciale     History of extremity bypass graft 2015    Left leg 2003    History of tobacco abuse 2015    Hyperlipidemia     Hypertension     Lipoma     PVD (peripheral vascular disease) (HCC)     Seborrheic keratosis     Small vessel cerebrovascular accident (CVA) (HCC) 2020     Past Surgical History:   Procedure Laterality Date    CARDIAC CATHETERIZATION      COLONOSCOPY  10-19-12    CORONARY ANGIOPLASTY WITH STENT PLACEMENT      DIAGNOSTIC CARDIAC CATH LAB PROCEDURE      HAND SURGERY      L    HIP SURGERY      JOINT REPLACEMENT      LUNG BIOPSY Right 2018    CT guided Left Lung Biopsy by Dr Fortino Pires    LUNG BIOPSY Right     2018 per Dr Pires    MD [facility] EBUS DX/TX INTERVENTION PERPH LES N/A 2018    EBUS WITH TRANSBRONCHIAL NEEDLE ASPIRATION W/ FLUOROSCOPY performed by Demi Lopez [place] at [facility] [place]    TOTAL HIP ARTHROPLASTY Left 2019    LEFT  HIP TOTAL ARTHROPLASTY performed by Josue Krishna MD at [facility] [place]     Social History     Socioeconomic History    Marital status:      Spouse name: Not on file    Number of children: Not on file    Years of education: Not on file    Highest education level: Not on file   Occupational History     Employer: [employer]     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

## 2024-10-23 LAB
BILIRUBIN, URINE: ABNORMAL
BLOOD, URINE: POSITIVE
CLARITY, UA: ABNORMAL
COLOR, UA: ABNORMAL
GLUCOSE URINE: ABNORMAL
KETONES, URINE: NEGATIVE
LEUKOCYTE ESTERASE, URINE: ABNORMAL
NITRITE, URINE: POSITIVE
PH UA: 6.7 (ref 4.5–8)
PROTEIN UA: POSITIVE
SPECIFIC GRAVITY UA: 1.01 (ref 1–1.03)
UROBILINOGEN, URINE: NORMAL

## 2024-10-28 NOTE — PROGRESS NOTES
Subjective:      Patient ID: Carl Molina is a pleasant 79 y.o. male who presents today for:  Chief Complaint   Patient presents with    Other     Acute cystitis without hematuria  (primary encounter diagnosis)  Alzheimer's disease, unspecified         [facility]    Following up with patient today for UTI.  Patient currently on levofloxacin 200 mg p.o. x 5 days.  No new complaints of further symptoms.  Seems to have overall clear urine, no new dysuria, hematuria, foul-smelling urine.  He does needs to drink more per nurse, his urine is a bit dark presumably per nurse.  Will follow-up with a BMP in 1 week encouraging p.o. fluids throughout the next week.  No new additional concerns otherwise.      Patient Active Problem List   Diagnosis    Lipoma    Eczema    Hyperlipidemia    Senile hyperkeratosis    Granuloma faciale    Impotence    History of placement of stent in LAD coronary artery    History of arterial bypass of lower extremity    History of tobacco abuse    PAD (peripheral artery disease) ([facility])    Lung mass    Status post total hip replacement, right    Status post total hip replacement, left    Aftercare following joint replacement surgery    Carotid bruit    Essential hypertension    Atherosclerosis of native coronary artery with angina pectoris, unspecified whether native or transplanted heart ([facility])    Unilateral primary osteoarthritis, left hip    Primary osteoarthritis, right ankle and foot    Unspecified sprain of right foot, initial encounter    Coronary arteriosclerosis    Hypertensive disorder    Peripheral vascular disease ([facility])    History of repair of hip joint    History of total hip arthroplasty    S/P hip replacement    MCI (mild cognitive impairment)    Ulnar neuropathy at wrist, left    Memory loss, short term    Small vessel cerebrovascular accident (CVA) ([facility])    Mild cognitive disorder    Chronic obstructive pulmonary disease, unspecified COPD type ([facility])

## 2024-11-07 ENCOUNTER — OFFICE VISIT (OUTPATIENT)
Dept: CARDIOLOGY CLINIC | Age: 79
End: 2024-11-07

## 2024-11-07 VITALS
WEIGHT: 189 LBS | SYSTOLIC BLOOD PRESSURE: 190 MMHG | HEART RATE: 68 BPM | RESPIRATION RATE: 16 BRPM | OXYGEN SATURATION: 96 % | DIASTOLIC BLOOD PRESSURE: 100 MMHG | BODY MASS INDEX: 27.12 KG/M2

## 2024-11-07 DIAGNOSIS — I10 ESSENTIAL HYPERTENSION: Primary | ICD-10-CM

## 2024-11-07 ASSESSMENT — ENCOUNTER SYMPTOMS
GASTROINTESTINAL NEGATIVE: 1
RESPIRATORY NEGATIVE: 1
EYES NEGATIVE: 1
SHORTNESS OF BREATH: 0
COUGH: 0
STRIDOR: 0
BLOOD IN STOOL: 0
WHEEZING: 0
NAUSEA: 0
CHEST TIGHTNESS: 0

## 2024-11-07 NOTE — PROGRESS NOTES
tenderness or edema. Normal range of motion.      Cervical back: Normal range of motion and neck supple.   Neurological: He is alert and oriented to person, place, and time.   Skin: Skin is warm. No cyanosis. Nails show no clubbing.       LABS:  CBC:   Lab Results   Component Value Date/Time    WBC 5.4 10/17/2023 08:20 AM    RBC 5.11 10/17/2023 08:20 AM    RBC 4.94 10/14/2011 07:15 AM    HGB 15.0 10/17/2023 08:20 AM    HCT 45.8 10/17/2023 08:20 AM    MCV 89.6 10/17/2023 08:20 AM    MCH 29.4 10/17/2023 08:20 AM    MCHC 32.8 10/17/2023 08:20 AM    RDW 13.0 10/17/2023 08:20 AM     10/17/2023 08:20 AM    MPV 7.9 07/22/2015 05:46 AM     Lipids:  Lab Results   Component Value Date    CHOL 140 10/17/2023    CHOL 129 05/12/2023    CHOL 173 10/17/2022     Lab Results   Component Value Date    TRIG 79 10/17/2023    TRIG 70 05/12/2023    TRIG 119 10/17/2022     Lab Results   Component Value Date    HDL 56 10/17/2023    HDL 55 05/12/2023    HDL 62 (H) 10/17/2022     No components found for: \"LDLCHOLESTEROL\", \"LDLCALC\"    No results found for: \"VLDL\"  Lab Results   Component Value Date    CHOLHDLRATIO 3.7 10/05/2012    CHOLHDLRATIO 4.0 10/14/2011     CMP:    Lab Results   Component Value Date/Time    .66 10/09/2024 04:11 PM    K 4.1 10/09/2024 04:11 PM    K 4.3 06/22/2019 05:56 AM    CL 99.95 10/09/2024 04:11 PM    CO2 29.18 10/09/2024 04:11 PM    BUN 16.23 10/09/2024 04:11 PM    CREATININE 1.16 10/09/2024 04:11 PM    GFRAA >60.0 10/17/2022 11:00 AM    LABGLOM 64.00 10/09/2024 04:11 PM    GLUCOSE 94.37 10/09/2024 04:11 PM    GLUCOSE 104 10/14/2011 07:15 AM    CALCIUM 9.30 10/09/2024 04:11 PM    BILITOT 0.5 10/17/2023 08:20 AM    ALKPHOS 66 10/17/2023 08:20 AM    AST 24 10/17/2023 08:20 AM    ALT 24 10/17/2023 08:20 AM     BMP:    Lab Results   Component Value Date/Time    .66 10/09/2024 04:11 PM    K 4.1 10/09/2024 04:11 PM    K 4.3 06/22/2019 05:56 AM    CL 99.95 10/09/2024 04:11 PM    CO2 29.18 10/09/2024

## 2024-11-13 ENCOUNTER — OFFICE VISIT (OUTPATIENT)
Dept: GERIATRIC MEDICINE | Age: 79
End: 2024-11-13
Payer: MEDICARE

## 2024-11-13 DIAGNOSIS — R60.9 DEPENDENT EDEMA: Primary | ICD-10-CM

## 2024-11-13 PROCEDURE — G8484 FLU IMMUNIZE NO ADMIN: HCPCS | Performed by: PHYSICIAN ASSISTANT

## 2024-11-13 PROCEDURE — G8419 CALC BMI OUT NRM PARAM NOF/U: HCPCS | Performed by: PHYSICIAN ASSISTANT

## 2024-11-13 PROCEDURE — 1123F ACP DISCUSS/DSCN MKR DOCD: CPT | Performed by: PHYSICIAN ASSISTANT

## 2024-11-13 PROCEDURE — 1036F TOBACCO NON-USER: CPT | Performed by: PHYSICIAN ASSISTANT

## 2024-11-13 PROCEDURE — 99348 HOME/RES VST EST LOW MDM 30: CPT | Performed by: PHYSICIAN ASSISTANT

## 2024-11-19 ENCOUNTER — OFFICE VISIT (OUTPATIENT)
Dept: GERIATRIC MEDICINE | Age: 79
End: 2024-11-19

## 2024-11-19 DIAGNOSIS — R60.0 BILATERAL EDEMA OF LOWER EXTREMITY: Primary | ICD-10-CM

## 2024-12-10 ENCOUNTER — OFFICE VISIT (OUTPATIENT)
Dept: GERIATRIC MEDICINE | Age: 79
End: 2024-12-10
Payer: MEDICARE

## 2024-12-10 DIAGNOSIS — F03.918 AGGRESSIVE BEHAVIOR DUE TO DEMENTIA (HCC): ICD-10-CM

## 2024-12-10 DIAGNOSIS — F05 SUNDOWNING: ICD-10-CM

## 2024-12-10 DIAGNOSIS — F03.911 AGITATION DUE TO DEMENTIA (HCC): Primary | ICD-10-CM

## 2024-12-10 PROCEDURE — 99348 HOME/RES VST EST LOW MDM 30: CPT | Performed by: PHYSICIAN ASSISTANT

## 2024-12-10 PROCEDURE — G8484 FLU IMMUNIZE NO ADMIN: HCPCS | Performed by: PHYSICIAN ASSISTANT

## 2024-12-10 PROCEDURE — G8419 CALC BMI OUT NRM PARAM NOF/U: HCPCS | Performed by: PHYSICIAN ASSISTANT

## 2024-12-10 PROCEDURE — 1036F TOBACCO NON-USER: CPT | Performed by: PHYSICIAN ASSISTANT

## 2024-12-10 PROCEDURE — 1123F ACP DISCUSS/DSCN MKR DOCD: CPT | Performed by: PHYSICIAN ASSISTANT

## 2024-12-14 NOTE — PROGRESS NOTES
Other Topics Concern    Not on file   Social History Narrative    Not on file     Social Determinants of Health     Financial Resource Strain: Low Risk  (5/13/2024)    Overall Financial Resource Strain (CARDIA)     Difficulty of Paying Living Expenses: Not hard at all   Food Insecurity: No Food Insecurity (5/13/2024)    Hunger Vital Sign     Worried About Running Out of Food in the Last Year: Never true     Ran Out of Food in the Last Year: Never true   Transportation Needs: Unknown (5/13/2024)    PRAPARE - Transportation     Lack of Transportation (Medical): Not on file     Lack of Transportation (Non-Medical): No   Physical Activity: Sufficiently Active (10/2/2023)    Exercise Vital Sign     Days of Exercise per Week: 7 days     Minutes of Exercise per Session: 30 min   Stress: Not on file   Social Connections: Not on file   Intimate Partner Violence: Not on file   Housing Stability: Unknown (5/13/2024)    Housing Stability Vital Sign     Unable to Pay for Housing in the Last Year: Not on file     Number of Places Lived in the Last Year: Not on file     Unstable Housing in the Last Year: No     Family History   Problem Relation Age of Onset    High Blood Pressure Mother     Other Mother         BRAIN TUMOR    Cancer Father         LUNG     Allergies   Allergen Reactions    Lisinopril Anaphylaxis and Swelling    Contrast [Iodides] Other (See Comments)     Pt unsure of reaction.    Crestor [Rosuvastatin]     Pravachol [Pravastatin] Other (See Comments)     Joint / Muscle aches           Review of Systems   Unable to perform ROS: Dementia   All other systems reviewed and are negative.      Objective:   VS: See PCC. Reviewed.    Physical Exam  Vitals (SEE STUART) reviewed.   Constitutional:       General: He is not in acute distress.     Appearance: Normal appearance. He is normal weight. He is not ill-appearing.   HENT:      Head: Normocephalic and atraumatic.      Nose: Nose normal. No congestion.   Eyes:

## 2024-12-19 NOTE — PROGRESS NOTES
activity: Not on file   Other Topics Concern    Not on file   Social History Narrative    Not on file     Social Determinants of Health     Financial Resource Strain: Low Risk  (5/13/2024)    Overall Financial Resource Strain (CARDIA)     Difficulty of Paying Living Expenses: Not hard at all   Food Insecurity: No Food Insecurity (5/13/2024)    Hunger Vital Sign     Worried About Running Out of Food in the Last Year: Never true     Ran Out of Food in the Last Year: Never true   Transportation Needs: Unknown (5/13/2024)    PRAPARE - Transportation     Lack of Transportation (Medical): Not on file     Lack of Transportation (Non-Medical): No   Physical Activity: Sufficiently Active (10/2/2023)    Exercise Vital Sign     Days of Exercise per Week: 7 days     Minutes of Exercise per Session: 30 min   Stress: Not on file   Social Connections: Not on file   Intimate Partner Violence: Not on file   Housing Stability: Unknown (5/13/2024)    Housing Stability Vital Sign     Unable to Pay for Housing in the Last Year: Not on file     Number of Places Lived in the Last Year: Not on file     Unstable Housing in the Last Year: No     Family History   Problem Relation Age of Onset    High Blood Pressure Mother     Other Mother         BRAIN TUMOR    Cancer Father         LUNG     Allergies   Allergen Reactions    Lisinopril Anaphylaxis and Swelling    Contrast [Iodides] Other (See Comments)     Pt unsure of reaction.    Crestor [Rosuvastatin]     Pravachol [Pravastatin] Other (See Comments)     Joint / Muscle aches           Review of Systems   Unable to perform ROS: Dementia       Objective:   VS: See PCC. Reviewed.    Physical Exam  Vitals (SEE STUART) reviewed.   Constitutional:       General: He is not in acute distress.     Appearance: Normal appearance. He is normal weight. He is not ill-appearing.   HENT:      Head: Normocephalic and atraumatic.      Nose: Nose normal. No congestion.      Mouth/Throat:      Mouth: Mucous

## 2025-01-02 NOTE — PROGRESS NOTES
Psychiatric/Behavioral:  Positive for behavioral problems (per nurse and staff members), confusion and decreased concentration (dementia). Negative for agitation.        Objective:   VS: See PCC. Reviewed.    Physical Exam  Vitals (SEE STUART) reviewed.   Constitutional:       General: He is not in acute distress.     Appearance: Normal appearance. He is normal weight. He is not ill-appearing.   HENT:      Head: Normocephalic and atraumatic.      Nose: Nose normal. No congestion.      Mouth/Throat:      Mouth: Mucous membranes are moist.      Pharynx: Oropharynx is clear.   Cardiovascular:      Rate and Rhythm: Normal rate and regular rhythm.   Pulmonary:      Effort: Pulmonary effort is normal. No respiratory distress.   Abdominal:      General: Abdomen is flat. Bowel sounds are normal.      Palpations: Abdomen is soft.   Genitourinary:     Comments: DEFERRED  Musculoskeletal:      Right lower leg: Edema (0-1+ bilateral) present.      Left lower leg: Edema (0-1+) present.   Skin:     General: Skin is warm and dry.   Neurological:      Mental Status: He is alert. He is disoriented.      Motor: Weakness present.   Psychiatric:         Attention and Perception: Attention normal.         Mood and Affect: Mood normal. Affect is flat. Affect is not angry or inappropriate.         Speech: Speech normal. Speech is not rapid and pressured.         Behavior: Behavior is slowed. Behavior is cooperative.         Cognition and Memory: Cognition is impaired. Memory is impaired.         Assessment:       Diagnosis Orders   1. Agitation due to dementia (HCC)        2. Sundowning        3. Aggressive behavior due to dementia (HCC)              Plan:      No orders of the defined types were placed in this encounter.    No orders of the defined types were placed in this encounter.      No new changes to medication management currently. Cont to watch for changes. If further aggression, will have psych eval if famly in agreement.

## 2025-01-14 ENCOUNTER — OFFICE VISIT (OUTPATIENT)
Dept: GERIATRIC MEDICINE | Age: 80
End: 2025-01-14
Payer: MEDICARE

## 2025-01-14 DIAGNOSIS — G30.9 ALZHEIMER'S DISEASE, UNSPECIFIED (CODE) (HCC): ICD-10-CM

## 2025-01-14 DIAGNOSIS — F03.911 AGITATION DUE TO DEMENTIA (HCC): ICD-10-CM

## 2025-01-14 DIAGNOSIS — F01.50 VASCULAR DEMENTIA WITHOUT BEHAVIORAL DISTURBANCE (HCC): Primary | ICD-10-CM

## 2025-01-14 PROCEDURE — 1036F TOBACCO NON-USER: CPT | Performed by: PHYSICIAN ASSISTANT

## 2025-01-14 PROCEDURE — G8419 CALC BMI OUT NRM PARAM NOF/U: HCPCS | Performed by: PHYSICIAN ASSISTANT

## 2025-01-14 PROCEDURE — 1123F ACP DISCUSS/DSCN MKR DOCD: CPT | Performed by: PHYSICIAN ASSISTANT

## 2025-01-14 PROCEDURE — 99348 HOME/RES VST EST LOW MDM 30: CPT | Performed by: PHYSICIAN ASSISTANT

## 2025-01-21 ENCOUNTER — OFFICE VISIT (OUTPATIENT)
Dept: GERIATRIC MEDICINE | Age: 80
End: 2025-01-21
Payer: MEDICARE

## 2025-01-21 DIAGNOSIS — F03.911 AGITATION DUE TO DEMENTIA (HCC): Primary | ICD-10-CM

## 2025-01-21 PROCEDURE — 1036F TOBACCO NON-USER: CPT | Performed by: PHYSICIAN ASSISTANT

## 2025-01-21 PROCEDURE — G8419 CALC BMI OUT NRM PARAM NOF/U: HCPCS | Performed by: PHYSICIAN ASSISTANT

## 2025-01-21 PROCEDURE — 1123F ACP DISCUSS/DSCN MKR DOCD: CPT | Performed by: PHYSICIAN ASSISTANT

## 2025-01-21 PROCEDURE — 99348 HOME/RES VST EST LOW MDM 30: CPT | Performed by: PHYSICIAN ASSISTANT

## 2025-01-28 ENCOUNTER — OFFICE VISIT (OUTPATIENT)
Dept: GERIATRIC MEDICINE | Age: 80
End: 2025-01-28
Payer: MEDICARE

## 2025-01-28 DIAGNOSIS — F03.918 DEMENTIA WITH BEHAVIORAL PROBLEM (HCC): Primary | ICD-10-CM

## 2025-01-28 PROCEDURE — 1123F ACP DISCUSS/DSCN MKR DOCD: CPT | Performed by: PHYSICIAN ASSISTANT

## 2025-01-28 PROCEDURE — G8419 CALC BMI OUT NRM PARAM NOF/U: HCPCS | Performed by: PHYSICIAN ASSISTANT

## 2025-01-28 PROCEDURE — 1036F TOBACCO NON-USER: CPT | Performed by: PHYSICIAN ASSISTANT

## 2025-01-28 PROCEDURE — 99348 HOME/RES VST EST LOW MDM 30: CPT | Performed by: PHYSICIAN ASSISTANT

## 2025-02-04 ENCOUNTER — OFFICE VISIT (OUTPATIENT)
Dept: GERIATRIC MEDICINE | Age: 80
End: 2025-02-04

## 2025-02-04 DIAGNOSIS — F02.818 DEMENTIA, ALZHEIMER'S, WITH BEHAVIOR DISTURBANCE (HCC): Primary | ICD-10-CM

## 2025-02-04 DIAGNOSIS — G30.9 DEMENTIA, ALZHEIMER'S, WITH BEHAVIOR DISTURBANCE (HCC): Primary | ICD-10-CM

## 2025-02-04 DIAGNOSIS — F03.911 AGITATION DUE TO DEMENTIA (HCC): ICD-10-CM

## 2025-02-07 ENCOUNTER — APPOINTMENT (OUTPATIENT)
Dept: CT IMAGING | Age: 80
End: 2025-02-07
Payer: MEDICARE

## 2025-02-07 ENCOUNTER — HOSPITAL ENCOUNTER (EMERGENCY)
Age: 80
Discharge: HOME | End: 2025-02-07
Attending: INTERNAL MEDICINE
Payer: MEDICARE

## 2025-02-07 VITALS
RESPIRATION RATE: 16 BRPM | BODY MASS INDEX: 27.2 KG/M2 | DIASTOLIC BLOOD PRESSURE: 76 MMHG | TEMPERATURE: 98.5 F | HEART RATE: 49 BPM | WEIGHT: 190 LBS | OXYGEN SATURATION: 98 % | HEIGHT: 70 IN | SYSTOLIC BLOOD PRESSURE: 140 MMHG
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
## 2025-02-07 DIAGNOSIS — S00.03XA SCALP HEMATOMA, INITIAL ENCOUNTER: Primary | ICD-10-CM

## 2025-02-07 DIAGNOSIS — S01.01XA LACERATION OF SCALP, INITIAL ENCOUNTER: ICD-10-CM

## 2025-02-07 LAB
ANION GAP SERPL CALCULATED.3IONS-SCNC: 9 MEQ/L (ref 9–15)
BASOPHILS # BLD: 0 K/UL (ref 0–0.2)
BASOPHILS NFR BLD: 0.6 %
BUN SERPL-MCNC: 18 MG/DL (ref 8–23)
CALCIUM SERPL-MCNC: 8.3 MG/DL (ref 8.5–9.9)
CHLORIDE SERPL-SCNC: 102 MEQ/L (ref 95–107)
CK SERPL-CCNC: 181 U/L (ref 0–190)
CO2 SERPL-SCNC: 28 MEQ/L (ref 20–31)
CREAT SERPL-MCNC: 1.12 MG/DL (ref 0.7–1.2)
EKG ATRIAL RATE: 42 BPM
EKG P AXIS: 70 DEGREES
EKG P-R INTERVAL: 192 MS
EKG Q-T INTERVAL: 548 MS
EKG QRS DURATION: 94 MS
EKG QTC CALCULATION (BAZETT): 457 MS
EKG R AXIS: 44 DEGREES
EKG T AXIS: 78 DEGREES
EKG VENTRICULAR RATE: 42 BPM
EOSINOPHIL # BLD: 0.2 K/UL (ref 0–0.7)
EOSINOPHIL NFR BLD: 3.6 %
ERYTHROCYTE [DISTWIDTH] IN BLOOD BY AUTOMATED COUNT: 13.5 % (ref 11.5–14.5)
GLUCOSE SERPL-MCNC: 110 MG/DL (ref 70–99)
HCT VFR BLD AUTO: 39.8 % (ref 42–52)
HGB BLD-MCNC: 13.4 G/DL (ref 14–18)
LYMPHOCYTES # BLD: 1.5 K/UL (ref 1–4.8)
LYMPHOCYTES NFR BLD: 22.9 %
MCH RBC QN AUTO: 29.1 PG (ref 27–31.3)
MCHC RBC AUTO-ENTMCNC: 33.7 % (ref 33–37)
MCV RBC AUTO: 86.5 FL (ref 79–92.2)
MONOCYTES # BLD: 0.5 K/UL (ref 0.2–0.8)
MONOCYTES NFR BLD: 7.4 %
NEUTROPHILS # BLD: 4.3 K/UL (ref 1.4–6.5)
NEUTS SEG NFR BLD: 65 %
PLATELET # BLD AUTO: 230 K/UL (ref 130–400)
POTASSIUM SERPL-SCNC: 4.2 MEQ/L (ref 3.4–4.9)
RBC # BLD AUTO: 4.6 M/UL (ref 4.7–6.1)
SODIUM SERPL-SCNC: 139 MEQ/L (ref 135–144)
WBC # BLD AUTO: 6.6 K/UL (ref 4.8–10.8)

## 2025-02-07 PROCEDURE — 72125 CT NECK SPINE W/O DYE: CPT

## 2025-02-07 PROCEDURE — 85025 COMPLETE CBC W/AUTO DIFF WBC: CPT

## 2025-02-07 PROCEDURE — 12002 RPR S/N/AX/GEN/TRNK2.6-7.5CM: CPT

## 2025-02-07 PROCEDURE — 80048 BASIC METABOLIC PNL TOTAL CA: CPT

## 2025-02-07 PROCEDURE — 99284 EMERGENCY DEPT VISIT MOD MDM: CPT

## 2025-02-07 PROCEDURE — 6360000002 HC RX W HCPCS: Performed by: PHYSICIAN ASSISTANT

## 2025-02-07 PROCEDURE — 93005 ELECTROCARDIOGRAM TRACING: CPT | Performed by: PHYSICIAN ASSISTANT

## 2025-02-07 PROCEDURE — 90471 IMMUNIZATION ADMIN: CPT | Performed by: PHYSICIAN ASSISTANT

## 2025-02-07 PROCEDURE — 70450 CT HEAD/BRAIN W/O DYE: CPT

## 2025-02-07 PROCEDURE — 82550 ASSAY OF CK (CPK): CPT

## 2025-02-07 PROCEDURE — 90714 TD VACC NO PRESV 7 YRS+ IM: CPT | Performed by: PHYSICIAN ASSISTANT

## 2025-02-07 RX ADMIN — CLOSTRIDIUM TETANI TOXOID ANTIGEN (FORMALDEHYDE INACTIVATED) AND CORYNEBACTERIUM DIPHTHERIAE TOXOID ANTIGEN (FORMALDEHYDE INACTIVATED) 0.5 ML: 5; 2 INJECTION, SUSPENSION INTRAMUSCULAR at 19:00

## 2025-02-07 ASSESSMENT — PAIN - FUNCTIONAL ASSESSMENT: PAIN_FUNCTIONAL_ASSESSMENT: NONE - DENIES PAIN

## 2025-02-07 NOTE — ED PROVIDER NOTES
[facility] EMERGENCY DEPARTMENT  eMERGENCYdEPARTMENT eNCOUnter        Pt Name: Carl Molina  MRN: [id number]  Birthdate 1945of evaluation: 2/7/2025  Provider:Geronimo House PA-C  6:52 PM EST    CHIEF COMPLAINT       Chief Complaint   Patient presents with    Fall         HISTORY OF PRESENT ILLNESS  (Location/Symptom, Timing/Onset, Context/Setting, Quality, Duration, Modifying Factors, Severity.)   Carl Molina is a 79 y.o. male who presents to the emergency department      Patient presents emergency department via EMS from nursing home after an unwitnessed fall.  Per staff they found him on the ground with blood on the back of his head.  Patient has a history of dementia and he is at his baseline per staff.  Patient is able to answer questions but he states he does not remember how he fell.  He denies any pain.  When asked if he would report pain he did state he would.  He is currently on antiplatelet agent.  He is currently on 200 mg of Toprol and per nursing home staff he is consistently bradycardic.          Nursing Notes were reviewed and I agree.    REVIEW OF SYSTEMS    (2-9 systems for level 4, 10 or more for level 5)     Review of Systems   All other systems reviewed and are negative.       as noted above the remainder of the review of systems was reviewed and negative.       PAST MEDICAL HISTORY     Past Medical History:   Diagnosis Date    CAD (coronary artery disease)     Eczema     Granuloma faciale     History of extremity bypass graft 11/16/2015    Left leg 11/14/2003    History of tobacco abuse 11/16/2015    Hyperlipidemia     Hypertension     Lipoma     PVD (peripheral vascular disease) ([facility])     Seborrheic keratosis     Small vessel cerebrovascular accident (CVA) ([facility]) 12/7/2020         SURGICAL HISTORY       Past Surgical History:   Procedure Laterality Date    CARDIAC CATHETERIZATION  2003    COLONOSCOPY  10-19-12    CORONARY ANGIOPLASTY WITH STENT PLACEMENT  2003    DIAGNOSTIC  range or not returnedas of this dictation.    EMERGENCYDEPARTMENT COURSE and DIFFERENTIAL DIAGNOSIS/MDM:   Vitals:    Vitals:    02/07/25 1826 02/07/25 1830 02/07/25 1900 02/07/25 1930   BP:  134/65 (!) 141/76 (!) 140/76   Pulse: (!) 44 (!) 44 (!) 46 (!) 49   Resp: 21 19 18 16   Temp: 98.5 °F (36.9 °C)      TempSrc: Oral      SpO2: 95% 96% 99% 98%   Weight: 86.2 kg (190 lb)      Height: 1.778 m (5' 10\")            Medical Decision Making  Patient was taken for ridging of the head and neck.  He has no other traumatic findings on history or exam he does ambulate steadily.  CT did not demonstrate any acute abnormality.  C-collar promptly removed.  His posterior laceration was gently cleansed and reapproximated with staples.  Tolerated procedure well.  His lab work not demonstrate emergent abnormalities.  He was discharged back to his nursing facility with son as .    Amount and/or Complexity of Data Reviewed  Labs: ordered.  Radiology: ordered.  ECG/medicine tests: ordered.    Risk  Prescription drug management.       Coding     PROCEDURES:    Lac Repair    Date/Time: 2/7/2025 9:47 PM    Performed by: Geronimo House PA-C  Authorized by: Zheng Hernández DO    Consent:     Consent obtained:  Verbal    Consent given by:  Patient    Risks discussed:  Pain, poor cosmetic result and infection  Anesthesia:     Anesthesia method:  None  Laceration details:     Location:  Scalp    Scalp location:  Occipital    Length (cm):  4  Pre-procedure details:     Preparation:  Patient was prepped and draped in usual sterile fashion and imaging obtained to evaluate for foreign bodies  Treatment:     Irrigation solution:  Sterile saline  Skin repair:     Repair method:  Staples    Number of staples:  4  Approximation:     Approximation:  Close  Repair type:     Repair type:  Simple  Post-procedure details:     Dressing:  Non-adherent dressing        FINAL IMPRESSION      1. Scalp hematoma, initial encounter    2. Laceration of

## 2025-02-07 NOTE — ED TRIAGE NOTES
Pt presents to ER via squad from [facility].  Conscious, Aox2.  This is baseline per facility states EMS.  EMS states pt fell at facility, not witnessed, hit the back of his head, \"brief LOC\", on x1 baby asa daily.  Pt denies pain.  Pt states only complaint of light-headedness.  Unable to fully complete required documentation screening questions due to confusion.   yes

## 2025-02-08 NOTE — ED NOTES
Patients head cleaned with soap and water. Small portion of hair shaved to visualize wound. Geronimo MERRITT placed staples to close laceration. Patient tolerated well. Non adherent dressing applied.

## 2025-02-08 NOTE — DISCHARGE INSTRUCTIONS
Staples will need removed in approximately 7 to 10 days.  Keep the area clean dry and covered gentle rinsing and pat drying.  Follow close with family physician for recheck.

## 2025-02-10 LAB
EKG ATRIAL RATE: 42 BPM
EKG P AXIS: 70 DEGREES
EKG P-R INTERVAL: 192 MS
EKG Q-T INTERVAL: 548 MS
EKG QRS DURATION: 94 MS
EKG QTC CALCULATION (BAZETT): 457 MS
EKG R AXIS: 44 DEGREES
EKG T AXIS: 78 DEGREES
EKG VENTRICULAR RATE: 42 BPM

## 2025-02-11 ENCOUNTER — OFFICE VISIT (OUTPATIENT)
Dept: GERIATRIC MEDICINE | Age: 80
End: 2025-02-11
Payer: MEDICARE

## 2025-02-11 DIAGNOSIS — F01.50 VASCULAR DEMENTIA WITHOUT BEHAVIORAL DISTURBANCE (HCC): Primary | ICD-10-CM

## 2025-02-11 DIAGNOSIS — F03.918 DEMENTIA WITH OTHER BEHAVIORAL DISTURBANCE, UNSPECIFIED DEMENTIA SEVERITY, UNSPECIFIED DEMENTIA TYPE (HCC): ICD-10-CM

## 2025-02-11 PROCEDURE — 99348 HOME/RES VST EST LOW MDM 30: CPT | Performed by: PHYSICIAN ASSISTANT

## 2025-02-11 PROCEDURE — 1036F TOBACCO NON-USER: CPT | Performed by: PHYSICIAN ASSISTANT

## 2025-02-11 PROCEDURE — 1123F ACP DISCUSS/DSCN MKR DOCD: CPT | Performed by: PHYSICIAN ASSISTANT

## 2025-02-11 PROCEDURE — G8419 CALC BMI OUT NRM PARAM NOF/U: HCPCS | Performed by: PHYSICIAN ASSISTANT

## 2025-02-20 NOTE — PROGRESS NOTES
Subjective:      Patient ID: Carl Molina is a pleasant 79 y.o. male who presents today for:  Chief Complaint   Patient presents with    Other     Agitation due to dementia (hcc)  (primary encounter diagnosis)         [facility]    Pt is seen today for ongoing behaviors. Cont to have evidence of poor sleep, often sleeping intermittently throughout the daytime, but up at night. Sundowning behaviors. Entering other patients rooms and at times waking them or attempting to get in bed with them. Family not agreeable to restarting Namenda, which pt admitted with when he did not have similar behaviors. Agreeable to try to increase trazadone at night. Will attempt to have pt seen by psychiatric services.      Patient Active Problem List   Diagnosis    Lipoma    Eczema    Hyperlipidemia    Senile hyperkeratosis    Granuloma faciale    Impotence    History of placement of stent in LAD coronary artery    History of arterial bypass of lower extremity    History of tobacco abuse    PAD (peripheral artery disease)    Lung mass    Status post total hip replacement, right    Status post total hip replacement, left    Aftercare following joint replacement surgery    Carotid bruit    Essential hypertension    Atherosclerosis of native coronary artery with angina pectoris, unspecified whether native or transplanted heart    Unilateral primary osteoarthritis, left hip    Primary osteoarthritis, right ankle and foot    Unspecified sprain of right foot, initial encounter    Coronary arteriosclerosis    Hypertensive disorder    Peripheral vascular disease    History of repair of hip joint    History of total hip arthroplasty    S/P hip replacement    MCI (mild cognitive impairment)    Ulnar neuropathy at wrist, left    Memory loss, short term    Small vessel cerebrovascular accident (CVA) (HCC)    Mild cognitive disorder    Chronic obstructive pulmonary disease, unspecified COPD type (HCC)    Alzheimer's disease,

## 2025-02-25 NOTE — PROGRESS NOTES
Subjective:      Patient ID: Carl Molina is a pleasant 79 y.o. male who presents today for:  Chief Complaint   Patient presents with    Other     Dementia with behavioral problem (hcc)  (primary encounter diagnosis)         [facility]    Patient is seen today for ongoing dementia with agitation/behaviors.  Did add trazodone due to concern for poor sleep possibly causing behaviors, however does not appear this is the case yet family would like to further explore treating his mood and sleep and lieu of restarting Namenda. Pt continues to wander into other patients rooms, sometimes women's rooms and disturb their sleep fairly frequently.  Difficult to redirect and becomes verbally combative.  Will allow for increase in trazodone to 27 mg p.o. nightly, it has not seem to be helping.  Believe patient needs to be seen by psychiatric services to assess med management.  Will have family contacted to suggest and schedule for visit.       Patient Active Problem List   Diagnosis    Lipoma    Eczema    Hyperlipidemia    Senile hyperkeratosis    Granuloma faciale    Impotence    History of placement of stent in LAD coronary artery    History of arterial bypass of lower extremity    History of tobacco abuse    PAD (peripheral artery disease)    Lung mass    Status post total hip replacement, right    Status post total hip replacement, left    Aftercare following joint replacement surgery    Carotid bruit    Essential hypertension    Atherosclerosis of native coronary artery with angina pectoris, unspecified whether native or transplanted heart    Unilateral primary osteoarthritis, left hip    Primary osteoarthritis, right ankle and foot    Unspecified sprain of right foot, initial encounter    Coronary arteriosclerosis    Hypertensive disorder    Peripheral vascular disease    History of repair of hip joint    History of total hip arthroplasty    S/P hip replacement    MCI (mild cognitive impairment)
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
## 2025-03-11 ENCOUNTER — OFFICE VISIT (OUTPATIENT)
Dept: GERIATRIC MEDICINE | Age: 80
End: 2025-03-11
Payer: MEDICARE

## 2025-03-11 DIAGNOSIS — F03.918 AGGRESSIVE BEHAVIOR DUE TO DEMENTIA: ICD-10-CM

## 2025-03-11 DIAGNOSIS — F01.50 VASCULAR DEMENTIA WITHOUT BEHAVIORAL DISTURBANCE (HCC): Primary | ICD-10-CM

## 2025-03-11 DIAGNOSIS — F05 SUNDOWNING: ICD-10-CM

## 2025-03-11 PROCEDURE — 99348 HOME/RES VST EST LOW MDM 30: CPT | Performed by: PHYSICIAN ASSISTANT

## 2025-03-11 PROCEDURE — 1123F ACP DISCUSS/DSCN MKR DOCD: CPT | Performed by: PHYSICIAN ASSISTANT

## 2025-03-13 NOTE — PROGRESS NOTES
1965     Quit date: 2005     Years since quittin.7    Smokeless tobacco: Never   Vaping Use    Vaping status: Never Used   Substance and Sexual Activity    Alcohol use: Yes     Types: 5 Glasses of wine, 2 Cans of beer per week     Comment: 3- 4 TIMES A WEEK  WINE ONLY     Drug use: No    Sexual activity: Not on file   Other Topics Concern    Not on file   Social History Narrative    Not on file     Social Drivers of Health     Financial Resource Strain: Low Risk  (2024)    Overall Financial Resource Strain (CARDIA)     Difficulty of Paying Living Expenses: Not hard at all   Food Insecurity: No Food Insecurity (2024)    Hunger Vital Sign     Worried About Running Out of Food in the Last Year: Never true     Ran Out of Food in the Last Year: Never true   Transportation Needs: Unknown (2024)    PRAPARE - Transportation     Lack of Transportation (Medical): Not on file     Lack of Transportation (Non-Medical): No   Physical Activity: Sufficiently Active (10/2/2023)    Exercise Vital Sign     Days of Exercise per Week: 7 days     Minutes of Exercise per Session: 30 min   Stress: Not on file   Social Connections: Not on file   Intimate Partner Violence: Not on file   Housing Stability: Unknown (2024)    Housing Stability Vital Sign     Unable to Pay for Housing in the Last Year: Not on file     Number of Places Lived in the Last Year: Not on file     Unstable Housing in the Last Year: No     Family History   Problem Relation Age of Onset    High Blood Pressure Mother     Other Mother         BRAIN TUMOR    Cancer Father         LUNG     Allergies   Allergen Reactions    Lisinopril Anaphylaxis and Swelling    Contrast [Iodides] Other (See Comments)     Pt unsure of reaction.    Crestor [Rosuvastatin]     Pravachol [Pravastatin] Other (See Comments)     Joint / Muscle aches           Review of Systems   Unable to perform ROS: Dementia       Objective:   VS: See PCC. Reviewed.    Physical

## 2025-03-18 ENCOUNTER — OFFICE VISIT (OUTPATIENT)
Dept: GERIATRIC MEDICINE | Age: 80
End: 2025-03-18
Payer: MEDICARE

## 2025-03-18 DIAGNOSIS — Z72.89 INAPPROPRIATE SEXUAL BEHAVIOR: ICD-10-CM

## 2025-03-18 DIAGNOSIS — F02.818 DEMENTIA, ALZHEIMER'S, WITH BEHAVIOR DISTURBANCE (HCC): Primary | ICD-10-CM

## 2025-03-18 DIAGNOSIS — G30.9 DEMENTIA, ALZHEIMER'S, WITH BEHAVIOR DISTURBANCE (HCC): Primary | ICD-10-CM

## 2025-03-18 PROCEDURE — 1036F TOBACCO NON-USER: CPT | Performed by: PHYSICIAN ASSISTANT

## 2025-03-18 PROCEDURE — 1123F ACP DISCUSS/DSCN MKR DOCD: CPT | Performed by: PHYSICIAN ASSISTANT

## 2025-03-18 PROCEDURE — G8419 CALC BMI OUT NRM PARAM NOF/U: HCPCS | Performed by: PHYSICIAN ASSISTANT

## 2025-03-26 ENCOUNTER — OFFICE VISIT (OUTPATIENT)
Dept: GERIATRIC MEDICINE | Age: 80
End: 2025-03-26
Payer: MEDICARE

## 2025-03-26 DIAGNOSIS — F02.818 DEMENTIA, ALZHEIMER'S, WITH BEHAVIOR DISTURBANCE (HCC): Primary | ICD-10-CM

## 2025-03-26 DIAGNOSIS — G30.9 DEMENTIA, ALZHEIMER'S, WITH BEHAVIOR DISTURBANCE (HCC): Primary | ICD-10-CM

## 2025-03-26 PROCEDURE — 99348 HOME/RES VST EST LOW MDM 30: CPT | Performed by: PHYSICIAN ASSISTANT

## 2025-03-26 PROCEDURE — G8419 CALC BMI OUT NRM PARAM NOF/U: HCPCS | Performed by: PHYSICIAN ASSISTANT

## 2025-03-26 PROCEDURE — 1123F ACP DISCUSS/DSCN MKR DOCD: CPT | Performed by: PHYSICIAN ASSISTANT

## 2025-03-26 PROCEDURE — 1036F TOBACCO NON-USER: CPT | Performed by: PHYSICIAN ASSISTANT

## 2025-04-01 ENCOUNTER — OFFICE VISIT (OUTPATIENT)
Dept: GERIATRIC MEDICINE | Age: 80
End: 2025-04-01
Payer: MEDICARE

## 2025-04-01 DIAGNOSIS — G30.9 ALZHEIMER'S DISEASE, UNSPECIFIED (CODE) (HCC): ICD-10-CM

## 2025-04-01 DIAGNOSIS — F01.50 VASCULAR DEMENTIA WITHOUT BEHAVIORAL DISTURBANCE (HCC): ICD-10-CM

## 2025-04-01 DIAGNOSIS — Z72.89 INAPPROPRIATE SEXUAL BEHAVIOR: ICD-10-CM

## 2025-04-01 DIAGNOSIS — R60.0 BILATERAL EDEMA OF LOWER EXTREMITY: Primary | ICD-10-CM

## 2025-04-01 PROCEDURE — 1123F ACP DISCUSS/DSCN MKR DOCD: CPT | Performed by: PHYSICIAN ASSISTANT

## 2025-04-01 PROCEDURE — G8419 CALC BMI OUT NRM PARAM NOF/U: HCPCS | Performed by: PHYSICIAN ASSISTANT

## 2025-04-01 PROCEDURE — 99349 HOME/RES VST EST MOD MDM 40: CPT | Performed by: PHYSICIAN ASSISTANT

## 2025-04-01 PROCEDURE — 1036F TOBACCO NON-USER: CPT | Performed by: PHYSICIAN ASSISTANT

## 2025-04-08 ENCOUNTER — OFFICE VISIT (OUTPATIENT)
Dept: GERIATRIC MEDICINE | Age: 80
End: 2025-04-08
Payer: MEDICARE

## 2025-04-08 DIAGNOSIS — Z72.89 INAPPROPRIATE SEXUAL BEHAVIOR: Primary | ICD-10-CM

## 2025-04-08 DIAGNOSIS — G30.9 ALZHEIMER'S DISEASE, UNSPECIFIED (CODE) (HCC): ICD-10-CM

## 2025-04-08 PROCEDURE — 1123F ACP DISCUSS/DSCN MKR DOCD: CPT | Performed by: PHYSICIAN ASSISTANT

## 2025-04-08 PROCEDURE — G8419 CALC BMI OUT NRM PARAM NOF/U: HCPCS | Performed by: PHYSICIAN ASSISTANT

## 2025-04-08 PROCEDURE — 1036F TOBACCO NON-USER: CPT | Performed by: PHYSICIAN ASSISTANT

## 2025-04-08 PROCEDURE — 99348 HOME/RES VST EST LOW MDM 30: CPT | Performed by: PHYSICIAN ASSISTANT

## 2025-04-11 NOTE — PROGRESS NOTES
Subjective:      Patient ID: Carl Molina is a pleasant 79 y.o. male who presents today for:  Chief Complaint   Patient presents with    Other       [facility]    Patient seen today in memory care unit after [facility] [place] to follow-up on behaviors.  Patient continues to have some deviant behavior, including elevating of the pupils grams sometimes getting in bed with them.  This particular problematic with female patients.  He was placed on Depakote which was originally recommended to family/POA prior to inpatient stay.  Initially patient having some issues with metoprolol parameters, will reestablish appropriate parameters to hold for SBP's lower than below threshold.  Vital signs daily as well to be assessed during med passes.  Will perform Depakote level as well to evaluate current therapeutic levels.    Patient Active Problem List   Diagnosis    Lipoma    Eczema    Hyperlipidemia    Senile hyperkeratosis    Granuloma faciale    Impotence    History of placement of stent in LAD coronary artery    History of arterial bypass of lower extremity    History of tobacco abuse    PAD (peripheral artery disease)    Lung mass    Status post total hip replacement, right    Status post total hip replacement, left    Aftercare following joint replacement surgery    Carotid bruit    Essential hypertension    Atherosclerosis of native coronary artery with angina pectoris, unspecified whether native or transplanted heart    Unilateral primary osteoarthritis, left hip    Primary osteoarthritis, right ankle and foot    Unspecified sprain of right foot, initial encounter    Coronary arteriosclerosis    Hypertensive disorder    Peripheral vascular disease    History of repair of hip joint    History of total hip arthroplasty    S/P hip replacement    MCI (mild cognitive impairment)    Ulnar neuropathy at wrist, left    Memory loss, short term    Small vessel
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
## 2025-04-22 ENCOUNTER — OFFICE VISIT (OUTPATIENT)
Dept: GERIATRIC MEDICINE | Age: 80
End: 2025-04-22
Payer: MEDICARE

## 2025-04-22 DIAGNOSIS — F52.9 SEXUAL BEHAVIOR DISORDER: Primary | ICD-10-CM

## 2025-04-22 DIAGNOSIS — G30.9 ALZHEIMER'S DISEASE, UNSPECIFIED (CODE) (HCC): ICD-10-CM

## 2025-04-22 PROCEDURE — 1036F TOBACCO NON-USER: CPT | Performed by: PHYSICIAN ASSISTANT

## 2025-04-22 PROCEDURE — 1123F ACP DISCUSS/DSCN MKR DOCD: CPT | Performed by: PHYSICIAN ASSISTANT

## 2025-04-22 PROCEDURE — 99348 HOME/RES VST EST LOW MDM 30: CPT | Performed by: PHYSICIAN ASSISTANT

## 2025-04-22 PROCEDURE — G8419 CALC BMI OUT NRM PARAM NOF/U: HCPCS | Performed by: PHYSICIAN ASSISTANT

## 2025-04-22 NOTE — PROGRESS NOTES
Subjective:      Patient ID: Carl Molina is a pleasant 80 y.o. male who presents today for:  Chief Complaint   Patient presents with    Other     Dementia, alzheimer's, with behavior disturbance (hcc)  (primary encounter diagnosis)         [facility]    Patient seen today in memory care unit for advanced dementia with behaviors including sexual D&C.  Patient has had increased Depakote recently, seems to be making only a mild impact if at all.  Have patient routinely seen by psych month-to-month.  Considering adding cimetidine.  If any further evidence of sexual deviant behavior, will add.  Patient tends to have these behaviors towards the evening hours, will be working on plans with administration to redirect patient as best as possible.  Some efforts have been made to attempt to add activities to patient's regimen however patient has been resistant at times.  Continue make efforts to find appropriate activities to redirect during evening hours.    Patient Active Problem List   Diagnosis    Lipoma    Eczema    Hyperlipidemia    Senile hyperkeratosis    Granuloma faciale    Impotence    History of placement of stent in LAD coronary artery    History of arterial bypass of lower extremity    History of tobacco abuse    PAD (peripheral artery disease)    Lung mass    Status post total hip replacement, right    Status post total hip replacement, left    Aftercare following joint replacement surgery    Carotid bruit    Essential hypertension    Atherosclerosis of native coronary artery with angina pectoris, unspecified whether native or transplanted heart    Unilateral primary osteoarthritis, left hip    Primary osteoarthritis, right ankle and foot    Unspecified sprain of right foot, initial encounter    Coronary arteriosclerosis    Hypertensive disorder    Peripheral vascular disease    History of repair of hip joint    History of total hip arthroplasty    S/P hip replacement    MCI (mild
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
## 2025-05-21 ENCOUNTER — NURSING HOME VISIT (OUTPATIENT)
Dept: PRIMARY CARE | Facility: CLINIC | Age: 80
End: 2025-05-21
Payer: MEDICARE

## 2025-05-21 DIAGNOSIS — L03.119 CELLULITIS AND ABSCESS OF HAND: Primary | ICD-10-CM

## 2025-05-21 DIAGNOSIS — L02.519 CELLULITIS AND ABSCESS OF HAND: Primary | ICD-10-CM

## 2025-05-21 DIAGNOSIS — I73.9 PVD (PERIPHERAL VASCULAR DISEASE): ICD-10-CM

## 2025-05-21 DIAGNOSIS — F01.B2 MODERATE VASCULAR DEMENTIA WITH PSYCHOTIC DISTURBANCE: ICD-10-CM

## 2025-05-21 DIAGNOSIS — I25.10 CORONARY ARTERY DISEASE INVOLVING NATIVE CORONARY ARTERY OF NATIVE HEART WITHOUT ANGINA PECTORIS: ICD-10-CM

## 2025-05-21 DIAGNOSIS — E78.5 HYPERLIPIDEMIA LDL GOAL <100: ICD-10-CM

## 2025-05-21 DIAGNOSIS — I10 HYPERTENSION, ESSENTIAL: ICD-10-CM

## 2025-05-21 PROCEDURE — 99345 HOME/RES VST NEW HIGH MDM 75: CPT | Performed by: FAMILY MEDICINE

## 2025-05-26 ENCOUNTER — NURSING HOME VISIT (OUTPATIENT)
Dept: POST ACUTE CARE | Facility: EXTERNAL LOCATION | Age: 80
End: 2025-05-26
Payer: MEDICARE

## 2025-05-26 DIAGNOSIS — L03.119 CELLULITIS AND ABSCESS OF HAND: Primary | ICD-10-CM

## 2025-05-26 DIAGNOSIS — T73.3XXD FATIGUE DUE TO EXCESSIVE EXERTION, SUBSEQUENT ENCOUNTER: ICD-10-CM

## 2025-05-26 DIAGNOSIS — F01.B2 MODERATE VASCULAR DEMENTIA WITH PSYCHOTIC DISTURBANCE: ICD-10-CM

## 2025-05-26 DIAGNOSIS — L02.519 CELLULITIS AND ABSCESS OF HAND: Primary | ICD-10-CM

## 2025-05-26 DIAGNOSIS — R26.2 AMBULATORY DYSFUNCTION: ICD-10-CM

## 2025-05-26 PROCEDURE — 99348 HOME/RES VST EST LOW MDM 30: CPT | Performed by: FAMILY MEDICINE

## 2025-05-27 VITALS
TEMPERATURE: 97.6 F | SYSTOLIC BLOOD PRESSURE: 132 MMHG | RESPIRATION RATE: 18 BRPM | HEART RATE: 70 BPM | DIASTOLIC BLOOD PRESSURE: 78 MMHG

## 2025-05-27 PROBLEM — L03.119 CELLULITIS AND ABSCESS OF HAND: Status: ACTIVE | Noted: 2025-05-27

## 2025-05-27 PROBLEM — L02.519 CELLULITIS AND ABSCESS OF HAND: Status: ACTIVE | Noted: 2025-05-27

## 2025-05-27 PROBLEM — I73.9 PVD (PERIPHERAL VASCULAR DISEASE): Status: ACTIVE | Noted: 2025-05-27

## 2025-05-27 PROBLEM — R26.2 AMBULATORY DYSFUNCTION: Status: ACTIVE | Noted: 2025-05-27

## 2025-05-27 PROBLEM — I25.10 CORONARY ARTERY DISEASE INVOLVING NATIVE CORONARY ARTERY OF NATIVE HEART WITHOUT ANGINA PECTORIS: Status: ACTIVE | Noted: 2025-05-27

## 2025-05-27 RX ORDER — ASPIRIN 81 MG/1
81 TABLET ORAL DAILY
COMMUNITY

## 2025-05-27 RX ORDER — ISOSORBIDE MONONITRATE 60 MG/1
60 TABLET, EXTENDED RELEASE ORAL DAILY
COMMUNITY

## 2025-05-27 RX ORDER — CIMETIDINE 300 MG
600 TABLET ORAL 2 TIMES DAILY
COMMUNITY

## 2025-05-27 RX ORDER — CEPHALEXIN 500 MG/1
500 CAPSULE ORAL 4 TIMES DAILY
COMMUNITY
End: 2025-05-27 | Stop reason: SDUPTHER

## 2025-05-27 RX ORDER — METOPROLOL TARTRATE 100 MG/1
100 TABLET ORAL 2 TIMES DAILY
COMMUNITY

## 2025-05-27 RX ORDER — DIVALPROEX SODIUM 125 MG/1
125 TABLET, DELAYED RELEASE ORAL 3 TIMES DAILY
COMMUNITY

## 2025-05-27 RX ORDER — CEPHALEXIN 500 MG/1
CAPSULE ORAL
Start: 2025-05-27

## 2025-05-27 RX ORDER — COLCHICINE 0.6 MG/1
0.6 TABLET ORAL 2 TIMES DAILY
COMMUNITY
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
## 2025-05-27 NOTE — PROGRESS NOTES
Subjective     Patient ID: [id number]     Gómez Costello is a 80 y.o. male residing at [facility].    HPI  Reassessment of left hand cellulitis as well as recent reports of extreme fatigue.  Over the weekend, nurse spoke with me about extreme fatigue that the patient seemed to be experiencing.  He had not been sleeping well since moving in and May 19.  - Director of nursing today indicated the patient is doing much better energy wise because he has slept the past night or 2.    Left hand cellulitis: No reports of worsening or fever, chills, pain.  Director of nursing recommended occupational therapy for left hand contracture and physical therapy due to patient's ambulatory dysfunction.    Objective   /78   Pulse 70   Temp 36.4 °C (97.6 °F)   Resp 18    Physical Exam:   Dorsum of left hand with significant improvement.  Less hypertrophy.  Less scaling of skin.  Less erythema over the dorsum of the hand.  Fingers are contracted at the MCP joints.  Patient is alert, sitting in a chair in the hallway with nearby caregivers.  He is quiet but alert.  No behavior elicited.    Assessment/Plan   Problem List Items Addressed This Visit       Moderate vascular dementia with psychotic disturbance    Cellulitis and abscess of hand - Primary    Fatigue due to excessive exertion    Ambulatory dysfunction     Complete Keflex  Order PT/OT evaluation and treatment

## (undated) DEVICE — LABEL MED MINI W/ MARKER

## (undated) DEVICE — SUTURE VCRL + SZ 2-0 L36IN ABSRB UD L36MM CT-1 1/2 CIR VCP945H

## (undated) DEVICE — SUTURE VCRL SZ 1 L36IN ABSRB UD L36MM CT-1 1/2 CIR J947H

## (undated) DEVICE — ADAPTER TBNG DIA15MM SWVL FBROPT BRONCHSCP TERM 2 AXIS PEEP

## (undated) DEVICE — SINGLE USE SUCTION VALVE MAJ-209: Brand: SINGLE USE SUCTION VALVE (STERILE)

## (undated) DEVICE — BLADE SAW W098XL276IN THK0025IN CUT THK0039IN REPL SAG

## (undated) DEVICE — FORCEPS BX OVL CUP FEN DISPOSABLE CAP L 160CM RAD JAW 4

## (undated) DEVICE — NEEDLE HYPO 18GA L1.5IN THN WALL PIVOTING SHLD BVL ORIENTED

## (undated) DEVICE — SUTURE VCRL SZ 4-0 L18IN ABSRB UD L19MM PS-2 3/8 CIR PRIM J496H

## (undated) DEVICE — ELECTRODE PT RET AD L9FT HI MOIST COND ADH HYDRGEL CORDED

## (undated) DEVICE — SIPS DUAL 2 MINUTE TIP

## (undated) DEVICE — COVER,TABLE,44X90,STERILE: Brand: MEDLINE

## (undated) DEVICE — TRAP,MUCUS SPECIMEN, 80CC: Brand: MEDLINE

## (undated) DEVICE — DISPOSABLE CYTOLOGY BRUSH: Brand: DISPOSABLE CYTOLOGY BRUSH

## (undated) DEVICE — Z DISCONTINUED USE 2744636  DRESSING AQUACEL 14 IN ALG W3.5XL14IN POLYUR FLM CVR W/ HYDRCOLL

## (undated) DEVICE — TUBING, SUCTION, 1/4" X 10', STRAIGHT: Brand: MEDLINE

## (undated) DEVICE — SYRINGE MED 10ML LUERLOCK TIP W/O SFTY DISP

## (undated) DEVICE — SYRINGE, LUER SLIP, 30ML: Brand: MEDLINE

## (undated) DEVICE — GLOVE ORANGE PI 7 1/2   MSG9075

## (undated) DEVICE — SINGLE USE ASPIRATION NEEDLE

## (undated) DEVICE — GLOVE ORANGE PI 8   MSG9080

## (undated) DEVICE — CHLORAPREP 26ML ORANGE

## (undated) DEVICE — CONMED CHANNEL MASTER COMBINATION CLEANING BRUSH, 230 CM X 2.0 MM: Brand: CONMED

## (undated) DEVICE — NEEDLE SPNL 18GA L3.5IN W/ QNCKE SHARPER BVL DURA CLICK

## (undated) DEVICE — PACK PROCEDURE SURG TOT HIP DRP

## (undated) DEVICE — T4 HOOD

## (undated) DEVICE — Device: Brand: MEDEX

## (undated) DEVICE — TIBURON TOP SHEET: Brand: CONVERTORS

## (undated) DEVICE — SUTURE TICRN BR BL NDL C-20 SZ 5 30 8886302779

## (undated) DEVICE — SINGLE USE BIOPSY VALVE MAJ-210: Brand: SINGLE USE BIOPSY VALVE (STERILE)

## (undated) DEVICE — BRUSH BIOPSY SUPERTRAX

## (undated) DEVICE — 3M™ STERI-DRAPE™ U-DRAPE 1015: Brand: STERI-DRAPE™

## (undated) DEVICE — Z CONVERTED USE 2271043 CONTAINER SPEC COLL 4OZ SCR ON LID PEEL PCH

## (undated) DEVICE — MAT FLOOR ULTRA ABS 28X48IN

## (undated) DEVICE — VIAL ACCESS CANNULA,SMART TIP: Brand: MONOJECT

## (undated) DEVICE — SYRINGE MED 30ML STD CLR PLAS LUERLOCK TIP N CTRL DISP

## (undated) DEVICE — PAD N ADH W3XL4IN POLY COT SFT PERF FLM EASILY CUT ABSRB

## (undated) DEVICE — BIT DRL L30MM MOD FLEX DISP FOR ACET CUP SCR